# Patient Record
Sex: MALE | Race: WHITE | NOT HISPANIC OR LATINO | Employment: OTHER | URBAN - METROPOLITAN AREA
[De-identification: names, ages, dates, MRNs, and addresses within clinical notes are randomized per-mention and may not be internally consistent; named-entity substitution may affect disease eponyms.]

---

## 2017-01-13 ENCOUNTER — ALLSCRIPTS OFFICE VISIT (OUTPATIENT)
Dept: OTHER | Facility: OTHER | Age: 67
End: 2017-01-13

## 2017-01-27 DIAGNOSIS — E78.5 HYPERLIPIDEMIA: ICD-10-CM

## 2017-01-27 DIAGNOSIS — E11.9 TYPE 2 DIABETES MELLITUS WITHOUT COMPLICATIONS (HCC): ICD-10-CM

## 2017-02-05 ENCOUNTER — OFFICE VISIT (OUTPATIENT)
Dept: URGENT CARE | Facility: MEDICAL CENTER | Age: 67
End: 2017-02-05
Payer: COMMERCIAL

## 2017-02-05 PROCEDURE — 99202 OFFICE O/P NEW SF 15 MIN: CPT

## 2017-05-15 ENCOUNTER — ALLSCRIPTS OFFICE VISIT (OUTPATIENT)
Dept: OTHER | Facility: OTHER | Age: 67
End: 2017-05-15

## 2017-05-22 ENCOUNTER — APPOINTMENT (OUTPATIENT)
Dept: LAB | Facility: MEDICAL CENTER | Age: 67
End: 2017-05-22
Payer: COMMERCIAL

## 2017-05-22 ENCOUNTER — TRANSCRIBE ORDERS (OUTPATIENT)
Dept: ADMINISTRATIVE | Facility: HOSPITAL | Age: 67
End: 2017-05-22

## 2017-05-22 DIAGNOSIS — E78.5 HYPERLIPIDEMIA: ICD-10-CM

## 2017-05-22 DIAGNOSIS — E11.9 TYPE 2 DIABETES MELLITUS WITHOUT COMPLICATIONS (HCC): ICD-10-CM

## 2017-05-22 LAB
ALBUMIN SERPL BCP-MCNC: 3.6 G/DL (ref 3.5–5)
ALP SERPL-CCNC: 83 U/L (ref 46–116)
ALT SERPL W P-5'-P-CCNC: 30 U/L (ref 12–78)
ANION GAP SERPL CALCULATED.3IONS-SCNC: 7 MMOL/L (ref 4–13)
AST SERPL W P-5'-P-CCNC: 15 U/L (ref 5–45)
BILIRUB SERPL-MCNC: 0.57 MG/DL (ref 0.2–1)
BUN SERPL-MCNC: 30 MG/DL (ref 5–25)
CALCIUM SERPL-MCNC: 9.1 MG/DL (ref 8.3–10.1)
CHLORIDE SERPL-SCNC: 103 MMOL/L (ref 100–108)
CHOLEST SERPL-MCNC: 173 MG/DL (ref 50–200)
CO2 SERPL-SCNC: 30 MMOL/L (ref 21–32)
CREAT SERPL-MCNC: 0.95 MG/DL (ref 0.6–1.3)
EST. AVERAGE GLUCOSE BLD GHB EST-MCNC: 143 MG/DL
GFR SERPL CREATININE-BSD FRML MDRD: >60 ML/MIN/1.73SQ M
GLUCOSE P FAST SERPL-MCNC: 143 MG/DL (ref 65–99)
HBA1C MFR BLD: 6.6 % (ref 4.2–6.3)
HDLC SERPL-MCNC: 36 MG/DL (ref 40–60)
LDLC SERPL CALC-MCNC: 112 MG/DL (ref 0–100)
POTASSIUM SERPL-SCNC: 3.4 MMOL/L (ref 3.5–5.3)
PROT SERPL-MCNC: 7.8 G/DL (ref 6.4–8.2)
SODIUM SERPL-SCNC: 140 MMOL/L (ref 136–145)
TRIGL SERPL-MCNC: 123 MG/DL
TSH SERPL DL<=0.05 MIU/L-ACNC: 1.54 UIU/ML (ref 0.36–3.74)

## 2017-05-22 PROCEDURE — 80061 LIPID PANEL: CPT

## 2017-05-22 PROCEDURE — 36415 COLL VENOUS BLD VENIPUNCTURE: CPT

## 2017-05-22 PROCEDURE — 80053 COMPREHEN METABOLIC PANEL: CPT

## 2017-05-22 PROCEDURE — 84443 ASSAY THYROID STIM HORMONE: CPT

## 2017-05-22 PROCEDURE — 83036 HEMOGLOBIN GLYCOSYLATED A1C: CPT

## 2017-05-23 ENCOUNTER — GENERIC CONVERSION - ENCOUNTER (OUTPATIENT)
Dept: OTHER | Facility: OTHER | Age: 67
End: 2017-05-23

## 2017-08-31 ENCOUNTER — ALLSCRIPTS OFFICE VISIT (OUTPATIENT)
Dept: OTHER | Facility: OTHER | Age: 67
End: 2017-08-31

## 2017-08-31 DIAGNOSIS — Z12.5 ENCOUNTER FOR SCREENING FOR MALIGNANT NEOPLASM OF PROSTATE: ICD-10-CM

## 2017-08-31 DIAGNOSIS — N40.1 ENLARGED PROSTATE WITH LOWER URINARY TRACT SYMPTOMS (LUTS): ICD-10-CM

## 2017-08-31 LAB
CLARITY UR: NORMAL
COLOR UR: YELLOW
GLUCOSE (HISTORICAL): NORMAL
HGB UR QL STRIP.AUTO: NORMAL
KETONES UR STRIP-MCNC: NORMAL MG/DL
LEUKOCYTE ESTERASE UR QL STRIP: NORMAL
NITRITE UR QL STRIP: NORMAL
PH UR STRIP.AUTO: 5 [PH]
PROT UR STRIP-MCNC: NORMAL MG/DL
SP GR UR STRIP.AUTO: 1.01

## 2017-09-02 ENCOUNTER — APPOINTMENT (OUTPATIENT)
Dept: LAB | Facility: MEDICAL CENTER | Age: 67
End: 2017-09-02
Payer: MEDICARE

## 2017-09-02 DIAGNOSIS — Z12.5 ENCOUNTER FOR SCREENING FOR MALIGNANT NEOPLASM OF PROSTATE: ICD-10-CM

## 2017-09-02 DIAGNOSIS — N40.1 ENLARGED PROSTATE WITH LOWER URINARY TRACT SYMPTOMS (LUTS): ICD-10-CM

## 2017-09-02 LAB — PSA SERPL-MCNC: 2.1 NG/ML (ref 0–4)

## 2017-09-02 PROCEDURE — G0103 PSA SCREENING: HCPCS

## 2017-09-02 PROCEDURE — 36415 COLL VENOUS BLD VENIPUNCTURE: CPT

## 2017-09-07 ENCOUNTER — HOSPITAL ENCOUNTER (OUTPATIENT)
Dept: ULTRASOUND IMAGING | Facility: MEDICAL CENTER | Age: 67
Discharge: HOME | End: 2017-09-07
Payer: MEDICARE

## 2017-09-07 ENCOUNTER — APPOINTMENT (OUTPATIENT)
Dept: ULTRASOUND IMAGING | Facility: MEDICAL CENTER | Age: 67
End: 2017-09-07
Payer: MEDICARE

## 2017-09-07 DIAGNOSIS — N40.1 ENLARGED PROSTATE WITH LOWER URINARY TRACT SYMPTOMS (LUTS): ICD-10-CM

## 2017-09-07 PROCEDURE — 51798 US URINE CAPACITY MEASURE: CPT

## 2017-09-22 DIAGNOSIS — E11.9 TYPE 2 DIABETES MELLITUS WITHOUT COMPLICATIONS (HCC): ICD-10-CM

## 2017-09-22 DIAGNOSIS — E78.5 HYPERLIPIDEMIA: ICD-10-CM

## 2017-09-28 ENCOUNTER — ALLSCRIPTS OFFICE VISIT (OUTPATIENT)
Dept: OTHER | Facility: OTHER | Age: 67
End: 2017-09-28

## 2017-09-28 LAB
BILIRUB UR QL STRIP: NORMAL
CLARITY UR: NORMAL
COLOR UR: YELLOW
GLUCOSE (HISTORICAL): NORMAL
HGB UR QL STRIP.AUTO: NORMAL
KETONES UR STRIP-MCNC: NORMAL MG/DL
LEUKOCYTE ESTERASE UR QL STRIP: NORMAL
NITRITE UR QL STRIP: NORMAL
PH UR STRIP.AUTO: 5 [PH]
PROT UR STRIP-MCNC: NORMAL MG/DL
SP GR UR STRIP.AUTO: 1
UROBILINOGEN UR QL STRIP.AUTO: NORMAL

## 2017-10-25 NOTE — CONSULTS
Assessment  1  Enlarged prostate with lower urinary tract symptoms (LUTS) (600 01) (N40 1)    Plan  Encounter for screening for malignant neoplasm of prostate, Enlarged prostate with  lower urinary tract symptoms (LUTS)    · (1) PSA (SCREEN) (Dx V76 44 Screen for Prostate Cancer); Status:Active; Requested  for:73Yfk1786;    Perform:[facility]; Due:31Aug2018; Ordered;For:Encounter for screening for malignant neoplasm of prostate, Enlarged prostate with lower urinary tract symptoms (LUTS); Ordered By:Moisés Butler;  Enlarged prostate with lower urinary tract symptoms (LUTS)    · Tamsulosin HCl - 0 4 MG Oral Capsule; 1 PO every HS   Rx By: Rich Caballero; Dispense: 30 Days ; #:30 Capsule; Refill: 11; For: Enlarged prostate with lower urinary tract symptoms (LUTS); KRISTINE = N; Sent To:  [facility]   · Cystourethroscopy - POC; Status:Active - Perform Order; Requested for:08Ore1003;    Perform: In Office; Due:31Aug2018; Ordered;For:Enlarged prostate with lower urinary tract symptoms (LUTS); Ordered By:Brigitte Butler;   · US KIDNEY AND BLADDER; Status:Hold For - Scheduling; Requested for:42Tfw2173;    Perform:[facility]; Due:31Aug2018; Ordered;For:Enlarged prostate with lower urinary tract symptoms (LUTS); Ordered By:Brigitte Bulter;   · US MEASURE POST VOID RESIDUAL; Status:Hold For - Scheduling; Requested  for:31Aug2017;    Perform:[facility]; Due:31Aug2018; Ordered;For:Enlarged prostate with lower urinary tract symptoms (LUTS); Ordered By:Moisés Butler;  Nocturia    · Urine Dip Non-Automated- POC; Status:Complete - Retrospective By Protocol  Authorization;   Done: 89LML5609 09:49AM   Performed: In Office; Due:70Ani0146; Last Updated Lizz Sutherland; 8/31/2017 9:51:27 AM;Ordered;For:Nocturia; Ordered By:Brigitte Butler; Discussion/Summary  Discussion Summary:   Multiple urinary symptoms   Patient likely bladder outlet obstruction although requires further evaluation  He is also due for PSA  I will check an ultrasound of urinary tract with postvoid residual  I will also schedule him for cystoscopy  Regardless, I would like him to resume tamsulosin  He may have this at home  If not a new prescription was sent to his pharmacy  He understands and agrees with the plan  Chief Complaint  Chief Complaint Free Text Note Form: BPH; Nocturia      History of Present Illness  HPI: 30-year-old male presents complaining of significant lower urinary tract symptoms for several years  AUA symptom score 30  His primary complaint is nocturia up to 5 times per night, but he also has weak urinary stream occasional hesitancy and feeling of incomplete emptying  He has frequency and urgency as well on occasion  It seems as though he has been on tamsulosin for short-term as well as finasteride for short-term  He discontinued those medications on his own because of fear of side effects  2 2 on 9/27/16      Review of Systems  Complete-Male Urology:   Constitutional: No fever or chills, feels well, no tiredness, no recent weight gain or weight loss  Respiratory: No complaints of shortness of breath, no wheezing, no cough, no SOB on exertion, no orthopnea or PND  Cardiovascular: No complaints of slow heart rate, no fast heart rate, no chest pain, no palpitations, no leg claudication, no lower extremity  Gastrointestinal: No complaints of abdominal pain, no constipation, no nausea or vomiting, no diarrhea or bloody stools  Genitourinary: incontinence-- (occasional urge incontinence ),-- feelings of urinary urgency-- and-- stream quality poor, but-- as noted in HPI,-- no hematuria-- and-- no empty sensation--    The patient presents with complaints of dysuria (occasional burning )     The patient presents with complaints of urinary hesitancy (occasional )   The patient presents with complaints of nocturia (4-5x/night)   Musculoskeletal: No complaints of arthralgia, no myalgias, no joint swelling or stiffness, no limb pain or swelling  Integumentary: No complaints of skin rash or skin lesions, no itching, no skin wound, no dry skin  Hematologic/Lymphatic: No complaints of swollen glands, no swollen glands in the neck, does not bleed easily, no easy bruising  Neurological: No compliants of headache, no confusion, no convulsions, no numbness or tingling, no dizziness or fainting, no limb weakness, no difficulty walking  ROS Reviewed:   ROS reviewed  Active Problems  1  Dermatitis (692 9) (L30 9)   2  Encounter for prostate cancer screening (V76 44) (Z12 5)   3  Enlarged prostate with lower urinary tract symptoms (LUTS) (600 01) (N40 1)   4  Hearing loss, right (389 9) (H91 91)   5  Hyperlipidemia (272 4) (E78 5)   6  Hypertension (401 9) (I10)   7  Need for Tdap vaccination (V06 1) (Z23)   8  Nocturia (788 43) (R35 1)   9  Screening for colon cancer (V76 51) (Z12 11)   10  Syringomyelia (336 0) (G95 0)   11  Type 2 diabetes mellitus (250 00) (E11 9)    Past Medical History  1  History of Diverticulosis (562 10) (K57 90)   2  History of hemorrhoids (V13 89) (Z87 19)   3  History of Polyp, sigmoid colon (211 3) (D12 5)  Active Problems And Past Medical History Reviewed: The active problems and past medical history were reviewed and updated today  Surgical History  1  History of Colonoscopy   2  History of Ear Surgery   3  History of Nose Surgery  Surgical History Reviewed: The surgical history was reviewed and updated today  Family History  Mother    1  Family history of Hepatitis  Father    2  Family history of cardiac disorder (V17 49) (Z82 49)  Family History Reviewed: The family history was reviewed and updated today         Social History   · Advance directive information unavailable   · Full-time employment   · Denied: History of domestic violence   ·    · Never smoker   · Occasional alcohol use   · Denied: History of Problem with Latter day or spiritual beliefs regarding medical care   · Three children  Social History Reviewed: The social history was reviewed and updated today  Current Meds   1  AmLODIPine Besylate 10 MG Oral Tablet; TAKE 1 TABLET DAILY; Therapy: 85JXJ3656 to (Evaluate:08Jan2018)  Requested for: 21IDD9781; Last   Rx:13Jan2017 Ordered   2  Clobetasol Propionate 0 05 % External Cream; APPLY SPARINGLY TO AFFECTED   AREA(S) TWICE DAILY; Therapy: 21PWA9959 to (Oseas Gallagher)  Requested for: 13DNI5436; Last   Rx:22Jan2015 Ordered   3  Gabapentin 600 MG Oral Tablet; TAKE 1 CAPSULE BY MOUTH THREE TIMES DAILY AS   NEEDED; Therapy: 50OPV2034 to ([phone])  Requested for: 51TFY7305; Last   Rx:27Zzt5668 Ordered   4  HydroCHLOROthiazide 25 MG Oral Tablet; take 1 tablet by mouth once daily; Therapy: 59QMS7818 to (Evaluate:48Axx3987)  Requested for: 99OUX3493; Last   Rx:59Veq2558 Ordered   5  MetFORMIN HCl ER (OSM) 500 MG Oral Tablet Extended Release 24 Hour; take 1 tablet   by mouth once daily; Therapy: 81KYY4313 to (Evaluate:56Nmy6540)  Requested for: 54Bbl6865; Last   Rx:74Cyu9802 Ordered   6  OneTouch Ultra 2 w/Device Kit; USE AS DIRECTED; Therapy: 46JEY1920 to (Evaluate:05Oct2017)  Requested for: 35HBI2427; Last   Rx:14Nxo8272 Ordered   7  OneTouch Ultra Blue In Vitro Strip; TEST 1-2 TIMES PER DAY; Therapy: 22ZLR0890 to ([phone])  Requested for: 78IXR4746; Last   Rx:91Awg6533 Ordered   8  OneTouch UltraSoft Lancets Miscellaneous; check BS once daily; Therapy: 88ZIA6605 to ([phone])  Requested for: 76ADS6322; Last   Rx:31Oou6782 Ordered   9  TraMADol HCl - 50 MG Oral Tablet; TAKE 1 TABLET BY MOUTH TID  AS NEEDED FOR   MODERATE PAIN;   Therapy: 22Apr2017 to (Evaluate:34Ftn0054)  Requested for: 83RFC6231; Last   Rx:45Ezh4272 Ordered  Medication List Reviewed: The medication list was reviewed and updated today  Allergies  1  Lipitor TABS  2  No Known Environmental Allergies   3   No Known Food Allergies    Vitals  Vital Signs    Recorded: 31Aug2017 09:48AM   Heart Rate 78   Systolic 770   Diastolic 70   Height 5 ft 5 in   Weight 159 lb    BMI Calculated 26 46   BSA Calculated 1 79     Physical Exam    Constitutional   General appearance: No acute distress, well appearing and well nourished  Pulmonary   Respiratory effort: No increased work of breathing or signs of respiratory distress  Cardiovascular   Examination of extremities for edema and/or varicosities: Normal     Abdomen   Abdomen: Non-tender, no masses  Genitourinary   Digital rectal exam of prostate: Normal size, no masses  -- About 40 g, smooth, no nodules  Musculoskeletal   Gait and station: Normal     Skin   Skin and subcutaneous tissue: Normal without rashes or lesions  Lymphatic   Palpation of lymph nodes in groin: Normal     Neurologic   Sensation: No sensory loss         Results/Data  AUA Symptom Score 31Aug2017 09:51AM User, Ahs     Test Name Result Flag Reference   AUA Symptom Score (for prostate disease) 30     Incomplete emptying: Almost always (5)  Frequency: Almost always (5)  Intermittency: Almost always (5)  Urgency: Almost always (5)  Weak-stream: About half the time (3)  Straining: About half the time (3)  Nocturia: More than half the time (4)   AUA Symptom Score (for prostate disease) - Quality of Life Due to Urinary Symptoms Mixed     AUA Symptom Score (for prostate disease) - Score Category Severe       Urine Dip Non-Automated- POC 31Aug2017 09:49AM Guicho Newman     Test Name Result Flag Reference   Color Yellow     Clarity Transparent     Leukocytes -     Nitrite -     Blood -     Protein -     Ph 5 0     Specific Gravity 1 010     Ketone -     Glucose -         Future Appointments    Date/Time Provider Specialty Site   11/15/2017 10:00 AM Lavelle Lanza MD [place] [address]     Signatures   Electronically signed by : Luane Closs, M D ; Aug 31 2017 10:43AM EST (Author)

## 2017-11-13 ENCOUNTER — GENERIC CONVERSION - ENCOUNTER (OUTPATIENT)
Dept: OTHER | Facility: OTHER | Age: 67
End: 2017-11-13

## 2017-11-15 ENCOUNTER — TRANSCRIBE ORDERS (OUTPATIENT)
Dept: ADMINISTRATIVE | Facility: HOSPITAL | Age: 67
End: 2017-11-15

## 2017-11-15 ENCOUNTER — ALLSCRIPTS OFFICE VISIT (OUTPATIENT)
Dept: OTHER | Facility: OTHER | Age: 67
End: 2017-11-15

## 2017-11-15 DIAGNOSIS — G95.0 SYRINGOMYELIA (HCC): Primary | ICD-10-CM

## 2017-11-20 ENCOUNTER — TELEPHONE (OUTPATIENT)
Dept: PREADMISSION TESTING | Facility: HOSPITAL | Age: 67
End: 2017-11-20

## 2017-11-22 ENCOUNTER — APPOINTMENT (OUTPATIENT)
Dept: LAB | Facility: MEDICAL CENTER | Age: 67
End: 2017-11-22
Payer: MEDICARE

## 2017-11-22 ENCOUNTER — GENERIC CONVERSION - ENCOUNTER (OUTPATIENT)
Dept: OTHER | Facility: OTHER | Age: 67
End: 2017-11-22

## 2017-11-22 DIAGNOSIS — E78.5 HYPERLIPIDEMIA: ICD-10-CM

## 2017-11-22 DIAGNOSIS — E11.9 TYPE 2 DIABETES MELLITUS WITHOUT COMPLICATIONS (HCC): ICD-10-CM

## 2017-11-22 LAB
ALBUMIN SERPL BCP-MCNC: 3.6 G/DL (ref 3.5–5)
ALP SERPL-CCNC: 87 U/L (ref 46–116)
ALT SERPL W P-5'-P-CCNC: 30 U/L (ref 12–78)
ANION GAP SERPL CALCULATED.3IONS-SCNC: 8 MMOL/L (ref 4–13)
AST SERPL W P-5'-P-CCNC: 18 U/L (ref 5–45)
BILIRUB SERPL-MCNC: 0.69 MG/DL (ref 0.2–1)
BUN SERPL-MCNC: 23 MG/DL (ref 5–25)
CALCIUM SERPL-MCNC: 9 MG/DL (ref 8.3–10.1)
CHLORIDE SERPL-SCNC: 101 MMOL/L (ref 100–108)
CHOLEST SERPL-MCNC: 179 MG/DL (ref 50–200)
CO2 SERPL-SCNC: 28 MMOL/L (ref 21–32)
CREAT SERPL-MCNC: 0.88 MG/DL (ref 0.6–1.3)
CREAT UR-MCNC: 13.9 MG/DL
EST. AVERAGE GLUCOSE BLD GHB EST-MCNC: 143 MG/DL
GFR SERPL CREATININE-BSD FRML MDRD: 90 ML/MIN/1.73SQ M
GLUCOSE P FAST SERPL-MCNC: 118 MG/DL (ref 65–99)
HBA1C MFR BLD: 6.6 % (ref 4.2–6.3)
HDLC SERPL-MCNC: 38 MG/DL (ref 40–60)
LDLC SERPL CALC-MCNC: 123 MG/DL (ref 0–100)
MICROALBUMIN UR-MCNC: <5 MG/L (ref 0–20)
MICROALBUMIN/CREAT 24H UR: <36 MG/G CREATININE (ref 0–30)
POTASSIUM SERPL-SCNC: 3 MMOL/L (ref 3.5–5.3)
PROT SERPL-MCNC: 8.1 G/DL (ref 6.4–8.2)
SODIUM SERPL-SCNC: 137 MMOL/L (ref 136–145)
TRIGL SERPL-MCNC: 92 MG/DL

## 2017-11-22 PROCEDURE — 82043 UR ALBUMIN QUANTITATIVE: CPT

## 2017-11-22 PROCEDURE — 36415 COLL VENOUS BLD VENIPUNCTURE: CPT

## 2017-11-22 PROCEDURE — 82570 ASSAY OF URINE CREATININE: CPT

## 2017-11-22 PROCEDURE — 80053 COMPREHEN METABOLIC PANEL: CPT

## 2017-11-22 PROCEDURE — 83036 HEMOGLOBIN GLYCOSYLATED A1C: CPT

## 2017-11-22 PROCEDURE — 80061 LIPID PANEL: CPT

## 2017-12-06 ENCOUNTER — GENERIC CONVERSION - ENCOUNTER (OUTPATIENT)
Dept: OTHER | Facility: OTHER | Age: 67
End: 2017-12-06

## 2017-12-06 ENCOUNTER — ANESTHESIA (OUTPATIENT)
Dept: RADIOLOGY | Facility: HOSPITAL | Age: 67
End: 2017-12-06

## 2017-12-06 ENCOUNTER — ANESTHESIA EVENT (OUTPATIENT)
Dept: RADIOLOGY | Facility: HOSPITAL | Age: 67
End: 2017-12-06

## 2017-12-06 ENCOUNTER — HOSPITAL ENCOUNTER (OUTPATIENT)
Dept: RADIOLOGY | Facility: HOSPITAL | Age: 67
Discharge: HOME | End: 2017-12-06
Payer: MEDICARE

## 2017-12-06 VITALS
WEIGHT: 160 LBS | BODY MASS INDEX: 25.71 KG/M2 | TEMPERATURE: 98.3 F | DIASTOLIC BLOOD PRESSURE: 78 MMHG | HEIGHT: 66 IN | HEART RATE: 56 BPM | RESPIRATION RATE: 16 BRPM | SYSTOLIC BLOOD PRESSURE: 158 MMHG | OXYGEN SATURATION: 97 %

## 2017-12-06 DIAGNOSIS — E87.6 HYPOKALEMIA: ICD-10-CM

## 2017-12-06 DIAGNOSIS — G95.0 SYRINGOMYELIA (HCC): ICD-10-CM

## 2017-12-06 LAB — GLUCOSE SERPL-MCNC: 105 MG/DL (ref 65–140)

## 2017-12-06 PROCEDURE — 72141 MRI NECK SPINE W/O DYE: CPT

## 2017-12-06 PROCEDURE — 82948 REAGENT STRIP/BLOOD GLUCOSE: CPT

## 2017-12-06 RX ORDER — SODIUM CHLORIDE, SODIUM LACTATE, POTASSIUM CHLORIDE, CALCIUM CHLORIDE 600; 310; 30; 20 MG/100ML; MG/100ML; MG/100ML; MG/100ML
20 INJECTION, SOLUTION INTRAVENOUS CONTINUOUS
Status: DISCONTINUED | OUTPATIENT
Start: 2017-12-06 | End: 2017-12-07 | Stop reason: HOSPADM

## 2017-12-06 RX ORDER — PROPOFOL 10 MG/ML
INJECTION, EMULSION INTRAVENOUS AS NEEDED
Status: DISCONTINUED | OUTPATIENT
Start: 2017-12-06 | End: 2017-12-06 | Stop reason: SURG

## 2017-12-06 RX ADMIN — PROPOFOL 200 MG: 10 INJECTION, EMULSION INTRAVENOUS at 13:34

## 2017-12-06 RX ADMIN — SODIUM CHLORIDE, SODIUM LACTATE, POTASSIUM CHLORIDE, AND CALCIUM CHLORIDE: .6; .31; .03; .02 INJECTION, SOLUTION INTRAVENOUS at 13:27

## 2017-12-06 RX ADMIN — SODIUM CHLORIDE, SODIUM LACTATE, POTASSIUM CHLORIDE, AND CALCIUM CHLORIDE 20 ML/HR: .6; .31; .03; .02 INJECTION, SOLUTION INTRAVENOUS at 12:21

## 2017-12-06 NOTE — ANESTHESIA PREPROCEDURE EVALUATION
Review of Systems/Medical History  Patient summary reviewed  Chart reviewed      Cardiovascular  Exercise tolerance: good,  Hypertension ,    Pulmonary       GI/Hepatic            Endo/Other  Diabetes ,      GYN       Hematology   Musculoskeletal       Neurology   Psychology           Physical Exam    Airway    Mallampati score: I  TM Distance: <3 FB  Neck ROM: full     Dental   No notable dental hx     Cardiovascular  Rhythm: regular, Rate: normal,     Pulmonary  Pulmonary exam normal     Other Findings        Anesthesia Plan  ASA Score- 2       Anesthesia Type- general with ASA Monitors  Additional Monitors:   Airway Plan: LMA  Comment: I, Dr Tenisha Escobedo, the attending physician, has personally seen and evaluated the patient prior to anesthetic care  I have reviewed the pre-anesthetic record, and other medical records if appropriate to the anesthetic care  Risks and benefits discussed with patient; patient consented and agrees to proceed  If a CRNA is involved in the case, I have reviewed the CRNA assessment, if present, and agree  The patient is in a suitable condition to proceed with my formulated anesthetic plan          Induction- intravenous  Informed Consent- Anesthetic plan and risks discussed with patient

## 2017-12-06 NOTE — PROGRESS NOTES
Pt awake and alert  VS stable  Pt resting comfortably, answered all patient's questions  Notified pt of transfer to [facility], called [facility] spoke with Hola Villanueva

## 2017-12-06 NOTE — ANESTHESIA POSTPROCEDURE EVALUATION
Post-Op Assessment Note      CV Status:  Stable    Mental Status:  Alert and awake    Hydration Status:  Euvolemic    PONV Controlled:  Controlled    Airway Patency:  Patent    Post Op Vitals Reviewed: Yes          Staff: Anesthesiologist           /85 (12/06/17 1413)    Temp (!) 96 2 °F (35 7 °C) (12/06/17 1413)    Pulse 58 (12/06/17 1413)   Resp 16 (12/06/17 1413)    SpO2 98 % (12/06/17 1413)

## 2017-12-08 ENCOUNTER — GENERIC CONVERSION - ENCOUNTER (OUTPATIENT)
Dept: OTHER | Facility: OTHER | Age: 67
End: 2017-12-08

## 2017-12-11 ENCOUNTER — APPOINTMENT (OUTPATIENT)
Dept: LAB | Facility: MEDICAL CENTER | Age: 67
End: 2017-12-11
Payer: MEDICARE

## 2017-12-11 ENCOUNTER — GENERIC CONVERSION - ENCOUNTER (OUTPATIENT)
Dept: OTHER | Facility: OTHER | Age: 67
End: 2017-12-11

## 2017-12-11 DIAGNOSIS — E87.6 HYPOKALEMIA: ICD-10-CM

## 2017-12-11 LAB
ANION GAP SERPL CALCULATED.3IONS-SCNC: 6 MMOL/L (ref 4–13)
BUN SERPL-MCNC: 29 MG/DL (ref 5–25)
CALCIUM SERPL-MCNC: 9.5 MG/DL (ref 8.3–10.1)
CHLORIDE SERPL-SCNC: 102 MMOL/L (ref 100–108)
CO2 SERPL-SCNC: 28 MMOL/L (ref 21–32)
CREAT SERPL-MCNC: 0.92 MG/DL (ref 0.6–1.3)
GFR SERPL CREATININE-BSD FRML MDRD: 87 ML/MIN/1.73SQ M
GLUCOSE SERPL-MCNC: 152 MG/DL (ref 65–140)
POTASSIUM SERPL-SCNC: 3.4 MMOL/L (ref 3.5–5.3)
SODIUM SERPL-SCNC: 136 MMOL/L (ref 136–145)

## 2017-12-11 PROCEDURE — 80048 BASIC METABOLIC PNL TOTAL CA: CPT

## 2017-12-11 PROCEDURE — 36415 COLL VENOUS BLD VENIPUNCTURE: CPT

## 2018-01-10 NOTE — RESULT NOTES
Verified Results  (Q) BASIC METABOLIC PANEL W/O CA 03IWI7644 09:20AM Dionbrian Rodriguezn   REPORT COMMENT:  FASTING:YES     Test Name Result Flag Reference   GLUCOSE 168 mg/dL H 65-99   Fasting reference interval   UREA NITROGEN (BUN) 27 mg/dL H 7-25   CREATININE 0 81 mg/dL  0 70-1 25   For patients >52years of age, the reference limit  for Creatinine is approximately 13% higher for people  identified as -American  eGFR NON-AFR  AMERICAN 95 mL/min/1 73m2  > OR = 60   eGFR AFRICAN AMERICAN 110 mL/min/1 73m2  > OR = 60   BUN/CREATININE RATIO 33 (calc) H 6-22   SODIUM 141 mmol/L  135-146   POTASSIUM 3 9 mmol/L  3 5-5 3   CHLORIDE 104 mmol/L     CARBON DIOXIDE 26 mmol/L  19-30       Plan  Type 2 diabetes mellitus    · (Q) COMPREHENSIVE METABOLIC PNL W/ADJUSTED CALCIUM; Status:Active; Requested GGD:10ABU2537;    · (Q) HEMOGLOBIN A1c WITH eAG; Status:Active; Requested JUX:30ILG8754;    · (Q) LIPID PANEL WITH DIRECT LDL; Status:Active;  Requested OLB:05FBW7125;

## 2018-01-13 VITALS
BODY MASS INDEX: 28.06 KG/M2 | RESPIRATION RATE: 18 BRPM | WEIGHT: 168.38 LBS | SYSTOLIC BLOOD PRESSURE: 165 MMHG | DIASTOLIC BLOOD PRESSURE: 92 MMHG | HEIGHT: 65 IN | OXYGEN SATURATION: 97 % | HEART RATE: 80 BPM

## 2018-01-13 VITALS
WEIGHT: 159 LBS | BODY MASS INDEX: 26.49 KG/M2 | HEIGHT: 65 IN | DIASTOLIC BLOOD PRESSURE: 70 MMHG | SYSTOLIC BLOOD PRESSURE: 136 MMHG | HEART RATE: 78 BPM

## 2018-01-13 NOTE — RESULT NOTES
Verified Results  (1) COMPREHENSIVE METABOLIC PANEL 69VUU8180 03:64WJ Freddie Andres Order Number: [id number]     Test Name Result Flag Reference   SODIUM 140 mmol/L  136-145   POTASSIUM 3 4 mmol/L L 3 5-5 3   CHLORIDE 103 mmol/L  100-108   CARBON DIOXIDE 30 mmol/L  21-32   ANION GAP (CALC) 7 mmol/L  4-13   BLOOD UREA NITROGEN 30 mg/dL H 5-25   CREATININE 0 95 mg/dL  0 60-1 30   Standardized to IDMS reference method   CALCIUM 9 1 mg/dL  8 3-10 1   BILI, TOTAL 0 57 mg/dL  0 20-1 00   ALK PHOSPHATAS 83 U/L     ALT (SGPT) 30 U/L  12-78   AST(SGOT) 15 U/L  5-45   ALBUMIN 3 6 g/dL  3 5-5 0   TOTAL PROTEIN 7 8 g/dL  6 4-8 2   eGFR Non-African American      >60 0 ml/min/1 73sq [facility] Disease Education Program recommendations are as follows:  GFR calculation is accurate only with a steady state creatinine  Chronic Kidney disease less than 60 ml/min/1 73 sq  meters  Kidney failure less than 15 ml/min/1 73 sq  meters  GLUCOSE FASTING 143 mg/dL H 65-99     (1) HEMOGLOBIN A1C 95NLO2346 08:28AM Freddie [place] Order Number: [id number]     Test Name Result Flag Reference   HEMOGLOBIN A1C 6 6 % H 4 2-6 3   EST  AVG  GLUCOSE 143 mg/dl       (1) LIPID PANEL FASTING W DIRECT LDL REFLEX 40IYJ3058 08:28AM Freddie Dmitry Order Number: [id number]     Test Name Result Flag Reference   CHOLESTEROL 173 mg/dL     LDL CHOLESTEROL CALCULATED 112 mg/dL H 0-100   - Patient Instructions:  This is a fasting blood test  Water, black tea or black coffee only after 9:00pm the night before test   Drink 2 glasses of water the morning of test       Triglyceride:         Normal              <150 mg/dl       Borderline High    150-199 mg/dl       High               200-499 mg/dl       Very High          >499 mg/dl  Cholesterol:         Desirable        <200 mg/dl      Borderline High  200-239 mg/dl      High             >239 mg/dl  HDL Cholesterol:        High    >59 mg/dL      Low     <41 mg/dL  LDL Cholesterol:        Optimal          <100 mg/dl        Near Optimal     100-129 mg/dl        Above Optimal          Borderline High   130-159 mg/dl          High              160-189 mg/dl          Very High        >189 mg/dl  LDL CALCULATED:    This screening LDL is a calculated result  It does not have the accuracy of the Direct Measured LDL in the monitoring of patients with hyperlipidemia and/or statin therapy  Direct Measure LDL (GHZ612) must be ordered separately in these patients  TRIGLYCERIDES 123 mg/dL  <=150   Specimen collection should occur prior to N-Acetylcysteine or Metamizole administration due to the potential for falsely depressed results  HDL,DIRECT 36 mg/dL L 40-60   Specimen collection should occur prior to Metamizole administration due to the potential for falsely depressed results  (1) TSH 22YRY1557 08:28AM Nain Batista Order Number: [id number]     Test Name Result Flag Reference   TSH 1 540 uIU/mL  0 358-3 740   - Patient Instructions: This bloodwork is non-fasting  Please drink two glasses of water morning of bloodwork  Patients undergoing fluorescein dye angiography may retain small amounts of fluorescein in the body for 48-72 hours post procedure  Samples containing fluorescein can produce falsely depressed TSH values  If the patient had this procedure,a specimen should be resubmitted post fluorescein clearance  Plan  Hyperlipidemia    · (1) LIPID PANEL FASTING W DIRECT LDL REFLEX; Status:Active; Requested  for:22Sep2017;   Type 2 diabetes mellitus    · (1) COMPREHENSIVE METABOLIC PANEL; Status:Active; Requested for:22Sep2017;    · (1) HEMOGLOBIN A1C; Status:Active; Requested for:22Sep2017;    · (1) MICROALBUMIN CREATININE RATIO, RANDOM URINE; Status:Active; Requested  for:22Sep2017;    · (1) HEMOGLOBIN A1C ; every 6 months;  Last 27ICZ5201; Next 51WUC8644;  Status:Active

## 2018-01-13 NOTE — MISCELLANEOUS
Message   Recorded as Task   Date: 08/31/2016 08:31 AM, Created By: [facility]   Task Name: Follow Up   Assigned To: [facility],Team   Regarding Patient: Santa Fenton, Status: Active   CommentShikha Pepe - 31 Aug 2016 8:31 AM     TASK CREATED    Routine post colono call placed to patient  Procedure done on 8/30/16  Voicemail received and message left for patient to return call  Path pending  {Diverticulosis and Hemorrhoids, Single polyp seen in Sigmoid Colon}   [facility] - 31 Aug 2016 10:28 AM     TASK EDITED  Patient returned call and states he is doing fine  He is moving bowels and denies having any problems  Told him I will call him when his results are in  Patient verbalized understanding and thanked me for the call  [facility] - 07 Sep 2016 9:03 AM     TASK EDITED   [facility] - 08 Sep 2016 10:24 AM     TASK EDITED  Low-grade dysplasia, serrated adenoma, 1 year follow-up for the dysplasia  Results call in to patient  See telephone note on 9/8/16 for full discussion with patient of results  Active Problems    1  Benign prostatic hypertrophy (BPH) with nocturia (600 01,788 43) (N40 1,R35 1)   2  Chronic maxillary sinusitis (473 0) (J32 0)   3  Dermatitis (692 9) (L30 9)   4  Encounter for prostate cancer screening (V76 44) (Z12 5)   5  Fingernail problem (703 9) (L60 9)   6  Hearing loss, right (389 9) (H91 91)   7  Hyperlipidemia (272 4) (E78 5)   8  Hypertension (401 9) (I10)   9  Need for Tdap vaccination (V06 1) (Z23)   10  Otitis media (382 9) (H66 90)   11  Screening for colon cancer (V76 51) (Z12 11)   12  Syringomyelia (336 0) (G95 0)   13  Type 2 diabetes mellitus (250 00) (E11 9)    Current Meds   1  AmLODIPine Besylate 5 MG Oral Tablet; TAKE 1 TABLET DAILY AS DIRECTED; Therapy: 14NEK7911 to (Evaluate:29Oct2016)  Requested for: 37BWX2376; Last   Rx:02May2016 Ordered   2   Clobetasol Propionate 0 05 % External Cream; APPLY SPARINGLY TO AFFECTED   AREA(S) TWICE DAILY; Therapy: 76HXS3745 to (Raymona Burn)  Requested for: 80TPC2911; Last   Rx:22Jan2015 Ordered   3  Gabapentin 600 MG Oral Tablet; TAKE 1 CAPSULE BY MOUTH TWICE DAILY AS   NEEDED; Therapy: 06UWQ1321 to (Aguilar Rafita)  Requested for: 31Zmk4462; Last   Rx:66Izz1893 Ordered   4  Hydrochlorothiazide 25 MG Oral Tablet; take 1 tablet by mouth daily; Therapy: 73GXU0562 to (Evaluate:29Oct2016)  Requested for: 70YVL6830; Last   Rx:05Znn7059 Ordered   5  Tamsulosin HCl - 0 4 MG Oral Capsule; TAKE 1 CAPSULE AT BEDTIME; Therapy: 50HDM1817 to (Niraj Paul)  Requested for: 58CTW4711; Last   Rx:01Jun2016 Ordered   6  TraMADol HCl - 50 MG Oral Tablet; TAKE 1 TABLET Twice daily PRN; Last Rx:13Jan2016   Ordered    Allergies    1  Lipitor TABS    2  No Known Environmental Allergies   3   No Known Food Allergies    Signatures   Electronically signed by : Ngozi Story, ; Sep  8 2016 10:24AM EST                       (Author)

## 2018-01-14 VITALS
WEIGHT: 158 LBS | SYSTOLIC BLOOD PRESSURE: 130 MMHG | BODY MASS INDEX: 26.29 KG/M2 | DIASTOLIC BLOOD PRESSURE: 82 MMHG | HEART RATE: 68 BPM

## 2018-01-14 VITALS
BODY MASS INDEX: 27.42 KG/M2 | DIASTOLIC BLOOD PRESSURE: 98 MMHG | HEIGHT: 65 IN | SYSTOLIC BLOOD PRESSURE: 162 MMHG | WEIGHT: 164.6 LBS

## 2018-01-15 NOTE — RESULT NOTES
Verified Results  (Q) COMPREHENSIVE METABOLIC PNL W/ADJUSTED CALCIUM 37UGV3125 10:10AM Tamara Gibbonsumada     Test Name Result Flag Reference   GLUCOSE 139 mg/dL H 65-99   Fasting reference interval   UREA NITROGEN (BUN) 23 mg/dL  7-25   CREATININE 0 84 mg/dL  0 70-1 25   For patients >52years of age, the reference limit  for Creatinine is approximately 13% higher for people  identified as -American  eGFR NON-AFR  AMERICAN 93 mL/min/1 73m2  > OR = 60   eGFR AFRICAN AMERICAN 108 mL/min/1 73m2  > OR = 60   BUN/CREATININE RATIO   1-44   NOT APPLICABLE (calc)   SODIUM 140 mmol/L  135-146   POTASSIUM 4 3 mmol/L  3 5-5 3   CHLORIDE 105 mmol/L     CARBON DIOXIDE 27 mmol/L  19-30   CALCIUM 9 3 mg/dL  8 6-10 3   CALCIUM (ADJUSTED FOR$ALBUMIN) 9 4 mg/dL (calc)  8 6-10 2   PROTEIN, TOTAL 7 6 g/dL  6 1-8 1   ALBUMIN 4 2 g/dL  3 6-5 1   GLOBULIN 3 4 g/dL (calc)  1 9-3 7   ALBUMIN/GLOBULIN RATIO 1 2 (calc)  1 0-2 5   BILIRUBIN, TOTAL 0 7 mg/dL  0 2-1 2   ALKALINE PHOSPHATASE 88 U/L     AST 15 U/L  10-35   ALT 20 U/L  9-46     (Q) HEMOGLOBIN A1c WITH eAG 95GZA8180 10:10AM Tamara Gibbonsumada   REPORT COMMENT:  FASTING:YES     Test Name Result Flag Reference   HEMOGLOBIN A1c 6 5 % of total Hgb H <5 7   According to ADA guidelines, hemoglobin A1c <7 0%  represents optimal control in non-pregnant diabetic  patients  Different metrics may apply to specific  patient populations  Standards of Medical Care in    Diabetes Care  2013;36:s11-s66     For the purpose of screening for the presence of  diabetes  <5 7%       Consistent with the absence of diabetes  5 7-6 4%    Consistent with increased risk for diabetes              (prediabetes)  >or=6 5%    Consistent with diabetes     This assay result is consistent with diabetes  mellitus  Currently, no consensus exists for use of hemoglobin  A1c for diagnosis of diabetes for children     eAG (mg/dL) 140 (calc)     eAG (mmol/L) 7 7 (calc)       (Q) LIPID PANEL WITH DIRECT LDL 66TIH8744 10:10AM Leila Cm     Test Name Result Flag Reference   CHOLESTEROL, TOTAL 201 mg/dL H 125-200   HDL CHOLESTEROL 39 mg/dL L > OR = 40   TRIGLICERIDES 496 mg/dL  <150   DIRECT  mg/dL H <130   Desirable range <100 mg/dL for patients with CHD or  diabetes and <70 mg/dL for diabetic patients with  known heart disease  CHOL/HDLC RATIO 5 2 (calc) H < OR = 5 0   NON HDL CHOLESTEROL 162 mg/dL (calc) H    Target for non-HDL cholesterol is 30 mg/dL higher than   LDL cholesterol target  Plan  Type 2 diabetes mellitus    · (Q) BASIC METABOLIC PANEL W/O CA; Status:Active;  Requested for:20Qgu9988;

## 2018-01-15 NOTE — RESULT NOTES
Verified Results  (Q) COMPREHENSIVE METABOLIC PNL W/ADJUSTED CALCIUM [id number] 08:16AM Mari FrameBlast     Test Name Result Flag Reference   GLUCOSE 150 mg/dL H 65-99   Fasting reference interval   UREA NITROGEN (BUN) 22 mg/dL  7-25   CREATININE 0 86 mg/dL  0 70-1 25   For patients >52years of age, the reference limit  for Creatinine is approximately 13% higher for people  identified as -American  eGFR NON-AFR  AMERICAN 92 mL/min/1 73m2  > OR = 60   eGFR AFRICAN AMERICAN 106 mL/min/1 73m2  > OR = 60   BUN/CREATININE RATIO   2-70   NOT APPLICABLE (calc)   SODIUM 140 mmol/L  135-146   POTASSIUM 4 0 mmol/L  3 5-5 3   CHLORIDE 106 mmol/L     CARBON DIOXIDE 26 mmol/L  19-30   CALCIUM 9 1 mg/dL  8 6-10 3   CALCIUM (ADJUSTED FOR$ALBUMIN) 9 4 mg/dL (calc)  8 6-10 2   PROTEIN, TOTAL 6 9 g/dL  6 1-8 1   ALBUMIN 4 0 g/dL  3 6-5 1   GLOBULIN 2 9 g/dL (calc)  1 9-3 7   ALBUMIN/GLOBULIN RATIO 1 4 (calc)  1 0-2 5   BILIRUBIN, TOTAL 0 3 mg/dL  0 2-1 2   ALKALINE PHOSPHATASE 74 U/L     AST 15 U/L  10-35   ALT 21 U/L  9-46     (Q) HEMOGLOBIN A1c WITH eAG [id number] 08:16AM [facility]   REPORT COMMENT:  FASTING:YES     Test Name Result Flag Reference   HEMOGLOBIN A1c 6 8 % of total Hgb H <5 7   According to ADA guidelines, hemoglobin A1c <7 0%  represents optimal control in non-pregnant diabetic  patients  Different metrics may apply to specific  patient populations  Standards of Medical Care in    Diabetes Care  2013;36:s11-s66     For the purpose of screening for the presence of  diabetes  <5 7%       Consistent with the absence of diabetes  5 7-6 4%    Consistent with increased risk for diabetes              (prediabetes)  >or=6 5%    Consistent with diabetes     This assay result is consistent with diabetes  mellitus  Currently, no consensus exists for use of hemoglobin  A1c for diagnosis of diabetes for children     eAG (mg/dL) 148 (calc)     eAG (mmol/L) 8 2 (calc)       (Q) LIPID PANEL WITH DIRECT LDL [id number] 08:16AM Kamaljit Pena     Test Name Result Flag Reference   CHOLESTEROL, TOTAL 197 mg/dL  125-200   HDL CHOLESTEROL 35 mg/dL L > OR = 40   TRIGLICERIDES 628 mg/dL  <150   DIRECT  mg/dL H <130   Desirable range <100 mg/dL for patients with CHD or  diabetes and <70 mg/dL for diabetic patients with  known heart disease  CHOL/HDLC RATIO 5 6 (calc) H < OR = 5 0   NON HDL CHOLESTEROL 162 mg/dL (calc) H    Target for non-HDL cholesterol is 30 mg/dL higher than   LDL cholesterol target  (Q) MICROALBUMIN, RANDOM URINE (W/CREATININE) [id number] 08:16AM Kamaljit Pean     Test Name Result Flag Reference   CREATININE, RANDOM URINE 98 mg/dL     MICROALBUMIN 1 7 mg/dL     Reference Range  Not established   MICROALBUMIN/CREATININE$RATIO, RANDOM URINE 17 mcg/mg creat  <30   The ADA defines abnormalities in albumin  excretion as follows:     Category         Result (mcg/mg creatinine)     Normal                    <30  Microalbuminuria            Clinical albuminuria   > OR = 300     The ADA recommends that at least two of three  specimens collected within a 3-6 month period be  abnormal before considering a patient to be  within a diagnostic category  (Q) TSH, 3RD GENERATION [id number] 08:16AM Kamaljit Pena     Test Name Result Flag Reference   TSH 1 47 mIU/L  0 40-4 50       Plan  Type 2 diabetes mellitus    · (Q) MICROALBUMIN, RANDOM URINE (W/CREATININE) ; every 1 year;  Last  05MII7641; Status:Active

## 2018-01-16 NOTE — MISCELLANEOUS
Message  Mailed colono letter to patients home address  Tasked PCPs office  {Updated pre-visit planning for ONE YEAR follow up  }      Active Problems    1  Benign prostatic hypertrophy (BPH) with nocturia (600 01,788 43) (N40 1,R35 1)   2  Chronic maxillary sinusitis (473 0) (J32 0)   3  Dermatitis (692 9) (L30 9)   4  Encounter for prostate cancer screening (V76 44) (Z12 5)   5  Fingernail problem (703 9) (L60 9)   6  Hearing loss, right (389 9) (H91 91)   7  Hyperlipidemia (272 4) (E78 5)   8  Hypertension (401 9) (I10)   9  Need for Tdap vaccination (V06 1) (Z23)   10  Otitis media (382 9) (H66 90)   11  Screening for colon cancer (V76 51) (Z12 11)   12  Syringomyelia (336 0) (G95 0)   13  Type 2 diabetes mellitus (250 00) (E11 9)    Current Meds   1  AmLODIPine Besylate 5 MG Oral Tablet; TAKE 1 TABLET DAILY AS DIRECTED; Therapy: 29ADQ6946 to (Evaluate:29Oct2016)  Requested for: 35MQZ7658; Last   Rx:02May2016 Ordered   2  Clobetasol Propionate 0 05 % External Cream; APPLY SPARINGLY TO AFFECTED   AREA(S) TWICE DAILY; Therapy: 97SSN6928 to (Adrian Million)  Requested for: 10OTW5064; Last   Rx:22Jan2015 Ordered   3  Gabapentin 600 MG Oral Tablet; TAKE 1 CAPSULE BY MOUTH TWICE DAILY AS   NEEDED; Therapy: 74MDQ5287 to (Adrian Million)  Requested for: 30Wwl3807; Last   Rx:84Pys5532 Ordered   4  Hydrochlorothiazide 25 MG Oral Tablet; take 1 tablet by mouth daily; Therapy: 30FXN2394 to (Evaluate:29Oct2016)  Requested for: 13RFV0245; Last   Rx:02May2016 Ordered   5  MetFORMIN HCl ER (OSM) 500 MG Oral Tablet Extended Release 24 Hour; take 1 tablet   by mouth once daily; Therapy: 33SMX6866 to (Evaluate:82Sjz1221)  Requested for: 08Jzx0674; Last   Rx:62Wrf3270 Ordered   6  Tamsulosin HCl - 0 4 MG Oral Capsule; TAKE 1 CAPSULE AT BEDTIME; Therapy: 72BYU0789 to (Janne Sacks)  Requested for: 33RYA2697; Last   Rx:01Jun2016 Ordered   7   TraMADol HCl - 50 MG Oral Tablet; TAKE 1 TABLET Twice daily PRN; Last Rx:13Jan2016   Ordered    Allergies    1  Lipitor TABS    2  No Known Environmental Allergies   3  No Known Food Allergies    Plan  Screening for colon cancer    · COLONOSCOPY ; every 1 year;  Last 30Aug2016; Next 30Aug2017; Status:Active    Signatures   Electronically signed by : Paty Myers, ; Sep 22 2016 10:41AM EST                       (Author)

## 2018-01-16 NOTE — RESULT NOTES
Verified Results  (1) PSA (SCREEN) (Dx V76 44 Screen for Prostate Cancer) [id number] 08:15AM Leila Deshler     Test Name Result Flag Reference   PROSTATE SPECIFIC ANTIGEN 2 2 ng/mL  0 0-4 0     (1) HEMOGLOBIN A1C [id number] 08:15AM Leila Cm     Test Name Result Flag Reference   HEMOGLOBIN A1C 6 1 %  4 2-6 3   EST  AVG  GLUCOSE 128 mg/dl       (1) COMPREHENSIVE METABOLIC PANEL 50HDJ0367 87:14UW Leila Cm     Test Name Result Flag Reference   GLUCOSE,RANDM 110 mg/dL     If the patient is fasting, the ADA then defines impaired fasting glucose as > 100 mg/dL and diabetes as > or equal to 123 mg/dL  SODIUM 137 mmol/L  136-145   POTASSIUM 3 7 mmol/L  3 5-5 3   CHLORIDE 103 mmol/L  100-108   CARBON DIOXIDE 29 mmol/L  21-32   ANION GAP (CALC) 5 mmol/L  4-13   BLOOD UREA NITROGEN 24 mg/dL  5-25   CREATININE 0 88 mg/dL  0 60-1 30   Standardized to IDMS reference method   CALCIUM 8 8 mg/dL  8 3-10 1   BILI, TOTAL 0 55 mg/dL  0 20-1 00   ALK PHOSPHATAS 72 U/L     ALT (SGPT) 30 U/L  12-78   AST(SGOT) 16 U/L  5-45   ALBUMIN 3 9 g/dL  3 5-5 0   TOTAL PROTEIN 7 7 g/dL  6 4-8 2   eGFR Non-African American      >60 0 ml/min/1 73sq [facility] [employer] Disease Education Program recommendations are as follows:  GFR calculation is accurate only with a steady state creatinine  Chronic Kidney disease less than 60 ml/min/1 73 sq  meters  Kidney failure less than 15 ml/min/1 73 sq  meters       (1) LIPID PANEL FASTING W DIRECT LDL REFLEX [id number] 08:15AM Leila Cm     Test Name Result Flag Reference   CHOLESTEROL 199 mg/dL     LDL CHOLESTEROL CALCULATED 137 mg/dL H 0-100   Triglyceride:         Normal              <150 mg/dl       Borderline High    150-199 mg/dl       High               200-499 mg/dl       Very High          >499 mg/dl  Cholesterol:         Desirable        <200 mg/dl      Borderline High  200-239 mg/dl      High             >239 mg/dl  HDL Cholesterol:        High    >59 mg/dL      Low     <41 mg/dL  LDL Cholesterol:        Optimal          <100 mg/dl        Near Optimal     100-129 mg/dl        Above Optimal          Borderline High   130-159 mg/dl          High              160-189 mg/dl          Very High        >189 mg/dl  LDL CALCULATED:    This screening LDL is a calculated result  It does not have the accuracy of the Direct Measured LDL in the monitoring of patients with hyperlipidemia and/or statin therapy  Direct Measure LDL (YHS088) must be ordered separately in these patients  TRIGLYCERIDES 108 mg/dL  <=150   Specimen collection should occur prior to N-Acetylcysteine or Metamizole administration due to the potential for falsely depressed results  HDL,DIRECT 40 mg/dL  40-60   Specimen collection should occur prior to Metamizole administration due to the potential for falsely depressed results  Plan  Type 2 diabetes mellitus    · (1) HEMOGLOBIN A1C ; every 6 months;  Last [id number]; Status:Active

## 2018-01-18 NOTE — RESULT NOTES
Verified Results  (1) LIPID PANEL FASTING W DIRECT LDL REFLEX [id number] 09:34AM Danica Quick Order Number: [id number]     Test Name Result Flag Reference   CHOLESTEROL 179 mg/dL     LDL CHOLESTEROL CALCULATED 123 mg/dL H 0-100   Triglyceride:        Normal <150 mg/dl   Borderline High 150-199 mg/dl   High 200-499 mg/dl   Very High >499 mg/dl      Cholesterol:       Desirable <200 mg/dl    Borderline High 200-239 mg/dl    High >239 mg/dl      HDL Cholesterol:       High>59 mg/dL    Low <41 mg/dL      HDL Cholesterol:       High>59 mg/dL    Low <41 mg/dL      This screening LDL is a calculated result  It does not have the accuracy of the Direct Measured LDL in the monitoring of patients with hyperlipidemia and/or statin therapy  Direct Measure LDL (EIY491) must be ordered separately in these patients  TRIGLYCERIDES 92 mg/dL  <=150   Specimen collection should occur prior to N-Acetylcysteine or Metamizole administration due to the potential for falsely depressed results  HDL,DIRECT 38 mg/dL L 40-60   Specimen collection should occur prior to Metamizole administration due to the potential for falsley depressed results  (1) HEMOGLOBIN A1C 22Nov2017 09:34AM Danica Quick Order Number: [id number]     Test Name Result Flag Reference   HEMOGLOBIN A1C 6 6 % H 4 2-6 3   EST  AVG   GLUCOSE 143 mg/dl       (1) COMPREHENSIVE METABOLIC PANEL [id number] 80:14DJ Danica Quick Order Number: [id number]     Test Name Result Flag Reference   SODIUM 137 mmol/L  136-145   POTASSIUM 3 0 mmol/L L 3 5-5 3   CHLORIDE 101 mmol/L  100-108   CARBON DIOXIDE 28 mmol/L  21-32   ANION GAP (CALC) 8 mmol/L  4-13   BLOOD UREA NITROGEN 23 mg/dL  5-25   CREATININE 0 88 mg/dL  0 60-1 30   Standardized to IDMS reference method   CALCIUM 9 0 mg/dL  8 3-10 1   BILI, TOTAL 0 69 mg/dL  0 20-1 00   ALK PHOSPHATAS 87 U/L     ALT (SGPT) 30 U/L  12-78   Specimen collection should occur prior to Sulfasalazine and/or Sulfapyridine administration due to the potential for falsely depressed results  AST(SGOT) 18 U/L  5-45   Specimen collection should occur prior to Sulfasalazine administration due to the potential for falsely depressed results  ALBUMIN 3 6 g/dL  3 5-5 0   TOTAL PROTEIN 8 1 g/dL  6 4-8 2   eGFR 90 ml/min/1 73sq m     National Kidney Disease Education Program recommendations are as follows:  GFR calculation is accurate only with a steady state creatinine  Chronic Kidney disease less than 60 ml/min/1 73 sq  meters  Kidney failure less than 15 ml/min/1 73 sq  meters  GLUCOSE FASTING 118 mg/dL H 65-99   Specimen collection should occur prior to Sulfasalazine administration due to the potential for falsely depressed results  Specimen collection should occur prior to Sulfapyridine administration due to the potential for falsely elevated results  (1) MICROALBUMIN CREATININE RATIO, RANDOM URINE 22Nov2017 09:34AM Shahid Morales Order Number: [id number]     Test Name Result Flag Reference   MICROALBUMIN/ CREAT R <36 mg/g creatinine H 0-30   MICROALBUMIN,URINE <5 0 mg/L  0 0-20 0   CREATININE URINE 13 9 mg/dL         Plan  Hypokalemia    · (1) BASIC METABOLIC PROFILE; Status:Active; Requested for:25Exf6573;   Type 2 diabetes mellitus    · (1) HEMOGLOBIN A1C ; every 6 months;  Last 16ZUH2741; Next 29PTP9985;  Status:Active

## 2018-01-18 NOTE — MISCELLANEOUS
Message   Recorded as Task   Date: 09/07/2016 09:38 PM, Created By: Luis Lagunas   Task Name: Go to Result   Assigned To: [facility],Team   Regarding Patient: Narayan Andersen, Status: Active   CommentButler Joyce - 07 Sep 2016 9:38 PM     TASK CREATED  Low-grade dysplasia, serrated adenoma, 1 year follow-up for the dysplasia  MARIXA GUTIERREZ  [facility] - 08 Sep 2016 10:22 AM     TASK EDITED  Called patient with results and informed him that the polyp that was removed was a low-grade dysplasia, serrated adenoma  Dysplasia is a term that describes how much your polyp looks like cancer  Polyps that are only mildly abnormal, they donÃ¢??t look much like cancer are low-grade dysplasia which is the kind you had  It was removed completely and there is no cancer  However, you will need a follow-up colonoscopy in 1 year due to the dysplasia and to monitor for any new polyps with dysplasia  A reminder letter will be sent in the mail with the recommendation to have a 1 year follow-up but he will need to call the office to schedule an appointment in 1 year  Patient verbalized understanding and had no questions  Active Problems    1  Benign prostatic hypertrophy (BPH) with nocturia (600 01,788 43) (N40 1,R35 1)   2  Chronic maxillary sinusitis (473 0) (J32 0)   3  Dermatitis (692 9) (L30 9)   4  Encounter for prostate cancer screening (V76 44) (Z12 5)   5  Fingernail problem (703 9) (L60 9)   6  Hearing loss, right (389 9) (H91 91)   7  Hyperlipidemia (272 4) (E78 5)   8  Hypertension (401 9) (I10)   9  Need for Tdap vaccination (V06 1) (Z23)   10  Otitis media (382 9) (H66 90)   11  Screening for colon cancer (V76 51) (Z12 11)   12  Syringomyelia (336 0) (G95 0)   13  Type 2 diabetes mellitus (250 00) (E11 9)    Current Meds   1  AmLODIPine Besylate 5 MG Oral Tablet; TAKE 1 TABLET DAILY AS DIRECTED; Therapy: 95WFU8015 to (Evaluate:29Oct2016)  Requested for: 48YOA0156; Last   Rx:93Hwq2329 Ordered   2  Clobetasol Propionate 0 05 % External Cream; APPLY SPARINGLY TO AFFECTED   AREA(S) TWICE DAILY; Therapy: 31WXA9712 to ([facility])  Requested for: 95GRA0800; Last   Rx:22Jan2015 Ordered   3  Gabapentin 600 MG Oral Tablet; TAKE 1 CAPSULE BY MOUTH TWICE DAILY AS   NEEDED; Therapy: 15LGH4546 to ([id number])  Requested for: 60Bfy0500; Last   Rx:32Azy1284 Ordered   4  Hydrochlorothiazide 25 MG Oral Tablet; take 1 tablet by mouth daily; Therapy: 88JTT2729 to (Evaluate:29Oct2016)  Requested for: 36ONN4115; Last   Rx:72Yth3181 Ordered   5  Tamsulosin HCl - 0 4 MG Oral Capsule; TAKE 1 CAPSULE AT BEDTIME; Therapy: 96DBT8084 to ([facility])  Requested for: 35FFJ3314; Last   Rx:01Jun2016 Ordered   6  TraMADol HCl - 50 MG Oral Tablet; TAKE 1 TABLET Twice daily PRN; Last Rx:13Jan2016   Ordered    Allergies    1  Lipitor TABS    2  No Known Environmental Allergies   3   No Known Food Allergies    Signatures   Electronically signed by : Ping July, ; Sep  8 2016 10:22AM EST                       (Author)

## 2018-01-18 NOTE — PROGRESS NOTES
Assessment    1  Type 2 diabetes mellitus (250 00) (E11 9)   2  Acute maxillary sinusitis, recurrence not specified (461 0) (J01 00)   3  Syringomyelia (336 0) (G95 0)   4  Encounter for preventive health examination (V70 0) (Z00 00)    Plan  Acute maxillary sinusitis, recurrence not specified    · Amoxicillin-Pot Clavulanate 875-125 MG Oral Tablet; TAKE 1 TABLET EVERY 12  HOURS DAILY  Hyperlipidemia    · (1) LIPID PANEL FASTING W DIRECT LDL REFLEX; Status:Active; Requested  for:53Dwz6753;   Syringomyelia    · TraMADol HCl - 50 MG Oral Tablet; take 1 tablet by mouth three times a day if  needed for MODERATE PAIN   · * MRI CERVICAL SPINE WO CONTRAST; Status:Need Information - Financial  Authorization; Requested for:[id number];   Type 2 diabetes mellitus    · MetFORMIN HCl ER (OSM) 500 MG Oral Tablet Extended Release 24 Hour   · MetFORMIN HCl  MG Oral Tablet Extended Release 24 Hour; take 1 tablet  every day   · (1) COMPREHENSIVE METABOLIC PANEL; Status:Active; Requested for:73Pja1674;    · (1) HEMOGLOBIN A1C; Status:Active; Requested for:25Nyx3773;    · (1) MICROALBUMIN CREATININE RATIO, RANDOM URINE; Status:Active; Requested  for:24Ylh9574;     Discussion/Summary    Await lab,would like to do MRI but needs sedation  Impression: Welcome to Medicare Visit, with preventive exam as well as age and risk appropriate counseling completed  Cardiovascular screening and counseling: screening is current  Diabetes screening and counseling: screening is current  Colorectal cancer screening and counseling: screening is current  Prostate cancer screening and counseling: screening is current  Osteoporosis screening and counseling: screening not indicated  Abdominal aortic aneurysm screening and counseling: screening not indicated  Glaucoma screening and counseling: screening is current  HIV screening and counseling: screening not indicated     Hepatitis C Screening: the patient was counseled on Hepatitis C screening  Immunizations: influenza vaccine is up to date this year, the lifetime pneumococcal vaccine has been completed, hepatitis B vaccination series is not indicated at this time due to the patient's low risk of sotero the disease, the risks and benefits of the Zostavax vaccine were discussed with the patient and Tdap vaccination up to date  Diabetes Counseling: pt declines  Advance Directive Planning: not complete, he was encouraged to follow-up with me to discuss his questions and/or decisions  Patient Discussion: follow-up visit needed in one year  The treatment plan was reviewed with the patient/guardian  The patient/guardian understands and agrees with the treatment plan      Chief Complaint  pt is here for AWV      Advance Directives  Advance Directive Radha Santos:   The patient is in agreement to receive the Advance Care Planning service   Capacity/Competence: This patient has full decision making capacity for discussion of advance care planning and This patient has full decision making competency for discussion of advance care planning  History of Present Illness  Welcome to Medicare and Wellness Visits: The patient is being seen for the welcome to medicare visit  Medicare Screening and Risk Factors   Hospitalizations: he has been previously hospitalizied and he has been hospitalized 3-4  ear surgery, trouble breathing, neck times  Once per lifetime medicare screening tests: ECG (nl)  Medicare Screening Tests Risk Questions   Abdominal aortic aneurysm risk assessment: over 72years of age  Osteoporosis risk assessment: over 48years of age  HIV risk assessment: none indicated  Drug and Alcohol Use: The patient is a former cigarette smoker, quit smoking 50 and experimented when younger  The patient reports occasional alcohol use  He has never used illicit drugs  Diet and Physical Activity: Current diet includes well balanced meals  He exercises 5 times per week   Exercise: walking, yardwork 30 minutes per day  Mood Disorder and Cognitive Impairment Screening:   Depression screening  negative for symptoms  Cognitive impairment screening: denies difficulty learning/retaining new information, denies difficulty handling complex tasks, denies difficulty with reasoning, denies difficulty with spatial ability and orientation, denies difficulty with language and denies difficulty with behavior  Functional Ability/Level of Safety: Hearing is normal bilaterally  He denies hearing difficulties  He does not use a hearing aid  The patient is currently able to do activities of daily living without limitations, able to do instrumental activities of daily living without limitations, able to participate in social activities without limitations and able to drive without limitations  Activities of daily living details: does not need help using the phone, no transportation help needed, does not need help shopping, no meal preparation help needed, does not need help doing housework, does not need help doing laundry, does not need help managing medications and does not need help managing money  Fall risk factors: The patient fell 0 times in the past 12 months  Injury History: urinary incontinence  Home safety risk factors:  no unfamiliar surroundings, no loose rugs, no poor household lighting, no uneven floors, no household clutter, grab bars in the bathroom and handrails on the stairs  Advance Directives: Advance directives: no living will, no durable power of  for health care directives and no advance directives     Co-Managers and Medical Equipment/Suppliers: See Patient Care Team   Preventive Quality Program 65 and Older: Urinary Incontinence Symptoms includes: urinary incontinence        Patient Care Team    Care Team Member Role Specialty Office Number   Edith Worthington MD Specialist Urology [phone]   Alejandra Augustine MD Referring Family Medicine [phone]     Review of Systems    Constitutional: no malaise and no fatigue  ENT: sore throat and nasal congestion  Cardiovascular: no chest pain  Respiratory: no shortness of breath  Genitourinary: no dysuria  Musculoskeletal: pain in other joints and neck pain  Integumentary and Breasts: no rashes  Active Problems    1  Dermatitis (692 9) (L30 9)   2  Encounter for prostate cancer screening (V76 44) (Z12 5)   3  Encounter for screening for malignant neoplasm of prostate (V76 44) (Z12 5)   4  Enlarged prostate with lower urinary tract symptoms (LUTS) (600 01) (N40 1)   5  Hearing loss, right (389 9) (H91 91)   6  Hyperlipidemia (272 4) (E78 5)   7  Hypertension (401 9) (I10)   8  Need for Tdap vaccination (V06 1) (Z23)   9  Nocturia (788 43) (R35 1)   10  Screening for colon cancer (V76 51) (Z12 11)   11  Syringomyelia (336 0) (G95 0)   12  Type 2 diabetes mellitus (250 00) (E11 9)    Past Medical History    · History of Diverticulosis (562 10) (K57 90)   · History of hemorrhoids (V13 89) (Z87 19)   · History of Polyp, sigmoid colon (211 3) (D12 5)    The active problems and past medical history were reviewed and updated today  Surgical History    · History of Colonoscopy   · History of Ear Surgery   · History of Nose Surgery    The surgical history was reviewed and updated today  Family History  Mother    · Family history of Hepatitis  Father    · Family history of cardiac disorder (V17 49) (Z82 49)    The family history was reviewed and updated today  Social History    · Advance directive information unavailable   · Full-time employment   · Denied: History of domestic violence   ·    · Never smoker   · Occasional alcohol use   · Denied: History of Problem with Syn[employer] or spiritual beliefs regarding medical care   · Three children  The social history was reviewed and updated today  Current Meds   1  AmLODIPine Besylate 10 MG Oral Tablet; TAKE 1 TABLET DAILY;    Therapy: 30CVM4877 to (Evaluate:08Jan2018)  Requested for: 58COD3561; Last   Rx:13Jan2017 Ordered   2  Clobetasol Propionate 0 05 % External Cream; APPLY SPARINGLY TO AFFECTED   AREA(S) TWICE DAILY; Therapy: 91SBY8353 to ([phone])  Requested for: 00BFG7900; Last   Rx:22Jan2015 Ordered   3  Gabapentin 600 MG Oral Tablet; TAKE 1 CAPSULE BY MOUTH THREE TIMES DAILY   AS NEEDED; Therapy: 34MEH0883 to ([phone])  Requested for: 41VHI0929; Last   Rx:39Otq6699 Ordered   4  HydroCHLOROthiazide 25 MG Oral Tablet; take 1 tablet by mouth once daily; Therapy: 59LSG1370 to (Evaluate:28Cgm4289)  Requested for: 34BMA9802; Last   Rx:81Mnj1863 Ordered   5  MetFORMIN HCl ER (OSM) 500 MG Oral Tablet Extended Release 24 Hour; take 1 tablet   by mouth daily; Therapy: 52QDT5559 to (General Dad)  Requested for: 18Dgk5272; Last   Rx:03Cxg4099 Ordered   6  OneTouch Ultra 2 w/Device Kit; USE AS DIRECTED; Therapy: 96GEG5408 to (Evaluate:05Oct2017)  Requested for: 16RAL9241; Last   Rx:10Ttq4033 Ordered   7  OneTouch Ultra Blue In Vitro Strip; TEST 1-2 TIMES PER DAY; Therapy: 27RTE0586 to ([phone])  Requested for: 17HSZ5464; Last   Rx:22Oyn1708 Ordered   8  OneTouch UltraSoft Lancets Miscellaneous; check BS once daily; Therapy: 95VXN9908 to ([phone])  Requested for: 57JRI7782; Last   Rx:89Crx1895 Ordered   9  Tamsulosin HCl - 0 4 MG Oral Capsule; 1 PO every HS; Therapy: 96Ufy1027 to (Evaluate:72Zva8220)  Requested for: 25Kdo6493; Last   Rx:34Pfu7920 Ordered   10  TraMADol HCl - 50 MG Oral Tablet; take 1 tablet by mouth three times a day if needed for    MODERATE PAIN;    Therapy: 22Apr2017 to (Alen Rich)  Requested for: 15BFT0532; Last    Rx:09Oct2017 Ordered    The medication list was reviewed and updated today  Allergies    1  Lipitor TABS    2  No Known Environmental Allergies   3   No Known Food Allergies    Immunizations   1 2 3    Influenza  15-Sep-2015  (65y) 01-Oct-2015  (65y) 01-Nov-2017  (67y)    PCV  13-Jan-2016  (65y)      Tdap  01-Jun-2016  (66y)       Vitals  Signs    Temperature: 98 1 F   Heart Rate: 68  Systolic: 471  Diastolic: 74  Height: 5 ft 5 in  Weight: 157 lb   BMI Calculated: 26 13  BSA Calculated: 1 78    Physical Exam    Constitutional   General appearance: Abnormal   acutely ill and within normal limits of ideal weight  Ears, Nose, Mouth, and Throat   Otoscopic examination: Tympanic membranes translucent with normal light reflex  Canals patent without erythema  Hearing: Abnormal   -positive maxillary sinus discomfort  Oropharynx: Abnormal   There was erythema of both tonsils  Pulmonary   Auscultation of lungs: Clear to auscultation  Cardiovascular   Auscultation of heart: Normal rate and rhythm, normal S1 and S2, no murmurs  Lymphatic   Palpation of lymph nodes in neck: No lymphadenopathy  Results/Data  Falls Risk Assessment (Dx Z13 89 Screen for Neurologic Disorder) 62YJC0723 10:41AM User, [employer]     Test Name Result Flag Reference   Falls Risk      No falls in the past year     PHQ-2 Adult Depression Screening 19JSZ7446 10:40AM User, [employer]     Test Name Result Flag Reference   PHQ-2 Adult Depression Score 0     Over the last two weeks, how often have you been bothered by any of the following problems? Little interest or pleasure in doing things: Not at all - 0  Feeling down, depressed, or hopeless: Not at all - 0   PHQ-2 Adult Depression Screening Negative         Health Management  Screening for colon cancer   COLONOSCOPY; every 1 year; Last 76Liy2701; Next Due: 91Zcu6915; Overdue  Type 2 diabetes mellitus   (1) HEMOGLOBIN A1C; every 6 months; Last 32QJP6541; Next Due: 82Cer1151; Near  Due  (every) MICROALBUMIN, RANDOM URINE (W/CREATININE); every 1 year; Last  65OHI6999; Next Due: 71OGE0547; Overdue  *VB - Eye Exam; every 1 year; Last 69DIJ7633; Next Due: 63AUL9432; Overdue  *VB - Foot Exam; every 1 year;  Last 62TRS7213; Next Due: 92SZS3185; Overdue    Future Appointments    Date/Time Provider Specialty Site   03/29/2018 08:30 AM SARAH Mujica  Urology  [facility]     Signatures   Electronically signed by :  Marilee Mcdaniels MD; Nov 15 2017 11:13AM EST                       (Author)

## 2018-01-22 VITALS
BODY MASS INDEX: 26.16 KG/M2 | HEIGHT: 65 IN | HEART RATE: 68 BPM | SYSTOLIC BLOOD PRESSURE: 112 MMHG | WEIGHT: 157 LBS | DIASTOLIC BLOOD PRESSURE: 74 MMHG | TEMPERATURE: 98.1 F

## 2018-01-23 NOTE — RESULT NOTES
Verified Results  (1) BASIC METABOLIC PROFILE 11WGU0287 09:44AM Clay Medina    Order Number: [id number]     Test Name Result Flag Reference   GLUCOSE,RANDM 152 mg/dL H    If the patient is fasting, the ADA then defines impaired fasting glucose as > 100 mg/dL and diabetes as > or equal to 123 mg/dL  Specimen collection should occur prior to Sulfasalazine administration due to the potential for falsely depressed results  Specimen collection should occur prior to Sulfapyridine administration due to the potential for falsely elevated results  SODIUM 136 mmol/L  136-145   POTASSIUM 3 4 mmol/L L 3 5-5 3   CHLORIDE 102 mmol/L  100-108   CARBON DIOXIDE 28 mmol/L  21-32   ANION GAP (CALC) 6 mmol/L  4-13   BLOOD UREA NITROGEN 29 mg/dL H 5-25   CREATININE 0 92 mg/dL  0 60-1 30   Standardized to IDMS reference method   CALCIUM 9 5 mg/dL  8 3-10 1   eGFR 87 ml/min/1 73sq m     [facility] Disease Education Program recommendations are as follows:  GFR calculation is accurate only with a steady state creatinine  Chronic Kidney disease less than 60 ml/min/1 73 sq  meters  Kidney failure less than 15 ml/min/1 73 sq  meters  Plan  Type 2 diabetes mellitus    · (1) COMPREHENSIVE METABOLIC PANEL; Status:Active; Requested for:19Ixl4794;    · (1) HEMOGLOBIN A1C; Status:Active;  Requested for:51Nib3664;

## 2018-01-23 NOTE — RESULT NOTES
Verified Results  * MRI CERVICAL SPINE WO CONTRAST 64WIE7589 10:55AM Amanda Lewis     Test Name Result Flag Reference   MRI CERVICAL SPINE [address] (Report)     MRI CERVICAL SPINE WITHOUT CONTRAST     INDICATION: Previously documented mid thoracic syrinx     COMPARISON: None  TECHNIQUE: Sagittal T1, sagittal T2, sagittal inversion recovery, axial T2, axial 2D merge patient prepared for and monitored during this exam by anesthesia personnel     IMAGE QUALITY: Diagnostic     FINDINGS:     ALIGNMENT: Normal alignment of the cervical spine  No compression fracture  No subluxation  No scoliosis  MARROW SIGNAL: Normal marrow signal is identified within the visualized bony structures  No discrete marrow lesion  CERVICAL AND VISUALIZED THORACIC CORD: Normal signal within the visualized cord  No intrinsic cord pathology  No indication of cerebellar tonsillar ectopia or other dysraphic condition  PREVERTEBRAL AND PARASPINAL SOFT TISSUES:  Normal          VISUALIZED POSTERIOR FOSSA: The visualized posterior fossa demonstrates no abnormal signal  Visualized brain stem normal  Craniocervical junction normal      CERVICAL DISC SPACES:        C2-C3: Minor facet arthrosis     C3-C4: Mild facet arthrosis minor right uncinate arthrosis     C4-C5: Normal      C5-C6: Reduction disc height, asymmetric right facet and uncinate arthrosis  Right lateral disc osteophyte  Potentially significant right foraminal stenosis  Correlate for right C6 radiculitis  C6-C7: Small marginal osteophytes, right facet arthrosis  C7-T1: Normal      UPPER THORACIC DISC SPACES: Normal        IMPRESSION:     Cervical cord, caudal brainstem, craniocervical junction are intrinsically normal  No compressive disease  Potentially significant right C5-C6 foraminal stenosis, correlate for right C6 radiculitis  Workstation performed: [id number]     Signed by:    Roseann Lo MD   12/7/17

## 2018-02-05 DIAGNOSIS — I10 ESSENTIAL HYPERTENSION: Primary | ICD-10-CM

## 2018-02-05 RX ORDER — AMLODIPINE BESYLATE 10 MG/1
TABLET ORAL
Qty: 90 TABLET | Refills: 3 | Status: SHIPPED | OUTPATIENT
Start: 2018-02-05 | End: 2018-03-01 | Stop reason: SDUPTHER

## 2018-02-19 LAB
LEFT EYE DIABETIC RETINOPATHY: NORMAL
RIGHT EYE DIABETIC RETINOPATHY: NORMAL

## 2018-02-22 DIAGNOSIS — E11.9 TYPE 2 DIABETES MELLITUS WITHOUT COMPLICATIONS (HCC): ICD-10-CM

## 2018-03-01 ENCOUNTER — OFFICE VISIT (OUTPATIENT)
Dept: FAMILY MEDICINE CLINIC | Facility: CLINIC | Age: 68
End: 2018-03-01
Payer: MEDICARE

## 2018-03-01 VITALS
HEART RATE: 78 BPM | HEIGHT: 65 IN | DIASTOLIC BLOOD PRESSURE: 84 MMHG | BODY MASS INDEX: 27.03 KG/M2 | SYSTOLIC BLOOD PRESSURE: 134 MMHG | WEIGHT: 162.25 LBS | TEMPERATURE: 97.6 F | RESPIRATION RATE: 16 BRPM

## 2018-03-01 DIAGNOSIS — E11.9 TYPE 2 DIABETES MELLITUS WITHOUT COMPLICATION, WITHOUT LONG-TERM CURRENT USE OF INSULIN (HCC): Primary | ICD-10-CM

## 2018-03-01 DIAGNOSIS — M79.601 PAIN OF RIGHT UPPER EXTREMITY: ICD-10-CM

## 2018-03-01 PROCEDURE — 99213 OFFICE O/P EST LOW 20 MIN: CPT | Performed by: FAMILY MEDICINE

## 2018-03-01 RX ORDER — AMLODIPINE BESYLATE 10 MG/1
10 TABLET ORAL DAILY
Status: ON HOLD | COMMUNITY
Start: 2014-07-09 | End: 2019-02-05 | Stop reason: ALTCHOICE

## 2018-03-01 RX ORDER — CLOBETASOL PROPIONATE 0.5 MG/G
1 CREAM TOPICAL 2 TIMES DAILY PRN
COMMUNITY
Start: 2015-01-22 | End: 2019-01-10 | Stop reason: SDUPTHER

## 2018-03-01 RX ORDER — LANCETS
EACH MISCELLANEOUS DAILY
Qty: 100 EACH | Refills: 2 | Status: SHIPPED | OUTPATIENT
Start: 2018-03-01 | End: 2018-03-08 | Stop reason: SDUPTHER

## 2018-03-01 RX ORDER — METFORMIN HYDROCHLORIDE 500 MG/1
1 TABLET, EXTENDED RELEASE ORAL DAILY
COMMUNITY
Start: 2017-11-15 | End: 2018-03-01 | Stop reason: SDUPTHER

## 2018-03-01 RX ORDER — HYDROCHLOROTHIAZIDE 25 MG/1
1 TABLET ORAL DAILY
COMMUNITY
Start: 2014-12-01 | End: 2018-06-17 | Stop reason: SDUPTHER

## 2018-03-01 RX ORDER — LANCETS
EACH MISCELLANEOUS
COMMUNITY
Start: 2017-07-07 | End: 2018-03-01 | Stop reason: SDUPTHER

## 2018-03-01 RX ORDER — METHYLPREDNISOLONE 4 MG/1
TABLET ORAL
Qty: 21 TABLET | Refills: 0 | Status: SHIPPED | OUTPATIENT
Start: 2018-03-01 | End: 2018-05-16 | Stop reason: HOSPADM

## 2018-03-01 RX ORDER — TRAMADOL HYDROCHLORIDE 50 MG/1
TABLET ORAL
COMMUNITY
Start: 2017-04-22 | End: 2018-03-04 | Stop reason: SDUPTHER

## 2018-03-01 RX ORDER — GABAPENTIN 600 MG/1
TABLET ORAL
COMMUNITY
Start: 2016-01-13 | End: 2018-03-01 | Stop reason: SDUPTHER

## 2018-03-01 RX ORDER — METAXALONE 800 MG/1
800 TABLET ORAL 3 TIMES DAILY
Qty: 30 TABLET | Refills: 0 | Status: SHIPPED | OUTPATIENT
Start: 2018-03-01 | End: 2018-05-16 | Stop reason: HOSPADM

## 2018-03-01 RX ORDER — TAMSULOSIN HYDROCHLORIDE 0.4 MG/1
CAPSULE ORAL
COMMUNITY
Start: 2017-08-31 | End: 2018-03-01 | Stop reason: SDUPTHER

## 2018-03-01 RX ORDER — BLOOD-GLUCOSE METER
EACH MISCELLANEOUS
COMMUNITY
Start: 2017-07-07 | End: 2021-09-01 | Stop reason: SDUPTHER

## 2018-03-01 RX ORDER — AMLODIPINE BESYLATE 5 MG/1
5 TABLET ORAL
COMMUNITY
Start: 2012-09-07 | End: 2018-03-01 | Stop reason: SDUPTHER

## 2018-03-04 DIAGNOSIS — G95.0 SYRINGOMYELIA (HCC): Primary | ICD-10-CM

## 2018-03-04 RX ORDER — TRAMADOL HYDROCHLORIDE 50 MG/1
TABLET ORAL
Qty: 90 TABLET | Refills: 0 | Status: SHIPPED | OUTPATIENT
Start: 2018-03-04 | End: 2018-04-16 | Stop reason: SDUPTHER

## 2018-03-05 DIAGNOSIS — G95.0 SYRINGOMYELIA (HCC): ICD-10-CM

## 2018-03-05 RX ORDER — TRAMADOL HYDROCHLORIDE 50 MG/1
TABLET ORAL
Qty: 90 TABLET | Refills: 0 | OUTPATIENT
Start: 2018-03-05

## 2018-03-06 ENCOUNTER — APPOINTMENT (OUTPATIENT)
Dept: RADIOLOGY | Facility: MEDICAL CENTER | Age: 68
End: 2018-03-06
Payer: MEDICARE

## 2018-03-06 ENCOUNTER — TRANSCRIBE ORDERS (OUTPATIENT)
Dept: ADMINISTRATIVE | Facility: HOSPITAL | Age: 68
End: 2018-03-06

## 2018-03-06 PROCEDURE — 73030 X-RAY EXAM OF SHOULDER: CPT

## 2018-03-08 ENCOUNTER — TELEPHONE (OUTPATIENT)
Dept: FAMILY MEDICINE CLINIC | Facility: CLINIC | Age: 68
End: 2018-03-08

## 2018-03-08 ENCOUNTER — APPOINTMENT (OUTPATIENT)
Dept: LAB | Facility: MEDICAL CENTER | Age: 68
End: 2018-03-08
Payer: MEDICARE

## 2018-03-08 DIAGNOSIS — E11.9 TYPE 2 DIABETES MELLITUS WITHOUT COMPLICATION, WITHOUT LONG-TERM CURRENT USE OF INSULIN (HCC): ICD-10-CM

## 2018-03-08 DIAGNOSIS — E11.9 TYPE 2 DIABETES MELLITUS WITHOUT COMPLICATIONS (HCC): ICD-10-CM

## 2018-03-08 DIAGNOSIS — M79.601 PAIN OF RIGHT UPPER EXTREMITY: Primary | ICD-10-CM

## 2018-03-08 LAB
ALBUMIN SERPL BCP-MCNC: 3.7 G/DL (ref 3.5–5)
ALP SERPL-CCNC: 90 U/L (ref 46–116)
ALT SERPL W P-5'-P-CCNC: 32 U/L (ref 12–78)
ANION GAP SERPL CALCULATED.3IONS-SCNC: 6 MMOL/L (ref 4–13)
AST SERPL W P-5'-P-CCNC: 8 U/L (ref 5–45)
BILIRUB SERPL-MCNC: 0.57 MG/DL (ref 0.2–1)
BUN SERPL-MCNC: 27 MG/DL (ref 5–25)
CALCIUM SERPL-MCNC: 9 MG/DL (ref 8.3–10.1)
CHLORIDE SERPL-SCNC: 105 MMOL/L (ref 100–108)
CO2 SERPL-SCNC: 28 MMOL/L (ref 21–32)
CREAT SERPL-MCNC: 0.89 MG/DL (ref 0.6–1.3)
EST. AVERAGE GLUCOSE BLD GHB EST-MCNC: 148 MG/DL
GFR SERPL CREATININE-BSD FRML MDRD: 90 ML/MIN/1.73SQ M
GLUCOSE P FAST SERPL-MCNC: 112 MG/DL (ref 65–99)
HBA1C MFR BLD: 6.8 % (ref 4.2–6.3)
POTASSIUM SERPL-SCNC: 3.4 MMOL/L (ref 3.5–5.3)
PROT SERPL-MCNC: 7.9 G/DL (ref 6.4–8.2)
SODIUM SERPL-SCNC: 139 MMOL/L (ref 136–145)

## 2018-03-08 PROCEDURE — 80053 COMPREHEN METABOLIC PANEL: CPT

## 2018-03-08 PROCEDURE — 36415 COLL VENOUS BLD VENIPUNCTURE: CPT

## 2018-03-08 PROCEDURE — 83036 HEMOGLOBIN GLYCOSYLATED A1C: CPT

## 2018-03-08 RX ORDER — LANCETS
EACH MISCELLANEOUS DAILY
Qty: 100 EACH | Refills: 3 | Status: SHIPPED | OUTPATIENT
Start: 2018-03-08 | End: 2021-03-01 | Stop reason: SDUPTHER

## 2018-03-08 NOTE — TELEPHONE ENCOUNTER
Pt notified of results and recommendatios  Pt is interested in the physical therapy, no preference on location  Pls advise

## 2018-03-08 NOTE — TELEPHONE ENCOUNTER
----- Message from Xochitl Eller MD sent at 3/7/2018  5:24 PM EST -----  Xray shoulder nl, if pain persisting next step physical therapy

## 2018-03-19 ENCOUNTER — TELEPHONE (OUTPATIENT)
Dept: FAMILY MEDICINE CLINIC | Facility: CLINIC | Age: 68
End: 2018-03-19

## 2018-03-20 ENCOUNTER — EVALUATION (OUTPATIENT)
Dept: PHYSICAL THERAPY | Facility: MEDICAL CENTER | Age: 68
End: 2018-03-20
Payer: MEDICARE

## 2018-03-20 DIAGNOSIS — M54.12 CERVICAL RADICULOPATHY: ICD-10-CM

## 2018-03-20 DIAGNOSIS — M25.511 ACUTE PAIN OF RIGHT SHOULDER: Primary | ICD-10-CM

## 2018-03-20 DIAGNOSIS — M79.601 PAIN OF RIGHT UPPER EXTREMITY: ICD-10-CM

## 2018-03-20 PROCEDURE — G8991 OTHER PT/OT GOAL STATUS: HCPCS | Performed by: PHYSICAL THERAPIST

## 2018-03-20 PROCEDURE — G8990 OTHER PT/OT CURRENT STATUS: HCPCS | Performed by: PHYSICAL THERAPIST

## 2018-03-20 PROCEDURE — 97112 NEUROMUSCULAR REEDUCATION: CPT | Performed by: PHYSICAL THERAPIST

## 2018-03-20 PROCEDURE — 97162 PT EVAL MOD COMPLEX 30 MIN: CPT | Performed by: PHYSICAL THERAPIST

## 2018-03-20 NOTE — PROGRESS NOTES
PT Evaluation     Today's date: 3/20/2018  Patient name: Mathew Carter  : 1950  MRN: [id number]  Referring provider: Bibi Wolf MD  Dx:   Encounter Diagnosis     ICD-10-CM    1  Acute pain of right shoulder M25 511    2  Pain of right upper extremity M79 601 Ambulatory referral to Physical Therapy   3  Cervical radiculopathy M54 12                   Assessment  Impairments: abnormal muscle firing, abnormal or restricted ROM, activity intolerance, lacks appropriate home exercise program, pain with function and scapular dyskinesis    Assessment details: Patient is a pleasant 74 y o  Male who presents to physical therapy with chief complaint of right shoulder and arm pain of 4 weeks duration following splitting and carrying wood  No further referral appears necessary at this time based upon exam findings  Patient presents with primary movement diagnosis of poor scapulohumeral movement coordination with underlying cervical dysfunction contributing to right shoulder and UE pain secondary to possible impingement and cervical radiculopathy limiting his ability to reach, wash his hair, don/doff a jacket and shirt, and sleep  Etiologic factors include poor posture  Patient will benefit from outpatient physical therapy services to address the observed impairments to enable him to return to premorbid level of functioning  Understanding of Dx/Px/POC: good   Prognosis: good  Prognosis details: Prognosis is good given compliance with HEP and PT 2x/week decreasing to 1x/week over the next 8 weeks  Positive prognostic factors: positive attitude towards recovery  Negative prognostic factors: DM, HTN, language barrier    Goals  1  Patient will be independent in HEP  2  Patient will demonstrate improved functional ROM to enable him to reach overhead to wash his hair and into a cabinet in 4 weeks    3  Patient will demonstrate decreased pain to enable him to don/doff a jacket or shirt and sleep on his right side in 4 weeks   4  Patient will demonstrate improved scapular movement coordination to enable him to return to all activities (i e , gardening) at pre-morbid level of functioning in 8 weeks  Plan  Planned modality interventions: cryotherapy  Planned therapy interventions: joint mobilization, manual therapy, motor coordination training, neuromuscular re-education, patient education, postural training, therapeutic exercise, home exercise program and functional ROM exercises  Frequency: 2x week  Duration in weeks: 8  Treatment plan discussed with: patient  Plan details: Please contact me with any questions  Thank you for the referral          Subjective Evaluation    History of Present Illness  Mechanism of injury: Patient is a 74 y o  Male who presents to physical therapy with chief report of right shoulder pain with onset 4 weeks ago following splitting and carrying wood for his fireplace  Patient states he followed up with his PCP in which he was prescribed pain medication with no change  Patient reports a stabbing pain in the right anterior and superior shoulder with reaching and overhead activities, including washing his hair and donning/doffing a jacket or shirt   He reports intermittent paraesthesias in the right UE and into the fingers  Denies any loss of sensation or significant weakness  Patient states the pain is gradually improving however is concerned about his ongoing pain with daily activities and pain and disruption in his sleep secondary to inability to lay on his right side  Occupation: retired    Quality of life: good    Pain  Current pain ratin  At best pain ratin  At worst pain rating: 10  Quality: sharp  Alleviating factors: stopping the aggravating motion, resting with arm at side  Exacerbated by: reaching, overhead activities, donning/doffing shirt and jacket, sleeping on right side    Progression: improved    Patient Goals  Patient goals for therapy: independence with ADLs/IADLs, decreased pain and increased motion          Objective     Postural Observations  Seated posture: poor (slouched)    Additional Postural Observation Details  Right lateral head tilt    Observations     Additional Observation Details  Poor scapulohumeral movement coordination with right medial and inferior boarding and (+) painful arc    Palpation     Additional Palpation Details  (+) TTP supraspinatus tendon insertion  Myogenic hypertonicity (B) UT R > L    Cervical/Thoracic Screen   Cervical range of motion within normal limits with the following exceptions: Cervical AROM flexion limited 25% pulling in UT , extension limited 25% (+) pain in shoulder, rotation and SB WNL  (+) spurlings A on right  (-) distraction  (-) ULTT median n  Bias    Repeated retractions- no change in pain, improved OH reaching  Poor DNF movement coordination    Active Range of Motion   Left Shoulder   Normal active range of motion  External rotation BTH: T3   Internal rotation BTB: T6     Right Shoulder   Flexion: Right shoulder active forward flexion: limited 15% compared to contralateral side  with pain  Abduction: Right shoulder active abduction: limited 40% compared to contralateral side  with pain  External rotation 0°: WFL and with pain  External rotation BTH: Active external rotation behind the head: occiput   with pain  Internal rotation BTB: sacrum with pain    Passive Range of Motion     Right Shoulder   Normal passive range of motion  Flexion: with pain  Abduction: with pain  External rotation 90°: with pain  Internal rotation 90°: 45 degrees with pain    Strength/Myotome Testing     Left Shoulder   Normal muscle strength    Isolated Muscles   Lower trapezius: 4   Middle trapezius: 4   Teres minor: 4+     Right Shoulder     Planes of Motion   Flexion: 3+   Extension: 3+   External rotation at 0°: 4-   Internal rotation at 0°: 5     Isolated Muscles   Lower trapezius: 3+   Middle trapezius: 3+   Supraspinatus: 3   Teres minor: 3- Tests     Additional Tests Details  (+) neers, muro, painful arc, resisted ER on right  (+) full can/empty for pain  (+) test for posterior capsule tightness  (-) IR lag  (+) ER lag, pain  (-) Scapular repositioning and scapular assistance tests    General Comments     Shoulder Comments   Constitutional: No fever, no chills, feels well, no tiredness, no recent weight gain or loss  Eyes: No complaints of eyesight problems, no red eyes  ENT: no loss of hearing, no nosebleeds, no sore throat  Cardiovascular: No complaints of chest pain, no palpitations, no leg claudication or lower extremity edema  Respiratory: no complaints of shortness of breath, no wheezing, no cough  Gastrointestinal: no complaints of abdominal pain, no constipation, no nausea or diarrhea, no vomiting, no bloody stools  Genitourinary: no complaints of dysuria, no incontinence  Musculoskeletal: as noted in impairment observations  Integumentary: no complaints of skin rash or lesion, no itching or dry skin, no skin wounds  Neurological: no complaints of headache, no confusion, no numbness or tingling, no dizziness  DTR, myotome, dermatome screen unremarkable  Endocrine: No complaints of muscle weakness, no feelings of weakness, no frequent urination, no excessive thirst    Psychiatric: No suicidal thoughts, no anxiety, no feelings of depression                Precautions: DM, HTN, back pain    Daily Treatment Diary     Manual              Posterior GJ joint mobs             Posterior capsule stretch             May benefit from cervical mobilizations                                           Exercise Diary              Wall slides X 10            Lower trap ER X 10            Cervical retractions X 10            pulleys             DNF             Prone horizontal ABD             Prone flexion             scap 4 progression             Postural training Modalities              CP prn                                         Impairment list:  1  Poor scapulohumeral movement coordination- will address with movement coordination training  2  poor DNF movement coordination- will address with movement coordination training  3  Posterior capsule tightness- will address with joint mobilizations and mobility exericses  4   Postural dysfunction will address with postural education     Plan: further assess UE strength (elbow, wrist, hand) as well as cervical and thoracic segmental mobility

## 2018-03-27 ENCOUNTER — OFFICE VISIT (OUTPATIENT)
Dept: PHYSICAL THERAPY | Facility: MEDICAL CENTER | Age: 68
End: 2018-03-27
Payer: MEDICARE

## 2018-03-27 DIAGNOSIS — M25.511 ACUTE PAIN OF RIGHT SHOULDER: ICD-10-CM

## 2018-03-27 DIAGNOSIS — M54.12 CERVICAL RADICULOPATHY: ICD-10-CM

## 2018-03-27 DIAGNOSIS — M79.601 PAIN OF RIGHT UPPER EXTREMITY: Primary | ICD-10-CM

## 2018-03-27 PROCEDURE — 97110 THERAPEUTIC EXERCISES: CPT | Performed by: PHYSICAL THERAPIST

## 2018-03-27 PROCEDURE — 97112 NEUROMUSCULAR REEDUCATION: CPT | Performed by: PHYSICAL THERAPIST

## 2018-03-27 PROCEDURE — 97140 MANUAL THERAPY 1/> REGIONS: CPT | Performed by: PHYSICAL THERAPIST

## 2018-03-27 NOTE — PROGRESS NOTES
Daily Note     Today's date: 3/27/2018  Patient name: Spenser Cobos  : 1950  MRN: [id number]  Referring provider: Aurelia Edwards MD  Dx:   Encounter Diagnosis     ICD-10-CM    1  Pain of right upper extremity M79 601    2  Cervical radiculopathy M54 12    3  Acute pain of right shoulder M25 511                   Subjective: Patient denies any increase in symptoms at this time  States the wall slides seem to increase his pain during but resolves after  Objective: See treatment diary below    Precautions: DM, HTN, back pain    Daily Treatment Diary     Manual   3/27           Posterior GJ joint mobs inferiorly, posteriorly followed by PROM with gentle distraction  CK           Posterior capsule stretch  CK           Grade V supine thoracic mobilization  CK                                         Exercise Diary              Wall slides X 10 hold           Lower trap ER X 10 2 x 10           Cervical retractions X 10 5" x 10           pulleys             DNF             Prone horizontal ABD  X 10           Prone flexion  pain           scap 4 progression             Postural training  performed           Prone row  X 10           Cross arm stretch  20" x 4 (in pain free range)                                                                                                                                    Modalities              CP prn                                         Impairment list:  1  Poor scapulohumeral movement coordination- addressing with movement coordination training  2  poor DNF movement coordination- addressing with movement coordination training  3  Posterior capsule tightness- addressing with joint mobilizations and mobility exericses  4  Postural dysfunction- addressing with postural education       Assessment: Cervical spine further screened this session  PROM WNL compared to AROM, AROM limited in all planes by 25%, (+) in to flexion    (-) spurlings, distraction (+) ZUNILDA CEE  Myotomes/dermatomes WNL  Right UPA hypomobility noted throughout with (+) tenderness, myogenic hypertonicity of right cervical paraspinals  Hypomobility of mid thoracic spine  Addressed mobility issues as noted above with improved GH mobility into flexion and abduction  Plan: Monitor response nv and progress as apporpriate

## 2018-03-29 ENCOUNTER — OFFICE VISIT (OUTPATIENT)
Dept: UROLOGY | Facility: CLINIC | Age: 68
End: 2018-03-29
Payer: MEDICARE

## 2018-03-29 VITALS
WEIGHT: 161.2 LBS | DIASTOLIC BLOOD PRESSURE: 80 MMHG | HEART RATE: 58 BPM | HEIGHT: 66 IN | SYSTOLIC BLOOD PRESSURE: 132 MMHG | BODY MASS INDEX: 25.91 KG/M2

## 2018-03-29 DIAGNOSIS — N40.1 BENIGN PROSTATIC HYPERPLASIA WITH URINARY FREQUENCY: ICD-10-CM

## 2018-03-29 DIAGNOSIS — R35.0 BENIGN PROSTATIC HYPERPLASIA WITH URINARY FREQUENCY: ICD-10-CM

## 2018-03-29 PROCEDURE — 99213 OFFICE O/P EST LOW 20 MIN: CPT | Performed by: UROLOGY

## 2018-03-29 PROCEDURE — 51798 US URINE CAPACITY MEASURE: CPT | Performed by: UROLOGY

## 2018-03-29 NOTE — LETTER
March 29, 2018     MD [address]  [facility] [address]    Patient: Kendell Gentile   YOB: 1950   Date of Visit: 3/29/2018       Dear Dr Mona Coats:    Thank you for referring Kendell Gentile to me for evaluation  Below are my notes for this consultation  If you have questions, please do not hesitate to call me  I look forward to following your patient along with you  Sincerely,        Prescott Leyden, MD        CC: No Recipients  Prescott Leyden, MD  3/29/2018  9:09 AM  Sign at close encounter  3/29/2018    Kendell Gentile  1950  [id number]        Assessment  BPH with lower urinary tract symptoms    Plan  Patient is doing well and will continue with tamsulosin daily  He will follow up in 1 year with postvoid residual assessment  Will continue to have PSA checked with his routine lab work through his primary care physician  History of Present Illness  Juan Mendoza is a 79 y o  male with history of BPH and lower urinary tract symptoms  He was started on tamsulosin  His AUA symptom score improved from 30 down to 1  His nocturia improved from 5 to 0  He is very happy with his progress overall  PSA 2 1 on 9/2/2017  Bladder scan postvoid residual today 7 mL  AUA SYMPTOM SCORE    Flowsheet Row Most Recent Value   AUA SYMPTOM SCORE   How often have you had a sensation of not emptying your bladder completely after you finished urinating? 0   How often have you had to urinate again less than two hours after you finished urinating? 1   How often have you found you stopped and started again several times when you urinate?  0   How often have you found it difficult to postpone urination? 0   How often have you had a weak urinary stream?  0   How often have you had to push or strain to begin urination?   0   How many times did you most typically get up to urinate from the time you went to bed at night until the time you got up in the morning?  0   Quality of Life: If you were to spend the rest of your life with your urinary condition just the way it is now, how would you feel about that?  1   AUA SYMPTOM SCORE  1          Review of Systems  Review of Systems      Past Medical History  Past Medical History:   Diagnosis Date    Diabetes mellitus ([address] )     Hypertension        Past Social History  Past Surgical History:   Procedure Laterality Date    INNER EAR SURGERY      NASAL SINUS SURGERY      AR COLONOSCOPY FLX DX W/COLLJ SPEC WHEN PFRMD N/A 8/30/2016    Procedure: COLONOSCOPY;  Surgeon: Dominic Rojo MD;  Location: [facility]; Service: General       Past Family History  History reviewed  No pertinent family history  Past Social history  Social History     Social History    Marital status: /Civil Union     Spouse name: N/A    Number of children: N/A    Years of education: N/A     Occupational History    Not on file       Social History Main Topics    Smoking status: Never Smoker    Smokeless tobacco: Never Used    Alcohol use Yes      Comment: occassional    Drug use: No    Sexual activity: Not on file     Other Topics Concern    Not on file     Social History Narrative    No narrative on file       Current Medications  Current Outpatient Prescriptions   Medication Sig Dispense Refill    amLODIPine (NORVASC) 10 mg tablet Take 1 tablet by mouth daily      Blood Glucose Monitoring Suppl (ONE TOUCH ULTRA 2) w/Device KIT by Does not apply route      clobetasol (TEMOVATE) 0 05 % cream Apply topically 2 (two) times a day      GABAPENTIN PO Take 600 mg by mouth 3 (three) times a day as needed        glucose blood (ONE TOUCH ULTRA TEST) test strip by In Vitro route      hydrochlorothiazide (HYDRODIURIL) 25 mg tablet Take 1 tablet by mouth daily      Lancets (ONETOUCH ULTRASOFT) lancets by Other route daily 100 each 3    metFORMIN (GLUCOPHAGE) 500 mg tablet Take 500 mg by mouth daily at bedtime        tamsulosin (FLOMAX) 0 4 mg Take 0 4 mg by mouth daily at bedtime      traMADol (ULTRAM) 50 mg tablet take 1 tablet by mouth three times a day if needed for MODERATE PAIN 90 tablet 0    metaxalone (SKELAXIN) 800 mg tablet Take 1 tablet (800 mg total) by mouth 3 (three) times a day 30 tablet 0    Methylprednisolone 4 MG TBPK Use as directed on package 21 tablet 0     No current facility-administered medications for this visit  Allergies  Allergies   Allergen Reactions    Lipitor [Atorvastatin] Other (See Comments) and Myalgia     Other reaction(s): increased pain  arthralgia    Lyrica [Pregabalin] Other (See Comments)     Pt can't remmember reaction       Past Medical History, Social History, Family History, medications and allergies were reviewed  Vitals  Vitals:    03/29/18 0834   BP: 132/80   Pulse: 58   Weight: 73 1 kg (161 lb 3 2 oz)   Height: 5' 6" (1 676 m)       Physical Exam  Physical Exam   Constitutional: He is oriented to person, place, and time  He appears well-developed and well-nourished  Cardiovascular: Normal rate  Pulmonary/Chest: Effort normal    Abdominal: Soft  Genitourinary:   Genitourinary Comments: About 35 g, smooth, no nodules   Musculoskeletal: Normal range of motion  Neurological: He is alert and oriented to person, place, and time  Skin: Skin is warm, dry and intact  Psychiatric: He has a normal mood and affect  Vitals reviewed          Results  Lab Results   Component Value Date    PSA 2 1 09/02/2017    PSA 2 2 09/27/2016     Lab Results   Component Value Date    GLUCOSE 152 (H) 12/11/2017    CALCIUM 9 0 03/08/2018     03/08/2018    K 3 4 (L) 03/08/2018    CO2 28 03/08/2018     03/08/2018    BUN 27 (H) 03/08/2018    CREATININE 0 89 03/08/2018     No results found for: WBC, HGB, HCT, MCV, PLT

## 2018-03-29 NOTE — PROGRESS NOTES
3/29/2018    Eliz Burr  1950  [id number]        Assessment  BPH with lower urinary tract symptoms    Plan  Patient is doing well and will continue with tamsulosin daily  He will follow up in 1 year with postvoid residual assessment  Will continue to have PSA checked with his routine lab work through his primary care physician  History of Present Illness  Kassidy Meyers is a 79 y o  male with history of BPH and lower urinary tract symptoms  He was started on tamsulosin  His AUA symptom score improved from 30 down to 1  His nocturia improved from 5 to 0  He is very happy with his progress overall  PSA 2 1 on 9/2/2017  Bladder scan postvoid residual today 7 mL  AUA SYMPTOM SCORE    Flowsheet Row Most Recent Value   AUA SYMPTOM SCORE   How often have you had a sensation of not emptying your bladder completely after you finished urinating? 0   How often have you had to urinate again less than two hours after you finished urinating? 1   How often have you found you stopped and started again several times when you urinate?  0   How often have you found it difficult to postpone urination? 0   How often have you had a weak urinary stream?  0   How often have you had to push or strain to begin urination?   0   How many times did you most typically get up to urinate from the time you went to bed at night until the time you got up in the morning?  0   Quality of Life: If you were to spend the rest of your life with your urinary condition just the way it is now, how would you feel about that?  1   AUA SYMPTOM SCORE  1          Review of Systems  Review of Systems      Past Medical History  Past Medical History:   Diagnosis Date    Diabetes mellitus ([address] )     Hypertension        Past Social History  Past Surgical History:   Procedure Laterality Date    INNER EAR SURGERY      NASAL SINUS SURGERY      PA COLONOSCOPY FLX DX W/COLLJ SPEC WHEN PFRMD N/A 8/30/2016    Procedure: COLONOSCOPY;  Surgeon: Haresh Francis MD;  Location: [facility]; Service: General       Past Family History  History reviewed  No pertinent family history  Past Social history  Social History     Social History    Marital status: /Civil Union     Spouse name: N/A    Number of children: N/A    Years of education: N/A     Occupational History    Not on file  Social History Main Topics    Smoking status: Never Smoker    Smokeless tobacco: Never Used    Alcohol use Yes      Comment: occassional    Drug use: No    Sexual activity: Not on file     Other Topics Concern    Not on file     Social History Narrative    No narrative on file       Current Medications  Current Outpatient Prescriptions   Medication Sig Dispense Refill    amLODIPine (NORVASC) 10 mg tablet Take 1 tablet by mouth daily      Blood Glucose Monitoring Suppl (ONE TOUCH ULTRA 2) w/Device KIT by Does not apply route      clobetasol (TEMOVATE) 0 05 % cream Apply topically 2 (two) times a day      GABAPENTIN PO Take 600 mg by mouth 3 (three) times a day as needed        glucose blood (ONE TOUCH ULTRA TEST) test strip by In Vitro route      hydrochlorothiazide (HYDRODIURIL) 25 mg tablet Take 1 tablet by mouth daily      Lancets (ONETOUCH ULTRASOFT) lancets by Other route daily 100 each 3    metFORMIN (GLUCOPHAGE) 500 mg tablet Take 500 mg by mouth daily at bedtime        tamsulosin (FLOMAX) 0 4 mg Take 0 4 mg by mouth daily at bedtime      traMADol (ULTRAM) 50 mg tablet take 1 tablet by mouth three times a day if needed for MODERATE PAIN 90 tablet 0    metaxalone (SKELAXIN) 800 mg tablet Take 1 tablet (800 mg total) by mouth 3 (three) times a day 30 tablet 0    Methylprednisolone 4 MG TBPK Use as directed on package 21 tablet 0     No current facility-administered medications for this visit          Allergies  Allergies   Allergen Reactions    Lipitor [Atorvastatin] Other (See Comments) and Myalgia     Other reaction(s): increased pain  arthralgia    Lyrica [Pregabalin] Other (See Comments)     Pt can't remmember reaction       Past Medical History, Social History, Family History, medications and allergies were reviewed  Vitals  Vitals:    03/29/18 0834   BP: 132/80   Pulse: 58   Weight: 73 1 kg (161 lb 3 2 oz)   Height: 5' 6" (1 676 m)       Physical Exam  Physical Exam   Constitutional: He is oriented to person, place, and time  He appears well-developed and well-nourished  Cardiovascular: Normal rate  Pulmonary/Chest: Effort normal    Abdominal: Soft  Genitourinary:   Genitourinary Comments: About 35 g, smooth, no nodules   Musculoskeletal: Normal range of motion  Neurological: He is alert and oriented to person, place, and time  Skin: Skin is warm, dry and intact  Psychiatric: He has a normal mood and affect  Vitals reviewed          Results  Lab Results   Component Value Date    PSA 2 1 09/02/2017    PSA 2 2 09/27/2016     Lab Results   Component Value Date    GLUCOSE 152 (H) 12/11/2017    CALCIUM 9 0 03/08/2018     03/08/2018    K 3 4 (L) 03/08/2018    CO2 28 03/08/2018     03/08/2018    BUN 27 (H) 03/08/2018    CREATININE 0 89 03/08/2018     No results found for: WBC, HGB, HCT, MCV, PLT

## 2018-03-30 ENCOUNTER — OFFICE VISIT (OUTPATIENT)
Dept: PHYSICAL THERAPY | Facility: MEDICAL CENTER | Age: 68
End: 2018-03-30
Payer: MEDICARE

## 2018-03-30 DIAGNOSIS — M54.12 CERVICAL RADICULOPATHY: ICD-10-CM

## 2018-03-30 DIAGNOSIS — M25.511 ACUTE PAIN OF RIGHT SHOULDER: ICD-10-CM

## 2018-03-30 DIAGNOSIS — M79.601 PAIN OF RIGHT UPPER EXTREMITY: Primary | ICD-10-CM

## 2018-03-30 PROCEDURE — 97110 THERAPEUTIC EXERCISES: CPT | Performed by: PHYSICAL THERAPIST

## 2018-03-30 PROCEDURE — 97112 NEUROMUSCULAR REEDUCATION: CPT | Performed by: PHYSICAL THERAPIST

## 2018-03-30 PROCEDURE — 97140 MANUAL THERAPY 1/> REGIONS: CPT | Performed by: PHYSICAL THERAPIST

## 2018-03-30 NOTE — PROGRESS NOTES
Daily Note     Today's date: 3/30/2018  Patient name: Maria D Gamino  : 1950  MRN: [id number]  Referring provider: Brea Clemente MD  Dx:   Encounter Diagnosis     ICD-10-CM    1  Pain of right upper extremity M79 601    2  Cervical radiculopathy M54 12    3  Acute pain of right shoulder M25 511                   Subjective: Patient states he notes small improvements in his right shoulder pain at this time  Objective: See treatment diary below    Precautions: DM, HTN, back pain    Daily Treatment Diary     Manual   3/27 3/30          Posterior GJ joint mobs inferiorly, posteriorly followed by PROM with gentle distraction  CK           Posterior capsule stretch  CK           Grade V supine thoracic mobilization  CK                                         Exercise Diary              Wall slides X 10 hold 2 x 10          Lower trap ER X 10 2 x 10 3 x 10          Cervical retractions X 10 5" x 10           pulleys   X 5'          DNF   5" 2 x 10          Prone horizontal ABD  X 10 2 x 10          Prone flexion  pain 2 x 10          scap 4 progression   2 x 10 ea          Postural training  performed           Prone row  X 10           Cross arm stretch  20" x 4 (in pain free range) 20" x 4          SL ER   2 x 10                                                                                                                      Modalities              CP prn                                         Impairment list:  1  Poor scapulohumeral movement coordination- addressing with movement coordination training  2  poor DNF movement coordination- addressing with movement coordination training  3  Posterior capsule tightness- addressing with joint mobilizations and mobility exericses  4  Postural dysfunction- addressing with postural education       Assessment: Patient demonstrates full forward flexion and abduction ROM this session, slight discomfort noted with abduction at end range   Tolerated progressions in TE this session with addition of scapular stabilization  Cueing required for for scapular control and positioning  Plan: Monitor response nv and progress as apporpriate

## 2018-04-03 ENCOUNTER — OFFICE VISIT (OUTPATIENT)
Dept: PHYSICAL THERAPY | Facility: MEDICAL CENTER | Age: 68
End: 2018-04-03
Payer: MEDICARE

## 2018-04-03 DIAGNOSIS — M54.12 CERVICAL RADICULOPATHY: ICD-10-CM

## 2018-04-03 DIAGNOSIS — M79.601 PAIN OF RIGHT UPPER EXTREMITY: Primary | ICD-10-CM

## 2018-04-03 DIAGNOSIS — M25.511 ACUTE PAIN OF RIGHT SHOULDER: ICD-10-CM

## 2018-04-03 PROCEDURE — 97110 THERAPEUTIC EXERCISES: CPT | Performed by: PHYSICAL THERAPIST

## 2018-04-03 PROCEDURE — 97112 NEUROMUSCULAR REEDUCATION: CPT | Performed by: PHYSICAL THERAPIST

## 2018-04-03 PROCEDURE — 97140 MANUAL THERAPY 1/> REGIONS: CPT | Performed by: PHYSICAL THERAPIST

## 2018-04-03 NOTE — PROGRESS NOTES
Daily Note     Today's date: 4/3/2018  Patient name: Lilliana Quiñones  : 1950  MRN: [id number]  Referring provider: Ernie Traore MD  Dx:   Encounter Diagnosis     ICD-10-CM    1  Pain of right upper extremity M79 601    2  Cervical radiculopathy M54 12    3  Acute pain of right shoulder M25 511                   Subjective: Patient reports improved ability to wash hair  Was also able to carry wood into the house yesterday with minimal discomfort  However today he states he woke up with stiffness and pain, but it is improving as he has moved around  Objective: See treatment diary below    Precautions: DM, HTN, back pain    Daily Treatment Diary     Manual   3/27 3/30 4/3         Posterior GJ joint mobs inferiorly, posteriorly followed by PROM with gentle distraction  CK  CK         Posterior capsule stretch  CK           Grade V supine thoracic mobilization  CK           MWM into abduction    CK                          Exercise Diary              Wall slides X 10 hold 2 x 10 2 x 10         Lower trap ER X 10 2 x 10 3 x 10 3 x 10         Cervical retractions X 10 5" x 10           pulleys   X 5' X 5'         DNF   5" 2 x 10 5" 2 x 10         Prone horizontal ABD  X 10 2 x 10 3 x 10         Prone flexion  pain 2 x 10 3 x 10         scap 4 progression   2 x 10 ea 3 x 10 ea         Postural training  performed  perfomred         Prone row  X 10           Cross arm stretch  20" x 4 (in pain free range) 20" x 4 20" x 4         SL ER   2 x 10 3 x 10                                                                                                                     Modalities              CP prn                                         Impairment list:  1  Poor scapulohumeral movement coordination- addressing with movement coordination training  2  poor DNF movement coordination- addressing with movement coordination training  3   Posterior capsule tightness- addressing with joint mobilizations and mobility exericses  4  Postural dysfunction- addressing with postural education       Assessment: Patient continues to be challenged by scapular stabilization in which he requires extensive cueing and proprioceptive feedback to prevent UT hiking and demonstrate appropriate scapular retraction  Patient counseled in appropriate posture to promote overhead reaching; may benefit from further thoracic extension facilitation  May benefit from taping for proprioceptive feedback to limit right lateral head tilt, will trial next visit  Plan: Monitor response nv and progress as apporpriate

## 2018-04-06 ENCOUNTER — OFFICE VISIT (OUTPATIENT)
Dept: PHYSICAL THERAPY | Facility: MEDICAL CENTER | Age: 68
End: 2018-04-06
Payer: MEDICARE

## 2018-04-06 DIAGNOSIS — M79.601 PAIN OF RIGHT UPPER EXTREMITY: Primary | ICD-10-CM

## 2018-04-06 DIAGNOSIS — M54.12 CERVICAL RADICULOPATHY: ICD-10-CM

## 2018-04-06 DIAGNOSIS — M25.511 ACUTE PAIN OF RIGHT SHOULDER: ICD-10-CM

## 2018-04-06 PROCEDURE — G8991 OTHER PT/OT GOAL STATUS: HCPCS | Performed by: PHYSICAL THERAPIST

## 2018-04-06 PROCEDURE — 97110 THERAPEUTIC EXERCISES: CPT | Performed by: PHYSICAL THERAPIST

## 2018-04-06 PROCEDURE — G8990 OTHER PT/OT CURRENT STATUS: HCPCS | Performed by: PHYSICAL THERAPIST

## 2018-04-06 PROCEDURE — 97112 NEUROMUSCULAR REEDUCATION: CPT | Performed by: PHYSICAL THERAPIST

## 2018-04-06 PROCEDURE — 97140 MANUAL THERAPY 1/> REGIONS: CPT | Performed by: PHYSICAL THERAPIST

## 2018-04-06 NOTE — PROGRESS NOTES
Daily Note     Today's date: 2018  Patient name: Olvin Ramirez  : 1950  MRN: [id number]  Referring provider: Gabo Butler MD  Dx:   Encounter Diagnosis     ICD-10-CM    1  Pain of right upper extremity M79 601    2  Cervical radiculopathy M54 12    3  Acute pain of right shoulder M25 511                   Subjective: Patient continues to note good mobility of the right shoulder but continues to experience "pain" with all overhead and reaching activities  Objective: See treatment diary below    Precautions: DM, HTN, back pain    Daily Treatment Diary     Manual   3/27 3/30 4/3 4/6        Posterior GJ joint mobs inferiorly, posteriorly followed by PROM with gentle distraction  CK  CK CK        Posterior capsule stretch  CK   CK        Grade V supine thoracic mobilization  CK   CK        MWM into abduction    CK CK        May benefit from STM to right scalenes with passive stretching                 Exercise Diary              Wall slides X 10 hold 2 x 10 2 x 10         Lower trap ER X 10 2 x 10 3 x 10 3 x 10         Cervical retractions X 10 5" x 10           pulleys   X 5' X 5' X 5'        DNF   5" 2 x 10 5" 2 x 10 5" x 30        Prone horizontal ABD  X 10 2 x 10 3 x 10 np        Prone flexion  pain 2 x 10 3 x 10 np        scap 4 progression   2 x 10 ea 3 x 10 ea 3 x 10 (scap squeezes only)        Postural training  performed  perfomred performed        Cross arm stretch  20" x 4 (in pain free range) 20" x 4 20" x 4         SL ER   2 x 10 3 x 10 3 x 10        Prone lower trap     3 x 10        Serratus punches     2 x 10                                                                                          Modalities              CP prn     X 10        McConnel tape to correct right lateral head tilt     performed                       Impairment list:  1   Poor scapulohumeral movement coordination- addressing with movement coordination training  2  poor DNF movement coordination- addressing with movement coordination training  3  Posterior capsule tightness- addressing with joint mobilizations and mobility exericses  4  Postural dysfunction- addressing with postural education       Assessment: Patient presents with (+) painful arc, however demonstrates full AROM of the right shoulder in all planes Hypomobility noted throughout upper and mid-thoracic spine with notable flexed thoracic posture  (-) ULTT, however demonstrates significant scalene and upper trap hypertonicity, which may be contributing to right lateral head tilt  Extensive cuing this session to promote lower trap activation and inhibit UT activation utilizing visual, proprioceptive, and tactile cues  Follow up with nv and only progress as appropriate technique is noted consistently  Plan: Monitor response nv and progress as apporpriate

## 2018-04-10 ENCOUNTER — OFFICE VISIT (OUTPATIENT)
Dept: PHYSICAL THERAPY | Facility: MEDICAL CENTER | Age: 68
End: 2018-04-10
Payer: MEDICARE

## 2018-04-10 DIAGNOSIS — M25.511 ACUTE PAIN OF RIGHT SHOULDER: ICD-10-CM

## 2018-04-10 DIAGNOSIS — M54.12 CERVICAL RADICULOPATHY: ICD-10-CM

## 2018-04-10 DIAGNOSIS — M79.601 PAIN OF RIGHT UPPER EXTREMITY: Primary | ICD-10-CM

## 2018-04-10 PROCEDURE — 97112 NEUROMUSCULAR REEDUCATION: CPT | Performed by: PHYSICAL THERAPIST

## 2018-04-10 PROCEDURE — 97140 MANUAL THERAPY 1/> REGIONS: CPT | Performed by: PHYSICAL THERAPIST

## 2018-04-10 NOTE — PROGRESS NOTES
Daily Note     Today's date: 4/10/2018  Patient name: Giovani Orantes  : 1950  MRN: [id number]  Referring provider: Christopher Wray MD  Dx:   Encounter Diagnosis     ICD-10-CM    1  Pain of right upper extremity M79 601    2  Cervical radiculopathy M54 12    3  Acute pain of right shoulder M25 511                   Subjective: Patient reports his pain from Friday has decreased, continues to maintain full mobility of his right shoulder  He states he felt the tape "helped"       Objective: See treatment diary below    Precautions: DM, HTN, back pain    Daily Treatment Diary     Manual   3/27 3/30 4/3 4/6 4/10       Posterior GJ joint mobs inferiorly, posteriorly followed by PROM with gentle distraction  CK  CK CK        Posterior capsule stretch  CK   CK        Grade V supine thoracic mobilization  CK   CK        MWM into abduction    CK CK        May benefit from STM to right scalenes with passive stretching             Grade 2-4 inferior GH joint mobs in scapular plane      CK                                                  Exercise Diary              Wall slides X 10 hold 2 x 10 2 x 10  2 x 10       Lower trap ER X 10 2 x 10 3 x 10 3 x 10  3 x 10       Cervical retractions X 10 5" x 10           pulleys   X 5' X 5' X 5' X 5'       DNF   5" 2 x 10 5" 2 x 10 5" x 30        Prone horizontal ABD  X 10 2 x 10 3 x 10 np        Prone flexion  pain 2 x 10 3 x 10 np        scap 4 progression   2 x 10 ea 3 x 10 ea 3 x 10 (scap squeezes only)        Postural training  performed  perfomred performed performed       Cross arm stretch  20" x 4 (in pain free range) 20" x 4 20" x 4         SL ER   2 x 10 3 x 10 3 x 10 3 x 10       Prone lower trap     3 x 10 3 x 10       Serratus punches     2 x 10 3 x 10       MRE's lower trap      5" x 20                                                                            Modalities              CP prn     X 10        McConnel tape to correct right lateral head tilt     performed CK McConnel tape for upper trap inhibition      CK         Impairment list:  1  Poor scapulohumeral movement coordination- addressing with movement coordination training  2  poor DNF movement coordination- addressing with movement coordination training  3  Posterior capsule tightness- addressing with joint mobilizations and mobility exericses  4  Postural dysfunction- addressing with postural education       Assessment: Patient presents today with painful arc into shoulder abduction  Demonstrates decreased scapular upward rotation and UT compensation with scapular dumping upon lowering phase  Unable to correct with manual scapular upward rotation ,however Pain improved following inferior joint mobilizations  Focused on scapular retraction this session in which patient required extensive tactile and verbal cues for apporpriate technique  McConnel tape performed for postural correction and to inhibit UT hiking  Patient left at end of session with decreased pain and improved mobility  Will continue with HEP for lower trap isolation, review nv  Plan: Monitor response nv and progress as apporpriate

## 2018-04-13 ENCOUNTER — OFFICE VISIT (OUTPATIENT)
Dept: PHYSICAL THERAPY | Facility: MEDICAL CENTER | Age: 68
End: 2018-04-13
Payer: MEDICARE

## 2018-04-13 DIAGNOSIS — M54.12 CERVICAL RADICULOPATHY: ICD-10-CM

## 2018-04-13 DIAGNOSIS — M25.511 ACUTE PAIN OF RIGHT SHOULDER: ICD-10-CM

## 2018-04-13 DIAGNOSIS — M79.601 PAIN OF RIGHT UPPER EXTREMITY: Primary | ICD-10-CM

## 2018-04-13 PROCEDURE — 97140 MANUAL THERAPY 1/> REGIONS: CPT | Performed by: PHYSICAL THERAPIST

## 2018-04-13 PROCEDURE — 97112 NEUROMUSCULAR REEDUCATION: CPT | Performed by: PHYSICAL THERAPIST

## 2018-04-13 NOTE — PROGRESS NOTES
Daily Note     Today's date: 2018  Patient name: Braulio Kayser  : 1950  MRN: [id number]  Referring provider: Rosario Virk MD  Dx:   Encounter Diagnosis     ICD-10-CM    1  Pain of right upper extremity M79 601    2  Cervical radiculopathy M54 12    3  Acute pain of right shoulder M25 511                   Subjective: Patient states reaching overhead is feeling much better, slight discomfort in the middle of the range noted  Notes anterior shoulder pain with lifting and carrying moderately heavy objects such as wood      Objective: See treatment diary below    Precautions: DM, HTN, back pain    Daily Treatment Diary     Manual   3/27 3/30 4/3 4/6 4/10 4/13      Posterior GJ joint mobs inferiorly, posteriorly followed by PROM with gentle distraction  CK  CK CK        Posterior capsule stretch  CK   CK  CK      Grade V supine thoracic mobilization  CK   CK        MWM into abduction    CK CK        May benefit from STM to right scalenes with passive stretching             Grade 2-4 inferior GH joint mobs in scapular plane      CK CK      Radial nerve glides       CK      Subscapularis release       CK                       Exercise Diary              Wall slides X 10 hold 2 x 10 2 x 10  2 x 10 3 x 10      Lower trap ER X 10 2 x 10 3 x 10 3 x 10  3 x 10 3  X 15      Cervical retractions X 10 5" x 10           pulleys   X 5' X 5' X 5' X 5' X 5'      DNF   5" 2 x 10 5" 2 x 10 5" x 30        Prone horizontal ABD  X 10 2 x 10 3 x 10 np        Prone flexion  pain 2 x 10 3 x 10 np        scap 4 progression   2 x 10 ea 3 x 10 ea 3 x 10 (scap squeezes only)        Postural training  performed  perfomred performed performed       Cross arm stretch  20" x 4 (in pain free range) 20" x 4 20" x 4         SL ER   2 x 10 3 x 10 3 x 10 3 x 10 1# 3 x 10      Prone lower trap     3 x 10 3 x 10 3 x 10 with muscle tapping      Serratus punches     2 x 10 3 x 10 3 x 10      MRE's lower trap      5" x 20 5" x 20      Radial nerve glides       X 10      Right UT stretch       20" x 3                                                 Modalities              CP prn     X 10        McConnel tape to correct right lateral head tilt     performed CK CK      McConnel tape for upper trap inhibition      CK CK        Impairment list:  1  Poor scapulohumeral movement coordination- addressing with movement coordination training  2  poor DNF movement coordination- addressing with movement coordination training  3  Posterior capsule tightness- addressing with joint mobilizations and mobility exericses  4  Postural dysfunction- addressing with postural education       Assessment: Patient demonstrates improved lower trap activation this session, however continues to require tactile cues to prevent UT activation  Mild discomfort noted at end range flexion and abduction, resolved following joint mobilization  Patient reports posterior arm and forearm pain with (+) ULTT for radian nerve bias noted with elbow flexion at 75 degrees of flexion before onset of pain  Performed nerve glides with slight improvement noted, instructed in self nerve glides for home to carry over between sessions  Patient fatigued at end of session  Plan: Monitor response nv  Continue with POC  RAYNA McConnel taping as appropriate

## 2018-04-16 DIAGNOSIS — G95.0 SYRINGOMYELIA (HCC): ICD-10-CM

## 2018-04-16 RX ORDER — TRAMADOL HYDROCHLORIDE 50 MG/1
TABLET ORAL
Qty: 90 TABLET | Refills: 0 | Status: SHIPPED | OUTPATIENT
Start: 2018-04-16 | End: 2018-05-16 | Stop reason: SDUPTHER

## 2018-04-17 ENCOUNTER — OFFICE VISIT (OUTPATIENT)
Dept: PHYSICAL THERAPY | Facility: MEDICAL CENTER | Age: 68
End: 2018-04-17
Payer: MEDICARE

## 2018-04-17 DIAGNOSIS — M79.601 PAIN OF RIGHT UPPER EXTREMITY: Primary | ICD-10-CM

## 2018-04-17 DIAGNOSIS — M54.12 CERVICAL RADICULOPATHY: ICD-10-CM

## 2018-04-17 DIAGNOSIS — M25.511 ACUTE PAIN OF RIGHT SHOULDER: ICD-10-CM

## 2018-04-17 PROCEDURE — 97140 MANUAL THERAPY 1/> REGIONS: CPT | Performed by: PHYSICAL THERAPIST

## 2018-04-17 PROCEDURE — 97112 NEUROMUSCULAR REEDUCATION: CPT | Performed by: PHYSICAL THERAPIST

## 2018-04-17 NOTE — PROGRESS NOTES
Daily Note     Today's date: 2018  Patient name: Zayda Braxton  : 1950  MRN: [id number]  Referring provider: Lavelle Lanza MD  Dx:   Encounter Diagnosis     ICD-10-CM    1  Pain of right upper extremity M79 601    2  Cervical radiculopathy M54 12    3  Acute pain of right shoulder M25 511                   Subjective: Patient reports continued anterior lateral right shoulder pain with shoulder extension and pushing       Objective: See treatment diary below    Precautions: DM, HTN, back pain    Daily Treatment Diary     Manual   3/27 3/30 4/3 4/6 4/10 4/13 4/17     Posterior GJ joint mobs inferiorly, posteriorly followed by PROM with gentle distraction  CK  CK CK        Posterior capsule stretch  CK   CK  CK      Grade V supine thoracic mobilization  CK   CK        MWM into abduction    CK CK        STM right anterior lateral deltoid        CK     Grade 2-4 inferior GH joint mobs in scapular plane      CK CK CK     Radial nerve glides       CK CK     Subscapularis release       CK      PA right uper rib joint mobs grade 3        CK         Exercise Diary              Wall slides X 10 hold 2 x 10 2 x 10  2 x 10 3 x 10 3 x 10     Lower trap ER X 10 2 x 10 3 x 10 3 x 10  3 x 10 3  X 15 3 x 15     Cervical retractions X 10 5" x 10           pulleys   X 5' X 5' X 5' X 5' X 5' X 5'     DNF   5" 2 x 10 5" 2 x 10 5" x 30        Prone horizontal ABD  X 10 2 x 10 3 x 10 np   2 x 10     Prone flexion  pain 2 x 10 3 x 10 np   pain     scap 4 progression   2 x 10 ea 3 x 10 ea 3 x 10 (scap squeezes only)        Postural training  performed  perfomred performed performed  performed     Cross arm stretch  20" x 4 (in pain free range) 20" x 4 20" x 4    20" x 4     SL ER   2 x 10 3 x 10 3 x 10 3 x 10 1# 3 x 10      Prone lower trap     3 x 10 3 x 10 3 x 10 with muscle tapping 5", 3 x 10 with muscle tapping     Serratus punches     2 x 10 3 x 10 3 x 10      MRE's lower trap      5" x 20 5" x 20      Radial nerve glides X 10      Right UT stretch       20" x 3                                                 Modalities              CP prn     X 10        McConnel tape to correct right lateral head tilt     performed CK CK      McConnel tape for upper trap inhibition      CK CK CK       Impairment list:  1  Poor scapulohumeral movement coordination- addressing with movement coordination training  2  poor DNF movement coordination- addressing with movement coordination training  3  Posterior capsule tightness- addressing with joint mobilizations and mobility exericses  4  Postural dysfunction- addressing with postural education       Assessment: Patient continues to require extensive cueing for upper trap inhibition and activation of lower trap for appropriate scapular retraction and upward rotation  Patient demonstrates poor posterior tilt and retraction of bilateral scapula, R > L, which is most likely secondary to poor motor coordination of the lower trap  Patient also presents with hypomobility of upper ribs which may be contributing to his scapular positioning  Muscle tapping utilized in combination with manual techniques to promote appropriate scapular movement coordination  Will continue with McConnel taping at this time for postural cueing  I believe the communication barrier at this time is limiting patient's progress as he continues to require extensive cueing each visit for appropriate technique and motor control  Plan: Update HEP next session to improve carry over between sessions to minimize incorrect performance

## 2018-04-20 ENCOUNTER — OFFICE VISIT (OUTPATIENT)
Dept: PHYSICAL THERAPY | Facility: MEDICAL CENTER | Age: 68
End: 2018-04-20
Payer: MEDICARE

## 2018-04-20 DIAGNOSIS — M79.601 PAIN OF RIGHT UPPER EXTREMITY: Primary | ICD-10-CM

## 2018-04-20 DIAGNOSIS — M54.12 CERVICAL RADICULOPATHY: ICD-10-CM

## 2018-04-20 DIAGNOSIS — M25.511 ACUTE PAIN OF RIGHT SHOULDER: ICD-10-CM

## 2018-04-20 PROCEDURE — 97112 NEUROMUSCULAR REEDUCATION: CPT | Performed by: PHYSICAL THERAPIST

## 2018-04-20 PROCEDURE — 97140 MANUAL THERAPY 1/> REGIONS: CPT | Performed by: PHYSICAL THERAPIST

## 2018-04-20 NOTE — PROGRESS NOTES
Daily Note     Today's date: 2018  Patient name: Olvin Ramirez  : 1950  MRN: [id number]  Referring provider: Gabo Butler MD  Dx:   Encounter Diagnosis     ICD-10-CM    1  Pain of right upper extremity M79 601    2  Cervical radiculopathy M54 12    3  Acute pain of right shoulder M25 511                   Subjective: Patient reports relief of symptoms with taping for UT inhibition  He states he took the tape off last night and is experiencing slight discomfort again  Overall, patient reports 90% improvement in his right shoulder since starting PT      Objective: See treatment diary below    Precautions: DM, HTN, back pain    Daily Treatment Diary     Manual   3/27 3/30 4/3 4/6 4/10 4/13 4/17 4/20    Posterior GJ joint mobs inferiorly, posteriorly followed by PROM with gentle distraction  CK  CK CK        Posterior capsule stretch  CK   CK  CK  CK    Grade V supine thoracic mobilization  CK   CK        MWM into abduction    CK CK        STM right anterior lateral deltoid        CK CK    Grade 2-4 inferior GH joint mobs in scapular plane      CK CK CK     Radial nerve glides       CK CK     Subscapularis release       CK      PA right uper rib joint mobs grade 3        CK CK        Exercise Diary              Wall slides X 10 hold 2 x 10 2 x 10  2 x 10 3 x 10 3 x 10 3 x 10    Lower trap ER X 10 2 x 10 3 x 10 3 x 10  3 x 10 3  X 15 3 x 15     Cervical retractions X 10 5" x 10           pulleys   X 5' X 5' X 5' X 5' X 5' X 5'     DNF   5" 2 x 10 5" 2 x 10 5" x 30        Prone horizontal ABD  X 10 2 x 10 3 x 10 np   2 x 10 3 x 10 (tapping)    Prone flexion  pain 2 x 10 3 x 10 np   pain 3 x 10 (tapping)    scap 4 progression   2 x 10 ea 3 x 10 ea 3 x 10 (scap squeezes only)    YTB x 10 ea    Postural training  performed  perfomred performed performed  performed     Cross arm stretch  20" x 4 (in pain free range) 20" x 4 20" x 4    20" x 4 20" x 3    SL ER   2 x 10 3 x 10 3 x 10 3 x 10 1# 3 x 10      Prone lower trap     3 x 10 3 x 10 3 x 10 with muscle tapping 5", 3 x 10 with muscle tapping 5", 3 x 10 with muscle tapping    Serratus punches     2 x 10 3 x 10 3 x 10      MRE's lower trap      5" x 20 5" x 20  5" x 20    Radial nerve glides       X 10      Right UT stretch       20" x 3      UBE         3'/3'                                  Modalities              CP prn     X 10        McConnel tape to correct right lateral head tilt     performed CK CK      McConnel tape for upper trap inhibition      CK CK CK CK      Impairment list:  1  Poor scapulohumeral movement coordination- addressing with movement coordination training  2  poor DNF movement coordination- addressing with movement coordination training  3  Posterior capsule tightness- addressing with joint mobilizations and mobility exericses  4  Postural dysfunction- addressing with postural education and McConnel taping      Assessment: Patient's symptoms appear to be primarily of motor coordination and muscle power deficits at this time  Joint mobility assessment normal with full ROM in all planes  Improved lower trap isolation noted this session, however continued to perform all scapular setting exercises with muscular tapping at the lower trap to prevent upper trap activation  Patient with improved technique however continued to fatigue  Reintroduced scap 4 progression with limited resistance and muscle tapping for cueing  Updated HEP with written home exercise sheet: prone lower trap setting, cross arm stretch, wall slides  Plan: Continue PT 2x/week for 2 more weeks

## 2018-04-24 ENCOUNTER — EVALUATION (OUTPATIENT)
Dept: PHYSICAL THERAPY | Facility: MEDICAL CENTER | Age: 68
End: 2018-04-24
Payer: MEDICARE

## 2018-04-24 DIAGNOSIS — M54.12 CERVICAL RADICULOPATHY: ICD-10-CM

## 2018-04-24 DIAGNOSIS — M79.601 PAIN OF RIGHT UPPER EXTREMITY: Primary | ICD-10-CM

## 2018-04-24 DIAGNOSIS — M25.511 ACUTE PAIN OF RIGHT SHOULDER: ICD-10-CM

## 2018-04-24 PROCEDURE — G8991 OTHER PT/OT GOAL STATUS: HCPCS | Performed by: PHYSICAL THERAPIST

## 2018-04-24 PROCEDURE — 97112 NEUROMUSCULAR REEDUCATION: CPT | Performed by: PHYSICAL THERAPIST

## 2018-04-24 PROCEDURE — G8990 OTHER PT/OT CURRENT STATUS: HCPCS | Performed by: PHYSICAL THERAPIST

## 2018-04-24 NOTE — PROGRESS NOTES
Progress Note     Today's date: 2018  Patient name: Rusty Cleveland  : 1950  MRN: [id number]  Referring provider: Michael Harper MD  Dx:   Encounter Diagnosis     ICD-10-CM    1  Pain of right upper extremity M79 601    2  Cervical radiculopathy M54 12    3  Acute pain of right shoulder M25 511                   Subjective: Patient continues to note most relief with UT inhibition taping with increased pain following removal of tape  He states he has made 90% improvement in his right shoulder pain since starting PT however continues to be frustrated with ongoing discomfort in his anterior shoulder and arm with lifting and pushing/pulling activities  He states he was removing paint on his porch over the weekend which has resulted in some increased arm and shoulder pain today        Objective: See treatment diary below    Precautions: DM, HTN, back pain    Daily Treatment Diary     Manual   3/27 3/30 4/3 4/6 4/10 4/13 4/17 4/20 4/24   Posterior GJ joint mobs inferiorly, posteriorly followed by PROM with gentle distraction  CK  CK CK        Posterior capsule stretch  CK   CK  CK  CK    Grade V supine thoracic mobilization  CK   CK        MWM into abduction    CK CK        STM right anterior lateral deltoid        CK CK    Grade 2-4 inferior GH joint mobs in scapular plane      CK CK CK     Radial nerve glides       CK CK     Subscapularis release       CK      PA right uper rib joint mobs grade 3        CK CK        Exercise Diary              Wall slides X 10 hold 2 x 10 2 x 10  2 x 10 3 x 10 3 x 10 3 x 10    Lower trap ER X 10 2 x 10 3 x 10 3 x 10  3 x 10 3  X 15 3 x 15     Cervical retractions X 10 5" x 10           pulleys   X 5' X 5' X 5' X 5' X 5' X 5'     DNF   5" 2 x 10 5" 2 x 10 5" x 30        Prone horizontal ABD  X 10 2 x 10 3 x 10 np   2 x 10 3 x 10 (tapping)    Prone flexion  pain 2 x 10 3 x 10 np   pain 3 x 10 (tapping)    scap 4 progression   2 x 10 ea 3 x 10 ea 3 x 10 (scap squeezes only)    YTB x 10 ea    Postural training  performed  perfomred performed performed  performed     Cross arm stretch  20" x 4 (in pain free range) 20" x 4 20" x 4    20" x 4 20" x 3    SL ER   2 x 10 3 x 10 3 x 10 3 x 10 1# 3 x 10      Prone lower trap     3 x 10 3 x 10 3 x 10 with muscle tapping 5", 3 x 10 with muscle tapping 5", 3 x 10 with muscle tapping    Serratus punches     2 x 10 3 x 10 3 x 10      MRE's lower trap      5" x 20 5" x 20  5" x 20    Radial nerve glides       X 10      Right UT stretch       20" x 3      UBE         3'/3' 3'/3'                                 Modalities              CP prn     X 10        McConnel tape to correct right lateral head tilt     performed CK CK      McConnel tape for upper trap inhibition      CK CK CK CK CK     Impairment list:  1  Poor scapulohumeral movement coordination- addressing with movement coordination training  2  poor DNF movement coordination- addressing with movement coordination training  3  Posterior capsule tightness- addressing with joint mobilizations and mobility exericses  4  Postural dysfunction- addressing with postural education and McConnel taping      Assessment: Patient presents today for reassessment of right shoulder pain  ROM WNL, painful arc with abduction and flexion  Cervical assessment does not change his symptoms  Gross 4/5 right shoulder strength, 3-/5 lower trap strength  Tenderness along right biceps muscle  Unable to isolate lower trap with out cueing and tactile cues  He also continues to have poor postural awareness despite cueing and education provided  We have been working extensively on lower trap activation and posture during therapy however there is minimal carry over between sessions  It appears that progress is limited at this time secondary to language barrier which communication is limited and is therefore limiting carry over between sessions   McConnel taping has continued to work positively for patient however once removed there is no carry over which I believe is from over activation of the upper trap  Patient would benefit from continued PT at this time in order to address the observed impairments to decrease shoulder pain during daily activities including reaching, lifting, pushing, and pulling  Patient would like to work on things at home for two weeks and mentioned following up with his physician at this time  Patient will follow up in the intermin as needed  HEP: wall slides, scap retractions with focus on lower trap using mirror for visual feedback, posterior capsule stretch  Plan: Follow up in two weeks  Continue with PT as appropriate

## 2018-04-26 ENCOUNTER — APPOINTMENT (OUTPATIENT)
Dept: PHYSICAL THERAPY | Facility: MEDICAL CENTER | Age: 68
End: 2018-04-26
Payer: MEDICARE

## 2018-05-01 ENCOUNTER — APPOINTMENT (OUTPATIENT)
Dept: PHYSICAL THERAPY | Facility: MEDICAL CENTER | Age: 68
End: 2018-05-01
Payer: MEDICARE

## 2018-05-04 ENCOUNTER — APPOINTMENT (OUTPATIENT)
Dept: PHYSICAL THERAPY | Facility: MEDICAL CENTER | Age: 68
End: 2018-05-04
Payer: MEDICARE

## 2018-05-08 ENCOUNTER — EVALUATION (OUTPATIENT)
Dept: PHYSICAL THERAPY | Facility: MEDICAL CENTER | Age: 68
End: 2018-05-08
Payer: MEDICARE

## 2018-05-08 DIAGNOSIS — M79.601 PAIN OF RIGHT UPPER EXTREMITY: Primary | ICD-10-CM

## 2018-05-08 DIAGNOSIS — M54.12 CERVICAL RADICULOPATHY: ICD-10-CM

## 2018-05-08 DIAGNOSIS — M25.511 ACUTE PAIN OF RIGHT SHOULDER: ICD-10-CM

## 2018-05-08 PROCEDURE — G8991 OTHER PT/OT GOAL STATUS: HCPCS | Performed by: PHYSICAL THERAPIST

## 2018-05-08 PROCEDURE — 97110 THERAPEUTIC EXERCISES: CPT | Performed by: PHYSICAL THERAPIST

## 2018-05-08 PROCEDURE — G8992 OTHER PT/OT  D/C STATUS: HCPCS | Performed by: PHYSICAL THERAPIST

## 2018-05-08 NOTE — PROGRESS NOTES
Daily Note     Today's date: 2018  Patient name: Nhung Belle  : 1950  MRN: [id number]  Referring provider: Grabiel Church MD  Dx:   Encounter Diagnosis     ICD-10-CM    1  Pain of right upper extremity M79 601    2  Cervical radiculopathy M54 12    3  Acute pain of right shoulder M25 511                   Subjective: Patient arrived to therapy today and states he has good motion and mobility of his right shoulder but would like to hold on physical therapy treatment at this time until he follows up with his physician has he continues to have superior right shoulder pain  Overall patient reports 90% improvement in his right shoulder since starting physical therapy         Objective: See treatment diary below    Precautions: DM, HTN, back pain    Daily Treatment Diary     Manual              Posterior GJ joint mobs inferiorly, posteriorly followed by PROM with gentle distraction             Posterior capsule stretch             Grade V supine thoracic mobilization             MWM into abduction             STM right anterior lateral deltoid             Grade 2-4 inferior GH joint mobs in scapular plane             Radial nerve glides             Subscapularis release             PA right uper rib joint mobs grade 3                 Exercise Diary              Wall slides             Lower trap ER             Cervical retractions             pulleys             DNF             Prone horizontal ABD             Prone flexion             scap 4 progression             Postural training             Cross arm stretch             SL ER             Prone lower trap             Serratus punches             MRE's lower trap             Radial nerve glides             Right UT stretch             UBE                                           Modalities              CP prn             McConnel tape to correct right lateral head tilt             McConnel tape for upper trap inhibition                   Assessment: Patient presents today 2 weeks following reassessment of right shoulder pain  Patient continues to have primary complaint of superior right shoulder pain despite improvements in ROM, strength, and motor control  He continues to have poor scapulothoracic motor control  I believe his limited progress has been secondary to the language barrier that presents  Patient will follow up with his physician regarding his continued shoulder pain at this time  Progress note performed 4/24    Plan: Follow up with physician   Discharge from PT

## 2018-05-16 ENCOUNTER — OFFICE VISIT (OUTPATIENT)
Dept: FAMILY MEDICINE CLINIC | Facility: CLINIC | Age: 68
End: 2018-05-16
Payer: MEDICARE

## 2018-05-16 VITALS
BODY MASS INDEX: 25.71 KG/M2 | SYSTOLIC BLOOD PRESSURE: 112 MMHG | WEIGHT: 160 LBS | HEART RATE: 74 BPM | HEIGHT: 66 IN | DIASTOLIC BLOOD PRESSURE: 80 MMHG

## 2018-05-16 DIAGNOSIS — E11.9 TYPE 2 DIABETES MELLITUS WITHOUT COMPLICATION, WITHOUT LONG-TERM CURRENT USE OF INSULIN (HCC): ICD-10-CM

## 2018-05-16 DIAGNOSIS — G95.0 SYRINGOMYELIA (HCC): ICD-10-CM

## 2018-05-16 DIAGNOSIS — Z23 ENCOUNTER FOR IMMUNIZATION: ICD-10-CM

## 2018-05-16 DIAGNOSIS — M79.601 PAIN OF RIGHT UPPER EXTREMITY: Primary | ICD-10-CM

## 2018-05-16 DIAGNOSIS — N52.9 ERECTILE DYSFUNCTION, UNSPECIFIED ERECTILE DYSFUNCTION TYPE: ICD-10-CM

## 2018-05-16 PROCEDURE — 93000 ELECTROCARDIOGRAM COMPLETE: CPT | Performed by: FAMILY MEDICINE

## 2018-05-16 PROCEDURE — G0009 ADMIN PNEUMOCOCCAL VACCINE: HCPCS | Performed by: FAMILY MEDICINE

## 2018-05-16 PROCEDURE — 90732 PPSV23 VACC 2 YRS+ SUBQ/IM: CPT | Performed by: FAMILY MEDICINE

## 2018-05-16 PROCEDURE — 99214 OFFICE O/P EST MOD 30 MIN: CPT | Performed by: FAMILY MEDICINE

## 2018-05-16 RX ORDER — GABAPENTIN 600 MG/1
TABLET ORAL
Refills: 0 | COMMUNITY
Start: 2018-04-19 | End: 2018-05-18 | Stop reason: SDUPTHER

## 2018-05-16 RX ORDER — TRAMADOL HYDROCHLORIDE 50 MG/1
TABLET ORAL
Qty: 90 TABLET | Refills: 0 | Status: SHIPPED | OUTPATIENT
Start: 2018-05-16 | End: 2018-07-09 | Stop reason: SDUPTHER

## 2018-05-16 NOTE — PROGRESS NOTES
Assessment/Plan:    Type 2 diabetes mellitus ([facility] [address] )  Await lab    Erectile dysfunction  Check EKG before rxing meds       Diagnoses and all orders for this visit:    Pain of right upper extremity  -     Ambulatory referral to Sports Medicine; Future    Type 2 diabetes mellitus without complication, without long-term current use of insulin ([facility])  -     Comprehensive metabolic panel; Future  -     HEMOGLOBIN A1C W/ EAG ESTIMATION; Future  -     POCT ECG  -     PNEUMOCOCCAL POLYSACCHARIDE VACCINE 23-VALENT =>1YO SQ IM  -     Microalbumin / creatinine urine ratio; Future    Erectile dysfunction, unspecified erectile dysfunction type  -     POCT ECG    Syringomyelia (HCC)  -     traMADol (ULTRAM) 50 mg tablet; Take 1 tablet by mouth three times a day if needed for moderate pain    Encounter for immunization  -     PNEUMOCOCCAL POLYSACCHARIDE VACCINE 23-VALENT =>1YO SQ IM    Other orders  -     gabapentin (NEURONTIN) 600 MG tablet;           Subjective:      Patient ID: Kurt Yang is a 79 y o  male  Diabetes   He presents for his follow-up diabetic visit  He has type 2 diabetes mellitus  No MedicAlert identification noted  His disease course has been stable  There are no hypoglycemic associated symptoms  Pertinent negatives for hypoglycemia include no dizziness or headaches  There are no diabetic associated symptoms  Pertinent negatives for diabetes include no chest pain  Symptoms are stable  There are no diabetic complications  Risk factors for coronary artery disease include male sex  Current diabetic treatment includes oral agent (monotherapy)  He is compliant with treatment all of the time  He has not had a previous visit with a dietitian  He participates in exercise three times a week         The following portions of the patient's history were reviewed and updated as appropriate: allergies, current medications, past family history, past medical history, past social history, past surgical history and problem list     Review of Systems   Constitutional: Negative for unexpected weight change  Respiratory: Negative for shortness of breath  Cardiovascular: Negative for chest pain  Musculoskeletal: Positive for arthralgias  R shoulder pain   Neurological: Negative for dizziness and headaches  Objective:      /80   Pulse 74   Ht 5' 6" (1 676 m)   Wt 72 6 kg (160 lb)   BMI 25 82 kg/m²          Physical Exam   Constitutional: He appears well-developed and well-nourished  Neck: No thyromegaly present  Cardiovascular: Normal rate, regular rhythm and normal heart sounds  Pulmonary/Chest: Breath sounds normal    Musculoskeletal: He exhibits no edema  Lymphadenopathy:     He has no cervical adenopathy

## 2018-05-18 DIAGNOSIS — G95.0 SYRINGOMYELIA (HCC): Primary | ICD-10-CM

## 2018-05-18 RX ORDER — GABAPENTIN 600 MG/1
TABLET ORAL
Qty: 90 TABLET | Refills: 5 | Status: SHIPPED | OUTPATIENT
Start: 2018-05-18 | End: 2018-11-15 | Stop reason: SDUPTHER

## 2018-05-24 ENCOUNTER — OFFICE VISIT (OUTPATIENT)
Dept: OBGYN CLINIC | Facility: MEDICAL CENTER | Age: 68
End: 2018-05-24
Payer: MEDICARE

## 2018-05-24 VITALS
HEART RATE: 87 BPM | SYSTOLIC BLOOD PRESSURE: 114 MMHG | WEIGHT: 161.8 LBS | DIASTOLIC BLOOD PRESSURE: 77 MMHG | BODY MASS INDEX: 26.12 KG/M2

## 2018-05-24 DIAGNOSIS — M75.41 IMPINGEMENT SYNDROME OF RIGHT SHOULDER: Primary | ICD-10-CM

## 2018-05-24 DIAGNOSIS — M79.601 PAIN OF RIGHT UPPER EXTREMITY: ICD-10-CM

## 2018-05-24 DIAGNOSIS — M54.12 CERVICAL RADICULOPATHY: ICD-10-CM

## 2018-05-24 PROCEDURE — 20610 DRAIN/INJ JOINT/BURSA W/O US: CPT | Performed by: FAMILY MEDICINE

## 2018-05-24 PROCEDURE — 99214 OFFICE O/P EST MOD 30 MIN: CPT | Performed by: FAMILY MEDICINE

## 2018-05-24 RX ADMIN — LIDOCAINE HYDROCHLORIDE 4 ML: 10 INJECTION, SOLUTION INFILTRATION; PERINEURAL at 15:48

## 2018-05-24 RX ADMIN — TRIAMCINOLONE ACETONIDE 40 MG: 40 INJECTION, SUSPENSION INTRA-ARTICULAR; INTRAMUSCULAR at 15:48

## 2018-05-24 NOTE — PATIENT INSTRUCTIONS
I have diagnosed you with right rotator cuff impingment syndrome but also appears that you have he pinched nerve possibly in your neck  The corticosteroid injection performed today showed help with your right shoulder pain but may not cause relief of the symptoms radiating down her arm  I have referred you to Pain Management for evaluation of possible pinched nerve in your neck causing pain radiating down her arm  If you're pain resolved completely after injection then please cancel the pain management appointment and you can follow-up with orthopedic office as needed  reviewed red flags with patient regarding injection including infection, skin dimpling, hypo-pigmentation, and nerve damage  patient expressed understanding and agreed to proceed with procedure  educated if any symptoms including fevers, chills, swelling, or worsening symptoms occur then to call office or go to hospital for immediate care if physician unavailable  patient expressed understanding and agreed to plan

## 2018-05-24 NOTE — PROGRESS NOTES
1  Impingement syndrome of right shoulder     2  Pain of right upper extremity  Ambulatory referral to Sports Medicine    Large joint arthrocentesis   3  Cervical radiculopathy       Orders Placed This Encounter   Procedures    Large joint arthrocentesis        Imaging Studies (I personally reviewed results in PACS):  X-ray right shoulder 03/06/2018:  No acute osseous abnormality    Report reviewed:  MRI cervical vertebra 12/06/2017:  Possible right C5-6 foraminal stenosis    IMPRESSION:   Right shoulder impingement syndrome  Cervical radiculopathy      Return for Follow-up with Pain Management  Patient Instructions   I have diagnosed you with right rotator cuff impingment syndrome but also appears that you have he pinched nerve possibly in your neck  The corticosteroid injection performed today showed help with your right shoulder pain but may not cause relief of the symptoms radiating down her arm  I have referred you to Pain Management for evaluation of possible pinched nerve in your neck causing pain radiating down her arm  If you're pain resolved completely after injection then please cancel the pain management appointment and you can follow-up with orthopedic office as needed  reviewed red flags with patient regarding injection including infection, skin dimpling, hypo-pigmentation, and nerve damage  patient expressed understanding and agreed to proceed with procedure  educated if any symptoms including fevers, chills, swelling, or worsening symptoms occur then to call office or go to hospital for immediate care if physician unavailable  patient expressed understanding and agreed to plan  CHIEF COMPLAINT:  Right arm pain    HPI:  Mckenzie Nelson is a 79 y o  male  who presents for  Evaluation of right arm pain that is an ongoing for 5 months  Patient does have a past medical history significant for syringomyelia    He did have a MRI performed in December of 2017 for his cervical vertebra  There does appear to be signs of right foraminal stenosis C5-6  Patient has seen his primary care doctor for evaluation his right arm pain  He did have a shoulder x-ray performed on 03/07/2018 which showed no acute osseous abnormality  He did start physical therapy for his neck in shoulder for approximately 4 weeks but still has persistent pain  Visit 05/24/2018:   Patient points to his lateral shoulder screw source of pain  He does have associated symptoms which include radiation of pain down his arm into his hand  He describes it as sharp pain moderate intensity  Review of Systems   Constitutional: Negative for chills, fever and unexpected weight change  HENT: Negative for hearing loss, nosebleeds and sore throat  Eyes: Negative for pain, redness and visual disturbance  Respiratory: Negative for cough, shortness of breath and wheezing  Cardiovascular: Negative for chest pain, palpitations and leg swelling  Gastrointestinal: Negative for abdominal distention, nausea and vomiting  Endocrine: Negative for polydipsia and polyuria  Genitourinary: Negative for dysuria and hematuria  Skin: Negative for rash and wound  Neurological: Positive for numbness  Negative for dizziness and headaches  Psychiatric/Behavioral: Negative for decreased concentration and suicidal ideas  Following history reviewed and update:    Past Medical History:   Diagnosis Date    Diabetes mellitus ([facility] [address] )     Hypertension      Past Surgical History:   Procedure Laterality Date    INNER EAR SURGERY      NASAL SINUS SURGERY      [place] COLONOSCOPY FLX DX W/COLLJ SPEC WHEN PFRMD N/A 8/30/2016    Procedure: COLONOSCOPY;  Surgeon: Pradip Hu MD;  Location: [facility];   Service: General     Social History   History   Alcohol Use    Yes     Comment: occassional     History   Drug Use No     History   Smoking Status    Never Smoker   Smokeless Tobacco    Never Used     Family History Problem Relation Age of Onset    Hypertension Brother      Allergies   Allergen Reactions    Lipitor [Atorvastatin] Other (See Comments) and Myalgia     Other reaction(s): increased pain  arthralgia    Lyrica [Pregabalin] Other (See Comments)     Pt can't remmember reaction          Physical Exam  Constitutional:  see vital signs  Gen: well-developed, normocephalic/atraumatic, well-groomed  Eyes: No inflammation or discharge of conjunctiva or lids; sclera clear   Pharynx: no inflammation, lesion, or mass of lips  Neck: supple, no masses, non-distended  MSK: no inflammation, lesion, mass, or clubbing of nails and digits except for other than mentioned below  SKIN: no visible rashes or skin lesions  Pulmonary/Chest: Effort normal  No respiratory distress     NEURO: cranial nerves grossly intact  PSYCH:  Alert and oriented to person, place, and time; recent and remote memory intact; mood normal, no depression, anxiety, or agitation, judgment and insight good and intact     Ortho Exam  Cervical  ROM: intact  Tenderness: no spinous process tenderness;   Sensation UE Bilateral:  C5: normal  C6: normal  C7: normal  C8: normal  T1: normal  Strength UE: 5/5 elbow, wrist, fingers bilatearl  Reflexes: symmetric bilateral triceps, biceps, corachobrachiais  Spurlings:     RIGHT SHOULDER:  Erythema: no  Swelling: no  Increased Warmth: no    Tenderness: + lateral shoulder    ROM  Touchdown sign: intact  [place] Scratch Test: symmetric  Passive: symmetric    Strength  Abduction: 5/5  ER: 5/5  IR: 5/5    Drop-Arm: negative  Emptycan: +  Belly Press: negative  Lift-off Test:    Ewing:+  Neer: +  Cross-Arm: negative  Speeds: negative    Apprehension:  Clara's Relocation Maneuver:    LEFT SHOULDER:  Strength  Abduction: 5/5  ER: 5/5  IR: 5/5    ROM  Touchdown sign: intact  App[place] Scratch Test: symmetric  Passive: symmetric    Empty can: negative         Large joint arthrocentesis  Date/Time: 5/24/2018 3:48 PM  Consent given by: patient  Site marked: site marked  Timeout: Immediately prior to procedure a time out was called to verify the correct patient, procedure, equipment, support staff and site/side marked as required   Supporting Documentation  Indications: pain and diagnostic evaluation   Procedure Details  Location: shoulder - R subacromial bursa  Preparation: Patient was prepped and draped in the usual sterile fashion  Needle size: 22 G  Ultrasound guidance: no  Approach: posterolateral  Medications administered: 4 mL lidocaine 1 %; 40 mg triamcinolone acetonide 40 mg/mL    Patient tolerance: patient tolerated the procedure well with no immediate complications  Dressing:  Sterile dressing applied

## 2018-05-25 RX ORDER — LIDOCAINE HYDROCHLORIDE 10 MG/ML
4 INJECTION, SOLUTION INFILTRATION; PERINEURAL
Status: COMPLETED | OUTPATIENT
Start: 2018-05-24 | End: 2018-05-24

## 2018-05-25 RX ORDER — TRIAMCINOLONE ACETONIDE 40 MG/ML
40 INJECTION, SUSPENSION INTRA-ARTICULAR; INTRAMUSCULAR
Status: COMPLETED | OUTPATIENT
Start: 2018-05-24 | End: 2018-05-24

## 2018-06-17 DIAGNOSIS — I10 ESSENTIAL HYPERTENSION: Primary | ICD-10-CM

## 2018-06-17 RX ORDER — HYDROCHLOROTHIAZIDE 25 MG/1
TABLET ORAL
Qty: 90 TABLET | Refills: 1 | Status: SHIPPED | OUTPATIENT
Start: 2018-06-17 | End: 2018-12-12 | Stop reason: SDUPTHER

## 2018-06-18 ENCOUNTER — TELEPHONE (OUTPATIENT)
Dept: FAMILY MEDICINE CLINIC | Facility: CLINIC | Age: 68
End: 2018-06-18

## 2018-06-18 ENCOUNTER — APPOINTMENT (OUTPATIENT)
Dept: LAB | Facility: MEDICAL CENTER | Age: 68
End: 2018-06-18
Payer: MEDICARE

## 2018-06-18 DIAGNOSIS — E11.9 TYPE 2 DIABETES MELLITUS WITHOUT COMPLICATION, WITHOUT LONG-TERM CURRENT USE OF INSULIN (HCC): ICD-10-CM

## 2018-06-18 DIAGNOSIS — R80.9 PROTEINURIA, UNSPECIFIED TYPE: Primary | ICD-10-CM

## 2018-06-18 DIAGNOSIS — IMO0001 UNCONTROLLED TYPE 2 DIABETES MELLITUS WITHOUT COMPLICATION, WITHOUT LONG-TERM CURRENT USE OF INSULIN: ICD-10-CM

## 2018-06-18 LAB
ALBUMIN SERPL BCP-MCNC: 3.8 G/DL (ref 3.5–5)
ALP SERPL-CCNC: 78 U/L (ref 46–116)
ALT SERPL W P-5'-P-CCNC: 27 U/L (ref 12–78)
ANION GAP SERPL CALCULATED.3IONS-SCNC: 8 MMOL/L (ref 4–13)
AST SERPL W P-5'-P-CCNC: 14 U/L (ref 5–45)
BILIRUB SERPL-MCNC: 0.57 MG/DL (ref 0.2–1)
BUN SERPL-MCNC: 22 MG/DL (ref 5–25)
CALCIUM SERPL-MCNC: 9 MG/DL (ref 8.3–10.1)
CHLORIDE SERPL-SCNC: 102 MMOL/L (ref 100–108)
CO2 SERPL-SCNC: 27 MMOL/L (ref 21–32)
CREAT SERPL-MCNC: 0.98 MG/DL (ref 0.6–1.3)
CREAT UR-MCNC: 271 MG/DL
EST. AVERAGE GLUCOSE BLD GHB EST-MCNC: 146 MG/DL
GFR SERPL CREATININE-BSD FRML MDRD: 80 ML/MIN/1.73SQ M
GLUCOSE P FAST SERPL-MCNC: 131 MG/DL (ref 65–99)
HBA1C MFR BLD: 6.7 % (ref 4.2–6.3)
MICROALBUMIN UR-MCNC: 27.2 MG/L (ref 0–20)
MICROALBUMIN/CREAT 24H UR: 10 MG/G CREATININE (ref 0–30)
POTASSIUM SERPL-SCNC: 3.4 MMOL/L (ref 3.5–5.3)
PROT SERPL-MCNC: 7.6 G/DL (ref 6.4–8.2)
SODIUM SERPL-SCNC: 137 MMOL/L (ref 136–145)

## 2018-06-18 PROCEDURE — 83036 HEMOGLOBIN GLYCOSYLATED A1C: CPT

## 2018-06-18 PROCEDURE — 36415 COLL VENOUS BLD VENIPUNCTURE: CPT

## 2018-06-18 PROCEDURE — 80053 COMPREHEN METABOLIC PANEL: CPT

## 2018-06-18 PROCEDURE — 82570 ASSAY OF URINE CREATININE: CPT

## 2018-06-18 PROCEDURE — 82043 UR ALBUMIN QUANTITATIVE: CPT

## 2018-06-18 RX ORDER — LISINOPRIL 2.5 MG/1
2.5 TABLET ORAL DAILY
Qty: 30 TABLET | Refills: 5 | Status: SHIPPED | OUTPATIENT
Start: 2018-06-18 | End: 2018-12-27

## 2018-06-18 NOTE — TELEPHONE ENCOUNTER
----- Message from Davon Kelly MD sent at 6/18/2018  3:55 PM EDT -----  Sugar up a little-increase metformin to bid-new rx sent, too much protein in urine-rec lisinopril 2 5 mg/day-rx sent

## 2018-07-09 DIAGNOSIS — G95.0 SYRINGOMYELIA (HCC): ICD-10-CM

## 2018-07-09 RX ORDER — TRAMADOL HYDROCHLORIDE 50 MG/1
TABLET ORAL
Qty: 90 TABLET | Refills: 0 | Status: SHIPPED | OUTPATIENT
Start: 2018-07-09 | End: 2018-07-09 | Stop reason: SDUPTHER

## 2018-07-09 RX ORDER — TRAMADOL HYDROCHLORIDE 50 MG/1
TABLET ORAL
Qty: 90 TABLET | Refills: 0 | Status: SHIPPED | OUTPATIENT
Start: 2018-07-09 | End: 2018-08-29 | Stop reason: SDUPTHER

## 2018-07-09 NOTE — TELEPHONE ENCOUNTER
PT NEEDS REFILL ON TRAMADOL 50MG TAB 1 TAB 3 TIMES DAILY  LAST IN ON 05/16/18  PLS SEND TO [facility]

## 2018-07-16 ENCOUNTER — TELEPHONE (OUTPATIENT)
Dept: FAMILY MEDICINE CLINIC | Facility: CLINIC | Age: 68
End: 2018-07-16

## 2018-07-17 ENCOUNTER — OFFICE VISIT (OUTPATIENT)
Dept: FAMILY MEDICINE CLINIC | Facility: CLINIC | Age: 68
End: 2018-07-17
Payer: MEDICARE

## 2018-07-17 VITALS
DIASTOLIC BLOOD PRESSURE: 62 MMHG | WEIGHT: 159.2 LBS | BODY MASS INDEX: 25.58 KG/M2 | HEIGHT: 66 IN | TEMPERATURE: 98 F | SYSTOLIC BLOOD PRESSURE: 118 MMHG | HEART RATE: 76 BPM | RESPIRATION RATE: 18 BRPM

## 2018-07-17 DIAGNOSIS — F40.243 FEAR OF FLYING: Primary | ICD-10-CM

## 2018-07-17 PROCEDURE — 99213 OFFICE O/P EST LOW 20 MIN: CPT | Performed by: NURSE PRACTITIONER

## 2018-07-17 RX ORDER — ALPRAZOLAM 0.5 MG/1
TABLET ORAL
Qty: 4 TABLET | Refills: 0 | Status: SHIPPED | OUTPATIENT
Start: 2018-07-17 | End: 2020-05-11 | Stop reason: CLARIF

## 2018-07-17 NOTE — PROGRESS NOTES
Chief Complaint   Patient presents with    Anxiety       Assessment/Plan:         Diagnoses and all orders for this visit:    Fear of flying  -     ALPRAZolam (XANAX) 0 5 mg tablet; Take one tablet by mouth 30 minutes prior to flight  Subjective:      Patient ID: John Mccarthy is a 79 y o  male  Patient is going to fly tomorrow to [place]  He has a detrimental fear of flying after an elevator incident in which he was stuck in because the elevator dropped  Anxiety   Symptoms include nervous/anxious behavior  Patient reports no chest pain, palpitations or shortness of breath  The following portions of the patient's history were reviewed and updated as appropriate: allergies, current medications, past family history, past medical history, past social history, past surgical history and problem list     Review of Systems   Respiratory: Negative for chest tightness and shortness of breath  Cardiovascular: Negative for chest pain and palpitations  Psychiatric/Behavioral: The patient is nervous/anxious  Objective:      /62 (BP Location: Left arm, Patient Position: Sitting, Cuff Size: Adult)   Pulse 76   Temp 98 °F (36 7 °C) (Temporal)   Resp 18   Ht 5' 6" (1 676 m)   Wt 72 2 kg (159 lb 3 2 oz)   BMI 25 70 kg/m²          Physical Exam   Constitutional: He is oriented to person, place, and time  He appears well-developed and well-nourished  Cardiovascular: Normal rate, regular rhythm and normal heart sounds  No murmur heard  Pulmonary/Chest: Effort normal and breath sounds normal  No respiratory distress  Neurological: He is alert and oriented to person, place, and time  Psychiatric: His mood appears anxious

## 2018-08-29 DIAGNOSIS — G95.0 SYRINGOMYELIA (HCC): ICD-10-CM

## 2018-08-29 RX ORDER — TRAMADOL HYDROCHLORIDE 50 MG/1
TABLET ORAL
Qty: 90 TABLET | Refills: 0 | Status: SHIPPED | OUTPATIENT
Start: 2018-08-29 | End: 2018-10-17 | Stop reason: SDUPTHER

## 2018-08-29 NOTE — TELEPHONE ENCOUNTER
Pt called fro med refill for tramadol 50mg tab to be sent to [facility]  Pt last ov 7/17/18 last bw 6/18/18

## 2018-09-18 ENCOUNTER — OFFICE VISIT (OUTPATIENT)
Dept: OBGYN CLINIC | Facility: OTHER | Age: 68
End: 2018-09-18
Payer: MEDICARE

## 2018-09-18 VITALS
DIASTOLIC BLOOD PRESSURE: 87 MMHG | BODY MASS INDEX: 24.96 KG/M2 | HEIGHT: 67 IN | WEIGHT: 159 LBS | SYSTOLIC BLOOD PRESSURE: 149 MMHG | HEART RATE: 65 BPM

## 2018-09-18 DIAGNOSIS — M75.41 IMPINGEMENT SYNDROME OF RIGHT SHOULDER: Primary | ICD-10-CM

## 2018-09-18 PROCEDURE — 99213 OFFICE O/P EST LOW 20 MIN: CPT | Performed by: FAMILY MEDICINE

## 2018-09-18 PROCEDURE — 20610 DRAIN/INJ JOINT/BURSA W/O US: CPT | Performed by: FAMILY MEDICINE

## 2018-09-18 RX ORDER — TRIAMCINOLONE ACETONIDE 40 MG/ML
40 INJECTION, SUSPENSION INTRA-ARTICULAR; INTRAMUSCULAR
Status: COMPLETED | OUTPATIENT
Start: 2018-09-18 | End: 2018-09-18

## 2018-09-18 RX ORDER — BUPIVACAINE HYDROCHLORIDE 2.5 MG/ML
4 INJECTION, SOLUTION INFILTRATION; PERINEURAL
Status: COMPLETED | OUTPATIENT
Start: 2018-09-18 | End: 2018-09-18

## 2018-09-18 RX ADMIN — TRIAMCINOLONE ACETONIDE 40 MG: 40 INJECTION, SUSPENSION INTRA-ARTICULAR; INTRAMUSCULAR at 12:33

## 2018-09-18 RX ADMIN — BUPIVACAINE HYDROCHLORIDE 4 ML: 2.5 INJECTION, SOLUTION INFILTRATION; PERINEURAL at 12:33

## 2018-09-18 NOTE — PROGRESS NOTES
1  Impingement syndrome of right shoulder  Large joint arthrocentesis     Orders Placed This Encounter   Procedures    Large joint arthrocentesis        Imaging Studies (I personally reviewed results in PACS):  X-ray right shoulder 03/06/2018 reviewed again 09/18/2018:  No acute osseous abnormality    Report reviewed:  MRI cervical vertebra 12/06/2017:  Possible right C5-6 foraminal stenosis    IMPRESSION:   Right shoulder impingement syndrome  Cervical radiculopathy    Diabetes - hemoglobin A1c 06/18/2018 6 7 improved    Return if symptoms worsen or fail to improve  Patient Instructions   You have been diagnosed with Rotator cuff impingement which is a pinching of the tendon, or cable, that attaches to the arm bone known as the humerus on the outside of your shoulder and can lead to pain, feelings of weakness due to pain, and worsening pain with reaching overhead, repetitive movements with your arm, or lying on your shoulder at night  There is no indication today of a significant or large rotator cuff tear requiring surgery although it is possible you may still have a small tear that does not require surgery at the moment  Impingement and even small rotator cuff tear symptoms usually improve with rest, range of motion exercises, and physical therapy over 1-3 months but sometimes can take 4-6 months  Additional treatments include steroid injection for inflammation; however, the pain relief from steroid injections is not long lasting and the curative treatment is physical therapy  If you continue to fail to improve with conservative measures after 3 months or if there is a red flag, then we will discuss obtaining an MRI at future visits to evaluate for severe rotator cuff tear  Surgery for impingement syndrome is usually not considered until after 4-6 months of initial therapy unless a severe rotator cuff tear is found on MRI       reviewed red flags with patient regarding injection including infection, skin dimpling, hypo-pigmentation, and nerve damage  patient expressed understanding and agreed to proceed with procedure  educated if any symptoms including fevers, chills, swelling, or worsening symptoms occur then to call office or go to hospital for immediate care if physician unavailable  patient expressed understanding and agreed to plan  CHIEF COMPLAINT:  Right arm pain    HPI:  Omi Vidal is a 79 y o  male  who presents for  Evaluation of right arm pain that is an ongoing for 5 months  Patient does have a past medical history significant for syringomyelia  He did have a MRI performed in December of 2017 for his cervical vertebra  There does appear to be signs of right foraminal stenosis C5-6  Patient has seen his primary care doctor for evaluation his right arm pain  He did have a shoulder x-ray performed on 03/07/2018 which showed no acute osseous abnormality  He did start physical therapy for his neck in shoulder for approximately 4 weeks but still has persistent pain  Visit 05/24/2018:   Patient points to his lateral shoulder screw source of pain  He does have associated symptoms which include radiation of pain down his arm into his hand  He describes it as sharp pain moderate intensity  09/18/2018: Follow-up evaluation of right shoulder pain  Last visit patient was diagnosed with right shoulder impingement versus cervical radiculopathy  He did have subacromial corticosteroid injection which did result in significant relief  Patient was also referred to pain management but he states that since he had almost 100% relief of his right shoulder pain he did not present for appointment  Patient points to lateral aspect of her shoulder as greatest source of his pain  He denies any numbness tingling in his hands  Denies any pain radiating from his neck down his arm  He presents today seeking repeat corticosteroid injection subacromial region    Patient did perform approximately 5-6 weeks of physical therapy prior to her last appointment  Review of Systems   Constitutional: Negative for chills and fever  Neurological: Negative for weakness  Following history reviewed and update:    Past Medical History:   Diagnosis Date    Diabetes mellitus ([address] )     Diverticulosis     Hemorrhoids     Hypertension     Polyp, sigmoid colon      Past Surgical History:   Procedure Laterality Date    COLONOSCOPY      EAR SURGERY      INNER EAR SURGERY      NASAL SINUS SURGERY      [place] COLONOSCOPY FLX DX W/COLLJ SPEC WHEN PFRMD N/A 8/30/2016    Procedure: COLONOSCOPY;  Surgeon: Hannah Martin MD;  Location: [facility]; Service: General     Social History   History   Alcohol Use    Yes     Comment: occassional     History   Drug Use No     History   Smoking Status    Never Smoker   Smokeless Tobacco    Never Used     Family History   Problem Relation Age of Onset    Hypertension Brother     Hepatitis Mother     Heart disease Father         CARDIAC DISORDER      Allergies   Allergen Reactions    Lipitor [Atorvastatin] Other (See Comments) and Myalgia     Other reaction(s): increased pain  arthralgia    Lyrica [Pregabalin] Other (See Comments)     Pt can't remmember reaction          Physical Exam    Constitutional:  see vital signs  Gen: well-developed, normocephalic/atraumatic, well-groomed  Eyes: No inflammation or discharge of conjunctiva or lids; sclera clear   Pharynx: no inflammation, lesion, or mass of lips  Neck: supple, no masses, non-distended  MSK: no inflammation, lesion, mass, or clubbing of nails and digits except for other than mentioned below  SKIN: no visible rashes or skin lesions  Pulmonary/Chest: Effort normal  No respiratory distress     NEURO: cranial nerves grossly intact  PSYCH:  Alert and oriented to person, place, and time; recent and remote memory intact; mood normal, no depression, anxiety, or agitation, judgment and insight good and intact      Ortho Exam  Cervical  ROM: intact  Tenderness: no spinous process tenderness;   Sensation UE Bilateral:  C5: normal  C6: normal  C7: normal  C8: normal  T1: normal  Strength UE: 5/5 elbow, wrist, fingers bilatearl  Reflexes: symmetric bilateral triceps, biceps, corachobrachiais  Spurlings:   Negative        RIGHT SHOULDER:  Erythema: no  Swelling: no  Increased Warmth: no    Tenderness: + right subacromial    ROM  Touchdown sign: intact  Appley Scratch Test: symmetric  Passive: symmetric    Strength  Abduction: 5/5  ER: 5/5  IR: 5/5    Drop-Arm: negative  Emptycan: negative  Belly Press: negative  Lift-off Test:    Ewing: +  Neer: +  Cross-Arm: negative  Speeds: negative    LEFT SHOULDER:  Strength  Abduction: 5/5  ER: 5/5  IR: 5/5    ROM  Touchdown sign: intact  Appley Scratch Test: symmetric  Passive: symmetric    Empty can: negative            Large joint arthrocentesis  Date/Time: 9/18/2018 12:33 PM  Consent given by: patient  Site marked: site marked  Timeout: Immediately prior to procedure a time out was called to verify the correct patient, procedure, equipment, support staff and site/side marked as required   Supporting Documentation  Indications: pain and diagnostic evaluation   Procedure Details  Location: shoulder - R subacromial bursa  Preparation: Patient was prepped and draped in the usual sterile fashion  Needle size: 22 G  Ultrasound guidance: no  Approach: posterolateral  Medications administered: 4 mL bupivacaine 0 25 %; 40 mg triamcinolone acetonide 40 mg/mL    Patient tolerance: patient tolerated the procedure well with no immediate complications  Dressing:  Sterile dressing applied      Near exam repeated with significant reduction of symptoms after injection

## 2018-09-18 NOTE — PROGRESS NOTES
No diagnosis found  No orders of the defined types were placed in this encounter  Imaging Studies (I personally reviewed images in PACS and report):    IMPRESSION:  ***      No Follow-up on file  There are no Patient Instructions on file for this visit  CHIEF COMPLAINT:  ***    HPI:  Jennifer Valadez is a 79 y o  male  who presents for       Visit ***        Review of Systems      Following history reviewed and update:    Past Medical History:   Diagnosis Date    Diabetes mellitus ([address] )     Diverticulosis     Hemorrhoids     Hypertension     Polyp, sigmoid colon      Past Surgical History:   Procedure Laterality Date    COLONOSCOPY      EAR SURGERY      INNER EAR SURGERY      NASAL SINUS SURGERY      [place] COLONOSCOPY FLX DX W/COLLJ SPEC WHEN PFRMD N/A 8/30/2016    Procedure: COLONOSCOPY;  Surgeon: Margarita Cho MD;  Location: [facility]; Service: General     Social History   History   Alcohol Use    Yes     Comment: occassional     History   Drug Use No     History   Smoking Status    Never Smoker   Smokeless Tobacco    Never Used     Family History   Problem Relation Age of Onset    Hypertension Brother     Hepatitis Mother     Heart disease Father         CARDIAC DISORDER      Allergies   Allergen Reactions    Lipitor [Atorvastatin] Other (See Comments) and Myalgia     Other reaction(s): increased pain  arthralgia    Lyrica [Pregabalin] Other (See Comments)     Pt can't remmember reaction          Physical Exam  Constitutional:  see vital signs  Gen: well-developed, normocephalic/atraumatic, well-groomed  Eyes: No inflammation or discharge of conjunctiva or lids; sclera clear   Pharynx: no inflammation, lesion, or mass of lips  Neck: supple, no masses, non-distended  MSK: no inflammation, lesion, mass, or clubbing of nails and digits except for other than mentioned below  SKIN: no visible rashes or skin lesions  Pulmonary/Chest: Effort normal  No respiratory distress     NEURO: cranial nerves grossly intact  PSYCH:  Alert and oriented to person, place, and time; recent and remote memory intact; mood normal, no depression, anxiety, or agitation, judgment and insight good and intact     Ortho Exam  RIGHT SHOULDER:  Erythema: no  Swelling: no  Increased Warmth: no    Tenderness:     ROM  Touchdown sign: intact  Appley Scratch Test: symmetric  Passive: symmetric    Strength  Abduction: 5/5  ER: 5/5  IR: 5/5    Drop-Arm: negative  Emptycan: negative  Belly Press: negative  Lift-off Test:    Ewing: negative  Neer: negative  Cross-Arm: negative  Speeds: negative    Internal Impingement:  (crank position pain)    Labral Crank Test :  (abducted 90, axial load, guided IR & Er)    Modified Labral Shift:  (seated, ER, abduction, axial load, guided abd/add)    [place]'s Test:   (FF 90, abd 15, resist thumbs up-, resist thumbs down+)    Apprehension:  Clara's Relocation Maneuver:    LEFT SHOULDER:  Strength  Abduction: 5/5  ER: 5/5  IR: 5/5    ROM  Touchdown sign: intact  Appley Scratch Test: symmetric  Passive: symmetric    Empty can: negative      Procedures

## 2018-09-18 NOTE — PATIENT INSTRUCTIONS
You have been diagnosed with Rotator cuff impingement which is a pinching of the tendon, or cable, that attaches to the arm bone known as the humerus on the outside of your shoulder and can lead to pain, feelings of weakness due to pain, and worsening pain with reaching overhead, repetitive movements with your arm, or lying on your shoulder at night  There is no indication today of a significant or large rotator cuff tear requiring surgery although it is possible you may still have a small tear that does not require surgery at the moment  Impingement and even small rotator cuff tear symptoms usually improve with rest, range of motion exercises, and physical therapy over 1-3 months but sometimes can take 4-6 months  Additional treatments include steroid injection for inflammation; however, the pain relief from steroid injections is not long lasting and the curative treatment is physical therapy  If you continue to fail to improve with conservative measures after 3 months or if there is a red flag, then we will discuss obtaining an MRI at future visits to evaluate for severe rotator cuff tear  Surgery for impingement syndrome is usually not considered until after 4-6 months of initial therapy unless a severe rotator cuff tear is found on MRI  reviewed red flags with patient regarding injection including infection, skin dimpling, hypo-pigmentation, and nerve damage  patient expressed understanding and agreed to proceed with procedure  educated if any symptoms including fevers, chills, swelling, or worsening symptoms occur then to call office or go to hospital for immediate care if physician unavailable  patient expressed understanding and agreed to plan

## 2018-09-22 DIAGNOSIS — N40.1 BENIGN LOCALIZED PROSTATIC HYPERPLASIA WITH LOWER URINARY TRACT SYMPTOMS (LUTS): Primary | ICD-10-CM

## 2018-09-24 RX ORDER — TAMSULOSIN HYDROCHLORIDE 0.4 MG/1
CAPSULE ORAL
Qty: 30 CAPSULE | Refills: 11 | Status: SHIPPED | OUTPATIENT
Start: 2018-09-24 | End: 2019-09-08 | Stop reason: SDUPTHER

## 2018-09-26 ENCOUNTER — OFFICE VISIT (OUTPATIENT)
Dept: FAMILY MEDICINE CLINIC | Facility: CLINIC | Age: 68
End: 2018-09-26
Payer: MEDICARE

## 2018-09-26 VITALS
RESPIRATION RATE: 18 BRPM | HEIGHT: 66 IN | SYSTOLIC BLOOD PRESSURE: 134 MMHG | DIASTOLIC BLOOD PRESSURE: 78 MMHG | BODY MASS INDEX: 25.46 KG/M2 | WEIGHT: 158.4 LBS | HEART RATE: 72 BPM

## 2018-09-26 DIAGNOSIS — Z23 NEED FOR INFLUENZA VACCINATION: Primary | ICD-10-CM

## 2018-09-26 DIAGNOSIS — Z12.5 SCREENING FOR PROSTATE CANCER: ICD-10-CM

## 2018-09-26 DIAGNOSIS — E11.9 TYPE 2 DIABETES MELLITUS WITHOUT COMPLICATION, WITHOUT LONG-TERM CURRENT USE OF INSULIN (HCC): ICD-10-CM

## 2018-09-26 DIAGNOSIS — R39.14 FEELING OF INCOMPLETE BLADDER EMPTYING: ICD-10-CM

## 2018-09-26 DIAGNOSIS — I10 ESSENTIAL HYPERTENSION: ICD-10-CM

## 2018-09-26 DIAGNOSIS — L25.5 RHUS DERMATITIS: ICD-10-CM

## 2018-09-26 PROCEDURE — 99214 OFFICE O/P EST MOD 30 MIN: CPT | Performed by: FAMILY MEDICINE

## 2018-09-26 PROCEDURE — 90662 IIV NO PRSV INCREASED AG IM: CPT | Performed by: FAMILY MEDICINE

## 2018-09-26 PROCEDURE — G0008 ADMIN INFLUENZA VIRUS VAC: HCPCS | Performed by: FAMILY MEDICINE

## 2018-09-26 RX ORDER — BETAMETHASONE DIPROPIONATE 0.5 MG/G
CREAM TOPICAL 2 TIMES DAILY
Qty: 60 G | Refills: 1 | Status: SHIPPED | OUTPATIENT
Start: 2018-09-26 | End: 2019-01-10 | Stop reason: ALTCHOICE

## 2018-09-26 NOTE — ASSESSMENT & PLAN NOTE
Lab Results   Component Value Date    HGBA1C 6 7 (H) 06/18/2018   last lab 6/18-due 10/18    No results for input(s): POCGLU in the last 72 hours      Blood Sugar Average: Last 72 hrs:

## 2018-09-26 NOTE — PROGRESS NOTES
Assessment/Plan:    No problem-specific Assessment & Plan notes found for this encounter  Diagnoses and all orders for this visit:    Need for influenza vaccination  -     influenza vaccine, [id number], high-dose, PF 0 5 mL, for patients 65 yr+ (FLUZONE HIGH-DOSE)          Subjective:      Patient ID: Zayda Braxton is a 79 y o  male  Hypertension   This is a chronic problem  The current episode started more than 1 year ago  The problem is unchanged  The problem is controlled  Associated symptoms include neck pain  Pertinent negatives include no anxiety, blurred vision, chest pain, headaches, palpitations or shortness of breath  There are no associated agents to hypertension  Risk factors for coronary artery disease include male gender and diabetes mellitus  Past treatments include calcium channel blockers and angiotensin blockers  The current treatment provides significant improvement  There are no compliance problems  Hyperlipidemia   Pertinent negatives include no chest pain or shortness of breath  Diabetes   Pertinent negatives for hypoglycemia include no headaches or nervousness/anxiousness  Pertinent negatives for diabetes include no blurred vision and no chest pain  The following portions of the patient's history were reviewed and updated as appropriate: allergies, current medications, past family history, past medical history, past social history, past surgical history and problem list       Review of Systems   Constitutional: Negative for activity change, appetite change and unexpected weight change  Eyes: Negative for blurred vision  Respiratory: Negative for shortness of breath  Cardiovascular: Negative for chest pain and palpitations  Musculoskeletal: Positive for neck pain  Skin: Positive for rash  Neurological: Negative for headaches  Hematological: Negative for adenopathy  Psychiatric/Behavioral: The patient is not nervous/anxious            Objective:      /78 (BP Location: Right arm, Patient Position: Sitting, Cuff Size: Standard)   Pulse 72   Resp 18   Ht 5' 6 09" (1 679 m)   Wt 71 8 kg (158 lb 6 4 oz)   BMI 25 50 kg/m²          Physical Exam   Constitutional: He appears well-developed and well-nourished  Skin: Rash noted  Vitals reviewed

## 2018-10-17 DIAGNOSIS — G95.0 SYRINGOMYELIA (HCC): ICD-10-CM

## 2018-10-17 RX ORDER — TRAMADOL HYDROCHLORIDE 50 MG/1
TABLET ORAL
Qty: 90 TABLET | Refills: 0 | Status: SHIPPED | OUTPATIENT
Start: 2018-10-17 | End: 2018-11-27 | Stop reason: SDUPTHER

## 2018-10-17 NOTE — TELEPHONE ENCOUNTER
Pt called in for refill on  traMADol (ULTRAM) 50 mg tablet  30 days  90 qty  Sent to [facility] on file   Last ov was 09/26/18

## 2018-10-29 ENCOUNTER — TELEPHONE (OUTPATIENT)
Dept: FAMILY MEDICINE CLINIC | Facility: CLINIC | Age: 68
End: 2018-10-29

## 2018-10-29 ENCOUNTER — APPOINTMENT (OUTPATIENT)
Dept: LAB | Facility: MEDICAL CENTER | Age: 68
End: 2018-10-29
Payer: MEDICARE

## 2018-10-29 DIAGNOSIS — E11.9 TYPE 2 DIABETES MELLITUS WITHOUT COMPLICATION, WITHOUT LONG-TERM CURRENT USE OF INSULIN (HCC): ICD-10-CM

## 2018-10-29 LAB
ALBUMIN SERPL BCP-MCNC: 3.9 G/DL (ref 3.5–5)
ALP SERPL-CCNC: 76 U/L (ref 46–116)
ALT SERPL W P-5'-P-CCNC: 29 U/L (ref 12–78)
ANION GAP SERPL CALCULATED.3IONS-SCNC: 6 MMOL/L (ref 4–13)
AST SERPL W P-5'-P-CCNC: 12 U/L (ref 5–45)
BILIRUB SERPL-MCNC: 0.49 MG/DL (ref 0.2–1)
BUN SERPL-MCNC: 18 MG/DL (ref 5–25)
CALCIUM SERPL-MCNC: 8.8 MG/DL (ref 8.3–10.1)
CHLORIDE SERPL-SCNC: 103 MMOL/L (ref 100–108)
CHOLEST SERPL-MCNC: 196 MG/DL (ref 50–200)
CO2 SERPL-SCNC: 28 MMOL/L (ref 21–32)
CREAT SERPL-MCNC: 0.92 MG/DL (ref 0.6–1.3)
CREAT UR-MCNC: 58.5 MG/DL
EST. AVERAGE GLUCOSE BLD GHB EST-MCNC: 134 MG/DL
GFR SERPL CREATININE-BSD FRML MDRD: 86 ML/MIN/1.73SQ M
GLUCOSE P FAST SERPL-MCNC: 129 MG/DL (ref 65–99)
HBA1C MFR BLD: 6.3 % (ref 4.2–6.3)
HDLC SERPL-MCNC: 42 MG/DL (ref 40–60)
LDLC SERPL CALC-MCNC: 123 MG/DL (ref 0–100)
MICROALBUMIN UR-MCNC: 16.2 MG/L (ref 0–20)
MICROALBUMIN/CREAT 24H UR: 28 MG/G CREATININE (ref 0–30)
POTASSIUM SERPL-SCNC: 3.6 MMOL/L (ref 3.5–5.3)
PROT SERPL-MCNC: 7.8 G/DL (ref 6.4–8.2)
SODIUM SERPL-SCNC: 137 MMOL/L (ref 136–145)
TRIGL SERPL-MCNC: 157 MG/DL

## 2018-10-29 PROCEDURE — 80053 COMPREHEN METABOLIC PANEL: CPT

## 2018-10-29 PROCEDURE — 36415 COLL VENOUS BLD VENIPUNCTURE: CPT

## 2018-10-29 PROCEDURE — 80061 LIPID PANEL: CPT

## 2018-10-29 PROCEDURE — 83036 HEMOGLOBIN GLYCOSYLATED A1C: CPT

## 2018-10-29 PROCEDURE — 82570 ASSAY OF URINE CREATININE: CPT

## 2018-10-29 PROCEDURE — 82043 UR ALBUMIN QUANTITATIVE: CPT

## 2018-10-29 NOTE — TELEPHONE ENCOUNTER
----- Message from Eli Bee MD sent at 10/29/2018  3:35 PM EDT -----  All lab stable, recheck CMP, HBA1C,lipids  6  months

## 2018-11-04 ENCOUNTER — HOSPITAL ENCOUNTER (EMERGENCY)
Facility: HOSPITAL | Age: 68
Discharge: HOME | End: 2018-11-04
Attending: EMERGENCY MEDICINE | Admitting: EMERGENCY MEDICINE
Payer: MEDICARE

## 2018-11-04 VITALS
TEMPERATURE: 97.4 F | DIASTOLIC BLOOD PRESSURE: 93 MMHG | HEART RATE: 73 BPM | WEIGHT: 159 LBS | RESPIRATION RATE: 16 BRPM | BODY MASS INDEX: 25.59 KG/M2 | OXYGEN SATURATION: 98 % | SYSTOLIC BLOOD PRESSURE: 180 MMHG

## 2018-11-04 DIAGNOSIS — M25.511 RIGHT SHOULDER PAIN: Primary | ICD-10-CM

## 2018-11-04 PROCEDURE — 99283 EMERGENCY DEPT VISIT LOW MDM: CPT

## 2018-11-04 PROCEDURE — 96372 THER/PROPH/DIAG INJ SC/IM: CPT

## 2018-11-04 RX ORDER — METHOCARBAMOL 500 MG/1
500 TABLET, FILM COATED ORAL 2 TIMES DAILY
Qty: 10 TABLET | Refills: 0 | Status: SHIPPED | OUTPATIENT
Start: 2018-11-04 | End: 2018-12-27

## 2018-11-04 RX ORDER — LIDOCAINE 50 MG/G
1 PATCH TOPICAL ONCE
Status: DISCONTINUED | OUTPATIENT
Start: 2018-11-04 | End: 2018-11-04 | Stop reason: HOSPADM

## 2018-11-04 RX ORDER — KETOROLAC TROMETHAMINE 30 MG/ML
30 INJECTION, SOLUTION INTRAMUSCULAR; INTRAVENOUS ONCE
Status: COMPLETED | OUTPATIENT
Start: 2018-11-04 | End: 2018-11-04

## 2018-11-04 RX ORDER — NAPROXEN 500 MG/1
500 TABLET ORAL 2 TIMES DAILY WITH MEALS
Qty: 10 TABLET | Refills: 0 | Status: SHIPPED | OUTPATIENT
Start: 2018-11-04 | End: 2018-12-27

## 2018-11-04 RX ADMIN — LIDOCAINE 1 PATCH: 50 PATCH TOPICAL at 09:31

## 2018-11-04 RX ADMIN — KETOROLAC TROMETHAMINE 30 MG: 30 INJECTION, SOLUTION INTRAMUSCULAR at 09:32

## 2018-11-04 NOTE — ED PROVIDER NOTES
History  Chief Complaint   Patient presents with    Shoulder Pain     Patient has hx of torn rotator cuff to right surgery, saw othrjuan antonio on Sept 26th  Patient reports he was lifting a bike to place back on rack and reports severe pain to right shoulder, reports f/u with ortho again April 2nd but pain to too severe  Reports taking tramadol and gabepentin today, daily medications for back pain and denies relief to shoulder  History provided by:  Patient   used: No    Shoulder Pain   Location:  Shoulder  Shoulder location:  R shoulder  Injury: no (strain)    Pain details:     Quality:  Aching    Radiates to:  Does not radiate    Severity:  Moderate    Onset quality:  Gradual    Duration:  2 days    Timing:  Intermittent    Progression:  Unchanged  Dislocation: no    Foreign body present:  Unable to specify  Tetanus status:  Unknown  Prior injury to area:  Unable to specify (has chronic right shoulder pain)  Relieved by:  Nothing  Worsened by:  Nothing  Ineffective treatments:  None tried  Associated symptoms: no back pain, no fever, no muscle weakness, no neck pain and no swelling    Risk factors comment:  Chronic right shoulder pain      Prior to Admission Medications   Prescriptions Last Dose Informant Patient Reported? Taking? ALPRAZolam (XANAX) 0 5 mg tablet   No No   Sig: Take one tablet by mouth 30 minutes prior to flight     Blood Glucose Monitoring Suppl (ONE TOUCH ULTRA 2) w/Device KIT  Self Yes No   Sig: by Does not apply route   Lancets (ONETOUCH ULTRASOFT) lancets  Self No No   Sig: by Other route daily   amLODIPine (NORVASC) 10 mg tablet  Self Yes No   Sig: Take 1 tablet by mouth daily   betamethasone, augmented, (DIPROLENE-AF) 0 05 % cream   No No   Sig: Apply topically 2 (two) times a day   clobetasol (TEMOVATE) 0 05 % cream  Self Yes No   Sig: Apply topically 2 (two) times a day   gabapentin (NEURONTIN) 600 MG tablet   No No   Sig: take 1 tablet by mouth three times a day if needed   glucose blood (ONE TOUCH ULTRA TEST) test strip  Self Yes No   Sig: by In Vitro route   hydrochlorothiazide (HYDRODIURIL) 25 mg tablet   No No   Sig: take 1 tablet by mouth once daily   lisinopril (ZESTRIL) 2 5 mg tablet   No No   Sig: Take 1 tablet (2 5 mg total) by mouth daily   metFORMIN (GLUCOPHAGE) 500 mg tablet   No No   Sig: Take 1 tablet (500 mg total) by mouth 2 (two) times a day with meals   tamsulosin (FLOMAX) 0 4 mg   No No   Sig: take 1 capsule by mouth at bedtime   traMADol (ULTRAM) 50 mg tablet   No No   Sig: Take 1 tablet by mouth three times a day if needed for moderate pain      Facility-Administered Medications: None       Past Medical History:   Diagnosis Date    Diabetes mellitus ([facility] [address] )     Diverticulosis     Hemorrhoids     Hypertension     Polyp, sigmoid colon        Past Surgical History:   Procedure Laterality Date    COLONOSCOPY      EAR SURGERY      INNER EAR SURGERY      NASAL SINUS SURGERY      CA COLONOSCOPY FLX DX W/COLLJ SPEC WHEN PFRMD N/A 8/30/2016    Procedure: COLONOSCOPY;  Surgeon: Gadiel Mendes MD;  Location: [facility]; Service: General       Family History   Problem Relation Age of Onset    Hypertension Brother     Hepatitis Mother     Heart disease Father         CARDIAC DISORDER      I have reviewed and agree with the history as documented  Social History   Substance Use Topics    Smoking status: Never Smoker    Smokeless tobacco: Never Used    Alcohol use Yes      Comment: occassional        Review of Systems   Constitutional: Negative for chills and fever  HENT: Negative for facial swelling, sore throat and trouble swallowing  Eyes: Negative for pain and visual disturbance  Respiratory: Negative for cough and shortness of breath  Cardiovascular: Negative for chest pain and leg swelling  Gastrointestinal: Negative for abdominal pain, nausea and vomiting  Genitourinary: Negative for dysuria and flank pain     Musculoskeletal: Positive for arthralgias  Negative for back pain, neck pain and neck stiffness  Skin: Negative for pallor and rash  Allergic/Immunologic: Negative for environmental allergies and immunocompromised state  Neurological: Negative for dizziness and headaches  Hematological: Negative for adenopathy  Does not bruise/bleed easily  Psychiatric/Behavioral: Negative for agitation and behavioral problems  All other systems reviewed and are negative  Physical Exam  Physical Exam   Constitutional: He is oriented to person, place, and time  He appears well-developed and well-nourished  No distress  HENT:   Head: Normocephalic and atraumatic  Eyes: EOM are normal    Neck: Normal range of motion  Neck supple  Cardiovascular: Normal rate, regular rhythm, normal heart sounds and intact distal pulses  Pulmonary/Chest: Effort normal and breath sounds normal    Abdominal: Soft  Bowel sounds are normal  There is no tenderness  There is no rebound and no guarding  Musculoskeletal: Normal range of motion  Right shoulder:  Limited active range of movement due to pain, able to fully range passively, neurovascular intact distally,  Neurological: He is alert and oriented to person, place, and time  Skin: Skin is warm and dry  Psychiatric: He has a normal mood and affect  Nursing note and vitals reviewed        Vital Signs  ED Triage Vitals [11/04/18 0858]   Temperature Pulse Respirations Blood Pressure SpO2   (!) 97 4 °F (36 3 °C) 73 16 (!) 180/93 98 %      Temp Source Heart Rate Source Patient Position - Orthostatic VS BP Location FiO2 (%)   Oral Monitor Sitting Left arm --      Pain Score       Worst Possible Pain           Vitals:    11/04/18 0858   BP: (!) 180/93   Pulse: 73   Patient Position - Orthostatic VS: Sitting       Visual Acuity      ED Medications  Medications   ketorolac (TORADOL) injection 30 mg (30 mg Intramuscular Given 11/4/18 0932)       Diagnostic Studies  Results Reviewed     None No orders to display              Procedures  Procedures       Phone Contacts  ED Phone Contact    ED Course                               MDM  Number of Diagnoses or Management Options  Right shoulder pain: new and does not require workup  Diagnosis management comments: Acute on chronic right shoulder pain, has seen Orthopedics, got injection in the joint, was lifting a bike, developed severe pain in the right shoulder, no falls; denies chest pain or dyspnea  Vital signs stable, lungs clear, cardiovascular normal heart sounds, right shoulder joint has decreased range of movement due to pain, no deformity, no swelling, able to range passively, neurovascular intact distally  Impression:  Acute on chronic right shoulder pain, will give Toradol, lidocaine, discharged on naproxen and Robaxin, follow up with his orthopedics  CritCare Time    Disposition  Final diagnoses:   Right shoulder pain     Time reflects when diagnosis was documented in both MDM as applicable and the Disposition within this note     Time User Action Codes Description Comment    11/4/2018  9:22 AM Luis Carlos, [address] 917 Right shoulder pain       ED Disposition     ED Disposition Condition Comment    Discharge  [place] Antes Depari discharge to home/self care  Condition at discharge: Stable        Follow-up Information     Follow up With Specialties Details Why Contact Info    Mayra Zheng MD Family Medicine   [address] [facility] [address]  [phone]          [facility] [address]            Discharge Medication List as of 11/4/2018  9:23 AM      START taking these medications    Details   methocarbamol (ROBAXIN) 500 mg tablet Take 1 tablet (500 mg total) by mouth 2 (two) times a day, Starting Sun 11/4/2018, Print      naproxen (NAPROSYN) 500 mg tablet Take 1 tablet (500 mg total) by mouth 2 (two) times a day with meals for 5 days, Starting Sun 11/4/2018, Until Fri 11/9/2018, Print         CONTINUE these medications which have NOT CHANGED    Details   ALPRAZolam (XANAX) 0 5 mg tablet Take one tablet by mouth 30 minutes prior to flight , Print      amLODIPine (NORVASC) 10 mg tablet Take 1 tablet by mouth daily, Starting Wed 7/9/2014, Historical Med      betamethasone, augmented, (DIPROLENE-AF) 0 05 % cream Apply topically 2 (two) times a day, Starting Wed 9/26/2018, Normal      Blood Glucose Monitoring Suppl (ONE TOUCH ULTRA 2) w/Device KIT by Does not apply route, Starting Fri 7/7/2017, Historical Med      clobetasol (TEMOVATE) 0 05 % cream Apply topically 2 (two) times a day, Starting Thu 1/22/2015, Historical Med      gabapentin (NEURONTIN) 600 MG tablet take 1 tablet by mouth three times a day if needed, Normal      glucose blood (ONE TOUCH ULTRA TEST) test strip by In Vitro route, Starting Fri 7/7/2017, Historical Med      hydrochlorothiazide (HYDRODIURIL) 25 mg tablet take 1 tablet by mouth once daily, Normal      Lancets (ONETOUCH ULTRASOFT) lancets by Other route daily, Starting Thu 3/8/2018, Normal      lisinopril (ZESTRIL) 2 5 mg tablet Take 1 tablet (2 5 mg total) by mouth daily, Starting Mon 6/18/2018, Normal      metFORMIN (GLUCOPHAGE) 500 mg tablet Take 1 tablet (500 mg total) by mouth 2 (two) times a day with meals, Starting Mon 6/18/2018, Normal      tamsulosin (FLOMAX) 0 4 mg take 1 capsule by mouth at bedtime, Normal      traMADol (ULTRAM) 50 mg tablet Take 1 tablet by mouth three times a day if needed for moderate pain, Normal           No discharge procedures on file      ED Provider  Electronically Signed by           Bigg Ortiz MD  11/04/18 2030

## 2018-11-04 NOTE — DISCHARGE INSTRUCTIONS
Shoulder Pain, Ambulatory Care   GENERAL INFORMATION:   Shoulder pain  is a common problem and can affect your daily activities  Pain can be caused by a problem within your shoulder  Shoulder pain may also be caused by pain that spreads to your shoulder from another part of your body  Seek immediate care for the following symptoms:   · Severe pain    · Inability to move your arm or shoulder    · Numbness or tingling in your shoulder or arm  Treatment for shoulder pain  may include any of the following:  · Acetaminophen  decreases pain and fever  It is available without a doctor's order  Ask how much to take and how often to take it  Follow directions  Acetaminophen can cause liver damage if not taken correctly  · NSAIDs  help decrease swelling and pain or fever  This medicine is available with or without a doctor's order  NSAIDs can cause stomach bleeding or kidney problems in certain people  If you take blood thinner medicine, always ask your healthcare provider if NSAIDs are safe for you  Always read the medicine label and follow directions  · A steroid injection  may help decrease pain and swelling  · Surgery  may be needed for long-term pain and loss of function  Manage your symptoms:   · Apply ice  on your shoulder for 20 to 30 minutes every 2 hours or as directed  Use an ice pack, or put crushed ice in a plastic bag  Cover it with a towel  Ice helps prevent tissue damage and decreases swelling and pain  · Apply heat if ice does not help your symptoms  Apply heat on your shoulder for 20 to 30 minutes every 2 hours for as many days as directed  Heat helps decrease pain and muscle spasms  · Go to physical or occupational therapy as directed  A physical therapist teaches you exercises to help improve movement and strength, and to decrease pain  An occupational therapist teaches you skills to help with your daily activities  Prevent shoulder pain:   · Stretch and strengthen your shoulder  Use proper technique during exercises and sports  · Limit activities as directed  Try to avoid repeated overhead movements  Follow up with your healthcare provider or orthopedist as directed:  Write down your questions so you remember to ask them during your visits  CARE AGREEMENT:   You have the right to help plan your care  Learn about your health condition and how it may be treated  Discuss treatment options with your caregivers to decide what care you want to receive  You always have the right to refuse treatment  The above information is an  only  It is not intended as medical advice for individual conditions or treatments  Talk to your doctor, nurse or pharmacist before following any medical regimen to see if it is safe and effective for you  © 2014 [address] is for End User's use only and may not be sold, redistributed or otherwise used for commercial purposes  All illustrations and images included in CareNotes® are the copyrighted property of A D A M , Inc  or Cristian Colvin

## 2018-11-15 DIAGNOSIS — G95.0 SYRINGOMYELIA (HCC): ICD-10-CM

## 2018-11-15 RX ORDER — GABAPENTIN 600 MG/1
600 TABLET ORAL 3 TIMES DAILY PRN
Qty: 90 TABLET | Refills: 1 | Status: SHIPPED | OUTPATIENT
Start: 2018-11-15 | End: 2018-12-05 | Stop reason: SDUPTHER

## 2018-11-15 NOTE — TELEPHONE ENCOUNTER
Patient called in for refill on  gabapentin (NEURONTIN) 600 MG tablet  90 days  90 qty  Sent to [facility] on file  Last ov was 07/17/18

## 2018-11-27 DIAGNOSIS — G95.0 SYRINGOMYELIA (HCC): ICD-10-CM

## 2018-11-27 RX ORDER — TRAMADOL HYDROCHLORIDE 50 MG/1
TABLET ORAL
Qty: 90 TABLET | Refills: 0 | Status: SHIPPED | OUTPATIENT
Start: 2018-11-27 | End: 2019-01-07 | Stop reason: SDUPTHER

## 2018-11-27 NOTE — TELEPHONE ENCOUNTER
Patient called in requesting medication refill on his Tramadol to be sent to his [facility] on [phone]  MICHAEL GRAMAJO  [facility] was:9/26/2018

## 2018-12-05 DIAGNOSIS — IMO0001 UNCONTROLLED TYPE 2 DIABETES MELLITUS WITHOUT COMPLICATION, WITHOUT LONG-TERM CURRENT USE OF INSULIN: ICD-10-CM

## 2018-12-05 DIAGNOSIS — G95.0 SYRINGOMYELIA (HCC): ICD-10-CM

## 2018-12-05 RX ORDER — GABAPENTIN 600 MG/1
600 TABLET ORAL 3 TIMES DAILY PRN
Qty: 90 TABLET | Refills: 0 | Status: SHIPPED | OUTPATIENT
Start: 2018-12-05 | End: 2019-02-22 | Stop reason: SDUPTHER

## 2018-12-05 NOTE — TELEPHONE ENCOUNTER
Pt pharmacy faxed in med refill request for gabapentin 600mg and metformin 500mg to be sent to [facility]

## 2018-12-08 ENCOUNTER — OFFICE VISIT (OUTPATIENT)
Dept: OBGYN CLINIC | Facility: MEDICAL CENTER | Age: 68
End: 2018-12-08
Payer: MEDICARE

## 2018-12-08 VITALS
DIASTOLIC BLOOD PRESSURE: 88 MMHG | WEIGHT: 159 LBS | HEART RATE: 80 BPM | SYSTOLIC BLOOD PRESSURE: 149 MMHG | BODY MASS INDEX: 25.55 KG/M2 | HEIGHT: 66 IN

## 2018-12-08 DIAGNOSIS — M25.511 CHRONIC RIGHT SHOULDER PAIN: Primary | ICD-10-CM

## 2018-12-08 DIAGNOSIS — G89.29 CHRONIC RIGHT SHOULDER PAIN: Primary | ICD-10-CM

## 2018-12-08 PROCEDURE — 99213 OFFICE O/P EST LOW 20 MIN: CPT | Performed by: FAMILY MEDICINE

## 2018-12-08 NOTE — PROGRESS NOTES
1  Chronic right shoulder pain  MRI shoulder right wo contrast    Basic metabolic panel     Orders Placed This Encounter   Procedures    MRI shoulder right wo contrast    Basic metabolic panel        Imaging Studies (I personally reviewed results in PACS):  X-ray right shoulder 03/06/2018:  No acute osseous abnormality    Report reviewed:  MRI cervical vertebra 12/06/2017:  Possible right C5-6 foraminal stenosis    IMPRESSION:   Right shoulder impingement syndrome versus partial rotator cuff tear  Cervical radiculopathy  Diabetes - hemoglobin A1c 06/18/2018 6 7 improved    Return for Follow-up after MRI  Patient Instructions   Explained the patient that he has persistent symptoms even after 2 corticosteroid injections of his right shoulder with pain that could be consistent with possible partial rotator cuff tear versus other pathology  As such I will order MRI  Patient has severe claustrophobia requiring general anesthesia for his MRI  Provided him with BMP lab slip to be performed within 30 days of his scheduled MRI  Patient currently on tramadol also known as Ultram which is a opiate for his back pain  He tells me that he has prescribed to take this 3 times a day however he usually only requires 2 doses per day  He takes 1 pill of 50 mg in the morning and 1 at night  I explained to him that on severe days of shoulder pain he may take 2 pills of tramadol for total 100 mg per dose every 6 hours no more than 400 mg per day maximum dose      Educated risks of drowsiness and sedating effects when taking prescription opiate pain pills including ultram (tramadol), vicodin (hydrocodone-acetaminophen) and percocet (oxycodone-acetaminophen) and recommended against driving or performing complex tasks while taking these medications due to risk of injury to self or others    educated risks of mixing sedating medications such as sleeping pills, benadryl (diphenhydramine), prescription opiates, muscle relaxants, alcohol, benzodiazepines such as xanax (alprazolam), ativan (lorazepam), clonazeppam, valium (diazepam) and other sedating medications including respiratory depression (stopping breathing) and death  instructed to never take these medications together  I also explained that vicodin (hydrocodone-acetaminophen) and percocet (oxycodone-acetaminophen) is a combination pill and contains tylenol (acetaminophen)  I recommend against takign tylenol in the setting of liver problems (hepatitis or other) and if does take tylenol then takeno more than a  maximum of 1,000  Mg per dose every 6 hours as needed for pain but no more 3 doses or 3,000 mg per day  If you are taking other medications which include tylenol (acetaminophen) such as hydrocodone-acetaminophen then you must factor in the amount of tylenol (acetamionphen) into your 3,000mg maximum dose  Patient expressed understanding and agreed to plan  I recommend against taking anti-inflammatory medications also known NSAIDs  These medications include but not limited to Motrin, ibuprofen, Advil, Aleve, naproxen, meloxicam   These medications should not be used in the setting of known high blood pressure as well as kidney issues, stomach ulcers, gastrointestinal bleeding from the rectum or the stomach, or simultaneously with blood thinners  Risks of taking NSAIDs include severely elevated blood pressure that can lead to stroke and heart attack, kidney failure, and severe internal bleeding  If you have no liver problems, you may take Tylenol (also known as acetaminophen) at a  maximum of 1,000  Mg per dose every 6 hours as needed for pain but no more 3 doses or 3,000 mg per day  If you are taking other medications which include tylenol (acetaminophen) such as hydrocodone-acetaminophen then you must factor in the amount of tylenol (acetamionphen) into your 3,000mg maximum dose  Patient expressed understanding and agreed to plan                   CHIEF COMPLAINT:  Follow-up right arm and shoulder pain    HPI:  Pepe Pearson is a 79 y o  male  who presents for  Evaluation of right arm pain that is an ongoing for 5 months  Patient does have a past medical history significant for syringomyelia  He did have a MRI performed in December of 2017 for his cervical vertebra  There does appear to be signs of right foraminal stenosis C5-6  Patient has seen his primary care doctor for evaluation his right arm pain  He did have a shoulder x-ray performed on 03/07/2018 which showed no acute osseous abnormality  He did start physical therapy for his neck in shoulder for approximately 4 weeks but still has persistent pain  Visit 05/24/2018:   Patient points to his lateral shoulder screw source of pain  He does have associated symptoms which include radiation of pain down his arm into his hand  He describes it as sharp pain moderate intensity  09/18/2018: Follow-up evaluation of right shoulder pain  Last visit patient was diagnosed with right shoulder impingement versus cervical radiculopathy  He did have subacromial corticosteroid injection which did result in significant relief  Patient was also referred to pain management but he states that since he had almost 100% relief of his right shoulder pain he did not present for appointment  Patient points to lateral aspect of her shoulder as greatest source of his pain  He denies any numbness tingling in his hands  Denies any pain radiating from his neck down his arm  He presents today seeking repeat corticosteroid injection subacromial region  Patient did perform approximately 5-6 weeks of physical therapy prior to her last appointment  12/08/2018: Follow-up evaluation of right shoulder pain:  Uncontrolled  Patient did have 2nd corticosteroid injection 09/18/2018 which did not offer any significant relief  He has had worsening pain since then  Points to lateral aspect of his shoulder as source of pain  Worse with overhead motion and also feels restricted in his right shoulder motion  He tells me that since his last visit during Thanksgiving week he reached out to lift the bike which causes severe pain requiring ER visit where he was given analgesics  Review of Systems   Constitutional: Negative for chills and fever  Neurological: Negative for weakness  Following history reviewed and update:    Past Medical History:   Diagnosis Date    Diabetes mellitus ([address] )     Diverticulosis     Hemorrhoids     Hypertension     Polyp, sigmoid colon      Past Surgical History:   Procedure Laterality Date    COLONOSCOPY      EAR SURGERY      INNER EAR SURGERY      NASAL SINUS SURGERY      [place] COLONOSCOPY FLX DX W/COLLJ SPEC WHEN PFRMD N/A 8/30/2016    Procedure: COLONOSCOPY;  Surgeon: Bryan Armendariz MD;  Location: [facility]; Service: General     Social History   History   Alcohol Use    Yes     Comment: occassional     History   Drug Use No     History   Smoking Status    Never Smoker   Smokeless Tobacco    Never Used     Family History   Problem Relation Age of Onset    Hypertension Brother     Hepatitis Mother     Heart disease Father         CARDIAC DISORDER      Allergies   Allergen Reactions    Lipitor [Atorvastatin] Other (See Comments) and Myalgia     Other reaction(s): increased pain  arthralgia    Lyrica [Pregabalin] Other (See Comments)     Pt can't remmember reaction          Physical Exam    Constitutional:  see vital signs  Gen: well-developed, normocephalic/atraumatic, well-groomed  Eyes: No inflammation or discharge of conjunctiva or lids; sclera clear   Pharynx: no inflammation, lesion, or mass of lips  Neck: supple, no masses, non-distended  MSK: no inflammation, lesion, mass, or clubbing of nails and digits except for other than mentioned below  SKIN: no visible rashes or skin lesions  Pulmonary/Chest: Effort normal  No respiratory distress     NEURO: cranial nerves grossly intact  PSYCH:  Alert and oriented to person, place, and time; recent and remote memory intact; mood normal, no depression, anxiety, or agitation, judgment and insight good and intact      Ortho Exam    RIGHT SHOULDER:  Erythema: no  Swelling: no  Increased Warmth: no    Tenderness: + lateral subacromial    ROM  Touchdown sign: intact  Appley Scratch Test: symmetric  Passive: symmetric    Strength  Abduction: 4/5  ER: 4/5  IR: 5/5    Drop-Arm: negative  Emptycan: ++  Belly Press: negative  Lift-off Test:    Ewing: +  Neer: ++  Cross-Arm: negative  Speeds: negative    LEFT SHOULDER:  Strength  Abduction: 5/5  ER: 5/5  IR: 5/5    ROM  Touchdown sign: intact  Appley Scratch Test: symmetric  Passive: symmetric    Empty can: negative               Procedures

## 2018-12-08 NOTE — PATIENT INSTRUCTIONS
Explained the patient that he has persistent symptoms even after 2 corticosteroid injections of his right shoulder with pain that could be consistent with possible partial rotator cuff tear versus other pathology  As such I will order MRI  Patient has severe claustrophobia requiring general anesthesia for his MRI  Provided him with BMP lab slip to be performed within 30 days of his scheduled MRI  Patient currently on tramadol also known as Ultram which is a opiate for his back pain  He tells me that he has prescribed to take this 3 times a day however he usually only requires 2 doses per day  He takes 1 pill of 50 mg in the morning and 1 at night  I explained to him that on severe days of shoulder pain he may take 2 pills of tramadol for total 100 mg per dose every 6 hours no more than 400 mg per day maximum dose  Educated risks of drowsiness and sedating effects when taking prescription opiate pain pills including ultram (tramadol), vicodin (hydrocodone-acetaminophen) and percocet (oxycodone-acetaminophen) and recommended against driving or performing complex tasks while taking these medications due to risk of injury to self or others    educated risks of mixing sedating medications such as sleeping pills, benadryl (diphenhydramine), prescription opiates, muscle relaxants, alcohol, benzodiazepines such as xanax (alprazolam), ativan (lorazepam), clonazeppam, valium (diazepam) and other sedating medications including respiratory depression (stopping breathing) and death  instructed to never take these medications together  I also explained that vicodin (hydrocodone-acetaminophen) and percocet (oxycodone-acetaminophen) is a combination pill and contains tylenol (acetaminophen)   I recommend against takign tylenol in the setting of liver problems (hepatitis or other) and if does take tylenol then takeno more than a  maximum of 1,000  Mg per dose every 6 hours as needed for pain but no more 3 doses or 3,000 mg per day  If you are taking other medications which include tylenol (acetaminophen) such as hydrocodone-acetaminophen then you must factor in the amount of tylenol (acetamionphen) into your 3,000mg maximum dose  Patient expressed understanding and agreed to plan  I recommend against taking anti-inflammatory medications also known NSAIDs  These medications include but not limited to Motrin, ibuprofen, Advil, Aleve, naproxen, meloxicam   These medications should not be used in the setting of known high blood pressure as well as kidney issues, stomach ulcers, gastrointestinal bleeding from the rectum or the stomach, or simultaneously with blood thinners  Risks of taking NSAIDs include severely elevated blood pressure that can lead to stroke and heart attack, kidney failure, and severe internal bleeding  If you have no liver problems, you may take Tylenol (also known as acetaminophen) at a  maximum of 1,000  Mg per dose every 6 hours as needed for pain but no more 3 doses or 3,000 mg per day  If you are taking other medications which include tylenol (acetaminophen) such as hydrocodone-acetaminophen then you must factor in the amount of tylenol (acetamionphen) into your 3,000mg maximum dose  Patient expressed understanding and agreed to plan

## 2018-12-12 DIAGNOSIS — I10 ESSENTIAL HYPERTENSION: ICD-10-CM

## 2018-12-12 RX ORDER — HYDROCHLOROTHIAZIDE 25 MG/1
TABLET ORAL
Qty: 90 TABLET | Refills: 1 | Status: SHIPPED | OUTPATIENT
Start: 2018-12-12 | End: 2019-06-12 | Stop reason: SDUPTHER

## 2018-12-27 NOTE — PRE-PROCEDURE INSTRUCTIONS
Pre-Surgery Instructions:   Medication Instructions    ALPRAZolam (XANAX) 0 5 mg tablet Instructed patient per Anesthesia Guidelines   amLODIPine (NORVASC) 10 mg tablet Instructed patient per Anesthesia Guidelines   betamethasone, augmented, (DIPROLENE-AF) 0 05 % cream Instructed patient per Anesthesia Guidelines   Blood Glucose Monitoring Suppl (ONE TOUCH ULTRA 2) w/Device KIT Instructed patient per Anesthesia Guidelines   clobetasol (TEMOVATE) 0 05 % cream Instructed patient per Anesthesia Guidelines   gabapentin (NEURONTIN) 600 MG tablet Instructed patient per Anesthesia Guidelines   glucose blood (ONE TOUCH ULTRA TEST) test strip Instructed patient per Anesthesia Guidelines   hydrochlorothiazide (HYDRODIURIL) 25 mg tablet Instructed patient per Anesthesia Guidelines   Lancets (ONETOUCH ULTRASOFT) lancets Instructed patient per Anesthesia Guidelines   metFORMIN (GLUCOPHAGE) 500 mg tablet Instructed patient per Anesthesia Guidelines   tamsulosin (FLOMAX) 0 4 mg Instructed patient per Anesthesia Guidelines   traMADol (ULTRAM) 50 mg tablet Instructed patient per Anesthesia Guidelines      Pre MRI instructions reviewed; verbalized understanding

## 2018-12-28 ENCOUNTER — APPOINTMENT (OUTPATIENT)
Dept: LAB | Facility: MEDICAL CENTER | Age: 68
End: 2018-12-28
Payer: MEDICARE

## 2018-12-28 DIAGNOSIS — I10 ESSENTIAL HYPERTENSION: ICD-10-CM

## 2018-12-28 DIAGNOSIS — M25.511 CHRONIC RIGHT SHOULDER PAIN: ICD-10-CM

## 2018-12-28 DIAGNOSIS — R39.14 FEELING OF INCOMPLETE BLADDER EMPTYING: ICD-10-CM

## 2018-12-28 DIAGNOSIS — G89.29 CHRONIC RIGHT SHOULDER PAIN: ICD-10-CM

## 2018-12-28 DIAGNOSIS — Z12.5 SCREENING FOR PROSTATE CANCER: ICD-10-CM

## 2018-12-28 LAB
ANION GAP SERPL CALCULATED.3IONS-SCNC: 8 MMOL/L (ref 4–13)
BASOPHILS # BLD AUTO: 0.06 THOUSANDS/ΜL (ref 0–0.1)
BASOPHILS NFR BLD AUTO: 1 % (ref 0–1)
BUN SERPL-MCNC: 25 MG/DL (ref 5–25)
CALCIUM SERPL-MCNC: 9.4 MG/DL (ref 8.3–10.1)
CHLORIDE SERPL-SCNC: 100 MMOL/L (ref 100–108)
CO2 SERPL-SCNC: 30 MMOL/L (ref 21–32)
CREAT SERPL-MCNC: 0.98 MG/DL (ref 0.6–1.3)
EOSINOPHIL # BLD AUTO: 0.19 THOUSAND/ΜL (ref 0–0.61)
EOSINOPHIL NFR BLD AUTO: 3 % (ref 0–6)
ERYTHROCYTE [DISTWIDTH] IN BLOOD BY AUTOMATED COUNT: 13.1 % (ref 11.6–15.1)
GFR SERPL CREATININE-BSD FRML MDRD: 80 ML/MIN/1.73SQ M
GLUCOSE P FAST SERPL-MCNC: 151 MG/DL (ref 65–99)
HCT VFR BLD AUTO: 47.7 % (ref 36.5–49.3)
HGB BLD-MCNC: 15.6 G/DL (ref 12–17)
IMM GRANULOCYTES # BLD AUTO: 0.01 THOUSAND/UL (ref 0–0.2)
IMM GRANULOCYTES NFR BLD AUTO: 0 % (ref 0–2)
LYMPHOCYTES # BLD AUTO: 3.01 THOUSANDS/ΜL (ref 0.6–4.47)
LYMPHOCYTES NFR BLD AUTO: 40 % (ref 14–44)
MCH RBC QN AUTO: 28.4 PG (ref 26.8–34.3)
MCHC RBC AUTO-ENTMCNC: 32.7 G/DL (ref 31.4–37.4)
MCV RBC AUTO: 87 FL (ref 82–98)
MONOCYTES # BLD AUTO: 0.49 THOUSAND/ΜL (ref 0.17–1.22)
MONOCYTES NFR BLD AUTO: 7 % (ref 4–12)
NEUTROPHILS # BLD AUTO: 3.8 THOUSANDS/ΜL (ref 1.85–7.62)
NEUTS SEG NFR BLD AUTO: 49 % (ref 43–75)
NRBC BLD AUTO-RTO: 0 /100 WBCS
PLATELET # BLD AUTO: 233 THOUSANDS/UL (ref 149–390)
PMV BLD AUTO: 10.8 FL (ref 8.9–12.7)
POTASSIUM SERPL-SCNC: 3.7 MMOL/L (ref 3.5–5.3)
RBC # BLD AUTO: 5.5 MILLION/UL (ref 3.88–5.62)
SODIUM SERPL-SCNC: 138 MMOL/L (ref 136–145)
TSH SERPL DL<=0.05 MIU/L-ACNC: 1.68 UIU/ML (ref 0.36–3.74)
WBC # BLD AUTO: 7.56 THOUSAND/UL (ref 4.31–10.16)

## 2018-12-28 PROCEDURE — 80048 BASIC METABOLIC PNL TOTAL CA: CPT

## 2018-12-28 PROCEDURE — 85025 COMPLETE CBC W/AUTO DIFF WBC: CPT

## 2018-12-28 PROCEDURE — 84154 ASSAY OF PSA FREE: CPT

## 2018-12-28 PROCEDURE — 84443 ASSAY THYROID STIM HORMONE: CPT

## 2018-12-28 PROCEDURE — 84153 ASSAY OF PSA TOTAL: CPT

## 2018-12-28 PROCEDURE — 36415 COLL VENOUS BLD VENIPUNCTURE: CPT

## 2018-12-29 LAB
PSA FREE MFR SERPL: 18 %
PSA FREE SERPL-MCNC: 0.63 NG/ML
PSA SERPL-MCNC: 3.5 NG/ML (ref 0–4)

## 2018-12-31 ENCOUNTER — TELEPHONE (OUTPATIENT)
Dept: FAMILY MEDICINE CLINIC | Facility: CLINIC | Age: 68
End: 2018-12-31

## 2018-12-31 NOTE — TELEPHONE ENCOUNTER
Spoke to Pt spouse regarding the results as per Dr Ame Mckeon up-does pt have upcoming ov, pSA also bumped up a little=can review that also at ov  She will schedule an office visit later on this month   Albertina BRICENO

## 2018-12-31 NOTE — TELEPHONE ENCOUNTER
----- Message from James Leal MD sent at 12/30/2018  9:03 PM EST -----  Sugar up-does pt have upcoming ov, pSA also bumped up a little=can review that also at [facility]

## 2019-01-02 ENCOUNTER — TELEPHONE (OUTPATIENT)
Dept: FAMILY MEDICINE CLINIC | Facility: CLINIC | Age: 69
End: 2019-01-02

## 2019-01-07 DIAGNOSIS — G95.0 SYRINGOMYELIA (HCC): ICD-10-CM

## 2019-01-07 RX ORDER — TRAMADOL HYDROCHLORIDE 50 MG/1
TABLET ORAL
Qty: 90 TABLET | Refills: 0 | Status: SHIPPED | OUTPATIENT
Start: 2019-01-07 | End: 2019-02-22 | Stop reason: SDUPTHER

## 2019-01-07 NOTE — TELEPHONE ENCOUNTER
Pt called requesting med refill for his Tramadol 50mg to be sent to [facility] [address]

## 2019-01-09 ENCOUNTER — ANESTHESIA (OUTPATIENT)
Dept: RADIOLOGY | Facility: HOSPITAL | Age: 69
End: 2019-01-09

## 2019-01-09 ENCOUNTER — HOSPITAL ENCOUNTER (OUTPATIENT)
Dept: RADIOLOGY | Facility: HOSPITAL | Age: 69
Discharge: HOME | End: 2019-01-09
Attending: FAMILY MEDICINE
Payer: MEDICARE

## 2019-01-09 ENCOUNTER — ANESTHESIA EVENT (OUTPATIENT)
Dept: RADIOLOGY | Facility: HOSPITAL | Age: 69
End: 2019-01-09

## 2019-01-09 VITALS
HEIGHT: 66 IN | OXYGEN SATURATION: 100 % | BODY MASS INDEX: 25.23 KG/M2 | RESPIRATION RATE: 16 BRPM | WEIGHT: 157 LBS | HEART RATE: 80 BPM | SYSTOLIC BLOOD PRESSURE: 150 MMHG | DIASTOLIC BLOOD PRESSURE: 84 MMHG | TEMPERATURE: 99.2 F

## 2019-01-09 DIAGNOSIS — G89.29 CHRONIC RIGHT SHOULDER PAIN: ICD-10-CM

## 2019-01-09 DIAGNOSIS — M25.511 CHRONIC RIGHT SHOULDER PAIN: ICD-10-CM

## 2019-01-09 LAB — GLUCOSE SERPL-MCNC: 113 MG/DL (ref 65–140)

## 2019-01-09 PROCEDURE — 73221 MRI JOINT UPR EXTREM W/O DYE: CPT

## 2019-01-09 PROCEDURE — 82948 REAGENT STRIP/BLOOD GLUCOSE: CPT

## 2019-01-09 RX ORDER — PROPOFOL 10 MG/ML
INJECTION, EMULSION INTRAVENOUS AS NEEDED
Status: DISCONTINUED | OUTPATIENT
Start: 2019-01-09 | End: 2019-01-09 | Stop reason: SURG

## 2019-01-09 RX ORDER — LIDOCAINE HYDROCHLORIDE 10 MG/ML
INJECTION, SOLUTION INFILTRATION; PERINEURAL AS NEEDED
Status: DISCONTINUED | OUTPATIENT
Start: 2019-01-09 | End: 2019-01-09 | Stop reason: SURG

## 2019-01-09 RX ORDER — SODIUM CHLORIDE, SODIUM LACTATE, POTASSIUM CHLORIDE, CALCIUM CHLORIDE 600; 310; 30; 20 MG/100ML; MG/100ML; MG/100ML; MG/100ML
125 INJECTION, SOLUTION INTRAVENOUS CONTINUOUS
Status: DISCONTINUED | OUTPATIENT
Start: 2019-01-09 | End: 2019-01-10 | Stop reason: HOSPADM

## 2019-01-09 RX ORDER — TRAMADOL HYDROCHLORIDE 50 MG/1
50 TABLET ORAL EVERY 6 HOURS PRN
Status: DISCONTINUED | OUTPATIENT
Start: 2019-01-09 | End: 2019-01-10 | Stop reason: HOSPADM

## 2019-01-09 RX ADMIN — SODIUM CHLORIDE, SODIUM LACTATE, POTASSIUM CHLORIDE, AND CALCIUM CHLORIDE 125 ML/HR: .6; .31; .03; .02 INJECTION, SOLUTION INTRAVENOUS at 13:57

## 2019-01-09 RX ADMIN — TRAMADOL HYDROCHLORIDE 50 MG: 50 TABLET, COATED ORAL at 16:04

## 2019-01-09 RX ADMIN — LIDOCAINE HYDROCHLORIDE 5 ML: 10 INJECTION, SOLUTION INFILTRATION; PERINEURAL at 14:50

## 2019-01-09 RX ADMIN — PROPOFOL 200 MG: 10 INJECTION, EMULSION INTRAVENOUS at 14:50

## 2019-01-09 NOTE — PROGRESS NOTES
PT stable in holding area  Pt denies the need for anything when asked  Pt notified of plan for transport to [facility] and discharge  Alma Silva RN [facility] notified

## 2019-01-09 NOTE — ANESTHESIA PREPROCEDURE EVALUATION
Review of Systems/Medical History  Patient summary reviewed  Chart reviewed  No history of anesthetic complications     Cardiovascular  EKG reviewed, Exercise tolerance (METS): >4,  Hyperlipidemia, Hypertension on > 1 medication,    Pulmonary  Recent URI ,        GI/Hepatic       Prostatic disorder, benign prostatic hyperplasia       Endo/Other  Diabetes well controlled type 2 Oral agent,      GYN       Hematology   Musculoskeletal    Arthritis     Neurology   Psychology           Physical Exam    Airway    Mallampati score: II  TM Distance: >3 FB  Neck ROM: full     Dental   No notable dental hx     Cardiovascular      Pulmonary  Wheezes,     Other Findings        Anesthesia Plan  ASA Score- 2     Anesthesia Type- general with ASA Monitors  Additional Monitors:   Airway Plan: LMA  Plan Factors-    Induction- intravenous  Postoperative Plan-     Informed Consent- Anesthetic plan and risks discussed with patient

## 2019-01-09 NOTE — PROGRESS NOTES
No Heart Exam on H&P, Lungs-Wheezes throughout lung fields, nurse notified and note left for anesthesia, patient has cold symptoms and cough  Heart-RRR, no murmur

## 2019-01-09 NOTE — DISCHARGE INSTRUCTIONS
Magnetic Resonance Imaging   WHAT YOU NEED TO KNOW:   Magnetic resonance imaging (MRI) is a test that uses magnetic fields and radio waves to take pictures inside of your body  An MRI is used to see blood vessels, tissue, muscles, and bones  It can also show organs, such as your heart, lungs, or liver  An MRI can help your healthcare provider diagnose or treat a medical condition  It does not use radiation  DISCHARGE INSTRUCTIONS:   Drink liquids as directed:  Liquids will help flush the contrast dye out of your body  Ask how much liquid to drink after your MRI, and which liquids to drink  Follow up with your healthcare provider as directed:  Write down your questions so you remember to ask them during your visits  Contact your healthcare provider if:   · You have questions or concerns about your condition or care  Seek care immediately or call 911 if:  You have any signs of an allergic reaction to the contrast dye, such as:  · Trouble breathing    · Dizziness or fainting    · Swelling of your mouth or face    · Nausea or vomiting    · Sudden decrease in urination    · A rash, itching, or swollen skin  © 2017 [address] [facility] Information is for End User's use only and may not be sold, redistributed or otherwise used for commercial purposes  All illustrations and images included in CareNotes® are the copyrighted property of A D A M , Inc  or Cristian Colvin  The above information is an  only  It is not intended as medical advice for individual conditions or treatments  Talk to your doctor, nurse or pharmacist before following any medical regimen to see if it is safe and effective for you

## 2019-01-10 ENCOUNTER — OFFICE VISIT (OUTPATIENT)
Dept: FAMILY MEDICINE CLINIC | Facility: CLINIC | Age: 69
End: 2019-01-10
Payer: MEDICARE

## 2019-01-10 VITALS
BODY MASS INDEX: 25.39 KG/M2 | HEART RATE: 72 BPM | OXYGEN SATURATION: 97 % | RESPIRATION RATE: 18 BRPM | WEIGHT: 158 LBS | DIASTOLIC BLOOD PRESSURE: 72 MMHG | HEIGHT: 66 IN | TEMPERATURE: 97.9 F | SYSTOLIC BLOOD PRESSURE: 122 MMHG

## 2019-01-10 DIAGNOSIS — L24.9 IRRITANT CONTACT DERMATITIS, UNSPECIFIED TRIGGER: ICD-10-CM

## 2019-01-10 DIAGNOSIS — H65.92 MUCOID OTITIS MEDIA OF LEFT EAR WITH EFFUSION: Primary | ICD-10-CM

## 2019-01-10 DIAGNOSIS — R06.2 WHEEZE: ICD-10-CM

## 2019-01-10 PROCEDURE — 99213 OFFICE O/P EST LOW 20 MIN: CPT | Performed by: NURSE PRACTITIONER

## 2019-01-10 RX ORDER — CLOBETASOL PROPIONATE 0.5 MG/G
CREAM TOPICAL
Qty: 60 G | Refills: 2 | Status: SHIPPED | OUTPATIENT
Start: 2019-01-10 | End: 2019-03-27 | Stop reason: CLARIF

## 2019-01-10 RX ORDER — METHYLPREDNISOLONE 4 MG/1
TABLET ORAL
Qty: 21 TABLET | Refills: 0 | Status: SHIPPED | OUTPATIENT
Start: 2019-01-10 | End: 2019-01-25

## 2019-01-10 NOTE — PROGRESS NOTES
Chief Complaint   Patient presents with    Cold Like Symptoms     Pt was referred by MRI due pt was wheezing during tx  Pt has been coughing x 3 days w non produrctive cough with mild white phlegm  Assessment/Plan:     Diagnoses and all orders for this visit:    Mucoid otitis media of left ear with effusion  -     Methylprednisolone 4 MG TBPK; Use as directed on package    Wheeze  -     Methylprednisolone 4 MG TBPK; Use as directed on package    Irritant contact dermatitis, unspecified trigger  -     clobetasol (TEMOVATE) 0 05 % cream; Use three times per week      continue with mucinex      Subjective:      Patient ID: Angélica Payton is a 79 y o  male  Patient states eh was seen by doctor yesterday who told him he was wheezing and was told to make appointment with us  The following portions of the patient's history were reviewed and updated as appropriate: allergies, current medications, past family history, past medical history, past social history, past surgical history and problem list     Review of Systems   Constitutional: Negative for chills, diaphoresis, fatigue and fever  HENT: Positive for postnasal drip  Negative for congestion, rhinorrhea, sinus pain, sore throat and trouble swallowing  Respiratory: Positive for wheezing  Negative for chest tightness and shortness of breath  Cardiovascular: Negative for chest pain and palpitations  Gastrointestinal: Negative for diarrhea, nausea and vomiting  Hematological: Negative for adenopathy  Objective:      /72 (BP Location: Left arm, Patient Position: Sitting, Cuff Size: Standard)   Pulse 72   Temp 97 9 °F (36 6 °C) (Temporal)   Resp 18   Ht 5' 6" (1 676 m)   Wt 71 7 kg (158 lb)   SpO2 97%   BMI 25 50 kg/m²          Physical Exam   Constitutional: He is oriented to person, place, and time  Vital signs are normal  He appears well-developed and well-nourished  He does not appear ill     HENT:   Head: Normocephalic and atraumatic  Right Ear: Tympanic membrane normal    Left Ear: A middle ear effusion (clear) is present  Nose: Nose normal    Mouth/Throat: Uvula is midline, oropharynx is clear and moist and mucous membranes are normal    Eyes: Pupils are equal    Cardiovascular: Normal rate, regular rhythm, S1 normal and S2 normal     No murmur heard  Pulmonary/Chest: Effort normal  No respiratory distress  He has no decreased breath sounds  He has wheezes in the right upper field  He has no rhonchi  Lymphadenopathy:     He has no cervical adenopathy  Neurological: He is alert and oriented to person, place, and time  Psychiatric: He has a normal mood and affect   Thought content normal

## 2019-01-14 ENCOUNTER — OFFICE VISIT (OUTPATIENT)
Dept: OBGYN CLINIC | Facility: MEDICAL CENTER | Age: 69
End: 2019-01-14
Payer: MEDICARE

## 2019-01-14 VITALS
HEIGHT: 66 IN | SYSTOLIC BLOOD PRESSURE: 163 MMHG | DIASTOLIC BLOOD PRESSURE: 94 MMHG | WEIGHT: 162.8 LBS | BODY MASS INDEX: 26.16 KG/M2

## 2019-01-14 DIAGNOSIS — G89.29 CHRONIC RIGHT SHOULDER PAIN: ICD-10-CM

## 2019-01-14 DIAGNOSIS — M75.101 TEAR OF RIGHT ROTATOR CUFF, UNSPECIFIED TEAR EXTENT: Primary | ICD-10-CM

## 2019-01-14 DIAGNOSIS — M25.511 CHRONIC RIGHT SHOULDER PAIN: ICD-10-CM

## 2019-01-14 PROCEDURE — 99213 OFFICE O/P EST LOW 20 MIN: CPT | Performed by: FAMILY MEDICINE

## 2019-01-14 NOTE — PATIENT INSTRUCTIONS
Patient was instructed to consult PCP about sore throat, wheezing and frequent urination  He understood and all questions were answered      Due to failure of prior conservative treatments he will be referred to Dr Griselda Capone for possible surgical evaluation/consideration    MRI reveals full thickness tears of his supraspinatus and infraspinatus tendons of his rotator cuff causing his persistent weakness and pain

## 2019-01-14 NOTE — PROGRESS NOTES
1  Tear of right rotator cuff, unspecified tear extent  Ambulatory referral to Orthopedic Surgery   2  Chronic right shoulder pain  Ambulatory referral to Orthopedic Surgery     Orders Placed This Encounter   Procedures    Ambulatory referral to Orthopedic Surgery        Imaging Studies (I personally reviewed results in PACS):  MRI RIGHT shoulder:  Full-thickness supraspinatus and infraspinatus tendon tears with retraction  As per report: 4 2 cm in AP dimension and 3 4 cm of proximal retraction  Moderate glenohumeral osteoarthritis  Long head biceps tendinitis    Prior Studies:  X-ray right shoulder 03/06/2018:  No acute osseous abnormality    Report reviewed:  MRI cervical vertebra 12/06/2017:  Possible right C5-6 foraminal stenosis    IMPRESSION:   Right shoulder full thickness rotator cuff tear    Return for Follow-up with orthopedic surgeon  Patient Instructions   Patient was instructed to consult PCP about sore throat, wheezing and frequent urination  He understood and all questions were answered  Due to failure of prior conservative treatments he will be referred to Dr Anoop Samano for possible surgical evaluation/consideration    MRI reveals full thickness tears of his supraspinatus and infraspinatus tendons of his rotator cuff causing his persistent weakness and pain        CHIEF COMPLAINT:  Follow-up right arm and shoulder pain    HPI:  Daniel Wolf is a 79 y o  male  who presents for  Evaluation of right arm pain that is an ongoing for 5 months  Patient does have a past medical history significant for syringomyelia  He did have a MRI performed in December of 2017 for his cervical vertebra  There does appear to be signs of right foraminal stenosis C5-6  Patient has seen his primary care doctor for evaluation his right arm pain  He did have a shoulder x-ray performed on 03/07/2018 which showed no acute osseous abnormality    He did start physical therapy for his neck in shoulder for approximately 4 weeks but still has persistent pain  Visit 05/24/2018:   Patient points to his lateral shoulder screw source of pain  He does have associated symptoms which include radiation of pain down his arm into his hand  He describes it as sharp pain moderate intensity  09/18/2018: Follow-up evaluation of right shoulder pain  Last visit patient was diagnosed with right shoulder impingement versus cervical radiculopathy  He did have subacromial corticosteroid injection which did result in significant relief  Patient was also referred to pain management but he states that since he had almost 100% relief of his right shoulder pain he did not present for appointment  Patient points to lateral aspect of her shoulder as greatest source of his pain  He denies any numbness tingling in his hands  Denies any pain radiating from his neck down his arm  He presents today seeking repeat corticosteroid injection subacromial region  Patient did perform approximately 5-6 weeks of physical therapy prior to her last appointment  12/08/2018: Follow-up evaluation of right shoulder pain:  Uncontrolled  Patient did have 2nd corticosteroid injection 09/18/2018 which did not offer any significant relief  He has had worsening pain since then  Points to lateral aspect of his shoulder as source of pain  Worse with overhead motion and also feels restricted in his right shoulder motion  He tells me that since his last visit during Thanksgiving week he reached out to lift the bike which causes severe pain requiring ER visit where he was given analgesics  1/14/19:  Evaluation of right shoulder and MRI review  Shoulder pain uncontrolled  Continues to have shoulder pain lateral right shoulder  Patient has tried and failed conservative treatments for his right shoulder including multiple cortisone injections and formal physical therapy    Patient continues to point to the lateral aspect of his shoulder as the main source of pain  His symptoms are worse with overhead motions and reaching out in front of him  Review of Systems   Constitutional: Negative for chills and fever  HENT: Positive for sore throat  Respiratory: Positive for wheezing  Genitourinary: Positive for urgency  Neurological: Negative for weakness  Denies any shortness of breath or trouble breathing at this time  States that he has chronic wheezing intermittent has seen his primary care physician for this  Following history reviewed and update:    Past Medical History:   Diagnosis Date    Chronic pain disorder     Diabetes mellitus ([address] )     Diverticulosis     Enlarged prostate     Hearing loss, right     Hemorrhoids     Hypertension     Polyp, sigmoid colon      Past Surgical History:   Procedure Laterality Date    COLONOSCOPY      EAR SURGERY      INNER EAR SURGERY      NASAL SINUS SURGERY      LA COLONOSCOPY FLX DX W/COLLJ SPEC WHEN PFRMD N/A 8/30/2016    Procedure: COLONOSCOPY;  Surgeon: Jeff Soto MD;  Location: [facility];   Service: General     Social History   History   Alcohol Use    Yes     Comment: occassional     History   Drug Use No     History   Smoking Status    Never Smoker   Smokeless Tobacco    Never Used     Family History   Problem Relation Age of Onset    Hypertension Brother     Hepatitis Mother     Heart disease Father         CARDIAC DISORDER      Allergies   Allergen Reactions    Lipitor [Atorvastatin] Other (See Comments) and Myalgia     Other reaction(s): increased pain  arthralgia    Lyrica [Pregabalin] Other (See Comments)     Pt can't remmember reaction          Physical Exam    Constitutional:  see vital signs  Gen: well-developed, normocephalic/atraumatic, well-groomed  Eyes: No inflammation or discharge of conjunctiva or lids; sclera clear   Pharynx: no inflammation, lesion, or mass of lips  Neck: supple, no masses, non-distended  MSK: no inflammation, lesion, mass, or clubbing of nails and digits except for other than mentioned below  SKIN: no visible rashes or skin lesions  Pulmonary/Chest: Effort normal  No respiratory distress     NEURO: cranial nerves grossly intact  PSYCH:  Alert and oriented to person, place, and time; recent and remote memory intact; mood normal, no depression, anxiety, or agitation, judgment and insight good and intact      Ortho Exam    RIGHT SHOULDER:  Erythema: no  Swelling: no  Increased Warmth: no    Tenderness: + lateral subacromial    ROM  Touchdown sign: Active: 90 Passive 160  Appley Scratch Test: L5  Passive: symmetric    Strength  Abduction: 4-/5  ER: 4-/5  IR: 5/5    Drop-Arm: negative  Emptycan: ++  Belly Press: negative  Lift-off Test:    Ewing: +  Neer: ++  Cross-Arm: negative  Speeds: negative    LEFT SHOULDER:  Strength  Abduction: 5/5  ER: 5/5  IR: 5/5    ROM  Touchdown sign: intact  Appley Scratch Test: symmetric  Passive: symmetric    Empty can: negative      Cervical  ROM: intact  Tenderness: no spinous process tenderness;   Sensation UE Bilateral:  C5: normal  C6: normal  C7: normal  C8: normal  T1: normal  Strength UE: 5/5 elbow, wrist, fingers bilatearl  Reflexes: symmetric bilateral triceps, biceps, corachobrachiais  Spurlings: negative         Procedures    None performed today     Scribe Attestation    I,:   Meme Kaur am acting as a scribe while in the presence of the attending physician :        I,:   Linnie Saint III, DO personally performed the services described in this documentation    as scribed in my presence :

## 2019-01-21 ENCOUNTER — OFFICE VISIT (OUTPATIENT)
Dept: OBGYN CLINIC | Facility: MEDICAL CENTER | Age: 69
End: 2019-01-21
Payer: MEDICARE

## 2019-01-21 VITALS
WEIGHT: 162 LBS | HEIGHT: 66 IN | HEART RATE: 87 BPM | DIASTOLIC BLOOD PRESSURE: 89 MMHG | BODY MASS INDEX: 26.03 KG/M2 | SYSTOLIC BLOOD PRESSURE: 135 MMHG

## 2019-01-21 DIAGNOSIS — M75.101 TEAR OF RIGHT ROTATOR CUFF, UNSPECIFIED TEAR EXTENT: ICD-10-CM

## 2019-01-21 DIAGNOSIS — G89.29 CHRONIC RIGHT SHOULDER PAIN: ICD-10-CM

## 2019-01-21 DIAGNOSIS — M25.511 CHRONIC RIGHT SHOULDER PAIN: ICD-10-CM

## 2019-01-21 DIAGNOSIS — M75.21 BICEPS TENDONITIS ON RIGHT: Primary | ICD-10-CM

## 2019-01-21 PROCEDURE — 99214 OFFICE O/P EST MOD 30 MIN: CPT | Performed by: ORTHOPAEDIC SURGERY

## 2019-01-21 RX ORDER — CEFAZOLIN SODIUM 2 G/50ML
2000 SOLUTION INTRAVENOUS ONCE
Status: CANCELLED | OUTPATIENT
Start: 2019-01-21 | End: 2019-01-21

## 2019-01-21 RX ORDER — ACETAMINOPHEN 325 MG/1
650 TABLET ORAL EVERY 6 HOURS PRN
Status: CANCELLED | OUTPATIENT
Start: 2019-01-21

## 2019-01-21 NOTE — LETTER
January 21, 2019     MD Marquis [address]  [address]  [id number]    Patient: Jaqueline Rodriguez   YOB: 1950   Date of Visit: 1/21/2019       Dear Dr Sarah Berry:    Thank you for referring Jaqueline Rodriguez to me for evaluation  Below are my notes for this consultation  If you have questions, please do not hesitate to call me  I look forward to following your patient along with you  Sincerely,        Roosevelt Jauregui DO        CC: No Recipients  Roosevelt Jauregui DO  1/21/2019  3:47 PM  Sign at close encounter  Ortho Sports Medicine Shoulder New Patient Visit     Assesment:   79year old RHD male with right shoulder rotator cuff tear, AC joint arthritis and biceps tendinitis    Plan:    Conservative treatment:    Evan Gonsalves has failed the below conservative management:     Ice to shoulder 1-2 times daily, for 20 minutes at a time  Advil/Ibuprofen PRN pain  Physical Therapy  Corticosteroid injection      Imaging: All imaging from today was reviewed by myself and explained to the patient  Injection:    No Injection planned at this time  Surgery: All of the risks and benefits of operative treatment were explained to the patient, as well as the risks and benefits of any alternative treatment options, including nonoperative care  This risks of surgery include, but are not limited to, infection, bleeding, blood clot, damage to nerves/arteries, need for further surgery, cardiovascular risk, anesthesia risk, and continued pain  The patient understood this and elects to proceed forward with surgical intervention  We will proceed forward with surgical arthroscopy of the shoulder with surgical arthroscopy of the right shoulder, repair of the supraspinatus and infraspinatus tendons, subacromial decompression, and arthroscopic versus open biceps tenodesis versus tenotomy  Follow up:    Return in about 2 weeks (around 2/4/2019) for Surgery          Chief Complaint   Patient presents with    Right Shoulder - Pain       History of Present Illness: The patient is a 79 y o , right hand dominant male whose occupation is Retired, referred to me by Dr Colin Cohen, seen in clinic for consultation of right shoulder rotator cuff tear  Eileen Alejo reports today with a a year history of right shoulder pain  The pain was insidious in nature  He was initially treated by Dr Colin Cohen who performed two shoulder injections and physical therapy over a 5 month period  Eileen Alejo states he had moderate relief with the first injection and physical therapy but the pain returned in September  He underwent a second corticosteroid injection which provided no relief  They obtain an MRI on 01/09/18 which was reviewed with the patient by Dr Colin Cohen on 01/15/2019  They advised he follow up with me due to the findings of full thickness supraspinatus and infraspinatus tears  The patient has a history of diabetes  The patient denies a history of thyroid disorder  At today's visit, the pain is located anterior, posterior, lateral, medial, deep  The patient rates the pain as a 8/10  The pain has been present for 7 months  The pain is characterized as sharp, stabbing, dull, achy  The pain is present at all times  Pain is improved by rest, ice and NSAIDS  Pain is aggravated by overhead activity, reaching back, rotation, lifting  and exercising  Symptoms include clicking and popping  The patient has weakness  The patient denies numbness and tingling  The patient has tried rest, ice, NSAIDS, physical therapy and injection            Shoulder Surgical History:  None    Past Medical, Social and Family History:  Past Medical History:   Diagnosis Date    Chronic pain disorder     Diabetes mellitus ([address] )     Diverticulosis     Enlarged prostate     Hearing loss, right     Hemorrhoids     Hypertension     Polyp, sigmoid colon      Past Surgical History:   Procedure Laterality Date    COLONOSCOPY      EAR SURGERY      INNER EAR SURGERY      NASAL SINUS SURGERY      [place] COLONOSCOPY FLX DX W/COLLJ SPEC WHEN PFRMD N/A 8/30/2016    Procedure: COLONOSCOPY;  Surgeon: Michael Deshpande MD;  Location: [facility]; Service: General     Allergies   Allergen Reactions    Lipitor [Atorvastatin] Other (See Comments) and Myalgia     Other reaction(s): increased pain  arthralgia    Lyrica [Pregabalin] Other (See Comments)     Pt can't remmember reaction     Current Outpatient Prescriptions on File Prior to Visit   Medication Sig Dispense Refill    ALPRAZolam (XANAX) 0 5 mg tablet Take one tablet by mouth 30 minutes prior to flight   (Patient taking differently: Take 0 5 mg by mouth see administration instructions Take one tablet by mouth 30 minutes prior to flight  ) 4 tablet 0    amLODIPine (NORVASC) 10 mg tablet Take 1 tablet by mouth daily      Blood Glucose Monitoring Suppl (ONE TOUCH ULTRA 2) w/Device KIT by Does not apply route      clobetasol (TEMOVATE) 0 05 % cream Use three times per week 60 g 2    gabapentin (NEURONTIN) 600 MG tablet Take 1 tablet (600 mg total) by mouth 3 (three) times a day as needed (pain) (Patient taking differently: Take 600 mg by mouth 3 (three) times a day  ) 90 tablet 0    glucose blood (ONE TOUCH ULTRA TEST) test strip by In Vitro route      hydrochlorothiazide (HYDRODIURIL) 25 mg tablet take 1 tablet by mouth once daily 90 tablet 1    Lancets (ONETOUCH ULTRASOFT) lancets by Other route daily 100 each 3    metFORMIN (GLUCOPHAGE) 500 mg tablet Take 1 tablet (500 mg total) by mouth 2 (two) times a day with meals (Patient taking differently: Take 500 mg by mouth daily at bedtime  ) 180 tablet 0    tamsulosin (FLOMAX) 0 4 mg take 1 capsule by mouth at bedtime 30 capsule 11    traMADol (ULTRAM) 50 mg tablet Take 1 tablet by mouth three times a day if needed for moderate pain 90 tablet 0    Methylprednisolone 4 MG TBPK Use as directed on package (Patient not taking: Reported on 1/21/2019 ) 21 tablet 0     No current facility-administered medications on file prior to visit  Social History     Social History    Marital status: /Civil Union     Spouse name: N/A    Number of children: 3    Years of education: N/A     Occupational History    FULL TIME EMPLOYMENT      Social History Main Topics    Smoking status: Never Smoker    Smokeless tobacco: Never Used    Alcohol use Yes      Comment: occassional    Drug use: No    Sexual activity: Yes     Partners: Female     Other Topics Concern    Not on file     Social History Narrative    Lives with wife,daughter, and 2 grandchildren    Retired        Advance directive on file     Denied domestic violence     Denied problem with Evangelical beliefs regarding medical care         I have reviewed the past medical, surgical, social and family history, medications and allergies as documented in the EMR  Review of systems: ROS is negative other than that noted in the HPI  Constitutional: Negative for fatigue and fever  HENT: Negative for sore throat  Respiratory: Negative for shortness of breath  Cardiovascular: Negative for chest pain  Gastrointestinal: Negative for abdominal pain  Endocrine: Negative for cold intolerance and heat intolerance  Genitourinary: Negative for flank pain  Musculoskeletal: Negative for back pain  Skin: Negative for rash  Allergic/Immunologic: Negative for immunocompromised state  Neurological: Negative for dizziness  Psychiatric/Behavioral: Negative for agitation  Physical Exam:    Blood pressure 135/89, pulse 87, height 5' 6" (1 676 m), weight 73 5 kg (162 lb)      General/Constitutional: NAD, well developed, well nourished  HENT: Normocephalic, atraumatic  CV: Intact distal pulses, regular rate  Resp: No respiratory distress or labored breathing  Lymphatic: No lymphadenopathy palpated  Neuro: Alert and Oriented x 3, no focal deficits  Psych: Normal mood, normal affect, normal judgement, normal behavior  Skin: Warm, dry, no rashes, no erythema      Shoulder focused exam:       RIGHT LEFT    Scapula Atrophy Negative Negative     Winging Negative Negative     Protraction Negative Negative    Rotator cuff SS 4/5 5/5     IS 4/5 5/5     SubS 5/5 5/5    ROM  170     ER0 60 60     ER90 90 90     IR90 40 40     IRb T6 T6    TTP: AC Negative Negative     Biceps Positive Negative     Coracoid Negative Negative    Special Tests: O'Briens Negative Negative     Snyder-shear Positive Negative     Cross body Adduction Negative        Speeds  Positive Negative     Clara's Negative Negative     Whipple Positive Negative       Neer Positive Negative            Ewing Negative Negative    Instability: Apprehension & relocation not tested not tested     Load & shift not tested not tested    Other: Crank Negative Negative               UE NV Exam: +2 Radial pulses bilaterally  Sensation intact to light touch C5-T1 bilaterally, Radial/median/ulnar nerve motor intact      Bilateral elbow, wrist, and and forearm ROM full, painless with passive ROM, no ttp or crepitance throughout extremities below shoulder joint    Cervical ROM is full without pain, numbness or tingling      Shoulder Imaging    X-rays of the right shoulder were reviewed, which demonstrate moderate AC joint osteoarthritis  Otherwise normal examination without fracture, dislocation, or osseous abnormalities  I have reviewed the radiology report and agree with their impression  MRI of the right shoulder were reviewed, which demonstrates a full thickness RC tear  See report below  ROTATOR CUFF: full-thickness tear involves most of the supraspinatus, sparing only scant leading edge fibers, and most of the infraspinatus, which measures about 4 2 cm in AP dimension, with about 3 4 cm of proximal retraction  There is granulation tissue/remnant fibers within the tendon gap    There is a portion of the infraspinatus tendon has retracted more proximally, to about the level of the glenohumeral joint, seen on series 5 image 17  Mild fatty atrophy of both the supraspinatus and   infraspinatus  LONG HEAD OF BICEPS TENDON: Tendinopathy of the intra-articular portion     GLENOID LABRUM: Degenerative fraying without focal tear      GLENOHUMERAL JOINT: Shallow fraying of the central portion of the glenoid  Near full-thickness loss of cartilage at the inferior one 3rd of the humeral head surface      ACROMIOCLAVICULAR JOINT:  There is moderate osteoarthritis  I have reviewed the radiology report and agree with their impression        Scribe Attestation    I,:   Dawson Newman am acting as a scribe while in the presence of the attending physician :        I,:   Lacy Phelps, DO personally performed the services described in this documentation    as scribed in my presence :

## 2019-01-21 NOTE — PROGRESS NOTES
Ortho Sports Medicine Shoulder New Patient Visit     Assesment:   79year old RHD male with right shoulder rotator cuff tear, AC joint arthritis and biceps tendinitis    Plan:    Conservative treatment:    Zoraida He has failed the below conservative management:     Ice to shoulder 1-2 times daily, for 20 minutes at a time  Advil/Ibuprofen PRN pain  Physical Therapy  Corticosteroid injection      Imaging: All imaging from today was reviewed by myself and explained to the patient  Injection:    No Injection planned at this time  Surgery: All of the risks and benefits of operative treatment were explained to the patient, as well as the risks and benefits of any alternative treatment options, including nonoperative care  This risks of surgery include, but are not limited to, infection, bleeding, blood clot, damage to nerves/arteries, need for further surgery, cardiovascular risk, anesthesia risk, and continued pain  The patient understood this and elects to proceed forward with surgical intervention  We will proceed forward with surgical arthroscopy of the shoulder with surgical arthroscopy of the right shoulder, repair of the supraspinatus and infraspinatus tendons, subacromial decompression, and arthroscopic versus open biceps tenodesis versus tenotomy  Follow up:    Return in about 2 weeks (around 2/4/2019) for Surgery  Chief Complaint   Patient presents with    Right Shoulder - Pain       History of Present Illness: The patient is a 79 y o , right hand dominant male whose occupation is Retired, referred to me by Dr Anette Roth, seen in clinic for consultation of right shoulder rotator cuff tear  Zoraida He reports today with a a year history of right shoulder pain  The pain was insidious in nature  He was initially treated by Dr Anette Roth who performed two shoulder injections and physical therapy over a 5 month period   Zoraida Delgado states he had moderate relief with the first injection and physical therapy but the pain returned in September  He underwent a second corticosteroid injection which provided no relief  They obtain an MRI on 01/09/18 which was reviewed with the patient by Dr Amy Bailey on 01/15/2019  They advised he follow up with me due to the findings of full thickness supraspinatus and infraspinatus tears  The patient has a history of diabetes  The patient denies a history of thyroid disorder  At today's visit, the pain is located anterior, posterior, lateral, medial, deep  The patient rates the pain as a 8/10  The pain has been present for 7 months  The pain is characterized as sharp, stabbing, dull, achy  The pain is present at all times  Pain is improved by rest, ice and NSAIDS  Pain is aggravated by overhead activity, reaching back, rotation, lifting  and exercising  Symptoms include clicking and popping  The patient has weakness  The patient denies numbness and tingling  The patient has tried rest, ice, NSAIDS, physical therapy and injection  Shoulder Surgical History:  None    Past Medical, Social and Family History:  Past Medical History:   Diagnosis Date    Chronic pain disorder     Diabetes mellitus ([address] )     Diverticulosis     Enlarged prostate     Hearing loss, right     Hemorrhoids     Hypertension     Polyp, sigmoid colon      Past Surgical History:   Procedure Laterality Date    COLONOSCOPY      EAR SURGERY      INNER EAR SURGERY      NASAL SINUS SURGERY      [place] COLON[place]COPY FLX DX W/COLLJ SPEC WHEN PFRMD N/A 8/30/2016    Procedure: COLONOSCOPY;  Surgeon: Juan White MD;  Location: [facility];   Service: General     Allergies   Allergen Reactions    Lipitor [Atorvastatin] Other (See Comments) and Myalgia     Other reaction(s): increased pain  arthralgia    Lyrica [Pregabalin] Other (See Comments)     Pt can't remmember reaction     Current Outpatient Prescriptions on File Prior to Visit   Medication Sig Dispense Refill    ALPRAZolam (XANAX) 0 5 mg tablet Take one tablet by mouth 30 minutes prior to flight  (Patient taking differently: Take 0 5 mg by mouth see administration instructions Take one tablet by mouth 30 minutes prior to flight  ) 4 tablet 0    amLODIPine (NORVASC) 10 mg tablet Take 1 tablet by mouth daily      Blood Glucose Monitoring Suppl (ONE TOUCH ULTRA 2) w/Device KIT by Does not apply route      clobetasol (TEMOVATE) 0 05 % cream Use three times per week 60 g 2    gabapentin (NEURONTIN) 600 MG tablet Take 1 tablet (600 mg total) by mouth 3 (three) times a day as needed (pain) (Patient taking differently: Take 600 mg by mouth 3 (three) times a day  ) 90 tablet 0    glucose blood (ONE TOUCH ULTRA TEST) test strip by In Vitro route      hydrochlorothiazide (HYDRODIURIL) 25 mg tablet take 1 tablet by mouth once daily 90 tablet 1    Lancets (ONETOUCH ULTRASOFT) lancets by Other route daily 100 each 3    metFORMIN (GLUCOPHAGE) 500 mg tablet Take 1 tablet (500 mg total) by mouth 2 (two) times a day with meals (Patient taking differently: Take 500 mg by mouth daily at bedtime  ) 180 tablet 0    tamsulosin (FLOMAX) 0 4 mg take 1 capsule by mouth at bedtime 30 capsule 11    traMADol (ULTRAM) 50 mg tablet Take 1 tablet by mouth three times a day if needed for moderate pain 90 tablet 0    Methylprednisolone 4 MG TBPK Use as directed on package (Patient not taking: Reported on 1/21/2019 ) 21 tablet 0     No current facility-administered medications on file prior to visit        Social History     Social History    Marital status: /Civil Union     Spouse name: N/A    Number of children: 3    Years of education: N/A     Occupational History    FULL TIME EMPLOYMENT      Social History Main Topics    Smoking status: Never Smoker    Smokeless tobacco: Never Used    Alcohol use Yes      Comment: occassional    Drug use: No    Sexual activity: Yes     Partners: Female     Other Topics Concern    Not on file     Social History Narrative    Lives with wife,daughter, and 2 grandchildren    Retired        Advance directive on file     Denied domestic violence     Denied problem with Taoism beliefs regarding medical care         I have reviewed the past medical, surgical, social and family history, medications and allergies as documented in the EMR  Review of systems: ROS is negative other than that noted in the HPI  Constitutional: Negative for fatigue and fever  HENT: Negative for sore throat  Respiratory: Negative for shortness of breath  Cardiovascular: Negative for chest pain  Gastrointestinal: Negative for abdominal pain  Endocrine: Negative for cold intolerance and heat intolerance  Genitourinary: Negative for flank pain  Musculoskeletal: Negative for back pain  Skin: Negative for rash  Allergic/Immunologic: Negative for immunocompromised state  Neurological: Negative for dizziness  Psychiatric/Behavioral: Negative for agitation  Physical Exam:    Blood pressure 135/89, pulse 87, height 5' 6" (1 676 m), weight 73 5 kg (162 lb)      General/Constitutional: NAD, well developed, well nourished  HENT: Normocephalic, atraumatic  CV: Intact distal pulses, regular rate  Resp: No respiratory distress or labored breathing  Lymphatic: No lymphadenopathy palpated  Neuro: Alert and Oriented x 3, no focal deficits  Psych: Normal mood, normal affect, normal judgement, normal behavior  Skin: Warm, dry, no rashes, no erythema      Shoulder focused exam:       RIGHT LEFT    Scapula Atrophy Negative Negative     Winging Negative Negative     Protraction Negative Negative    Rotator cuff SS 4/5 5/5     IS 4/5 5/5     SubS 5/5 5/5    ROM  170     ER0 60 60     ER90 90 90     IR90 40 40     IRb T6 T6    TTP: AC Negative Negative     Biceps Positive Negative     Coracoid Negative Negative    Special Tests: O'Briens Negative Negative     Snyder-shear Positive Negative     Cross body Adduction Negative        Speeds  Positive Negative     Clara's Negative Negative     Whipple Positive Negative       Neer Positive Negative            Ewing Negative Negative    Instability: Apprehension & relocation not tested not tested     Load & shift not tested not tested    Other: Crank Negative Negative               UE NV Exam: +2 Radial pulses bilaterally  Sensation intact to light touch C5-T1 bilaterally, Radial/median/ulnar nerve motor intact      Bilateral elbow, wrist, and and forearm ROM full, painless with passive ROM, no ttp or crepitance throughout extremities below shoulder joint    Cervical ROM is full without pain, numbness or tingling      Shoulder Imaging    X-rays of the right shoulder were reviewed, which demonstrate moderate AC joint osteoarthritis  Otherwise normal examination without fracture, dislocation, or osseous abnormalities  I have reviewed the radiology report and agree with their impression  MRI of the right shoulder were reviewed, which demonstrates a full thickness RC tear  See report below  ROTATOR CUFF: full-thickness tear involves most of the supraspinatus, sparing only scant leading edge fibers, and most of the infraspinatus, which measures about 4 2 cm in AP dimension, with about 3 4 cm of proximal retraction  There is granulation tissue/remnant fibers within the tendon gap  There is a portion of the infraspinatus tendon has retracted more proximally, to about the level of the glenohumeral joint, seen on series 5 image 17  Mild fatty atrophy of both the supraspinatus and   infraspinatus  LONG HEAD OF BICEPS TENDON: Tendinopathy of the intra-articular portion     GLENOID LABRUM: Degenerative fraying without focal tear      GLENOHUMERAL JOINT: Shallow fraying of the central portion of the glenoid  Near full-thickness loss of cartilage at the inferior one 3rd of the humeral head surface      ACROMIOCLAVICULAR JOINT:  There is moderate osteoarthritis      I have reviewed the radiology report and agree with their impression        Scribe Attestation    I,:   Cira Patterson am acting as a scribe while in the presence of the attending physician :        I,:   Andree Allison, DO personally performed the services described in this documentation    as scribed in my presence :

## 2019-01-21 NOTE — PATIENT INSTRUCTIONS
Instructions Following Arthroscopic Rotator Cuff Repair     Sling:   Wear your sling at all times after your surgery (this includes sleeping), except for when you are doing pendulum exercises, showering, or physical therapy  Additionally, you should not carry anything heavier than a pencil in your hand  Please perform gentle elbow and wrist range of motion throughout the day to prevent stiffness  Dressing:   Remove all tape, cotton and gauze 4 days after your surgery  You do not need to put a new dressing on your wound  There are steri strips over your incisions, which can stay in place  If the steri strips fall off, you may place band-aids over the incisions  If the steri strips do not fall off by 2 weeks after surgery, you should peel them off yourself  Showering: You may shower 4 days after surgery, and should sponge bathe until that time  Do not soak in a bathtub, hot tub, or pool until the doctor tells you it is O K , to do so  Once you are done showering, pat the wound dry  While in the shower you must keep the arm across the front of the body as if it were still in the sling  Sleeping:   You will most likely have difficulty sleeping in the first few weeks after surgery  Most people find it more comfortable to sleep in a reclining position  You can either sleep in a recliner chair or create this position with pillows  Ice:   You can ice the shoulder to reduce swelling and discomfort  Do not ice the shoulder more than 20 minutes at a time  Let the shoulder warm up before reapplication  Avoid getting you wound wet  Pain Control:  Please take Advil 400 mg orally every 6 hours, as well as Tylenol 650 mg orally every 6 hours  You can alternate these medications, taking either Advil or Tylenol every 3 hours for 4 days  This will give you a baseline level of pain control and lessen the need for narcotics    You have been given a narcotic pain medicine, which can be used in addition to the Advil and Tylenol on an as needed basis  Follow-up visit:   You need to see the doctor about one week following surgery for your first post-op visit  You will be given a prescription to begin physical therapy if you were not already given one  Common concerns:   Bruising and/or swelling of the shoulder, arm, or hand are common after surgery  To relieve this discomfort it is best to ice the shoulder  Please call if:   1  Any oozing or redness of the wound, fevers (>101 3°F), or chills  2  Any difficulty breathing or heaviness in the chest      REMEMBER - these are only guidelines for what to expect   If you have any questions or concerns, please do not hesitate to call the office  [phone]

## 2019-01-21 NOTE — LETTER
January 21, 2019     MD Marquis [address]  [address]  [id number]    Patient: Jaqueline Rodriguez   YOB: 1950   Date of Visit: 1/21/2019       Dear Dr Sarah Berry:    Thank you for referring Jaqueline Rodriguez to me for evaluation  Below are my notes for this consultation  If you have questions, please do not hesitate to call me  I look forward to following your patient along with you  Sincerely,        Roosevelt Jauregui DO        CC: No Recipients  Roosevelt Jauregui DO  1/21/2019  3:46 PM  Sign at close encounter  Ortho Sports Medicine Shoulder New Patient Visit     Assesment:   79year old RHD male with right shoulder rotator cuff tear, AC joint arthritis and biceps tendinitis    Plan:    Conservative treatment:    Evan Gonsalves has failed the below conservative management:     Ice to shoulder 1-2 times daily, for 20 minutes at a time  Advil/Ibuprofen PRN pain  Physical Therapy  Corticosteroid injection      Imaging: All imaging from today was reviewed by myself and explained to the patient  Injection:    No Injection planned at this time  Surgery: All of the risks and benefits of operative treatment were explained to the patient, as well as the risks and benefits of any alternative treatment options, including nonoperative care  This risks of surgery include, but are not limited to, infection, bleeding, blood clot, damage to nerves/arteries, need for further surgery, cardiovascular risk, anesthesia risk, and continued pain  The patient understood this and elects to proceed forward with surgical intervention  We will proceed forward with surgical arthroscopy of the shoulder with surgical arthroscopy of the right shoulder, repair of the supraspinatus and infraspinatus tendons, subacromial decompression, and arthroscopic versus open biceps tenodesis versus tenotomy  Follow up:    Return in about 2 weeks (around 2/4/2019) for Surgery          Chief Complaint   Patient presents with    Right Shoulder - Pain       History of Present Illness: The patient is a 79 y o , right hand dominant male whose occupation is Retired, referred to me by Dr Steff Cano, seen in clinic for consultation of right shoulder rotator cuff tear  Dontae Osborn reports today with a a year history of right shoulder pain  The pain was insidious in nature  He was initially treated by Dr Steff Cano who performed two shoulder injections and physical therapy over a 5 month period  Diggs Irena states he had moderate relief with the first injection and physical therapy but the pain returned in September  He underwent a second corticosteroid injection which provided no relief  They obtain an MRI on 01/09/18 which was reviewed with the patient by Dr Steff Cano on 01/15/2019  They advised he follow up with me due to the findings of full thickness supraspinatus and infraspinatus tears  The patient has a history of diabetes  The patient denies a history of thyroid disorder  At today's visit, the pain is located anterior, posterior, lateral, medial, deep  The patient rates the pain as a 8/10  The pain has been present for 7 months  The pain is characterized as sharp, stabbing, dull, achy  The pain is present at all times  Pain is improved by rest, ice and NSAIDS  Pain is aggravated by overhead activity, reaching back, rotation, lifting  and exercising  Symptoms include clicking and popping  The patient has weakness  The patient denies numbness and tingling  The patient has tried rest, ice, NSAIDS, physical therapy and injection            Shoulder Surgical History:  None    Past Medical, Social and Family History:  Past Medical History:   Diagnosis Date    Chronic pain disorder     Diabetes mellitus ([address] )     Diverticulosis     Enlarged prostate     Hearing loss, right     Hemorrhoids     Hypertension     Polyp, sigmoid colon      Past Surgical History:   Procedure Laterality Date    COLONOSCOPY      EAR SURGERY      INNER EAR SURGERY      NASAL SINUS SURGERY      NV COLONOSCOPY FLX DX W/COLLJ SPEC WHEN PFRMD N/A 8/30/2016    Procedure: COLONOSCOPY;  Surgeon: Benson Agarwal MD;  Location: [facility]; Service: General     Allergies   Allergen Reactions    Lipitor [Atorvastatin] Other (See Comments) and Myalgia     Other reaction(s): increased pain  arthralgia    Lyrica [Pregabalin] Other (See Comments)     Pt can't remmember reaction     Current Outpatient Prescriptions on File Prior to Visit   Medication Sig Dispense Refill    ALPRAZolam (XANAX) 0 5 mg tablet Take one tablet by mouth 30 minutes prior to flight   (Patient taking differently: Take 0 5 mg by mouth see administration instructions Take one tablet by mouth 30 minutes prior to flight  ) 4 tablet 0    amLODIPine (NORVASC) 10 mg tablet Take 1 tablet by mouth daily      Blood Glucose Monitoring Suppl (ONE TOUCH ULTRA 2) w/Device KIT by Does not apply route      clobetasol (TEMOVATE) 0 05 % cream Use three times per week 60 g 2    gabapentin (NEURONTIN) 600 MG tablet Take 1 tablet (600 mg total) by mouth 3 (three) times a day as needed (pain) (Patient taking differently: Take 600 mg by mouth 3 (three) times a day  ) 90 tablet 0    glucose blood (ONE TOUCH ULTRA TEST) test strip by In Vitro route      hydrochlorothiazide (HYDRODIURIL) 25 mg tablet take 1 tablet by mouth once daily 90 tablet 1    Lancets (ONETOUCH ULTRASOFT) lancets by Other route daily 100 each 3    metFORMIN (GLUCOPHAGE) 500 mg tablet Take 1 tablet (500 mg total) by mouth 2 (two) times a day with meals (Patient taking differently: Take 500 mg by mouth daily at bedtime  ) 180 tablet 0    tamsulosin (FLOMAX) 0 4 mg take 1 capsule by mouth at bedtime 30 capsule 11    traMADol (ULTRAM) 50 mg tablet Take 1 tablet by mouth three times a day if needed for moderate pain 90 tablet 0    Methylprednisolone 4 MG TBPK Use as directed on package (Patient not taking: Reported on 1/21/2019 ) 21 tablet 0     No current facility-administered medications on file prior to visit  Social History     Social History    Marital status: /Civil Union     Spouse name: N/A    Number of children: 3    Years of education: N/A     Occupational History    FULL TIME EMPLOYMENT      Social History Main Topics    Smoking status: Never Smoker    Smokeless tobacco: Never Used    Alcohol use Yes      Comment: occassional    Drug use: No    Sexual activity: Yes     Partners: Female     Other Topics Concern    Not on file     Social History Narrative    Lives with wife,daughter, and 2 grandchildren    Retired        Advance directive on file     Denied domestic violence     Denied problem with [employer] beliefs regarding medical care         I have reviewed the past medical, surgical, social and family history, medications and allergies as documented in the EMR  Review of systems: ROS is negative other than that noted in the HPI  Constitutional: Negative for fatigue and fever  HENT: Negative for sore throat  Respiratory: Negative for shortness of breath  Cardiovascular: Negative for chest pain  Gastrointestinal: Negative for abdominal pain  Endocrine: Negative for cold intolerance and heat intolerance  Genitourinary: Negative for flank pain  Musculoskeletal: Negative for back pain  Skin: Negative for rash  Allergic/Immunologic: Negative for immunocompromised state  Neurological: Negative for dizziness  Psychiatric/Behavioral: Negative for agitation  Physical Exam:    Blood pressure 135/89, pulse 87, height 5' 6" (1 676 m), weight 73 5 kg (162 lb)      General/Constitutional: NAD, well developed, well nourished  HENT: Normocephalic, atraumatic  CV: Intact distal pulses, regular rate  Resp: No respiratory distress or labored breathing  Lymphatic: No lymphadenopathy palpated  Neuro: Alert and Oriented x 3, no focal deficits  Psych: Normal mood, normal affect, normal judgement, normal behavior  Skin: Warm, dry, no rashes, no erythema      Shoulder focused exam:       RIGHT LEFT    Scapula Atrophy Negative Negative     Winging Negative Negative     Protraction Negative Negative    Rotator cuff SS 4/5 5/5     IS 4/5 5/5     SubS 5/5 5/5    ROM  170     ER0 60 60     ER90 90 90     IR90 40 40     IRb T6 T6    TTP: AC Negative Negative     Biceps Positive Negative     Coracoid Negative Negative    Special Tests: O'Briens Negative Negative     Snyder-shear Positive Negative     Cross body Adduction Negative        Speeds  Positive Negative     Clara's Negative Negative     Whipple Positive {positive negative:10718::"Negative"}       Neer Positive {positive negative:28545::"Negative"}     Ewing Negative Negative    Instability: Apprehension & relocation not tested not tested     Load & shift not tested not tested    Other: Crank Negative Negative               UE NV Exam: +2 Radial pulses bilaterally  Sensation intact to light touch C5-T1 bilaterally, Radial/median/ulnar nerve motor intact      Bilateral elbow, wrist, and and forearm ROM full, painless with passive ROM, no ttp or crepitance throughout extremities below shoulder joint    Cervical ROM is full without pain, numbness or tingling      Shoulder Imaging    X-rays of the right shoulder were reviewed, which demonstrate moderate AC joint osteoarthritis  Otherwise normal examination without fracture, dislocation, or osseous abnormalities  I have reviewed the radiology report and agree with their impression  MRI of the right shoulder were reviewed, which demonstrates a full thickness RC tear  See report below  ROTATOR CUFF: full-thickness tear involves most of the supraspinatus, sparing only scant leading edge fibers, and most of the infraspinatus, which measures about 4 2 cm in AP dimension, with about 3 4 cm of proximal retraction  There is granulation tissue/remnant fibers within the tendon gap  There is a portion of the infraspinatus tendon has retracted more proximally, to about the level of the glenohumeral joint, seen on series 5 image 17  Mild fatty atrophy of both the supraspinatus and   infraspinatus  LONG HEAD OF BICEPS TENDON: Tendinopathy of the intra-articular portion     GLENOID LABRUM: Degenerative fraying without focal tear      GLENOHUMERAL JOINT: Shallow fraying of the central portion of the glenoid  Near full-thickness loss of cartilage at the inferior one 3rd of the humeral head surface      ACROMIOCLAVICULAR JOINT:  There is moderate osteoarthritis  I have reviewed the radiology report and agree with their impression        Scribe Attestation    I,:   Kiya Garcia am acting as a scribe while in the presence of the attending physician :        I,:   Roosevelt Jauregui DO personally performed the services described in this documentation    as scribed in my presence :

## 2019-01-25 ENCOUNTER — APPOINTMENT (OUTPATIENT)
Dept: PREADMISSION TESTING | Facility: HOSPITAL | Age: 69
End: 2019-01-25
Payer: MEDICARE

## 2019-01-25 DIAGNOSIS — Z01.818 PREOP TESTING: Primary | ICD-10-CM

## 2019-01-25 LAB
ANION GAP SERPL CALCULATED.3IONS-SCNC: 9 MMOL/L (ref 5–14)
BUN SERPL-MCNC: 20 MG/DL (ref 5–25)
CALCIUM SERPL-MCNC: 9.8 MG/DL (ref 8.4–10.2)
CHLORIDE SERPL-SCNC: 102 MMOL/L (ref 97–108)
CO2 SERPL-SCNC: 26 MMOL/L (ref 22–30)
CREAT SERPL-MCNC: 0.74 MG/DL (ref 0.7–1.5)
EST. AVERAGE GLUCOSE BLD GHB EST-MCNC: 163 MG/DL
GFR SERPL CREATININE-BSD FRML MDRD: 96 ML/MIN/1.73SQ M
GLUCOSE P FAST SERPL-MCNC: 130 MG/DL (ref 70–99)
GLUCOSE SERPL-MCNC: 130 MG/DL (ref 70–99)
HBA1C MFR BLD: 7.3 % (ref 4.2–6.3)
POTASSIUM SERPL-SCNC: 3.8 MMOL/L (ref 3.6–5)
SODIUM SERPL-SCNC: 137 MMOL/L (ref 137–147)

## 2019-01-25 PROCEDURE — 93005 ELECTROCARDIOGRAM TRACING: CPT

## 2019-01-25 PROCEDURE — 83036 HEMOGLOBIN GLYCOSYLATED A1C: CPT

## 2019-01-25 PROCEDURE — 80048 BASIC METABOLIC PNL TOTAL CA: CPT

## 2019-01-25 NOTE — PRE-PROCEDURE INSTRUCTIONS
Pre-Surgery Instructions:   Medication Instructions    amLODIPine (NORVASC) 10 mg tablet Instructed patient per Anesthesia Guidelines   gabapentin (NEURONTIN) 600 MG tablet Instructed patient per Anesthesia Guidelines   hydrochlorothiazide (HYDRODIURIL) 25 mg tablet Instructed patient per Anesthesia Guidelines  Pre-op Showering Instructions for Surgery Patients    Before your operation, you play an important role in decreasing your risk for infection by washing with special antiseptic soap  This is an effective way to reduce bacteria on the skin which may help to prevent infections at the surgical site  Please read the following directions in advance  1  In the week before your operation, purchase a 4 ounce bottle of antiseptic soap containing chlorhexidine gluconate (CHG)  4%  Some brand names include: Aplicare®, Endure, and Hibiclens®  The cost is usually less than $5 00   For your convenience, the [employer] carries the soap   It may also be available at your doctors office or pre-admission testing center, and at most retail pharmacies   If you are allergic or sensitive to soaps containing CHG, please let your doctor know so another antiseptic can be suggested   CHG antiseptic soap is for external use only  2   The day before your operation, follow these instructions carefully to get ready   Please clean linens (sheets) on your bed; you should sleep on clean sheets after your evening shower   Get clean towels and washcloth ready - you need enough for 2 showers   Set aside clean underwear, pajamas, and clothing to wear after the showers     Reminders:   DO NOT use any other soap or body rinse on your skin during or after the antiseptic showers   DO NOT use lotion, powder, deodorant, or perfume/aftershave of any kind on your skin after your antiseptic shower   DO NOT shave any body parts in the 24 hours/day before your operation   DO NOT get the antiseptic soap in your eyes, ears, nose, mouth, or vaginal area    3  You will need to shower the night before AND the morning of your surgery  Shower 1:   The first evening before the operation, take the first shower   First, shampoo your hair with regular shampoo and rinse it completely before you use the antiseptic soap  After washing and rinsing your hair, rinse your body   Next, use a clean washcloth to apply the antiseptic soap and wash your body from the neck down to your toes using ½ bottle of the antiseptic soap   You will use the other ½ bottle for the second shower   Clean the area where your incision will be; lather this area well for about 2 minutes   If you are having head or neck surgery, wash areas with the antiseptic soap   Rinse yourself completely with running water   Use a clean towel to dry off   Wear clean underwear and clothing/pajamas  Shower 2   The morning of your operation, take the second shower following the same steps as Shower 1 using the second ½ of the bottle of antiseptic soap   Use clean cloths and towels to wash and dry yourself   Wear clean underwear and clothing

## 2019-01-26 DIAGNOSIS — I10 ESSENTIAL HYPERTENSION: ICD-10-CM

## 2019-01-26 LAB
ATRIAL RATE: 69 BPM
P AXIS: 51 DEGREES
PR INTERVAL: 166 MS
QRS AXIS: -6 DEGREES
QRSD INTERVAL: 68 MS
QT INTERVAL: 386 MS
QTC INTERVAL: 413 MS
T WAVE AXIS: 22 DEGREES
VENTRICULAR RATE: 69 BPM

## 2019-01-26 PROCEDURE — 93010 ELECTROCARDIOGRAM REPORT: CPT | Performed by: INTERNAL MEDICINE

## 2019-01-27 RX ORDER — AMLODIPINE BESYLATE 10 MG/1
TABLET ORAL
Qty: 90 TABLET | Refills: 3 | Status: SHIPPED | OUTPATIENT
Start: 2019-01-27 | End: 2019-07-29 | Stop reason: CLARIF

## 2019-02-04 ENCOUNTER — ANESTHESIA EVENT (OUTPATIENT)
Dept: PERIOP | Facility: HOSPITAL | Age: 69
End: 2019-02-04
Payer: MEDICARE

## 2019-02-04 RX ORDER — OXYCODONE HYDROCHLORIDE 5 MG/1
5 TABLET ORAL EVERY 4 HOURS PRN
Qty: 30 TABLET | Refills: 0 | Status: SHIPPED | OUTPATIENT
Start: 2019-02-04 | End: 2019-02-14

## 2019-02-04 NOTE — DISCHARGE INSTRUCTIONS
Instructions Following Arthroscopic Rotator Cuff Repair     Sling:   Wear your sling at all times after your surgery (this includes sleeping), except for when you are doing pendulum exercises, showering, or physical therapy  Additionally, you should not carry anything heavier than a pencil in your hand  Please perform gentle elbow and wrist range of motion throughout the day to prevent stiffness  Dressing:   Remove all tape, cotton and gauze 4 days after your surgery  You do not need to put a new dressing on your wound  There are steri strips over your incisions, which can stay in place  If the steri strips fall off, you may place band-aids over the incisions  If the steri strips do not fall off by 2 weeks after surgery, you should peel them off yourself  Showering: You may shower 4 days after surgery, and should sponge bathe until that time  Do not soak in a bathtub, hot tub, or pool until the doctor tells you it is O K , to do so  Once you are done showering, pat the wound dry  While in the shower you must keep the arm across the front of the body as if it were still in the sling  Sleeping:   You will most likely have difficulty sleeping in the first few weeks after surgery  Most people find it more comfortable to sleep in a reclining position  You can either sleep in a recliner chair or create this position with pillows  Ice:   You can ice the shoulder to reduce swelling and discomfort  Do not ice the shoulder more than 20 minutes at a time  Let the shoulder warm up before reapplication  Avoid getting you wound wet  Pain Control:  Please take Advil 400 mg orally every 6 hours, as well as Tylenol 650 mg orally every 6 hours  You can alternate these medications, taking either Advil or Tylenol every 3 hours for 4 days  This will give you a baseline level of pain control and lessen the need for narcotics    You have been given a narcotic pain medicine, which can be used in addition to the Advil and Tylenol on an as needed basis  Follow-up visit:   You need to see the doctor about one week following surgery for your first post-op visit  You will be given a prescription to begin physical therapy if you were not already given one  Common concerns:   Bruising and/or swelling of the shoulder, arm, or hand are common after surgery  To relieve this discomfort it is best to ice the shoulder  Please call if:   1  Any oozing or redness of the wound, fevers (>101 3°F), or chills  2  Any difficulty breathing or heaviness in the chest      REMEMBER - these are only guidelines for what to expect   If you have any questions or concerns, please do not hesitate to call the office  [phone]

## 2019-02-04 NOTE — DISCHARGE SUMMARY
[facility] Discharge Note  Janeth Roy, 79 y o  male  MRN: [id number]      Preoperative diagnosis: right shoulder rotator cuff tear, impingement, biceps tendinitis    Postoperative diagnosis: same    Procedure: surgical arthroscopy of the right shoulder with arthroscopic biceps tenodesis, subacromial decompression, and rotator cuff repair    After procedure, patient was brought to PACU  Pain was controlled with IV and oral pain medication  Patient was discharged to home after cleared by anesthesia and when criteria was met  Condition: good    Discharge medications:   See after visit summary for reconciled discharge medications provided to patient and family  Please refer to discharge instructions for further information

## 2019-02-05 ENCOUNTER — ANESTHESIA (OUTPATIENT)
Dept: PERIOP | Facility: HOSPITAL | Age: 69
End: 2019-02-05
Payer: MEDICARE

## 2019-02-05 ENCOUNTER — HOSPITAL ENCOUNTER (OUTPATIENT)
Facility: HOSPITAL | Age: 69
Setting detail: OUTPATIENT SURGERY
Discharge: HOME | End: 2019-02-05
Attending: ORTHOPAEDIC SURGERY | Admitting: ORTHOPAEDIC SURGERY
Payer: MEDICARE

## 2019-02-05 VITALS
SYSTOLIC BLOOD PRESSURE: 135 MMHG | RESPIRATION RATE: 18 BRPM | DIASTOLIC BLOOD PRESSURE: 80 MMHG | TEMPERATURE: 97.6 F | HEART RATE: 94 BPM | OXYGEN SATURATION: 94 %

## 2019-02-05 DIAGNOSIS — M75.121 COMPLETE TEAR OF RIGHT ROTATOR CUFF: Primary | ICD-10-CM

## 2019-02-05 DIAGNOSIS — Z01.818 PREOP TESTING: ICD-10-CM

## 2019-02-05 LAB — GLUCOSE SERPL-MCNC: 121 MG/DL (ref 65–140)

## 2019-02-05 PROCEDURE — 82948 REAGENT STRIP/BLOOD GLUCOSE: CPT

## 2019-02-05 PROCEDURE — 29826 SHO ARTHRS SRG DECOMPRESSION: CPT | Performed by: PHYSICIAN ASSISTANT

## 2019-02-05 PROCEDURE — C1713 ANCHOR/SCREW BN/BN,TIS/BN: HCPCS | Performed by: ORTHOPAEDIC SURGERY

## 2019-02-05 PROCEDURE — C9290 INJ, BUPIVACAINE LIPOSOME: HCPCS | Performed by: ANESTHESIOLOGY

## 2019-02-05 PROCEDURE — 29827 SHO ARTHRS SRG RT8TR CUF RPR: CPT | Performed by: PHYSICIAN ASSISTANT

## 2019-02-05 PROCEDURE — 29828 SHO ARTHRS SRG BICP TENODSIS: CPT | Performed by: PHYSICIAN ASSISTANT

## 2019-02-05 PROCEDURE — 29828 SHO ARTHRS SRG BICP TENODSIS: CPT | Performed by: ORTHOPAEDIC SURGERY

## 2019-02-05 PROCEDURE — 29827 SHO ARTHRS SRG RT8TR CUF RPR: CPT | Performed by: ORTHOPAEDIC SURGERY

## 2019-02-05 PROCEDURE — 29826 SHO ARTHRS SRG DECOMPRESSION: CPT | Performed by: ORTHOPAEDIC SURGERY

## 2019-02-05 DEVICE — ANCHOR SUT BIOCOMPOSITE 2.9 X 12.5MM KNOTLESS SHRT PUSHLOCK: Type: IMPLANTABLE DEVICE | Site: SHOULDER | Status: FUNCTIONAL

## 2019-02-05 DEVICE — ANCHOR SUT 4.75 X 19.1MM W/CLD EYELET SWIVELOCK STRL: Type: IMPLANTABLE DEVICE | Site: SHOULDER | Status: FUNCTIONAL

## 2019-02-05 RX ORDER — GLYCOPYRROLATE 0.2 MG/ML
INJECTION INTRAMUSCULAR; INTRAVENOUS AS NEEDED
Status: DISCONTINUED | OUTPATIENT
Start: 2019-02-05 | End: 2019-02-05 | Stop reason: SURG

## 2019-02-05 RX ORDER — ONDANSETRON 2 MG/ML
INJECTION INTRAMUSCULAR; INTRAVENOUS AS NEEDED
Status: DISCONTINUED | OUTPATIENT
Start: 2019-02-05 | End: 2019-02-05 | Stop reason: SURG

## 2019-02-05 RX ORDER — SODIUM CHLORIDE, SODIUM LACTATE, POTASSIUM CHLORIDE, CALCIUM CHLORIDE 600; 310; 30; 20 MG/100ML; MG/100ML; MG/100ML; MG/100ML
125 INJECTION, SOLUTION INTRAVENOUS CONTINUOUS
Status: DISCONTINUED | OUTPATIENT
Start: 2019-02-05 | End: 2019-02-05 | Stop reason: HOSPADM

## 2019-02-05 RX ORDER — ACETAMINOPHEN 325 MG/1
650 TABLET ORAL EVERY 6 HOURS PRN
Status: DISCONTINUED | OUTPATIENT
Start: 2019-02-05 | End: 2019-02-05 | Stop reason: HOSPADM

## 2019-02-05 RX ORDER — NEOSTIGMINE METHYLSULFATE 1 MG/ML
INJECTION INTRAVENOUS AS NEEDED
Status: DISCONTINUED | OUTPATIENT
Start: 2019-02-05 | End: 2019-02-05 | Stop reason: SURG

## 2019-02-05 RX ORDER — MAGNESIUM HYDROXIDE 1200 MG/15ML
LIQUID ORAL AS NEEDED
Status: DISCONTINUED | OUTPATIENT
Start: 2019-02-05 | End: 2019-02-05 | Stop reason: HOSPADM

## 2019-02-05 RX ORDER — EPHEDRINE SULFATE 50 MG/ML
INJECTION, SOLUTION INTRAVENOUS AS NEEDED
Status: DISCONTINUED | OUTPATIENT
Start: 2019-02-05 | End: 2019-02-05 | Stop reason: SURG

## 2019-02-05 RX ORDER — DIPHENHYDRAMINE HYDROCHLORIDE 50 MG/ML
12.5 INJECTION INTRAMUSCULAR; INTRAVENOUS ONCE AS NEEDED
Status: DISCONTINUED | OUTPATIENT
Start: 2019-02-05 | End: 2019-02-05 | Stop reason: HOSPADM

## 2019-02-05 RX ORDER — VECURONIUM BROMIDE 1 MG/ML
INJECTION, POWDER, LYOPHILIZED, FOR SOLUTION INTRAVENOUS AS NEEDED
Status: DISCONTINUED | OUTPATIENT
Start: 2019-02-05 | End: 2019-02-05 | Stop reason: SURG

## 2019-02-05 RX ORDER — FENTANYL CITRATE 50 UG/ML
INJECTION, SOLUTION INTRAMUSCULAR; INTRAVENOUS AS NEEDED
Status: DISCONTINUED | OUTPATIENT
Start: 2019-02-05 | End: 2019-02-05 | Stop reason: SURG

## 2019-02-05 RX ORDER — HYDROMORPHONE HCL/PF 1 MG/ML
0.5 SYRINGE (ML) INJECTION
Status: DISCONTINUED | OUTPATIENT
Start: 2019-02-05 | End: 2019-02-05 | Stop reason: HOSPADM

## 2019-02-05 RX ORDER — PROPOFOL 10 MG/ML
INJECTION, EMULSION INTRAVENOUS AS NEEDED
Status: DISCONTINUED | OUTPATIENT
Start: 2019-02-05 | End: 2019-02-05 | Stop reason: SURG

## 2019-02-05 RX ORDER — DEXAMETHASONE SODIUM PHOSPHATE 4 MG/ML
INJECTION, SOLUTION INTRA-ARTICULAR; INTRALESIONAL; INTRAMUSCULAR; INTRAVENOUS; SOFT TISSUE AS NEEDED
Status: DISCONTINUED | OUTPATIENT
Start: 2019-02-05 | End: 2019-02-05 | Stop reason: SURG

## 2019-02-05 RX ORDER — CEFAZOLIN SODIUM 2 G/50ML
2000 SOLUTION INTRAVENOUS ONCE
Status: COMPLETED | OUTPATIENT
Start: 2019-02-05 | End: 2019-02-05

## 2019-02-05 RX ORDER — LIDOCAINE HYDROCHLORIDE 10 MG/ML
INJECTION, SOLUTION INFILTRATION; PERINEURAL AS NEEDED
Status: DISCONTINUED | OUTPATIENT
Start: 2019-02-05 | End: 2019-02-05 | Stop reason: SURG

## 2019-02-05 RX ORDER — MIDAZOLAM HYDROCHLORIDE 1 MG/ML
INJECTION INTRAMUSCULAR; INTRAVENOUS AS NEEDED
Status: DISCONTINUED | OUTPATIENT
Start: 2019-02-05 | End: 2019-02-05 | Stop reason: SURG

## 2019-02-05 RX ORDER — FENTANYL CITRATE/PF 50 MCG/ML
25 SYRINGE (ML) INJECTION
Status: DISCONTINUED | OUTPATIENT
Start: 2019-02-05 | End: 2019-02-05 | Stop reason: HOSPADM

## 2019-02-05 RX ADMIN — ACETAMINOPHEN 650 MG: 325 TABLET ORAL at 12:53

## 2019-02-05 RX ADMIN — CEFAZOLIN SODIUM 2000 MG: 2 SOLUTION INTRAVENOUS at 14:29

## 2019-02-05 RX ADMIN — SODIUM CHLORIDE, POTASSIUM CHLORIDE, SODIUM LACTATE AND CALCIUM CHLORIDE: 600; 310; 30; 20 INJECTION, SOLUTION INTRAVENOUS at 15:08

## 2019-02-05 RX ADMIN — NEOSTIGMINE METHYLSULFATE 3 MG: 1 INJECTION INTRAVENOUS at 16:19

## 2019-02-05 RX ADMIN — PROPOFOL 200 MG: 10 INJECTION, EMULSION INTRAVENOUS at 14:30

## 2019-02-05 RX ADMIN — SODIUM CHLORIDE, POTASSIUM CHLORIDE, SODIUM LACTATE AND CALCIUM CHLORIDE 125 ML/HR: 600; 310; 30; 20 INJECTION, SOLUTION INTRAVENOUS at 13:36

## 2019-02-05 RX ADMIN — VECURONIUM BROMIDE 6 MG: 1 INJECTION, POWDER, LYOPHILIZED, FOR SOLUTION INTRAVENOUS at 14:31

## 2019-02-05 RX ADMIN — EPHEDRINE SULFATE 10 MG: 50 INJECTION INTRAMUSCULAR; INTRAVENOUS; SUBCUTANEOUS at 14:57

## 2019-02-05 RX ADMIN — FENTANYL CITRATE 50 MCG: 50 INJECTION INTRAMUSCULAR; INTRAVENOUS at 15:59

## 2019-02-05 RX ADMIN — BUPIVACAINE 20 ML: 13.3 INJECTION, SUSPENSION, LIPOSOMAL INFILTRATION at 15:23

## 2019-02-05 RX ADMIN — DEXAMETHASONE SODIUM PHOSPHATE 4 MG: 4 INJECTION, SOLUTION INTRA-ARTICULAR; INTRALESIONAL; INTRAMUSCULAR; INTRAVENOUS; SOFT TISSUE at 14:50

## 2019-02-05 RX ADMIN — FENTANYL CITRATE 50 MCG: 50 INJECTION INTRAMUSCULAR; INTRAVENOUS at 14:07

## 2019-02-05 RX ADMIN — ONDANSETRON HYDROCHLORIDE 4 MG: 2 INJECTION, SOLUTION INTRAMUSCULAR; INTRAVENOUS at 15:59

## 2019-02-05 RX ADMIN — VECURONIUM BROMIDE 1 MG: 1 INJECTION, POWDER, LYOPHILIZED, FOR SOLUTION INTRAVENOUS at 15:59

## 2019-02-05 RX ADMIN — MIDAZOLAM HYDROCHLORIDE 2 MG: 1 INJECTION, SOLUTION INTRAMUSCULAR; INTRAVENOUS at 14:07

## 2019-02-05 RX ADMIN — GLYCOPYRROLATE 0.4 MG: 0.2 INJECTION, SOLUTION INTRAMUSCULAR; INTRAVENOUS at 16:19

## 2019-02-05 RX ADMIN — EPHEDRINE SULFATE 10 MG: 50 INJECTION INTRAMUSCULAR; INTRAVENOUS; SUBCUTANEOUS at 14:46

## 2019-02-05 RX ADMIN — LIDOCAINE HYDROCHLORIDE 50 MG: 10 INJECTION, SOLUTION INFILTRATION; PERINEURAL at 14:30

## 2019-02-05 NOTE — ANESTHESIA POSTPROCEDURE EVALUATION
Post-Op Assessment Note      CV Status:  Stable    Hydration Status:  Euvolemic    PONV Controlled:  None    Airway Patency:  Patent    Post Op Vitals Reviewed: Yes          Staff: Anesthesiologist           BP   144/76   Temp   97 4   Pulse  94   Resp  20   SpO2   98%15

## 2019-02-05 NOTE — NURSING NOTE
Patient received into SPU via litter  Awake/alert  Skin warm/dry  Respirations easy/unlabored  Dressing right shoulder clean/dry/intact  Sling with pillow to right arm   (+) right radial pulse  Right fingers:  (+) movement, (+) sensation, warm to touch with brisk capillary refill  Patient denies pain at present  Patient out of bed to bathroom with assistance, voided without difficulty

## 2019-02-05 NOTE — ANESTHESIA PREPROCEDURE EVALUATION
Review of Systems/Medical History  Patient summary reviewed  Chart reviewed      Cardiovascular  Hyperlipidemia, Hypertension ,    Pulmonary  Negative pulmonary ROS        GI/Hepatic  Negative GI/hepatic ROS          Prostatic disorder, benign prostatic hyperplasia       Endo/Other  Diabetes (HbA1c 7 3) type 2 Oral agent,      GYN  Negative gynecology ROS          Hematology  Negative hematology ROS      Musculoskeletal    Arthritis     Neurology  Negative neurology ROS      Psychology   Anxiety,          ECG 12 lead   Order: [id number]   Collected:  1/25/2019 09:19    Ref Range & Units 11d ago   Ventricular Rate BPM 69    Atrial Rate BPM 69    NH Interval ms 166    QRSD Interval ms 68    QT Interval ms 386    QTC Interval ms 413    P Axis degrees 51    QRS Axis degrees -6    T Wave Axis degrees 22    View Full Report  Narrative     Normal sinus rhythm  Normal ECG  No previous ECGs available  Confirmed by Neville Larsen ([id number]) on 1/26/2019 1:55:22 PM         Linked Documents     View Image                  Physical Exam    Airway    Mallampati score: II  TM Distance: >3 FB  Neck ROM: full     Dental       Cardiovascular  Cardiovascular exam normal    Pulmonary  Pulmonary exam normal     Other Findings        Anesthesia Plan  ASA Score- 2     Anesthesia Type- general and regional with ASA Monitors  Additional Monitors:   Airway Plan: LMA  Comment: Right ISB block  Plan Factors-    Induction- intravenous  Postoperative Plan-     Informed Consent- Anesthetic plan and risks discussed with patient  I personally reviewed this patient with the CRNA  Discussed and agreed on the Anesthesia Plan with the CRNA  Prasanna Esparza

## 2019-02-05 NOTE — H&P (VIEW-ONLY)
Ortho Sports Medicine Shoulder New Patient Visit     Assesment:   79year old RHD male with right shoulder rotator cuff tear, AC joint arthritis and biceps tendinitis    Plan:    Conservative treatment:    Dilma Muse has failed the below conservative management:     Ice to shoulder 1-2 times daily, for 20 minutes at a time  Advil/Ibuprofen PRN pain  Physical Therapy  Corticosteroid injection      Imaging: All imaging from today was reviewed by myself and explained to the patient  Injection:    No Injection planned at this time  Surgery: All of the risks and benefits of operative treatment were explained to the patient, as well as the risks and benefits of any alternative treatment options, including nonoperative care  This risks of surgery include, but are not limited to, infection, bleeding, blood clot, damage to nerves/arteries, need for further surgery, cardiovascular risk, anesthesia risk, and continued pain  The patient understood this and elects to proceed forward with surgical intervention  We will proceed forward with surgical arthroscopy of the shoulder with surgical arthroscopy of the right shoulder, repair of the supraspinatus and infraspinatus tendons, subacromial decompression, and arthroscopic versus open biceps tenodesis versus tenotomy  Follow up:    Return in about 2 weeks (around 2/4/2019) for Surgery  Chief Complaint   Patient presents with    Right Shoulder - Pain       History of Present Illness: The patient is a 79 y o , right hand dominant male whose occupation is Retired, referred to me by Dr Desire Jain, seen in clinic for consultation of right shoulder rotator cuff tear  Dilma Muse reports today with a a year history of right shoulder pain  The pain was insidious in nature  He was initially treated by Dr Desier Jain who performed two shoulder injections and physical therapy over a 5 month period   Dimla Muse states he had moderate relief with the first injection and physical therapy but the pain returned in September  He underwent a second corticosteroid injection which provided no relief  They obtain an MRI on 01/09/18 which was reviewed with the patient by Dr Mayte Hinojosa on 01/15/2019  They advised he follow up with me due to the findings of full thickness supraspinatus and infraspinatus tears  The patient has a history of diabetes  The patient denies a history of thyroid disorder  At today's visit, the pain is located anterior, posterior, lateral, medial, deep  The patient rates the pain as a 8/10  The pain has been present for 7 months  The pain is characterized as sharp, stabbing, dull, achy  The pain is present at all times  Pain is improved by rest, ice and NSAIDS  Pain is aggravated by overhead activity, reaching back, rotation, lifting  and exercising  Symptoms include clicking and popping  The patient has weakness  The patient denies numbness and tingling  The patient has tried rest, ice, NSAIDS, physical therapy and injection  Shoulder Surgical History:  None    Past Medical, Social and Family History:  Past Medical History:   Diagnosis Date    Chronic pain disorder     Diabetes mellitus ([address] )     Diverticulosis     Enlarged prostate     Hearing loss, right     Hemorrhoids     Hypertension     Polyp, sigmoid colon      Past Surgical History:   Procedure Laterality Date    COLONOSCOPY      EAR SURGERY      INNER EAR SURGERY      NASAL SINUS SURGERY      CO COLONOSCOPY FLX DX W/COLLJ SPEC WHEN PFRMD N/A 8/30/2016    Procedure: COLONOSCOPY;  Surgeon: Iliana Marquez MD;  Location: [facility];   Service: General     Allergies   Allergen Reactions    Lipitor [Atorvastatin] Other (See Comments) and Myalgia     Other reaction(s): increased pain  arthralgia    Lyrica [Pregabalin] Other (See Comments)     Pt can't remmember reaction     Current Outpatient Prescriptions on File Prior to Visit   Medication Sig Dispense Refill    ALPRAZolam (XANAX) 0 5 mg tablet Take one tablet by mouth 30 minutes prior to flight  (Patient taking differently: Take 0 5 mg by mouth see administration instructions Take one tablet by mouth 30 minutes prior to flight  ) 4 tablet 0    amLODIPine (NORVASC) 10 mg tablet Take 1 tablet by mouth daily      Blood Glucose Monitoring Suppl (ONE TOUCH ULTRA 2) w/Device KIT by Does not apply route      clobetasol (TEMOVATE) 0 05 % cream Use three times per week 60 g 2    gabapentin (NEURONTIN) 600 MG tablet Take 1 tablet (600 mg total) by mouth 3 (three) times a day as needed (pain) (Patient taking differently: Take 600 mg by mouth 3 (three) times a day  ) 90 tablet 0    glucose blood (ONE TOUCH ULTRA TEST) test strip by In Vitro route      hydrochlorothiazide (HYDRODIURIL) 25 mg tablet take 1 tablet by mouth once daily 90 tablet 1    Lancets (ONETOUCH ULTRASOFT) lancets by Other route daily 100 each 3    metFORMIN (GLUCOPHAGE) 500 mg tablet Take 1 tablet (500 mg total) by mouth 2 (two) times a day with meals (Patient taking differently: Take 500 mg by mouth daily at bedtime  ) 180 tablet 0    tamsulosin (FLOMAX) 0 4 mg take 1 capsule by mouth at bedtime 30 capsule 11    traMADol (ULTRAM) 50 mg tablet Take 1 tablet by mouth three times a day if needed for moderate pain 90 tablet 0    Methylprednisolone 4 MG TBPK Use as directed on package (Patient not taking: Reported on 1/21/2019 ) 21 tablet 0     No current facility-administered medications on file prior to visit        Social History     Social History    Marital status: /Civil Union     Spouse name: N/A    Number of children: 3    Years of education: N/A     Occupational History    FULL TIME EMPLOYMENT      Social History Main Topics    Smoking status: Never Smoker    Smokeless tobacco: Never Used    Alcohol use Yes      Comment: occassional    Drug use: No    Sexual activity: Yes     Partners: Female     Other Topics Concern    Not on file     Social History Narrative    Lives with wife,daughter, and 2 grandchildren    Retired        Advance directive on file     Denied domestic violence     Denied problem with [employer]ist beliefs regarding medical care         I have reviewed the past medical, surgical, social and family history, medications and allergies as documented in the EMR  Review of systems: ROS is negative other than that noted in the HPI  Constitutional: Negative for fatigue and fever  HENT: Negative for sore throat  Respiratory: Negative for shortness of breath  Cardiovascular: Negative for chest pain  Gastrointestinal: Negative for abdominal pain  Endocrine: Negative for cold intolerance and heat intolerance  Genitourinary: Negative for flank pain  Musculoskeletal: Negative for back pain  Skin: Negative for rash  Allergic/Immunologic: Negative for immunocompromised state  Neurological: Negative for dizziness  Psychiatric/Behavioral: Negative for agitation  Physical Exam:    Blood pressure 135/89, pulse 87, height 5' 6" (1 676 m), weight 73 5 kg (162 lb)      General/Constitutional: NAD, well developed, well nourished  HENT: Normocephalic, atraumatic  CV: Intact distal pulses, regular rate  Resp: No respiratory distress or labored breathing  Lymphatic: No lymphadenopathy palpated  Neuro: Alert and Oriented x 3, no focal deficits  Psych: Normal mood, normal affect, normal judgement, normal behavior  Skin: Warm, dry, no rashes, no erythema      Shoulder focused exam:       RIGHT LEFT    Scapula Atrophy Negative Negative     Winging Negative Negative     Protraction Negative Negative    Rotator cuff SS 4/5 5/5     IS 4/5 5/5     SubS 5/5 5/5    ROM  170     ER0 60 60     ER90 90 90     IR90 40 40     IRb T6 T6    TTP: AC Negative Negative     Biceps Positive Negative     Coracoid Negative Negative    Special Tests: O'Briens Negative Negative     Snyder-shear Positive Negative     Cross body Adduction Negative        Speeds  Positive Negative     Clara's Negative Negative     Whipple Positive Negative       Neer Positive Negative            Ewing Negative Negative    Instability: Apprehension & relocation not tested not tested     Load & shift not tested not tested    Other: Crank Negative Negative               UE NV Exam: +2 Radial pulses bilaterally  Sensation intact to light touch C5-T1 bilaterally, Radial/median/ulnar nerve motor intact      Bilateral elbow, wrist, and and forearm ROM full, painless with passive ROM, no ttp or crepitance throughout extremities below shoulder joint    Cervical ROM is full without pain, numbness or tingling      Shoulder Imaging    X-rays of the right shoulder were reviewed, which demonstrate moderate AC joint osteoarthritis  Otherwise normal examination without fracture, dislocation, or osseous abnormalities  I have reviewed the radiology report and agree with their impression  MRI of the right shoulder were reviewed, which demonstrates a full thickness RC tear  See report below  ROTATOR CUFF: full-thickness tear involves most of the supraspinatus, sparing only scant leading edge fibers, and most of the infraspinatus, which measures about 4 2 cm in AP dimension, with about 3 4 cm of proximal retraction  There is granulation tissue/remnant fibers within the tendon gap  There is a portion of the infraspinatus tendon has retracted more proximally, to about the level of the glenohumeral joint, seen on series 5 image 17  Mild fatty atrophy of both the supraspinatus and   infraspinatus  LONG HEAD OF BICEPS TENDON: Tendinopathy of the intra-articular portion     GLENOID LABRUM: Degenerative fraying without focal tear      GLENOHUMERAL JOINT: Shallow fraying of the central portion of the glenoid  Near full-thickness loss of cartilage at the inferior one 3rd of the humeral head surface      ACROMIOCLAVICULAR JOINT:  There is moderate osteoarthritis      I have reviewed the radiology report and agree with their impression        Scribe Attestation    I,:   Kiya Garcia am acting as a scribe while in the presence of the attending physician :        I,:   Roosevelt Jauregui, DO personally performed the services described in this documentation    as scribed in my presence :

## 2019-02-05 NOTE — ANESTHESIA PROCEDURE NOTES
Peripheral Block    Patient location during procedure: holding area  Start time: 2/5/2019 2:07 PM  Reason for block: at surgeon's request and post-op pain management  Staffing  Anesthesiologist: Santosh Woods  Performed: anesthesiologist   Preanesthetic Checklist  Completed: patient identified, site marked, surgical consent, pre-op evaluation, timeout performed, IV checked, risks and benefits discussed and monitors and equipment checked  Peripheral Block  Patient position: supine  Prep: ChloraPrep  Patient monitoring: heart rate, cardiac monitor, continuous pulse ox and frequent blood pressure checks  Block type: interscalene  Laterality: right  Injection technique: single-shot  Procedures: ultrasound guided  Ultrasound permanent image saved  Local infiltration: bupivacaine (experal 20 ml)  Infiltration strength: 0 5 %  Dose: 10 mL  Needle  Needle type: Stimuplex   Needle gauge: 21 G  Needle length: 10 cm  Needle localization: ultrasound guidance  Test dose: negative  Assessment  Injection assessment: incremental injection and negative aspiration for heme  Paresthesia pain: none  Heart rate change: no  Slow fractionated injection: no  patient tolerated the procedure well with no immediate complications

## 2019-02-06 NOTE — OP NOTE
OPERATIVE REPORT  PATIENT NAME: Jaqueline Rodriguez    :  1950  MRN: [id number]  Pt Location:  OR ROOM 10    SURGERY DATE: 2019    Surgeon(s) and Role:     * Howard Downey DO - Primary     * Lashonda Tay PA-C - Assisting    Preop Diagnosis:  Tear of right rotator cuff, unspecified tear extent [M75 101]    Post-Op Diagnosis Codes:     * Tear of right rotator cuff, unspecified tear extent [M75 101]    Procedure(s) (LRB):  ARTHROSCOPY SHOULDER (Right) with rotator cuff repair, subacromial decompression, and arthroscopic biceps tenodesis    Specimen(s):  * No specimens in log *    Estimated Blood Loss:   Minimal    Drains:       Anesthesia Type:   General    Operative Indications:  Tear of right rotator cuff, unspecified tear extent [G53 800]    Complications:   None    Patient: Jaqueline Rodriguez  MRN:  [id number]  Date of birth:  1950     Operative Modifiers:  None      Surgeon:  RUBY Kign  Second Assistant: Palma Gonzalez with interscalene block     Blood Loss:  Minimal      Indications:   Mr Soumya Cornell is a 79 y o  male who failed conservative management for a full thickness rotator cuff tear    An MRI revealed a full thickness supraspinatus and infraspinatus tear  Due to the MRI findings and the fact that the patient had not improved with a conservative approach we discussed rotator cuff repair surgery  We reviewed risks and benefits of surgery  It was agreed  to proceed with surgery to address the pathology that was causing the patient's symptoms  Findings:    Arthroscopic evaluation of the right shoulder revealed the following:   Glenoid articular surface: No notable chondral defects  Humeral head articular surface: No notable chondral defects except for some abrasion adjacent to the long head of the biceps tendon     Labrum: There was tearing of the superior labrum that was involved the bicipital attachment to the superior labrum   The labrum in this region was fragmented and getting entrapped between the humeral head and glenoid  Biceps tendon:fraying and erythema of biceps at superior labral insertion   Rotator cuff: The infraspinatus, teres minor, and subscapularis tendons were entirely intact without evidence of disruption on the articular or bursal surfaces  However, there was a rotator cuff tear involving the supraspinatus tendon, which measured approximately 25 mm from anterior to posterior  Inferior recess: No loose bodies or ligament disruption  Subacromial bursa: Markedly hypertrophic and hyperemic  Acromion: The anterior aspect of the acromion displayed an inferiorly directed spur  Distal clavicle: No appreciable inferior spurring  Procedure:    In the pre-operative holding area, the patient identified the correct operative extremity and I marked that extremity with my initials, using a permanent marker  The patient was then brought to the operating room and positioned supine  Following satisfactory induction of anesthesia, the patient was positioned in the beach chair position  The entire right operative extremity was draped free and the right shoulder was prepped in the usual sterile fashion for surgery of the shoulder  Before any surgical instrumentation was passed to me by the surgical technician, a formalized time-out occurred, which involves the surgeon, circulating nurse, and anesthesia staff all verifying the correct operative extremity  My initials were visible on the prepped and draped operative field  The anatomic landmarks of the scapular spine, acromion, clavicle, and coracoid process were marked  Subsequently, a posterior portal was marked in the soft recess between the glenoid and humeral head  The posterior portal was established with a scalpel  The arthroscope was introduced through this portal  Under direct visualization, the anterior portal was established with a localizing needle followed by a scalpel   A probe was then introduced into the anterior portal  A systematic diagnostic arthroscopy evaluated the following: glenoid articular surface, humeral head articular surface, labrum, biceps tendon, rotator cuff, and inferior recess  The superior labral complex had detached from the glenoid rim  Specifically, this separation extended from the anterior insertion of the long head of the biceps tendon to approximately the 10 o'clock position  The labrum in this region was grossly unstable to probing  Further, some radial tearing and central fraying was noted in this region  Consequently, the superior labrum was gently debrided with the motorized shaver  A Mindflash beak instrument was used to pass a cinch stitch around the proximal biceps tendon, in a loop n ledy fashion  The labral attachment of the long head of the biceps tendon was detached using a mets  Electrocautery was then used to debride any fraying of the labrum  A 2 9 mm push lock anchor was then inserted to secure the biceps tendon into the biceps groove just above the subscapularis tendon, completing the arthroscopic biceps tenotomy  The arthroscope was withdrawn from the glenohumeral joint and placed into the subacromial bursa via a separate approach and entry  Under direct visualization, the lateral portal was established with a localizing needle followed by a scalpel  A shaver was introduced into the subacromial space and the space was further established  The acromion, distal clavicle, and rotator cuff were arthroscopically evaluated  The subacromial bursa was markedly hypertrophic and hyperemic  The anterior aspect of the acromion displayed an inferiorly directed spur  The subacromial bursa was carefully resected with the motorized shaver and electrocautery tool  The subacromial space was further decompressed as the acromial spur was resected and smoothed using the arthroscopic yang until no spurs were seen       The size and mobility of the rotator cuff tear was assessed with the tissue grasper  A bleeding bed on the rotator cuff footprint was established with the arthroscopic shaver and yang  A fibertape suture was passed through the rotator cuff anteriorly and posteriorly, as well as one fiberlink stitch was placed as a cinch stitch in ripstop fashion between and medial to the prior fibertape stitches  All 3 suture limbs were then placed into a swivel lock anchor, and this was used to repair the rotator cuff down to bone  Appropriate anatomic repair of the rotator cuff was performed   There was no additional pathology  All particulate debris was removed  The shoulder was copiously rinsed and then drained  The portals were closed with buried 3-0 monocryl suture  The skin was cleansed with sterile saline and dried before Steri-Strips were applied  Finally, a sterile dressing was secured by tape, followed by a shoulder brace           I was present for the entire procedure, A qualified resident physician was not available and A physician assistant was required during the procedure for retraction tissue handling,dissection and suturing    Patient Disposition:  PACU     SIGNATURE: Angie Marley DO  DATE: February 6, 2019  TIME: 9:59 AM

## 2019-02-06 NOTE — NURSING NOTE
Pt is awake, alert, tolerated diet  Dressing and sling with pillow remain clean dry and intact to right arm, fingers to right hand are pink and warm,(+) movement in the fingers of the right hand and (+) radial pulse  Written and verbal instructions given to Pt and wife who verbalize an understanding, all questions were answered, pt offers no complaints, denies pain

## 2019-02-06 NOTE — INTERIM OP NOTE
ARTHROSCOPY SHOULDER  Postoperative Note  PATIENT NAME: Corinne Junior  : 1950  MRN: [id number]  [address] OR ROOM 10    Surgery Date: 2019    Preop Diagnosis:  Tear of right rotator cuff, unspecified tear extent [M75 101]    Post-Op Diagnosis Codes:     * Tear of right rotator cuff, unspecified tear extent [M75 101]    Procedure(s) (LRB):  ARTHROSCOPY SHOULDER (Right), rotator cuff repair, subacromial decompression, arthroscopic biceps tenodesis    Surgeon(s) and Role:     * Howard Downey DO - Primary     * Lashonda Tay PA-C - Assisting    Specimens:  * No specimens in log *    Estimated Blood Loss:   Minimal    Anesthesia Type:   General     Findings:    see op note  Complications:   None    SIGNATURE: Jeanne Poole DO   DATE: 2019   TIME: 9:59 AM

## 2019-02-18 ENCOUNTER — OFFICE VISIT (OUTPATIENT)
Dept: OBGYN CLINIC | Facility: MEDICAL CENTER | Age: 69
End: 2019-02-18

## 2019-02-18 VITALS
HEIGHT: 66 IN | DIASTOLIC BLOOD PRESSURE: 86 MMHG | BODY MASS INDEX: 26.03 KG/M2 | HEART RATE: 77 BPM | SYSTOLIC BLOOD PRESSURE: 135 MMHG | WEIGHT: 162 LBS

## 2019-02-18 DIAGNOSIS — M75.121 COMPLETE TEAR OF RIGHT ROTATOR CUFF: Primary | ICD-10-CM

## 2019-02-18 PROCEDURE — 99024 POSTOP FOLLOW-UP VISIT: CPT | Performed by: ORTHOPAEDIC SURGERY

## 2019-02-18 NOTE — LETTER
February 18, 2019     [address]  [place] [address]   [phone]  [phone]    Patient: Hosea Murrieta   YOB: 1950   Date of Visit: 2/18/2019       Dear Dr Sunny Pinedo:    Thank you for referring Hosea Murrieta to me for evaluation  Below are my notes for this consultation  If you have questions, please do not hesitate to call me  I look forward to following your patient along with you  Sincerely,        Dena Saldana DO        CC: No Recipients  Dena Saldana DO  2/18/2019  8:17 PM  Sign at close encounter  Shoulder Post Operative Visit     Assesment:     Surgical Arthroscopy of the right shoulder with rotator cuff repair and arthroscopic biceps tenodesis    Plan:    Post-Operative treatment:    Ice to shoulder 1-2 times daily, for 20 minutes at a time  PT for ROM and strengthening to shoulder, rotator cuff, scapular stabilizers  Imaging:    Surgical pictures were reviewed by myself and explained to the patient  Sling:  at all times other than showering    DVT Prophylaxis:  Ambulation    Follow up:   6 weeks    Patient was advised that if they have any fevers, chills, chest pain, shortness of breath, redness or drainage from the incision, please let our office know immediately  Chief Complaint   Patient presents with    Right Shoulder - Post-op       History of Present Illness: The patient is a 79 y o  male who is being evaluated post operatively 2 weeks status post right rotator cuff repair with arthroscopic biceps tenodesis  Since the prior visit, He reports no improvement  Patient reports significant problems sleeping  He's tried sleeping in a recliner and in his bed, propped up on pillows  Pain is well controlled  The patient is using ice to control swelling  They have not started physical therapy  Patient reports he has an appointment scheduled for Monday 2/25/19     The patient is ambulating for DVT ppx      The patient is using their sling at all times other than showering    The patient denies any fevers, chills, calf pain, chest pain/shortness of breath, redness or drainage from the incision  I have reviewed the past medical, surgical, social and family history, medications and allergies as documented in the EMR  Review of systems: ROS is negative other than that noted in the HPI  Constitutional: Negative for fatigue and fever  Physical Exam:    Blood pressure 135/86, pulse 77, height 5' 6" (1 676 m), weight 73 5 kg (162 lb)      General/Constitutional: NAD, well developed, well nourished  HENT: Normocephalic, atraumatic  CV: Intact distal pulses, regular rate  Resp: No respiratory distress or labored breathing  Lymphatic: No lymphadenopathy palpated  Neuro: Alert and Oriented x 3, no focal deficits  Psych: Normal mood, normal affect, normal judgement, normal behavior  Skin: Warm, dry, no rashes, no erythema    Incisions show no erythema, no drainage    Shoulder focused exam:       RIGHT LEFT    Scapula Atrophy Negative Negative     Winging Negative Negative     Protraction Negative Negative    Rotator cuff SS Deferred due to post op period Deferred due to post op period     IS Deferred due to post op period Deferred due to post op period     SubS Deferred due to post op period Deferred due to post op period    ROM FF Deferred due to post op period 170     ER0 Deferred due to post op period 61     ER90 Deferred due to post op period 80     IR90 Deferred due to post op period T6     IRb Deferred due to post op period T6    TTP:  None None    Stability:  stable stable      UE NV Exam: +2 Radial pulses bilaterally  Sensation intact to light touch C5-T1 bilaterally, Radial/median/ulnar nerve motor intact

## 2019-02-18 NOTE — PROGRESS NOTES
Shoulder Post Operative Visit     Assesment:     Surgical Arthroscopy of the right shoulder with rotator cuff repair and arthroscopic biceps tenodesis    Plan:    Post-Operative treatment:    Ice to shoulder 1-2 times daily, for 20 minutes at a time  PT for ROM and strengthening to shoulder, rotator cuff, scapular stabilizers  Imaging:    Surgical pictures were reviewed by myself and explained to the patient  Sling:  at all times other than showering    DVT Prophylaxis:  Ambulation    Follow up:   6 weeks    Patient was advised that if they have any fevers, chills, chest pain, shortness of breath, redness or drainage from the incision, please let our office know immediately  Chief Complaint   Patient presents with    Right Shoulder - Post-op       History of Present Illness: The patient is a 79 y o  male who is being evaluated post operatively 2 weeks status post right rotator cuff repair with arthroscopic biceps tenodesis  Since the prior visit, He reports no improvement  Patient reports significant problems sleeping  He's tried sleeping in a recliner and in his bed, propped up on pillows  Pain is well controlled  The patient is using ice to control swelling  They have not started physical therapy  Patient reports he has an appointment scheduled for Monday 2/25/19     The patient is ambulating for DVT ppx  The patient is using their sling at all times other than showering    The patient denies any fevers, chills, calf pain, chest pain/shortness of breath, redness or drainage from the incision  I have reviewed the past medical, surgical, social and family history, medications and allergies as documented in the EMR  Review of systems: ROS is negative other than that noted in the HPI  Constitutional: Negative for fatigue and fever  Physical Exam:    Blood pressure 135/86, pulse 77, height 5' 6" (1 676 m), weight 73 5 kg (162 lb)      General/Constitutional: NAD, well developed, well nourished  HENT: Normocephalic, atraumatic  CV: Intact distal pulses, regular rate  Resp: No respiratory distress or labored breathing  Lymphatic: No lymphadenopathy palpated  Neuro: Alert and Oriented x 3, no focal deficits  Psych: Normal mood, normal affect, normal judgement, normal behavior  Skin: Warm, dry, no rashes, no erythema    Incisions show no erythema, no drainage    Shoulder focused exam:       RIGHT LEFT    Scapula Atrophy Negative Negative     Winging Negative Negative     Protraction Negative Negative    Rotator cuff SS Deferred due to post op period Deferred due to post op period     IS Deferred due to post op period Deferred due to post op period     SubS Deferred due to post op period Deferred due to post op period    ROM FF Deferred due to post op period 170     ER0 Deferred due to post op period 61     ER90 Deferred due to post op period 80     IR90 Deferred due to post op period T6     IRb Deferred due to post op period T6    TTP:  None None    Stability:  stable stable      UE NV Exam: +2 Radial pulses bilaterally  Sensation intact to light touch C5-T1 bilaterally, Radial/median/ulnar nerve motor intact

## 2019-02-22 DIAGNOSIS — G95.0 SYRINGOMYELIA (HCC): ICD-10-CM

## 2019-02-22 RX ORDER — GABAPENTIN 600 MG/1
600 TABLET ORAL 3 TIMES DAILY PRN
Qty: 90 TABLET | Refills: 0 | Status: SHIPPED | OUTPATIENT
Start: 2019-02-22 | End: 2019-03-27 | Stop reason: SDUPTHER

## 2019-02-22 RX ORDER — TRAMADOL HYDROCHLORIDE 50 MG/1
TABLET ORAL
Qty: 90 TABLET | Refills: 0 | Status: SHIPPED | OUTPATIENT
Start: 2019-02-22 | End: 2019-03-27 | Stop reason: SDUPTHER

## 2019-02-22 NOTE — TELEPHONE ENCOUNTER
Patient called in requesting a refill on his Tramadol 50 mg and Gabapentin 600 mg to be sent to his [facility] on [facility]   Patient has an upcoming appointment on 3/27/2019 with Dr Ric Dueñas

## 2019-02-25 ENCOUNTER — EVALUATION (OUTPATIENT)
Dept: PHYSICAL THERAPY | Facility: MEDICAL CENTER | Age: 69
End: 2019-02-25
Payer: MEDICARE

## 2019-02-25 DIAGNOSIS — M25.511 CHRONIC RIGHT SHOULDER PAIN: ICD-10-CM

## 2019-02-25 DIAGNOSIS — G89.29 CHRONIC RIGHT SHOULDER PAIN: ICD-10-CM

## 2019-02-25 DIAGNOSIS — M75.101 TEAR OF RIGHT ROTATOR CUFF, UNSPECIFIED TEAR EXTENT: ICD-10-CM

## 2019-02-25 DIAGNOSIS — M75.21 BICEPS TENDONITIS ON RIGHT: ICD-10-CM

## 2019-02-25 PROCEDURE — 97161 PT EVAL LOW COMPLEX 20 MIN: CPT | Performed by: PHYSICAL THERAPIST

## 2019-02-25 NOTE — PROGRESS NOTES
PT Evaluation     Today's date: 2019  Patient name: Yanira Puentes  : 1950  MRN: [id number]  Referring provider: Reford Apgar, DO  Dx:   Encounter Diagnosis     ICD-10-CM    1  Tear of right rotator cuff, unspecified tear extent M75 101 Ambulatory referral to Physical Therapy   2  Chronic right shoulder pain M25 511 Ambulatory referral to Physical Therapy    G89 29    3  Biceps tendonitis on right M75 21 Ambulatory referral to Physical Therapy       Start Time:   Stop Time: 173  Total time in clinic (min): 45 minutes    Assessment  Assessment details: Yanira Puentes is a 79 y o  male who presents to physical therapy status post R RTC repair on 2019  Dontae Osborn presents with pain, limitations in range of motion, strength, and functional ability  The current limitations are affecting Stanislav's ability to function at prior level  He will benefit from skilled physical therapy to address the current impairments and functional limitations to enable him to return to daily activities at premorbid status  Impairments: abnormal movement, activity intolerance, impaired physical strength and lacks appropriate home exercise program    Symptom irritability: lowUnderstanding of Dx/Px/POC: good   Prognosis: good    Goals  STG (3-6 weeks)  1) patient will decrease pain at worst to 5/10   2) patient will demonstrate full pain free shoulder range of motion  3) patient will be able to sleep in bed, through the night  4) patient will d/c sling  5) patient will be independent with HEP  6) patient will demonstrate full elbow extension     LTG (8-12 weeks)  1) patient will improve pain at worst to 1-2/10   2) patient will demonstrate at 5/5 shoulder strength  3) patient will be able to shave and complete ADLs with no difficulties  4) patent will improve FOTO score to 60  5) patient will return to completing household tasks      Plan  Patient would benefit from: skilled physical therapy  Planned modality interventions: cryotherapy  Planned therapy interventions: home exercise program, therapeutic exercise, therapeutic activities, neuromuscular re-education, manual therapy, joint mobilization, strengthening and stretching  Frequency: 3x week  Duration in weeks: 12  Treatment plan discussed with: patient        Subjective Evaluation    History of Present Illness  Date of surgery: 2019  Mechanism of injury: surgery  Mechanism of injury: Keira Hayes is a 79 y o  male presenting to physical therapy on 19 status post R RTC surgery on 2019  He is RHD  Patient reports doing work around the house and pulling a nail out and felt pain in the shoulder, he then picked up a bike and felt it again  He reports he having two shots in the shoulder which didn't help the pain  He reports it was just a gradual onset of problems which lead to the surgery  He reports having therapy for this prior to therapy  Patient reports sleeping about an hour a night  He reports going from recliner to the bed back to the recliner etc  He mentions he is unable to complete housework like he was previously  He reports having difficulty shaving and completing ADLs normally due to being right handed  Patient denies any numbness/tingling in the hands  Pain  Current pain ratin  At best pain ratin  At worst pain rating: 10  Relieving factors: medications and ice    Social Support  Lives with: spouse    Treatments  Previous treatment: physical therapy and injection treatment  Patient Goals  Patient goals for therapy: increased strength, decreased pain, increased motion and independence with ADLs/IADLs  Patient goal: get back to doing housework  Objective     General Comments:      Shoulder Comments   Cervical ROM: WFL  Wrist ROM: WFL  Sensation: Intact bilaterally    Right Elbow ROM  - flexion: WFL  - Extension: 20 degrees      Left Shoulder AROM: WFL    Right Shoulder PROM  - flexion: 70 degrees  - abduction: 55 degrees  - ER: 10 degrees  - IR: n/a    Strength: not assessed at this time          FOTO Score: 22    Precautions: RTC Protocol DOS 2/5/19; DM, HTN, back pain    Daily Treatment Diary     Manual              PROM                                                                     Exercise Diary              Pendulums             Elb Flex/Ext             Wrist flex/ext             C/s AROM             UT Stretch             Scap retractions             Ball Squeeze                                                                                                                                                                                                                                 Modalities

## 2019-02-27 ENCOUNTER — OFFICE VISIT (OUTPATIENT)
Dept: PHYSICAL THERAPY | Facility: MEDICAL CENTER | Age: 69
End: 2019-02-27
Payer: MEDICARE

## 2019-02-27 DIAGNOSIS — M75.101 TEAR OF RIGHT ROTATOR CUFF, UNSPECIFIED TEAR EXTENT: Primary | ICD-10-CM

## 2019-02-27 DIAGNOSIS — G89.29 CHRONIC RIGHT SHOULDER PAIN: ICD-10-CM

## 2019-02-27 DIAGNOSIS — M25.511 CHRONIC RIGHT SHOULDER PAIN: ICD-10-CM

## 2019-02-27 DIAGNOSIS — M75.21 BICEPS TENDONITIS ON RIGHT: ICD-10-CM

## 2019-02-27 PROCEDURE — 97112 NEUROMUSCULAR REEDUCATION: CPT

## 2019-02-27 PROCEDURE — 97140 MANUAL THERAPY 1/> REGIONS: CPT

## 2019-02-27 PROCEDURE — 97110 THERAPEUTIC EXERCISES: CPT

## 2019-02-27 NOTE — PROGRESS NOTES
Daily Note     Today's date: 2019  Patient name: Corinne Junior  : 1950  MRN: [id number]  Referring provider: Mariola Vogt DO  Dx:   Encounter Diagnosis     ICD-10-CM    1  Tear of right rotator cuff, unspecified tear extent M75 101    2  Chronic right shoulder pain M25 511     G89 29    3  Biceps tendonitis on right M75 21                   Subjective: Pt reports that he was in pain 5/10 in his R shoulder  Notes that he has been compliant with the sling usage  No further questions regarding his HEP  Objective: See treatment diary below  Precautions: RTC Protocol DOS 19; DM, HTN, back pain    Daily Treatment Diary     Manual              PROM MM                                                                    Exercise Diary              Pendulums 30x ea            Elb Flex/Ext 2x10 (AA)            Wrist flex/ext 30x            C/s AROM 10x ea  UT Stretch 20"x4            Scap retractions 20x 5"            Ball Squeeze 3"x20                                                                                                                                                                                                                                Modalities              ICE 10'                                          Assessment: Tolerated treatment fair  Pt demonstrated compliance with sling usage as well as don/doffing  Continued to follow protocol this session with good tolerance but semi- compliant since he has a tendency to flex his elbow un assisted which was corrected with cuing  Pt was unable to reach protocol limits this session with PROM noting a tight end feel in both flx and ER  Patient demonstrated fatigue post treatment, exhibited good technique with therapeutic exercises and would benefit from continued PT      Plan: Continue per plan of care

## 2019-03-04 ENCOUNTER — OFFICE VISIT (OUTPATIENT)
Dept: PHYSICAL THERAPY | Facility: MEDICAL CENTER | Age: 69
End: 2019-03-04
Payer: MEDICARE

## 2019-03-04 DIAGNOSIS — M25.511 CHRONIC RIGHT SHOULDER PAIN: ICD-10-CM

## 2019-03-04 DIAGNOSIS — M75.21 BICEPS TENDONITIS ON RIGHT: ICD-10-CM

## 2019-03-04 DIAGNOSIS — G89.29 CHRONIC RIGHT SHOULDER PAIN: ICD-10-CM

## 2019-03-04 DIAGNOSIS — M75.101 TEAR OF RIGHT ROTATOR CUFF, UNSPECIFIED TEAR EXTENT: Primary | ICD-10-CM

## 2019-03-04 PROCEDURE — 97140 MANUAL THERAPY 1/> REGIONS: CPT

## 2019-03-04 PROCEDURE — 97110 THERAPEUTIC EXERCISES: CPT

## 2019-03-04 NOTE — PROGRESS NOTES
Daily Note     Today's date: 3/4/2019  Patient name: Leigh Epley  : 1950  MRN: [id number]  Referring provider: Anh Hendrix DO  Dx:   Encounter Diagnosis     ICD-10-CM    1  Tear of right rotator cuff, unspecified tear extent M75 101    2  Chronic right shoulder pain M25 511     G89 29    3  Biceps tendonitis on right M75 21        Start Time: 1730  Stop Time: 1820  Total time in clinic (min): 50 minutes    Subjective: Pt reports 5/10 prior to start of session today  Objective: See treatment diary below  Precautions: RTC Protocol DOS 19; DM, HTN, back pain    Daily Treatment Diary     Manual  2/27 3/4           PROM MM AFB                                                                   Exercise Diary  2/27 3/4           Pendulums 30x ea 30x ea           Elb Flex/Ext 2x10 (AA) 2x10 (AA)           Wrist flex/ext 30x 30x           C/s AROM 10x ea  10x ea  UT Stretch 20"x4 20"x4           Scap retractions 20x 5" 20x 5"           Ball Squeeze 3"x20 3"x20                                                                                                                                                                                                                               Modalities  2/27 3/4           ICE 10' 10'                                         Assessment: Tolerated treatment well  Slight increase of pain reported toward end of session that was resolved after receiving ice  Pain present at end range during PROM in all planes  Program performed within physicians protocol this session  Patient would benefit from continued PT to further improve ROM and strength per protocol  Plan: Continue per plan of care

## 2019-03-06 ENCOUNTER — OFFICE VISIT (OUTPATIENT)
Dept: PHYSICAL THERAPY | Facility: MEDICAL CENTER | Age: 69
End: 2019-03-06
Payer: MEDICARE

## 2019-03-06 DIAGNOSIS — M75.101 TEAR OF RIGHT ROTATOR CUFF, UNSPECIFIED TEAR EXTENT: Primary | ICD-10-CM

## 2019-03-06 DIAGNOSIS — M75.21 BICEPS TENDONITIS ON RIGHT: ICD-10-CM

## 2019-03-06 DIAGNOSIS — G89.29 CHRONIC RIGHT SHOULDER PAIN: ICD-10-CM

## 2019-03-06 DIAGNOSIS — M25.511 CHRONIC RIGHT SHOULDER PAIN: ICD-10-CM

## 2019-03-06 PROCEDURE — 97140 MANUAL THERAPY 1/> REGIONS: CPT

## 2019-03-06 PROCEDURE — 97112 NEUROMUSCULAR REEDUCATION: CPT

## 2019-03-06 NOTE — PROGRESS NOTES
Daily Note     Today's date: 3/6/2019  Patient name: Gonzalo Salvador  : 1950  MRN: [id number]  Referring provider: Rose Mane DO  Dx:   Encounter Diagnosis     ICD-10-CM    1  Tear of right rotator cuff, unspecified tear extent M75 101    2  Chronic right shoulder pain M25 511     G89 29    3  Biceps tendonitis on right M75 21        Start Time: 1638  Stop Time: 1717  Total time in clinic (min): 39 minutes    Subjective: Pt states pain is a little worse since his last visit  Pain rated as 5/10 prior to start of session  Objective: See treatment diary below  Precautions: RTC Protocol DOS 19; DM, HTN, back pain    Daily Treatment Diary     Manual  2/27 3/4 3/6          PROM MM AFB AFB                                                                  Exercise Diary  2/27 3/4 3/6          Pendulums 30x ea 30x ea 30x ea          Elb Flex/Ext 2x10 (AA) 2x10 (AA) 2x10  (AA          Wrist flex/ext 30x 30x 30x          C/s AROM 10x ea  10x ea  20x ea          UT Stretch 20"x4 20"x4 20"x4          Scap retractions 20x 5" 20x 5" 20x5"          Ball Squeeze 3"x20 3"x20 5"x20                                                                                                                                                                                                                              Modalities  2/27 3/4 3/6          ICE 10' 10' 10'                                        Assessment: Tolerated treatment well  All pt's program performed within physicians protocol this session  Visible limitations in PROM present  Pt reports slight increase of pain at end range in flex, abd, and ER  Reps and hold times increased as tolerated by pt  Pt stated feeling better at end of session  Patient would benefit from continued PT  Plan: Continue per plan of care  Progress treament per protocol

## 2019-03-11 ENCOUNTER — OFFICE VISIT (OUTPATIENT)
Dept: PHYSICAL THERAPY | Facility: MEDICAL CENTER | Age: 69
End: 2019-03-11
Payer: MEDICARE

## 2019-03-11 DIAGNOSIS — M25.511 CHRONIC RIGHT SHOULDER PAIN: ICD-10-CM

## 2019-03-11 DIAGNOSIS — G89.29 CHRONIC RIGHT SHOULDER PAIN: ICD-10-CM

## 2019-03-11 DIAGNOSIS — M75.21 BICEPS TENDONITIS ON RIGHT: ICD-10-CM

## 2019-03-11 DIAGNOSIS — M75.101 TEAR OF RIGHT ROTATOR CUFF, UNSPECIFIED TEAR EXTENT: Primary | ICD-10-CM

## 2019-03-11 PROCEDURE — 97112 NEUROMUSCULAR REEDUCATION: CPT | Performed by: PHYSICAL THERAPIST

## 2019-03-11 PROCEDURE — 97140 MANUAL THERAPY 1/> REGIONS: CPT | Performed by: PHYSICAL THERAPIST

## 2019-03-11 NOTE — PROGRESS NOTES
Daily Note     Today's date: 3/11/2019  Patient name: Clyde Nieto  : 1950  MRN: [id number]  Referring provider: Helen Friend,   Dx:   Encounter Diagnosis     ICD-10-CM    1  Tear of right rotator cuff, unspecified tear extent M75 101    2  Chronic right shoulder pain M25 511     G89 29    3  Biceps tendonitis on right M75 21        Start Time: 1805  Stop Time: 1855  Total time in clinic (min): 50 minutes    Subjective: Patient reports he is still having trouble sleeping through the night  He mentions that he switches from the bed to the recliner a few times throughout the night  Objective: See treatment diary below    Precautions: RTC Protocol DOS 19; DM, HTN, back pain    Daily Treatment Diary     Manual  2/27 3/4 3/6 3/11         PROM MM AFB AFB SK                                                                 Exercise Diary  2/27 3/4 3/6 3/11         Pendulums 30x ea 30x ea 30x ea 30x ea         Elb Flex/Ext 2x10 (AA) 2x10 (AA) 2x10  (AA 2x10  (AA         Wrist flex/ext 30x 30x 30x 30x         C/s AROM 10x ea  10x ea  20x ea 20x ea         UT Stretch 20"x4 20"x4 20"x4 20"x4         Scap retractions 20x 5" 20x 5" 20x5" 25x5"         Ball Squeeze 3"x20 3"x20 5"x20 5"x20                                                                                                                                                                                                                             Modalities  2/27 3/4 3/6          ICE 10' 10' 10'                                        Assessment: Tolerated treatment well  Patient exhibited good technique with therapeutic exercises and would benefit from continued PT to continue to improve range of motion and increase functional ability  Plan: Continue per plan of care  Progress as per protocol and patient tolerance

## 2019-03-13 ENCOUNTER — OFFICE VISIT (OUTPATIENT)
Dept: PHYSICAL THERAPY | Facility: MEDICAL CENTER | Age: 69
End: 2019-03-13
Payer: MEDICARE

## 2019-03-13 DIAGNOSIS — M75.101 TEAR OF RIGHT ROTATOR CUFF, UNSPECIFIED TEAR EXTENT: Primary | ICD-10-CM

## 2019-03-13 DIAGNOSIS — G89.29 CHRONIC RIGHT SHOULDER PAIN: ICD-10-CM

## 2019-03-13 DIAGNOSIS — M75.21 BICEPS TENDONITIS ON RIGHT: ICD-10-CM

## 2019-03-13 DIAGNOSIS — M25.511 CHRONIC RIGHT SHOULDER PAIN: ICD-10-CM

## 2019-03-13 PROCEDURE — 97112 NEUROMUSCULAR REEDUCATION: CPT

## 2019-03-13 PROCEDURE — 97140 MANUAL THERAPY 1/> REGIONS: CPT

## 2019-03-13 NOTE — PROGRESS NOTES
Daily Note     Today's date: 3/13/2019  Patient name: Radha Bui  : 1950  MRN: [id number]  Referring provider: Eugene Tang DO  Dx:   Encounter Diagnosis     ICD-10-CM    1  Tear of right rotator cuff, unspecified tear extent M75 101    2  Chronic right shoulder pain M25 511     G89 29    3  Biceps tendonitis on right M75 21        Start Time: 1710  Stop Time: 1755  Total time in clinic (min): 45 minutes    Subjective: Patient reports "6/10" pain post therapy today  Continues to have the most pain at night  Objective: See treatment diary below    Precautions: RTC Protocol DOS 19; DM, HTN, back pain    Daily Treatment Diary     Manual  2/27 3/4 3/6 3/11 3/13        PROM MM AFB AFB SK JM                                                              Exercise Diary  2/27 3/4 3/6 3/11 3/13        Pendulums 30x ea 30x ea 30x ea 30x ea 30x ea        Elb Flex/Ext 2x10 (AA) 2x10 (AA) 2x10  (AA 2x10  (AA 3x10 (AA)        Wrist flex/ext 30x 30x 30x 30x 30x        C/s AROM 10x ea  10x ea  20x ea 20x ea 30x ea        UT Stretch 20"x4 20"x4 20"x4 20"x4 20"x4        Scap retractions 20x 5" 20x 5" 20x5" 25x5" 25"x5        Ball Squeeze 3"x20 3"x20 5"x20 5"x20 5"x20                                                                                                                                                                                                                          Modalities  2/27 3/4 3/6 3/13         ICE 10' 10' 10' 10'                                     Assessment: Tolerated treatment well  Patient exhibited good technique with therapeutic exercises and would benefit from continued PT  Patient demonstrated pain limited ROM in all cardinal planes  Improved ROM secondary to PROM  Good response to ice post session  Plan: Continue per plan of care  Progress as per protocol and patient tolerance

## 2019-03-18 ENCOUNTER — OFFICE VISIT (OUTPATIENT)
Dept: PHYSICAL THERAPY | Facility: MEDICAL CENTER | Age: 69
End: 2019-03-18
Payer: MEDICARE

## 2019-03-18 DIAGNOSIS — M25.511 CHRONIC RIGHT SHOULDER PAIN: ICD-10-CM

## 2019-03-18 DIAGNOSIS — G89.29 CHRONIC RIGHT SHOULDER PAIN: ICD-10-CM

## 2019-03-18 DIAGNOSIS — M75.101 TEAR OF RIGHT ROTATOR CUFF, UNSPECIFIED TEAR EXTENT: Primary | ICD-10-CM

## 2019-03-18 DIAGNOSIS — M75.21 BICEPS TENDONITIS ON RIGHT: ICD-10-CM

## 2019-03-18 PROCEDURE — 97140 MANUAL THERAPY 1/> REGIONS: CPT | Performed by: PHYSICAL THERAPIST

## 2019-03-18 PROCEDURE — 97110 THERAPEUTIC EXERCISES: CPT | Performed by: PHYSICAL THERAPIST

## 2019-03-19 NOTE — PROGRESS NOTES
Daily Note     Today's date: 3/18/2019  Patient name: Mathew Carter  : 1950  MRN: [id number]  Referring provider: Asif Littlejohn DO  Dx:   Encounter Diagnosis     ICD-10-CM    1  Tear of right rotator cuff, unspecified tear extent M75 101    2  Chronic right shoulder pain M25 511     G89 29    3  Biceps tendonitis on right M75 21                   Subjective: Chapito Och reports he is doing well, no complaints       Objective: See treatment diary below  Precautions: RTC Protocol DOS 19; DM, HTN, back pain    Daily Treatment Diary     Manual  2/27 3/4 3/6 3/11 3/13 3/18       PROM MM AFB AFB SK JM                                                              Exercise Diary  2/27 3/4 3/6 3/11 3/13        Pendulums 30x ea 30x ea 30x ea 30x ea 30x ea        Elb Flex/Ext 2x10 (AA) 2x10 (AA) 2x10  (AA 2x10  (AA 3x10 (AA)        Wrist flex/ext 30x 30x 30x 30x 30x        C/s AROM 10x ea  10x ea  20x ea 20x ea 30x ea        UT Stretch 20"x4 20"x4 20"x4 20"x4 20"x4        Scap retractions 20x 5" 20x 5" 20x5" 25x5" 25"x5        Ball Squeeze 3"x20 3"x20 5"x20 5"x20 5"x20        Pulleys      4 min       Table slides      Flex   10 sec  X10       Table slides      scap  10 sec  X10       Prone extension      3x10       SL ER      2X10       Supine AAROM      2X10                                                                                                                                           Modalities  2/27 3/4 3/6 3/13         ICE 10' 10' 10' 10'                                       Assessment: Tolerated treatment well  Patient fatigued post treatment with the progression of TE  Limited in PROM and educated on stretching at home now that he is out of sling  Plan: Continue per plan of care

## 2019-03-20 ENCOUNTER — APPOINTMENT (OUTPATIENT)
Dept: PHYSICAL THERAPY | Facility: MEDICAL CENTER | Age: 69
End: 2019-03-20
Payer: MEDICARE

## 2019-03-22 DIAGNOSIS — Z29.9 PREVENTIVE MEASURE: Primary | ICD-10-CM

## 2019-03-22 RX ORDER — OSELTAMIVIR PHOSPHATE 75 MG/1
75 CAPSULE ORAL DAILY
Qty: 5 CAPSULE | Refills: 0 | Status: SHIPPED | OUTPATIENT
Start: 2019-03-22 | End: 2019-03-27 | Stop reason: CLARIF

## 2019-03-25 ENCOUNTER — APPOINTMENT (OUTPATIENT)
Dept: PHYSICAL THERAPY | Facility: MEDICAL CENTER | Age: 69
End: 2019-03-25
Payer: MEDICARE

## 2019-03-27 ENCOUNTER — OFFICE VISIT (OUTPATIENT)
Dept: PHYSICAL THERAPY | Facility: MEDICAL CENTER | Age: 69
End: 2019-03-27
Payer: MEDICARE

## 2019-03-27 ENCOUNTER — OFFICE VISIT (OUTPATIENT)
Dept: FAMILY MEDICINE CLINIC | Facility: CLINIC | Age: 69
End: 2019-03-27
Payer: MEDICARE

## 2019-03-27 VITALS
RESPIRATION RATE: 18 BRPM | TEMPERATURE: 97.6 F | HEIGHT: 66 IN | BODY MASS INDEX: 26.13 KG/M2 | DIASTOLIC BLOOD PRESSURE: 84 MMHG | HEART RATE: 80 BPM | WEIGHT: 162.6 LBS | SYSTOLIC BLOOD PRESSURE: 132 MMHG

## 2019-03-27 DIAGNOSIS — E11.9 TYPE 2 DIABETES MELLITUS WITHOUT COMPLICATION, WITHOUT LONG-TERM CURRENT USE OF INSULIN (HCC): Primary | ICD-10-CM

## 2019-03-27 DIAGNOSIS — M75.21 BICEPS TENDONITIS ON RIGHT: ICD-10-CM

## 2019-03-27 DIAGNOSIS — M75.121 COMPLETE TEAR OF RIGHT ROTATOR CUFF: ICD-10-CM

## 2019-03-27 DIAGNOSIS — J31.0 RHINITIS, UNSPECIFIED TYPE: ICD-10-CM

## 2019-03-27 DIAGNOSIS — R20.2 NUMBNESS AND TINGLING OF RIGHT ARM: ICD-10-CM

## 2019-03-27 DIAGNOSIS — I10 ESSENTIAL HYPERTENSION: ICD-10-CM

## 2019-03-27 DIAGNOSIS — G89.29 CHRONIC RIGHT SHOULDER PAIN: ICD-10-CM

## 2019-03-27 DIAGNOSIS — G95.0 SYRINGOMYELIA (HCC): ICD-10-CM

## 2019-03-27 DIAGNOSIS — R20.0 NUMBNESS AND TINGLING OF RIGHT ARM: ICD-10-CM

## 2019-03-27 DIAGNOSIS — M75.101 TEAR OF RIGHT ROTATOR CUFF, UNSPECIFIED TEAR EXTENT: Primary | ICD-10-CM

## 2019-03-27 DIAGNOSIS — Z12.11 SCREENING FOR COLON CANCER: ICD-10-CM

## 2019-03-27 DIAGNOSIS — Z00.00 MEDICARE ANNUAL WELLNESS VISIT, SUBSEQUENT: ICD-10-CM

## 2019-03-27 DIAGNOSIS — M25.511 CHRONIC RIGHT SHOULDER PAIN: ICD-10-CM

## 2019-03-27 PROCEDURE — 99214 OFFICE O/P EST MOD 30 MIN: CPT | Performed by: FAMILY MEDICINE

## 2019-03-27 PROCEDURE — G0439 PPPS, SUBSEQ VISIT: HCPCS | Performed by: FAMILY MEDICINE

## 2019-03-27 PROCEDURE — 97110 THERAPEUTIC EXERCISES: CPT | Performed by: PHYSICAL THERAPIST

## 2019-03-27 PROCEDURE — 97140 MANUAL THERAPY 1/> REGIONS: CPT | Performed by: PHYSICAL THERAPIST

## 2019-03-27 RX ORDER — GABAPENTIN 600 MG/1
600 TABLET ORAL 3 TIMES DAILY PRN
Qty: 90 TABLET | Refills: 3 | Status: SHIPPED | OUTPATIENT
Start: 2019-03-27 | End: 2019-07-23 | Stop reason: SDUPTHER

## 2019-03-27 RX ORDER — TRAMADOL HYDROCHLORIDE 50 MG/1
TABLET ORAL
Qty: 90 TABLET | Refills: 0 | Status: SHIPPED | OUTPATIENT
Start: 2019-03-27 | End: 2019-05-21 | Stop reason: SDUPTHER

## 2019-03-27 RX ORDER — MOMETASONE FUROATE 50 UG/1
2 SPRAY, METERED NASAL DAILY
Qty: 1 ACT | Refills: 2 | Status: SHIPPED | OUTPATIENT
Start: 2019-03-27 | End: 2019-07-29 | Stop reason: CLARIF

## 2019-03-27 NOTE — PROGRESS NOTES
Assessment and Plan:    Problem List Items Addressed This Visit     None        Health Maintenance Due   Topic Date Due    Medicare Annual Wellness Visit (AWV)  1950    BMI: Followup Plan  03/22/1970    HEPATITIS B VACCINES (1 of 3 - Risk 3-dose series) 03/22/1971    CRC Screening: Colonoscopy  08/30/2017    DM Eye Exam  02/19/2019    PT PLAN OF CARE  03/27/2019         HPI:  Maya Abebe is a 81 y o  male here for his Initial Wellness Visit  Patient Active Problem List   Diagnosis    Type 2 diabetes mellitus (HCC)    Chronic pain disorder    DDD (degenerative disc disease), thoracic    Enlarged prostate with lower urinary tract symptoms (LUTS)    Hearing loss, right    Hyperlipidemia    Hypertension    Syringomyelia ([address] )    Pain of right upper extremity    Erectile dysfunction    Tear of right rotator cuff     Past Medical History:   Diagnosis Date    Anxiety     Chronic pain disorder     right shoulder    Diabetes mellitus ([address] )     type 2    Diverticulosis     Enlarged prostate     Hearing loss, right     Hemorrhoids     Hypertension     Polyp, sigmoid colon      Past Surgical History:   Procedure Laterality Date    COLONOSCOPY      EAR SURGERY      INNER EAR SURGERY      NASAL SINUS SURGERY      WI COLONOSCOPY FLX DX W/COLLJ SPEC WHEN PFRMD N/A 8/30/2016    Procedure: COLONOSCOPY;  Surgeon: America Traore MD;  Location: [facility];   Service: General    WI SHLDR ARTHROSCOP,SURG,W/ROTAT CUFF REPR Right 2/5/2019    Procedure: ARTHROSCOPY SHOULDER;  Surgeon: Luba Mayer DO;  Location: [facility] MA[facility];  Service: Orthopedics     Family History   Problem Relation Age of Onset    Hypertension Brother     Hepatitis Mother     Heart disease Father         CARDIAC DISORDER      Social History     Tobacco Use   Smoking Status Never Smoker   Smokeless Tobacco Never Used     Social History     Substance and Sexual Activity   Alcohol Use Yes    Comment: occassional      Social History     Substance and Sexual Activity   Drug Use No       Current Outpatient Medications   Medication Sig Dispense Refill    ALPRAZolam (XANAX) 0 5 mg tablet Take one tablet by mouth 30 minutes prior to flight  (Patient taking differently: Take 0 5 mg by mouth see administration instructions Take one tablet by mouth 30 minutes prior to flight  ) 4 tablet 0    amLODIPine (NORVASC) 10 mg tablet take 1 tablet by mouth once daily 90 tablet 3    Blood Glucose Monitoring Suppl (ONE TOUCH ULTRA 2) w/Device KIT by Does not apply route      gabapentin (NEURONTIN) 600 MG tablet Take 1 tablet (600 mg total) by mouth 3 (three) times a day as needed (pain) 90 tablet 0    glucose blood (ONE TOUCH ULTRA TEST) test strip by In Vitro route      hydrochlorothiazide (HYDRODIURIL) 25 mg tablet take 1 tablet by mouth once daily 90 tablet 1    Lancets (ONETOUCH ULTRASOFT) lancets by Other route daily 100 each 3    metFORMIN (GLUCOPHAGE) 500 mg tablet Take 1 tablet (500 mg total) by mouth 2 (two) times a day with meals (Patient taking differently: Take 500 mg by mouth daily at bedtime  ) 180 tablet 0    tamsulosin (FLOMAX) 0 4 mg take 1 capsule by mouth at bedtime 30 capsule 11    traMADol (ULTRAM) 50 mg tablet Take 1 tablet by mouth three times a day if needed for moderate pain 90 tablet 0    clobetasol (TEMOVATE) 0 05 % cream Use three times per week (Patient not taking: Reported on 3/27/2019) 60 g 2    oseltamivir (TAMIFLU) 75 mg capsule Take 1 capsule (75 mg total) by mouth daily for 5 days (Patient not taking: Reported on 3/27/2019) 5 capsule 0     No current facility-administered medications for this visit        Allergies   Allergen Reactions    Lipitor [Atorvastatin] Other (See Comments) and Myalgia     Other reaction(s): increased pain  arthralgia    Lyrica [Pregabalin] Other (See Comments)     Pt can't remmember reaction     Immunization History   Administered Date(s) Administered    INFLUENZA 10/18/2013, 10/01/2015    Influenza Quadrivalent Preservative Free 3 years and older IM 09/15/2015, 11/01/2017    Influenza, high dose seasonal 0 5 mL 09/26/2018    Pneumococcal Conjugate 13-Valent 01/13/2016    Pneumococcal Polysaccharide PPV23 05/16/2018    Tdap 06/01/2016       Patient Care Team:  Neisha Soto MD as PCP - MD Lian Mcdaniel MD    Medicare Screening Tests and Risk Assessments:      Health Risk Assessment:  Patient rates overall health as very good  Patient feels that their physical health rating is Same  Eyesight was rated as Same  Hearing was rated as Slightly worse  Patient feels that their emotional and mental health rating is Same  Pain experienced by patient in the last 7 days has been A lot  Patient's pain rating has been 9/10  Patient states that he has experienced no weight loss or gain in last 6 months  Emotional/Mental Health:  Patient has been feeling nervous/anxious  PHQ-9 Depression Screening:    Frequency of the following problems over the past two weeks:      1  Little interest or pleasure in doing things: 0 - not at all      2  Feeling down, depressed, or hopeless: 0 - not at all  PHQ-2 Score: 0          Broken Bones/Falls: Fall Risk Assessment:    In the past year, patient has experienced: No history of falling in past year          Bladder/Bowel:  Patient has leaked urine accidently in the last six months  Patient reports no loss of bowel control  Immunizations:  Patient has had a flu vaccination within the last year  Patient has received a pneumonia shot  Patient has not received a shingles shot  Patient has received tetanus/diphtheria shot  Home Safety:  Patient does not have trouble with stairs inside or outside of their home  Patient currently reports that there are no safety hazards present in home, working smoke alarms, no working carbon monoxide detectors        Nutrition:  Current diet: Regular with servings of the following:    Medications:  Patient is currently taking over-the-counter supplements  List of OTC medications includes: advil  Patient is able to manage medications  Lifestyle Choices:  Patient reports no tobacco use  Patient has not smoked or used tobacco in the past   Patient reports alcohol use  Patient drives a vehicle  Patient wears seat belt  Current level of exercise of physical activity described by patient as: good per pt           Activities of Daily Living:  Can get out of bed by his or her self, able to dress self, able to make own meals, able to do own shopping, able to bathe self, can do own laundry/housekeeping, can manage own money, pay bills and track expenses    Previous Hospitalizations:  Hospitalization or ED visit in past 12 months  Number of hospitalizations within the last year: 1-2        Advanced Directives:  Patient has not decided on power of   Patient has not completed advanced directive  Preventative Screening/Counseling:      Cardiovascular:      General: Screening Current      Counseling: Healthy Diet and Healthy Weight          Diabetes:      General: Screening Current      Counseling: Healthy Diet and Healthy Weight          Colorectal Cancer:      General: Risks and Benefits Discussed      Due for studies: Colonoscopy - High Risk          Prostate Cancer:      General: Screening Current          Osteoporosis:      General: Screening Not Indicated          AAA:      General: Screening Not Indicated          Glaucoma:      General: Risks and Benefits Discussed      Referrals: Optometry          HIV:      General: Screening Not Indicated          Hepatitis C:      General: Screening Current        Advanced Directives:   Patient has no living will for healthcare, does not have durable POA for healthcare, patient does not have an advanced directive       Immunizations:      Influenza: Influenza UTD This Year      Pneumococcal: Lifetime Vaccine Completed TDAP: Tdap Vaccine UTD      Other Preventative Counseling (Non-Medicare):   Fall Prevention, Increase physical activity, Car/seat belt/driving safety reviewed, Skin self-exam, Sunscreen use and Weight reduction discussed

## 2019-03-27 NOTE — PROGRESS NOTES
Assessment/Plan:    Tear of right rotator cuff  Slowly improving from surgery    Type 2 diabetes mellitus ([facility] [facility] )  Lab Results   Component Value Date    HGBA1C 7 3 (H) 01/25/2019   BS stable    No results for input(s): POCGLU in the last 72 hours  Blood Sugar Average: Last 72 hrs:         Diagnoses and all orders for this visit:    Type 2 diabetes mellitus without complication, without long-term current use of insulin ([facility] )    Medicare annual wellness visit, subsequent    Essential hypertension    Complete tear of right rotator cuff    Rhinitis, unspecified type  -     mometasone (NASONEX) 50 mcg/act nasal spray; 2 sprays into each nostril daily    Screening for colon cancer  -     Ambulatory referral to General Surgery; Future    Syringomyelia (HCC)  -     traMADol (ULTRAM) 50 mg tablet; Take 1 tablet by mouth three times a day if needed for moderate pain  -     gabapentin (NEURONTIN) 600 MG tablet; Take 1 tablet (600 mg total) by mouth 3 (three) times a day as needed (pain)          Subjective:      Patient ID: Yanira Puentes is a 81 y o  male  6-7 weeks out from shoulder surgery, slowly improving, taking Tramadol, reviewed lab, seeing foot doc this afternoon, eye doc in may and urology in April      The following portions of the patient's history were reviewed and updated as appropriate: allergies, current medications, past family history, past medical history, past social history, past surgical history and problem list       Review of Systems   Constitutional: Negative for chills, fatigue and fever  HENT: Positive for congestion, sinus pressure and sinus pain  Respiratory: Negative for shortness of breath  Cardiovascular: Negative for chest pain  Musculoskeletal: Positive for arthralgias and neck pain  Shoulder pain   Neurological: Positive for numbness           Objective:      /84 (BP Location: Left arm, Patient Position: Sitting, Cuff Size: Standard)   Pulse 80   Temp 97 6 °F (36 4 °C) (Temporal)   Resp 18   Ht 5' 6" (1 676 m)   Wt 73 8 kg (162 lb 9 6 oz)   BMI 26 24 kg/m²          Physical Exam   Constitutional: He appears well-developed and well-nourished  HENT:   Right Ear: Tympanic membrane is retracted  A middle ear effusion is present  Left Ear: Tympanic membrane normal    Nose: Mucosal edema and rhinorrhea present  Mouth/Throat: No oropharyngeal exudate, posterior oropharyngeal edema or posterior oropharyngeal erythema  Neck: No thyromegaly present  Cardiovascular: Normal rate, regular rhythm and normal heart sounds  Pulmonary/Chest: Effort normal and breath sounds normal    Musculoskeletal: He exhibits no edema  Lymphadenopathy:     He has no cervical adenopathy  Vitals reviewed

## 2019-03-27 NOTE — ASSESSMENT & PLAN NOTE
Lab Results   Component Value Date    HGBA1C 7 3 (H) 01/25/2019   BS stable    No results for input(s): POCGLU in the last 72 hours      Blood Sugar Average: Last 72 hrs:

## 2019-03-28 NOTE — PROGRESS NOTES
Daily Note     Today's date: 3/27/2019  Patient name: Maya Abebe  : 1950  MRN: [id number]  Referring provider: Viviana Perez DO  Dx:   Encounter Diagnosis     ICD-10-CM    1  Tear of right rotator cuff, unspecified tear extent M75 101    2  Chronic right shoulder pain M25 511     G89 29    3  Biceps tendonitis on right M75 21                   Subjective: Srinath Zambrano reports that he is feeling sore today       Objective: See treatment diary below  Precautions: RTC Protocol DOS 19; DM, HTN, back pain    Daily Treatment Diary     Manual  2/27 3/4 3/6 3/11 3/13 3/18 3/27      PROM MM AFB AFB SK JM  AF                                                            Exercise Diary  2/27 3/4 3/6 3/11 3/13        Pendulums 30x ea 30x ea 30x ea 30x ea 30x ea        Elb Flex/Ext 2x10 (AA) 2x10 (AA) 2x10  (AA 2x10  (AA 3x10 (AA)        Wrist flex/ext 30x 30x 30x 30x 30x        C/s AROM 10x ea  10x ea  20x ea 20x ea 30x ea        UT Stretch 20"x4 20"x4 20"x4 20"x4 20"x4        Scap retractions 20x 5" 20x 5" 20x5" 25x5" 25"x5        Ball Squeeze 3"x20 3"x20 5"x20 5"x20 5"x20        Pulleys      4 min       Table slides      Flex   10 sec  X10 10 sec  x10      Table slides      scap  10 sec  X10 10 sec  X10      Prone extension      3x10 3X10      SL ER      2X10 2X10      Supine AAROM      2X10 2X10      Supine ER stretch       30 sec  X5                                                                                                                             Modalities  2/27 3/4 3/6 3/13         ICE 10' 10' 10' 10'                                       Assessment: Tolerated treatment well  Patient with significant ROM restriction with firm tissue end feel  Will continue to focus on improving ROM along with initial muscular activation exercises to promote return to function       Plan: Continue per plan of care

## 2019-04-01 ENCOUNTER — OFFICE VISIT (OUTPATIENT)
Dept: OBGYN CLINIC | Facility: MEDICAL CENTER | Age: 69
End: 2019-04-01

## 2019-04-01 ENCOUNTER — OFFICE VISIT (OUTPATIENT)
Dept: PHYSICAL THERAPY | Facility: MEDICAL CENTER | Age: 69
End: 2019-04-01
Payer: MEDICARE

## 2019-04-01 VITALS
WEIGHT: 162 LBS | HEART RATE: 89 BPM | SYSTOLIC BLOOD PRESSURE: 134 MMHG | DIASTOLIC BLOOD PRESSURE: 87 MMHG | HEIGHT: 66 IN | BODY MASS INDEX: 26.03 KG/M2

## 2019-04-01 DIAGNOSIS — G89.29 CHRONIC RIGHT SHOULDER PAIN: Primary | ICD-10-CM

## 2019-04-01 DIAGNOSIS — M25.511 CHRONIC RIGHT SHOULDER PAIN: Primary | ICD-10-CM

## 2019-04-01 DIAGNOSIS — Z98.890 HISTORY OF ARTHROSCOPIC SURGERY OF SHOULDER: Primary | ICD-10-CM

## 2019-04-01 DIAGNOSIS — M75.21 BICEPS TENDONITIS ON RIGHT: ICD-10-CM

## 2019-04-01 PROCEDURE — 97110 THERAPEUTIC EXERCISES: CPT | Performed by: PHYSICAL THERAPIST

## 2019-04-01 PROCEDURE — 99024 POSTOP FOLLOW-UP VISIT: CPT | Performed by: ORTHOPAEDIC SURGERY

## 2019-04-01 PROCEDURE — 97140 MANUAL THERAPY 1/> REGIONS: CPT | Performed by: PHYSICAL THERAPIST

## 2019-04-02 ENCOUNTER — OFFICE VISIT (OUTPATIENT)
Dept: UROLOGY | Facility: CLINIC | Age: 69
End: 2019-04-02
Payer: MEDICARE

## 2019-04-02 VITALS
HEART RATE: 80 BPM | BODY MASS INDEX: 27.13 KG/M2 | DIASTOLIC BLOOD PRESSURE: 84 MMHG | HEIGHT: 66 IN | SYSTOLIC BLOOD PRESSURE: 120 MMHG | WEIGHT: 168.8 LBS

## 2019-04-02 DIAGNOSIS — R35.0 BENIGN PROSTATIC HYPERPLASIA WITH URINARY FREQUENCY: Primary | ICD-10-CM

## 2019-04-02 DIAGNOSIS — N40.1 BENIGN PROSTATIC HYPERPLASIA WITH URINARY FREQUENCY: Primary | ICD-10-CM

## 2019-04-02 DIAGNOSIS — R97.20 ELEVATED PSA: ICD-10-CM

## 2019-04-02 LAB — POST-VOID RESIDUAL VOLUME, ML POC: 12 ML

## 2019-04-02 PROCEDURE — 99214 OFFICE O/P EST MOD 30 MIN: CPT | Performed by: UROLOGY

## 2019-04-02 PROCEDURE — 51798 US URINE CAPACITY MEASURE: CPT | Performed by: UROLOGY

## 2019-04-03 ENCOUNTER — OFFICE VISIT (OUTPATIENT)
Dept: PHYSICAL THERAPY | Facility: MEDICAL CENTER | Age: 69
End: 2019-04-03
Payer: MEDICARE

## 2019-04-03 DIAGNOSIS — M75.21 BICEPS TENDONITIS ON RIGHT: ICD-10-CM

## 2019-04-03 DIAGNOSIS — G89.29 CHRONIC RIGHT SHOULDER PAIN: Primary | ICD-10-CM

## 2019-04-03 DIAGNOSIS — M25.511 CHRONIC RIGHT SHOULDER PAIN: Primary | ICD-10-CM

## 2019-04-03 PROCEDURE — 97140 MANUAL THERAPY 1/> REGIONS: CPT | Performed by: PHYSICAL THERAPIST

## 2019-04-03 PROCEDURE — 97110 THERAPEUTIC EXERCISES: CPT | Performed by: PHYSICAL THERAPIST

## 2019-04-04 ENCOUNTER — APPOINTMENT (OUTPATIENT)
Dept: PHYSICAL THERAPY | Facility: MEDICAL CENTER | Age: 69
End: 2019-04-04
Payer: MEDICARE

## 2019-04-08 ENCOUNTER — OFFICE VISIT (OUTPATIENT)
Dept: PHYSICAL THERAPY | Facility: MEDICAL CENTER | Age: 69
End: 2019-04-08
Payer: MEDICARE

## 2019-04-08 DIAGNOSIS — G89.29 CHRONIC RIGHT SHOULDER PAIN: Primary | ICD-10-CM

## 2019-04-08 DIAGNOSIS — M25.511 CHRONIC RIGHT SHOULDER PAIN: Primary | ICD-10-CM

## 2019-04-08 DIAGNOSIS — M75.21 BICEPS TENDONITIS ON RIGHT: ICD-10-CM

## 2019-04-08 PROCEDURE — 97110 THERAPEUTIC EXERCISES: CPT | Performed by: PHYSICAL THERAPIST

## 2019-04-08 PROCEDURE — 97140 MANUAL THERAPY 1/> REGIONS: CPT | Performed by: PHYSICAL THERAPIST

## 2019-04-10 ENCOUNTER — OFFICE VISIT (OUTPATIENT)
Dept: PHYSICAL THERAPY | Facility: MEDICAL CENTER | Age: 69
End: 2019-04-10
Payer: MEDICARE

## 2019-04-10 DIAGNOSIS — G89.29 CHRONIC RIGHT SHOULDER PAIN: Primary | ICD-10-CM

## 2019-04-10 DIAGNOSIS — M25.511 CHRONIC RIGHT SHOULDER PAIN: Primary | ICD-10-CM

## 2019-04-10 DIAGNOSIS — M75.21 BICEPS TENDONITIS ON RIGHT: ICD-10-CM

## 2019-04-10 PROCEDURE — 97140 MANUAL THERAPY 1/> REGIONS: CPT | Performed by: PHYSICAL THERAPIST

## 2019-04-10 PROCEDURE — 97110 THERAPEUTIC EXERCISES: CPT | Performed by: PHYSICAL THERAPIST

## 2019-04-15 ENCOUNTER — OFFICE VISIT (OUTPATIENT)
Dept: PHYSICAL THERAPY | Facility: MEDICAL CENTER | Age: 69
End: 2019-04-15
Payer: MEDICARE

## 2019-04-15 DIAGNOSIS — G89.29 CHRONIC RIGHT SHOULDER PAIN: ICD-10-CM

## 2019-04-15 DIAGNOSIS — M25.511 CHRONIC RIGHT SHOULDER PAIN: ICD-10-CM

## 2019-04-15 DIAGNOSIS — M75.21 BICEPS TENDONITIS ON RIGHT: Primary | ICD-10-CM

## 2019-04-15 PROCEDURE — 97110 THERAPEUTIC EXERCISES: CPT | Performed by: PHYSICAL THERAPIST

## 2019-04-15 PROCEDURE — 97140 MANUAL THERAPY 1/> REGIONS: CPT | Performed by: PHYSICAL THERAPIST

## 2019-04-17 ENCOUNTER — OFFICE VISIT (OUTPATIENT)
Dept: PHYSICAL THERAPY | Facility: MEDICAL CENTER | Age: 69
End: 2019-04-17
Payer: MEDICARE

## 2019-04-17 DIAGNOSIS — G89.29 CHRONIC RIGHT SHOULDER PAIN: ICD-10-CM

## 2019-04-17 DIAGNOSIS — M75.21 BICEPS TENDONITIS ON RIGHT: Primary | ICD-10-CM

## 2019-04-17 DIAGNOSIS — M25.511 CHRONIC RIGHT SHOULDER PAIN: ICD-10-CM

## 2019-04-17 PROCEDURE — 97110 THERAPEUTIC EXERCISES: CPT

## 2019-04-17 PROCEDURE — 95831: CPT

## 2019-04-22 ENCOUNTER — OFFICE VISIT (OUTPATIENT)
Dept: PHYSICAL THERAPY | Facility: MEDICAL CENTER | Age: 69
End: 2019-04-22
Payer: MEDICARE

## 2019-04-22 DIAGNOSIS — M25.511 CHRONIC RIGHT SHOULDER PAIN: ICD-10-CM

## 2019-04-22 DIAGNOSIS — G89.29 CHRONIC RIGHT SHOULDER PAIN: ICD-10-CM

## 2019-04-22 DIAGNOSIS — M75.21 BICEPS TENDONITIS ON RIGHT: Primary | ICD-10-CM

## 2019-04-22 PROCEDURE — 97140 MANUAL THERAPY 1/> REGIONS: CPT | Performed by: PHYSICAL THERAPIST

## 2019-04-22 PROCEDURE — 97110 THERAPEUTIC EXERCISES: CPT | Performed by: PHYSICAL THERAPIST

## 2019-04-24 ENCOUNTER — OFFICE VISIT (OUTPATIENT)
Dept: PHYSICAL THERAPY | Facility: MEDICAL CENTER | Age: 69
End: 2019-04-24
Payer: MEDICARE

## 2019-04-24 DIAGNOSIS — M25.511 CHRONIC RIGHT SHOULDER PAIN: ICD-10-CM

## 2019-04-24 DIAGNOSIS — G89.29 CHRONIC RIGHT SHOULDER PAIN: ICD-10-CM

## 2019-04-24 DIAGNOSIS — M75.21 BICEPS TENDONITIS ON RIGHT: Primary | ICD-10-CM

## 2019-04-24 PROCEDURE — 97140 MANUAL THERAPY 1/> REGIONS: CPT | Performed by: PHYSICAL THERAPIST

## 2019-04-24 PROCEDURE — 97110 THERAPEUTIC EXERCISES: CPT | Performed by: PHYSICAL THERAPIST

## 2019-04-25 ENCOUNTER — APPOINTMENT (OUTPATIENT)
Dept: LAB | Facility: MEDICAL CENTER | Age: 69
End: 2019-04-25
Payer: MEDICARE

## 2019-04-25 DIAGNOSIS — R97.20 ELEVATED PSA: ICD-10-CM

## 2019-04-25 LAB — PSA SERPL-MCNC: 2.3 NG/ML (ref 0–4)

## 2019-04-25 PROCEDURE — 84153 ASSAY OF PSA TOTAL: CPT

## 2019-04-29 ENCOUNTER — OFFICE VISIT (OUTPATIENT)
Dept: PHYSICAL THERAPY | Facility: MEDICAL CENTER | Age: 69
End: 2019-04-29
Payer: MEDICARE

## 2019-04-29 DIAGNOSIS — G89.29 CHRONIC RIGHT SHOULDER PAIN: ICD-10-CM

## 2019-04-29 DIAGNOSIS — M25.511 CHRONIC RIGHT SHOULDER PAIN: ICD-10-CM

## 2019-04-29 DIAGNOSIS — M75.21 BICEPS TENDONITIS ON RIGHT: Primary | ICD-10-CM

## 2019-04-29 PROCEDURE — 97110 THERAPEUTIC EXERCISES: CPT

## 2019-04-29 PROCEDURE — 97140 MANUAL THERAPY 1/> REGIONS: CPT

## 2019-05-01 ENCOUNTER — OFFICE VISIT (OUTPATIENT)
Dept: PHYSICAL THERAPY | Facility: MEDICAL CENTER | Age: 69
End: 2019-05-01
Payer: MEDICARE

## 2019-05-01 DIAGNOSIS — G89.29 CHRONIC RIGHT SHOULDER PAIN: ICD-10-CM

## 2019-05-01 DIAGNOSIS — M75.21 BICEPS TENDONITIS ON RIGHT: Primary | ICD-10-CM

## 2019-05-01 DIAGNOSIS — M25.511 CHRONIC RIGHT SHOULDER PAIN: ICD-10-CM

## 2019-05-01 PROBLEM — R97.20 ELEVATED PSA: Status: ACTIVE | Noted: 2019-05-01

## 2019-05-01 PROCEDURE — 97110 THERAPEUTIC EXERCISES: CPT | Performed by: PHYSICAL THERAPIST

## 2019-05-01 PROCEDURE — 97140 MANUAL THERAPY 1/> REGIONS: CPT | Performed by: PHYSICAL THERAPIST

## 2019-05-02 ENCOUNTER — OFFICE VISIT (OUTPATIENT)
Dept: UROLOGY | Facility: CLINIC | Age: 69
End: 2019-05-02
Payer: MEDICARE

## 2019-05-02 VITALS
DIASTOLIC BLOOD PRESSURE: 84 MMHG | BODY MASS INDEX: 26.36 KG/M2 | HEART RATE: 97 BPM | HEIGHT: 66 IN | WEIGHT: 164 LBS | SYSTOLIC BLOOD PRESSURE: 120 MMHG

## 2019-05-02 DIAGNOSIS — N40.1 BENIGN PROSTATIC HYPERPLASIA WITH LOWER URINARY TRACT SYMPTOMS, SYMPTOM DETAILS UNSPECIFIED: Primary | ICD-10-CM

## 2019-05-02 DIAGNOSIS — R97.20 ELEVATED PSA: ICD-10-CM

## 2019-05-02 PROCEDURE — 99213 OFFICE O/P EST LOW 20 MIN: CPT | Performed by: PHYSICIAN ASSISTANT

## 2019-05-06 ENCOUNTER — OFFICE VISIT (OUTPATIENT)
Dept: PHYSICAL THERAPY | Facility: MEDICAL CENTER | Age: 69
End: 2019-05-06
Payer: MEDICARE

## 2019-05-06 ENCOUNTER — OFFICE VISIT (OUTPATIENT)
Dept: OBGYN CLINIC | Facility: MEDICAL CENTER | Age: 69
End: 2019-05-06

## 2019-05-06 VITALS
DIASTOLIC BLOOD PRESSURE: 77 MMHG | BODY MASS INDEX: 25.74 KG/M2 | HEART RATE: 71 BPM | WEIGHT: 164 LBS | SYSTOLIC BLOOD PRESSURE: 120 MMHG | HEIGHT: 67 IN

## 2019-05-06 DIAGNOSIS — Z98.890 HISTORY OF ARTHROSCOPIC SURGERY OF SHOULDER: Primary | ICD-10-CM

## 2019-05-06 DIAGNOSIS — M25.511 CHRONIC RIGHT SHOULDER PAIN: ICD-10-CM

## 2019-05-06 DIAGNOSIS — M75.21 BICEPS TENDONITIS ON RIGHT: Primary | ICD-10-CM

## 2019-05-06 DIAGNOSIS — G89.29 CHRONIC RIGHT SHOULDER PAIN: ICD-10-CM

## 2019-05-06 PROCEDURE — 99024 POSTOP FOLLOW-UP VISIT: CPT | Performed by: ORTHOPAEDIC SURGERY

## 2019-05-06 PROCEDURE — 97140 MANUAL THERAPY 1/> REGIONS: CPT | Performed by: PHYSICAL THERAPIST

## 2019-05-06 PROCEDURE — 97110 THERAPEUTIC EXERCISES: CPT | Performed by: PHYSICAL THERAPIST

## 2019-05-06 RX ORDER — NAPROXEN 500 MG/1
500 TABLET ORAL 2 TIMES DAILY WITH MEALS
Qty: 20 TABLET | Refills: 0 | Status: SHIPPED | OUTPATIENT
Start: 2019-05-06 | End: 2019-07-29 | Stop reason: CLARIF

## 2019-05-08 ENCOUNTER — OFFICE VISIT (OUTPATIENT)
Dept: PHYSICAL THERAPY | Facility: MEDICAL CENTER | Age: 69
End: 2019-05-08
Payer: MEDICARE

## 2019-05-08 DIAGNOSIS — M25.511 CHRONIC RIGHT SHOULDER PAIN: ICD-10-CM

## 2019-05-08 DIAGNOSIS — M75.21 BICEPS TENDONITIS ON RIGHT: Primary | ICD-10-CM

## 2019-05-08 DIAGNOSIS — G89.29 CHRONIC RIGHT SHOULDER PAIN: ICD-10-CM

## 2019-05-08 PROCEDURE — 97140 MANUAL THERAPY 1/> REGIONS: CPT

## 2019-05-13 ENCOUNTER — OFFICE VISIT (OUTPATIENT)
Dept: PHYSICAL THERAPY | Facility: MEDICAL CENTER | Age: 69
End: 2019-05-13
Payer: MEDICARE

## 2019-05-13 DIAGNOSIS — G89.29 CHRONIC RIGHT SHOULDER PAIN: ICD-10-CM

## 2019-05-13 DIAGNOSIS — M25.511 CHRONIC RIGHT SHOULDER PAIN: ICD-10-CM

## 2019-05-13 DIAGNOSIS — M75.21 BICEPS TENDONITIS ON RIGHT: Primary | ICD-10-CM

## 2019-05-13 PROCEDURE — 97140 MANUAL THERAPY 1/> REGIONS: CPT | Performed by: PHYSICAL THERAPIST

## 2019-05-13 PROCEDURE — 97110 THERAPEUTIC EXERCISES: CPT | Performed by: PHYSICAL THERAPIST

## 2019-05-15 ENCOUNTER — OFFICE VISIT (OUTPATIENT)
Dept: PHYSICAL THERAPY | Facility: MEDICAL CENTER | Age: 69
End: 2019-05-15
Payer: MEDICARE

## 2019-05-15 DIAGNOSIS — G89.29 CHRONIC RIGHT SHOULDER PAIN: ICD-10-CM

## 2019-05-15 DIAGNOSIS — M75.21 BICEPS TENDONITIS ON RIGHT: Primary | ICD-10-CM

## 2019-05-15 DIAGNOSIS — M25.511 CHRONIC RIGHT SHOULDER PAIN: ICD-10-CM

## 2019-05-15 PROCEDURE — 97140 MANUAL THERAPY 1/> REGIONS: CPT | Performed by: PHYSICAL THERAPIST

## 2019-05-15 PROCEDURE — 97110 THERAPEUTIC EXERCISES: CPT | Performed by: PHYSICAL THERAPIST

## 2019-05-20 ENCOUNTER — OFFICE VISIT (OUTPATIENT)
Dept: PHYSICAL THERAPY | Facility: MEDICAL CENTER | Age: 69
End: 2019-05-20
Payer: MEDICARE

## 2019-05-20 DIAGNOSIS — M25.511 CHRONIC RIGHT SHOULDER PAIN: ICD-10-CM

## 2019-05-20 DIAGNOSIS — G89.29 CHRONIC RIGHT SHOULDER PAIN: ICD-10-CM

## 2019-05-20 DIAGNOSIS — M75.21 BICEPS TENDONITIS ON RIGHT: Primary | ICD-10-CM

## 2019-05-20 PROCEDURE — 97110 THERAPEUTIC EXERCISES: CPT | Performed by: PHYSICAL THERAPIST

## 2019-05-21 DIAGNOSIS — G95.0 SYRINGOMYELIA (HCC): ICD-10-CM

## 2019-05-21 RX ORDER — TRAMADOL HYDROCHLORIDE 50 MG/1
TABLET ORAL
Qty: 90 TABLET | Refills: 0 | Status: SHIPPED | OUTPATIENT
Start: 2019-05-21 | End: 2019-07-05 | Stop reason: SDUPTHER

## 2019-05-22 ENCOUNTER — OFFICE VISIT (OUTPATIENT)
Dept: PHYSICAL THERAPY | Facility: MEDICAL CENTER | Age: 69
End: 2019-05-22
Payer: MEDICARE

## 2019-05-22 DIAGNOSIS — M25.511 CHRONIC RIGHT SHOULDER PAIN: ICD-10-CM

## 2019-05-22 DIAGNOSIS — G89.29 CHRONIC RIGHT SHOULDER PAIN: ICD-10-CM

## 2019-05-22 DIAGNOSIS — M75.21 BICEPS TENDONITIS ON RIGHT: Primary | ICD-10-CM

## 2019-05-22 PROCEDURE — 97110 THERAPEUTIC EXERCISES: CPT | Performed by: PHYSICAL THERAPIST

## 2019-05-22 PROCEDURE — 97140 MANUAL THERAPY 1/> REGIONS: CPT | Performed by: PHYSICAL THERAPIST

## 2019-05-29 ENCOUNTER — OFFICE VISIT (OUTPATIENT)
Dept: PHYSICAL THERAPY | Facility: MEDICAL CENTER | Age: 69
End: 2019-05-29
Payer: MEDICARE

## 2019-05-29 DIAGNOSIS — G89.29 CHRONIC RIGHT SHOULDER PAIN: ICD-10-CM

## 2019-05-29 DIAGNOSIS — M25.511 CHRONIC RIGHT SHOULDER PAIN: ICD-10-CM

## 2019-05-29 DIAGNOSIS — M75.21 BICEPS TENDONITIS ON RIGHT: Primary | ICD-10-CM

## 2019-05-29 PROCEDURE — 97110 THERAPEUTIC EXERCISES: CPT | Performed by: PHYSICAL THERAPIST

## 2019-05-29 PROCEDURE — 97140 MANUAL THERAPY 1/> REGIONS: CPT | Performed by: PHYSICAL THERAPIST

## 2019-05-30 ENCOUNTER — OFFICE VISIT (OUTPATIENT)
Dept: PHYSICAL THERAPY | Facility: MEDICAL CENTER | Age: 69
End: 2019-05-30
Payer: MEDICARE

## 2019-05-30 DIAGNOSIS — M25.511 CHRONIC RIGHT SHOULDER PAIN: ICD-10-CM

## 2019-05-30 DIAGNOSIS — G89.29 CHRONIC RIGHT SHOULDER PAIN: ICD-10-CM

## 2019-05-30 DIAGNOSIS — M75.21 BICEPS TENDONITIS ON RIGHT: Primary | ICD-10-CM

## 2019-05-30 PROCEDURE — 97140 MANUAL THERAPY 1/> REGIONS: CPT | Performed by: PHYSICAL THERAPIST

## 2019-05-30 PROCEDURE — 97110 THERAPEUTIC EXERCISES: CPT | Performed by: PHYSICAL THERAPIST

## 2019-06-03 ENCOUNTER — OFFICE VISIT (OUTPATIENT)
Dept: OBGYN CLINIC | Facility: MEDICAL CENTER | Age: 69
End: 2019-06-03
Payer: MEDICARE

## 2019-06-03 ENCOUNTER — TELEPHONE (OUTPATIENT)
Dept: OBGYN CLINIC | Facility: MEDICAL CENTER | Age: 69
End: 2019-06-03

## 2019-06-03 ENCOUNTER — OFFICE VISIT (OUTPATIENT)
Dept: PHYSICAL THERAPY | Facility: MEDICAL CENTER | Age: 69
End: 2019-06-03
Payer: MEDICARE

## 2019-06-03 VITALS
BODY MASS INDEX: 25.74 KG/M2 | WEIGHT: 164 LBS | HEART RATE: 97 BPM | DIASTOLIC BLOOD PRESSURE: 91 MMHG | SYSTOLIC BLOOD PRESSURE: 131 MMHG | HEIGHT: 67 IN

## 2019-06-03 DIAGNOSIS — M75.101 TEAR OF RIGHT ROTATOR CUFF, UNSPECIFIED TEAR EXTENT, UNSPECIFIED WHETHER TRAUMATIC: Primary | ICD-10-CM

## 2019-06-03 DIAGNOSIS — M75.21 BICEPS TENDONITIS ON RIGHT: Primary | ICD-10-CM

## 2019-06-03 DIAGNOSIS — M25.511 CHRONIC RIGHT SHOULDER PAIN: ICD-10-CM

## 2019-06-03 DIAGNOSIS — S46.011D TRAUMATIC COMPLETE TEAR OF RIGHT ROTATOR CUFF, SUBSEQUENT ENCOUNTER: ICD-10-CM

## 2019-06-03 DIAGNOSIS — G56.21 ULNAR NEUROPATHY AT WRIST, RIGHT: Primary | ICD-10-CM

## 2019-06-03 DIAGNOSIS — G89.29 CHRONIC RIGHT SHOULDER PAIN: ICD-10-CM

## 2019-06-03 PROCEDURE — 97140 MANUAL THERAPY 1/> REGIONS: CPT | Performed by: PHYSICAL THERAPIST

## 2019-06-03 PROCEDURE — 97110 THERAPEUTIC EXERCISES: CPT | Performed by: PHYSICAL THERAPIST

## 2019-06-03 PROCEDURE — 99213 OFFICE O/P EST LOW 20 MIN: CPT | Performed by: ORTHOPAEDIC SURGERY

## 2019-06-03 RX ORDER — NAPROXEN 500 MG/1
500 TABLET ORAL 2 TIMES DAILY WITH MEALS
Qty: 20 TABLET | Refills: 0 | Status: SHIPPED | OUTPATIENT
Start: 2019-06-03 | End: 2019-07-29 | Stop reason: CLARIF

## 2019-06-04 LAB
LEFT EYE DIABETIC RETINOPATHY: NORMAL
RIGHT EYE DIABETIC RETINOPATHY: NORMAL

## 2019-06-05 ENCOUNTER — OFFICE VISIT (OUTPATIENT)
Dept: PHYSICAL THERAPY | Facility: MEDICAL CENTER | Age: 69
End: 2019-06-05
Payer: MEDICARE

## 2019-06-05 DIAGNOSIS — G89.29 CHRONIC RIGHT SHOULDER PAIN: ICD-10-CM

## 2019-06-05 DIAGNOSIS — M25.511 CHRONIC RIGHT SHOULDER PAIN: ICD-10-CM

## 2019-06-05 DIAGNOSIS — M75.21 BICEPS TENDONITIS ON RIGHT: Primary | ICD-10-CM

## 2019-06-05 PROCEDURE — 97112 NEUROMUSCULAR REEDUCATION: CPT | Performed by: PHYSICAL THERAPIST

## 2019-06-05 PROCEDURE — 97110 THERAPEUTIC EXERCISES: CPT | Performed by: PHYSICAL THERAPIST

## 2019-06-05 PROCEDURE — 97140 MANUAL THERAPY 1/> REGIONS: CPT | Performed by: PHYSICAL THERAPIST

## 2019-06-10 ENCOUNTER — OFFICE VISIT (OUTPATIENT)
Dept: PHYSICAL THERAPY | Facility: MEDICAL CENTER | Age: 69
End: 2019-06-10
Payer: MEDICARE

## 2019-06-10 DIAGNOSIS — M25.511 CHRONIC RIGHT SHOULDER PAIN: ICD-10-CM

## 2019-06-10 DIAGNOSIS — G89.29 CHRONIC RIGHT SHOULDER PAIN: ICD-10-CM

## 2019-06-10 DIAGNOSIS — M75.21 BICEPS TENDONITIS ON RIGHT: Primary | ICD-10-CM

## 2019-06-10 DIAGNOSIS — S46.011D TRAUMATIC COMPLETE TEAR OF RIGHT ROTATOR CUFF, SUBSEQUENT ENCOUNTER: ICD-10-CM

## 2019-06-10 PROCEDURE — 97110 THERAPEUTIC EXERCISES: CPT | Performed by: PHYSICAL THERAPIST

## 2019-06-10 PROCEDURE — 97140 MANUAL THERAPY 1/> REGIONS: CPT | Performed by: PHYSICAL THERAPIST

## 2019-06-12 ENCOUNTER — OFFICE VISIT (OUTPATIENT)
Dept: PHYSICAL THERAPY | Facility: MEDICAL CENTER | Age: 69
End: 2019-06-12
Payer: MEDICARE

## 2019-06-12 DIAGNOSIS — I10 ESSENTIAL HYPERTENSION: ICD-10-CM

## 2019-06-12 DIAGNOSIS — S46.011D TRAUMATIC COMPLETE TEAR OF RIGHT ROTATOR CUFF, SUBSEQUENT ENCOUNTER: Primary | ICD-10-CM

## 2019-06-12 DIAGNOSIS — G89.29 CHRONIC RIGHT SHOULDER PAIN: ICD-10-CM

## 2019-06-12 DIAGNOSIS — M75.21 BICEPS TENDONITIS ON RIGHT: ICD-10-CM

## 2019-06-12 DIAGNOSIS — M25.511 CHRONIC RIGHT SHOULDER PAIN: ICD-10-CM

## 2019-06-12 PROCEDURE — 97140 MANUAL THERAPY 1/> REGIONS: CPT | Performed by: PHYSICAL THERAPIST

## 2019-06-12 PROCEDURE — 97110 THERAPEUTIC EXERCISES: CPT | Performed by: PHYSICAL THERAPIST

## 2019-06-12 RX ORDER — HYDROCHLOROTHIAZIDE 25 MG/1
TABLET ORAL
Qty: 90 TABLET | Refills: 1 | Status: SHIPPED | OUTPATIENT
Start: 2019-06-12 | End: 2019-11-27 | Stop reason: SDUPTHER

## 2019-06-17 ENCOUNTER — OFFICE VISIT (OUTPATIENT)
Dept: PHYSICAL THERAPY | Facility: MEDICAL CENTER | Age: 69
End: 2019-06-17
Payer: MEDICARE

## 2019-06-17 DIAGNOSIS — M75.21 BICEPS TENDONITIS ON RIGHT: ICD-10-CM

## 2019-06-17 DIAGNOSIS — M25.511 CHRONIC RIGHT SHOULDER PAIN: ICD-10-CM

## 2019-06-17 DIAGNOSIS — S46.011D TRAUMATIC COMPLETE TEAR OF RIGHT ROTATOR CUFF, SUBSEQUENT ENCOUNTER: Primary | ICD-10-CM

## 2019-06-17 DIAGNOSIS — G89.29 CHRONIC RIGHT SHOULDER PAIN: ICD-10-CM

## 2019-06-17 PROCEDURE — 97110 THERAPEUTIC EXERCISES: CPT | Performed by: PHYSICAL THERAPIST

## 2019-06-17 PROCEDURE — 97140 MANUAL THERAPY 1/> REGIONS: CPT | Performed by: PHYSICAL THERAPIST

## 2019-06-19 ENCOUNTER — OFFICE VISIT (OUTPATIENT)
Dept: PHYSICAL THERAPY | Facility: MEDICAL CENTER | Age: 69
End: 2019-06-19
Payer: MEDICARE

## 2019-06-19 DIAGNOSIS — M75.21 BICEPS TENDONITIS ON RIGHT: ICD-10-CM

## 2019-06-19 DIAGNOSIS — S46.011D TRAUMATIC COMPLETE TEAR OF RIGHT ROTATOR CUFF, SUBSEQUENT ENCOUNTER: Primary | ICD-10-CM

## 2019-06-19 DIAGNOSIS — M25.511 CHRONIC RIGHT SHOULDER PAIN: ICD-10-CM

## 2019-06-19 DIAGNOSIS — G89.29 CHRONIC RIGHT SHOULDER PAIN: ICD-10-CM

## 2019-06-19 PROCEDURE — 97140 MANUAL THERAPY 1/> REGIONS: CPT

## 2019-06-19 PROCEDURE — 97110 THERAPEUTIC EXERCISES: CPT

## 2019-06-24 ENCOUNTER — OFFICE VISIT (OUTPATIENT)
Dept: PHYSICAL THERAPY | Facility: MEDICAL CENTER | Age: 69
End: 2019-06-24
Payer: MEDICARE

## 2019-06-24 ENCOUNTER — HOSPITAL ENCOUNTER (OUTPATIENT)
Dept: NEUROLOGY | Facility: CLINIC | Age: 69
Discharge: HOME | End: 2019-06-24
Payer: MEDICARE

## 2019-06-24 DIAGNOSIS — G89.29 CHRONIC RIGHT SHOULDER PAIN: ICD-10-CM

## 2019-06-24 DIAGNOSIS — R20.2 NUMBNESS AND TINGLING OF RIGHT ARM: ICD-10-CM

## 2019-06-24 DIAGNOSIS — R20.0 NUMBNESS AND TINGLING OF RIGHT ARM: ICD-10-CM

## 2019-06-24 DIAGNOSIS — G56.21 ULNAR NEUROPATHY AT WRIST, RIGHT: ICD-10-CM

## 2019-06-24 DIAGNOSIS — M25.511 CHRONIC RIGHT SHOULDER PAIN: ICD-10-CM

## 2019-06-24 DIAGNOSIS — S46.011D TRAUMATIC COMPLETE TEAR OF RIGHT ROTATOR CUFF, SUBSEQUENT ENCOUNTER: Primary | ICD-10-CM

## 2019-06-24 DIAGNOSIS — M75.21 BICEPS TENDONITIS ON RIGHT: ICD-10-CM

## 2019-06-24 PROCEDURE — 95913 NRV CNDJ TEST 13/> STUDIES: CPT | Performed by: PHYSICAL MEDICINE & REHABILITATION

## 2019-06-24 PROCEDURE — 97140 MANUAL THERAPY 1/> REGIONS: CPT | Performed by: PHYSICAL THERAPIST

## 2019-06-24 PROCEDURE — 95886 MUSC TEST DONE W/N TEST COMP: CPT | Performed by: PHYSICAL MEDICINE & REHABILITATION

## 2019-06-24 PROCEDURE — 97110 THERAPEUTIC EXERCISES: CPT | Performed by: PHYSICAL THERAPIST

## 2019-06-26 ENCOUNTER — OFFICE VISIT (OUTPATIENT)
Dept: PHYSICAL THERAPY | Facility: MEDICAL CENTER | Age: 69
End: 2019-06-26
Payer: MEDICARE

## 2019-06-26 DIAGNOSIS — G89.29 CHRONIC RIGHT SHOULDER PAIN: ICD-10-CM

## 2019-06-26 DIAGNOSIS — S46.011D TRAUMATIC COMPLETE TEAR OF RIGHT ROTATOR CUFF, SUBSEQUENT ENCOUNTER: Primary | ICD-10-CM

## 2019-06-26 DIAGNOSIS — M25.511 CHRONIC RIGHT SHOULDER PAIN: ICD-10-CM

## 2019-06-26 DIAGNOSIS — M75.21 BICEPS TENDONITIS ON RIGHT: ICD-10-CM

## 2019-06-26 PROCEDURE — 97110 THERAPEUTIC EXERCISES: CPT

## 2019-06-26 PROCEDURE — 97140 MANUAL THERAPY 1/> REGIONS: CPT

## 2019-07-01 ENCOUNTER — OFFICE VISIT (OUTPATIENT)
Dept: PHYSICAL THERAPY | Facility: MEDICAL CENTER | Age: 69
End: 2019-07-01
Payer: MEDICARE

## 2019-07-01 DIAGNOSIS — M25.511 CHRONIC RIGHT SHOULDER PAIN: ICD-10-CM

## 2019-07-01 DIAGNOSIS — S46.011D TRAUMATIC COMPLETE TEAR OF RIGHT ROTATOR CUFF, SUBSEQUENT ENCOUNTER: Primary | ICD-10-CM

## 2019-07-01 DIAGNOSIS — G89.29 CHRONIC RIGHT SHOULDER PAIN: ICD-10-CM

## 2019-07-01 DIAGNOSIS — M75.21 BICEPS TENDONITIS ON RIGHT: ICD-10-CM

## 2019-07-01 PROCEDURE — 97140 MANUAL THERAPY 1/> REGIONS: CPT | Performed by: PHYSICAL THERAPIST

## 2019-07-01 PROCEDURE — 97110 THERAPEUTIC EXERCISES: CPT | Performed by: PHYSICAL THERAPIST

## 2019-07-01 NOTE — PROGRESS NOTES
Progress Note     Today's date: 2019  Patient name: Kriste Brunner  : 1950  MRN: [id number]  Referring provider: Michelle Olivo  Dx:   Encounter Diagnosis     ICD-10-CM    1  Traumatic complete tear of right rotator cuff, subsequent encounter S46 011D    2  Biceps tendonitis on right M75 21    3  Chronic right shoulder pain M25 511     G89 29                   Subjective: Sherry Desai reports that he is doing well, feels he can reach overhead more easily  He is going to see MD next week        Objective: See treatment diary below  Precautions: RTC Protocol DOS 19; DM, HTN, back pain    Daily Treatment Diary     Manual  6/5 6/10 6/12 6/17 6/19 6/24 6/26 7/1     PROM AF AF AF AF AFB AF MM AF     Inferior GHJ mobs in flexion AF AF AF AF np AF np AF                                                Exercise Diary              UBE 5 min  5 min  5 min  5 min  5 min 5 min 5 min 5 min      Prone row 5#  3X10 5#  3x10 5#  3X10 5#  3X10 5#  3X12 5# 3x12 5# 3x12 5#  3X12     Prone I 3#  3X10  3#  3X10 3#  3X10 3#  3X10 3#  3X10  3# 3x12 3#  3X12     SL ER 3#  3X10 3#  3X10 3#  3X10 3#  3x10 3#  3x10        TB row and extension Black  3X15 Black  3X15 Black  3x15 Black   3X15 Black   3X15 Black 3x15 Black 3x15 Black   3X15     TB ER Blue   10 sec  X10   Blue  10 sec  X10 Blue  10 sec  X10 Blue  10X10 Blue  10"  x10 Blue 10"x10 Blue 10"x10 Blue  10X10     Active scaption - avoid shrug 3X10 3X10 3X10 3 X10 3x10 3x10 3x10 3X10      Wall slides 3#  3x10 3#  3X10 3#  3X10 3#  3x10         scap wall slide w/ lift off     2x10  2x10                                                                                                                                                                                                Modalities                   CP  5'              MHP 10' MHP 10'                       Assessment:   Kriste Brunner has been compliant with attending PT and home exercise program since initial eval  Farrah Chang  has made improvements in objective data and achieved desired functional goals  Patient reports having returned to their prior level or function  It was mutually agreed to Discharge to home exercise program at this time  Patient has good understanding of provided home exercise program and was instructed to call with any questions or if issues should arise          Plan: Transition to HEP

## 2019-07-05 DIAGNOSIS — G95.0 SYRINGOMYELIA (HCC): ICD-10-CM

## 2019-07-05 RX ORDER — TRAMADOL HYDROCHLORIDE 50 MG/1
TABLET ORAL
Qty: 90 TABLET | Refills: 0 | Status: SHIPPED | OUTPATIENT
Start: 2019-07-05 | End: 2019-08-20 | Stop reason: SDUPTHER

## 2019-07-08 ENCOUNTER — OFFICE VISIT (OUTPATIENT)
Dept: PHYSICAL THERAPY | Facility: MEDICAL CENTER | Age: 69
End: 2019-07-08
Payer: MEDICARE

## 2019-07-08 ENCOUNTER — OFFICE VISIT (OUTPATIENT)
Dept: OBGYN CLINIC | Facility: MEDICAL CENTER | Age: 69
End: 2019-07-08
Payer: MEDICARE

## 2019-07-08 VITALS
BODY MASS INDEX: 26.2 KG/M2 | HEART RATE: 73 BPM | HEIGHT: 66 IN | WEIGHT: 163 LBS | SYSTOLIC BLOOD PRESSURE: 125 MMHG | DIASTOLIC BLOOD PRESSURE: 75 MMHG

## 2019-07-08 DIAGNOSIS — G56.01 CARPAL TUNNEL SYNDROME ON RIGHT: ICD-10-CM

## 2019-07-08 DIAGNOSIS — Z98.890 HISTORY OF ARTHROSCOPIC SURGERY OF SHOULDER: ICD-10-CM

## 2019-07-08 DIAGNOSIS — G89.29 CHRONIC RIGHT SHOULDER PAIN: ICD-10-CM

## 2019-07-08 DIAGNOSIS — M75.21 BICEPS TENDONITIS ON RIGHT: ICD-10-CM

## 2019-07-08 DIAGNOSIS — M54.12 RIGHT CERVICAL RADICULOPATHY: ICD-10-CM

## 2019-07-08 DIAGNOSIS — S46.011D TRAUMATIC COMPLETE TEAR OF RIGHT ROTATOR CUFF, SUBSEQUENT ENCOUNTER: Primary | ICD-10-CM

## 2019-07-08 DIAGNOSIS — M25.511 CHRONIC RIGHT SHOULDER PAIN: ICD-10-CM

## 2019-07-08 PROCEDURE — 97112 NEUROMUSCULAR REEDUCATION: CPT | Performed by: PHYSICAL THERAPIST

## 2019-07-08 PROCEDURE — 99213 OFFICE O/P EST LOW 20 MIN: CPT | Performed by: ORTHOPAEDIC SURGERY

## 2019-07-08 PROCEDURE — 97140 MANUAL THERAPY 1/> REGIONS: CPT | Performed by: PHYSICAL THERAPIST

## 2019-07-08 NOTE — PROGRESS NOTES
Daily Note     Today's date: 2019  Patient name: Bryce Molina  : 1950  MRN: [id number]  Referring provider: Steven Hernandez  Dx:   Encounter Diagnosis     ICD-10-CM    1  Traumatic complete tear of right rotator cuff, subsequent encounter S46 011D    2  Biceps tendonitis on right M75 21    3  Chronic right shoulder pain M25 511     G89 29                   Subjective: Amarjit De La Fuente reports he saw MD, feels his Is progressing well  Continues to have R hand numbness an pain    Will continue PT to further progress strength of R shoulder         Objective: See treatment diary below  Precautions: RTC Protocol DOS 19; DM, HTN, back pain    Daily Treatment Diary     Manual  6/5 6/10 6/12 6/17 6/19 6/24 6/26 7/1 7/8    PROM AF AF AF AF AFB AF MM AF AF    Inferior GHJ mobs in flexion AF AF AF AF np AF np AF AF                                               Exercise Diary              UBE 5 min  5 min  5 min  5 min  5 min 5 min 5 min 5 min  5 min     Prone row 5#  3X10 5#  3x10 5#  3X10 5#  3X10 5#  3X12 5# 3x12 5# 3x12 5#  3X12 5#  3X12    Prone I 3#  3X10  3#  3X10 3#  3X10 3#  3X10 3#  3X10  3# 3x12 3#  3X12 3#  3X12    SL ER 3#  3X10 3#  3X10 3#  3X10 3#  3x10 3#  3x10        TB row and extension Black  3X15 Black  3X15 Black  3x15 Black   3X15 Black   3X15 Black 3x15 Black 3x15 Black   3X15 Black   3X15    TB ER Blue   10 sec  X10   Blue  10 sec  X10 Blue  10 sec  X10 Blue  10X10 Blue  10"  x10 Blue 10"x10 Blue 10"x10 Blue  10X10     Active scaption - avoid shrug 3X10 3X10 3X10 3 X10 3x10 3x10 3x10 3X10  3X10    Wall slides 3#  3x10 3#  3X10 3#  3X10 3#  3x10     3#  3x10    scap wall slide w/ lift off     2x10  2x10  3X10                                                                                                                                                                                              Modalities                   CP  5'              MHP 10' MHP 10' Assessment: Tolerated treatment well  Patient demonstrated fatigue post treatment and exhibited good technique with therapeutic exercises  Focused on end range activation in overhead elevation       Plan: Continue per plan of care

## 2019-07-08 NOTE — PROGRESS NOTES
Shoulder Post Operative Visit     Assesment:     5 months s/p Surgical Arthroscopy of the right shoulder with rotator cuff repair and arthroscopic biceps tenodesis  Mild Carpal Tunnel Syndrome, Right  Right sided cervical radiculopathy    Plan:    Post-Operative treatment:    Ice to shoulder 1-2 times daily, for 20 minutes at a time  PT for ROM and strengthening to shoulder, rotator cuff, scapular stabilizers for another 3-4 weeks  Home exercise program for shoulder, including ROM and strenthening  Instructions given to patient of what exercises to perform  Will provide patient with cock-up wrist splint to wear mainly at night  He will also be referred to spine and pain for further evaluation and treatment of continued cervical radiculopathy symptoms about his right upper extremity  Imaging: All imaging from today was reviewed by myself and explained to the patient  Sling:  never, as they have completed this portion of the post op period  DVT Prophylaxis:  Ambulation    Follow up:   6 weeks    Patient was advised that if they have any fevers, chills, chest pain, shortness of breath, redness or drainage from the incision, please let our office know immediately  History of Present Illness: The patient is a 81 y o  male who is being evaluated post operatively 5 months  status post Rotator cuff Repair with arthroscopic biceps tenodesis done on 2/5/19  Since the prior visit, He reports significant improvement in his right shoulder  Patient continues to report decreased  strength of the right hand as well as numbness and tingling into his forearm, wrist and hand  His symptoms going to the 3rd 4th and 5th digit  Symptoms are worse in the morning  Pain is well controlled  Noting generalized soreness about the shoulder after activities but this is improving  The patient is using ice to control swelling  They have started physical therapy       The patient is ambulating for DVT ppx     The patient is using their sling never, as they have completed this portion of the post op period  The patient denies any fevers, chills, calf pain, chest pain/shortness of breath, redness or drainage from the incision  I have reviewed the past medical, surgical, social and family history, medications and allergies as documented in the EMR  Review of systems: ROS is negative other than that noted in the HPI  Constitutional: Negative for fatigue and fever  Physical Exam:    Blood pressure 125/75, pulse 73, height 5' 6" (1 676 m), weight 73 9 kg (163 lb)  General/Constitutional: NAD, well developed, well nourished  HENT: Normocephalic, atraumatic  CV: Intact distal pulses, regular rate  Resp: No respiratory distress or labored breathing  Lymphatic: No lymphadenopathy palpated  Neuro: Alert and Oriented x 3, no focal deficits  Psych: Normal mood, normal affect, normal judgement, normal behavior  Skin: Warm, dry, no rashes, no erythema    Shoulder focused exam:       RIGHT LEFT    Scapula Atrophy Negative Negative     Winging Negative Negative     Protraction Negative Negative    Rotator cuff SS 5/5 5/5     IS 5/5 5/5     SubS 5/5 5/5    ROM  170     ER0 60 60     ER90 90 90     IR90 T6 T6     IRb T6 T6    TTP:  None None    Stability:  stable stable      Incisions show no erythema, no drainage    UE NV Exam: +2 Radial pulses bilaterally  Sensation intact to light touch C5-T1 bilaterally, Radial/median/ulnar nerve motor intact    MRI Cervical Spine: Multiple degenerative changes as detailed above  There is no significant canal stenosis  Foraminal stenosis is greatest on the right at C5-C6  I have reviewed the radiology report agree with the impression  EMG Right Upper Extremity: Mild to moderate right carpal tunnel syndrome  No other sensory neuropathy seen    I have reviewed the radiology report    Scribe Attestation    I,:   Erika Rizzo am acting as a scribe while in the presence of the attending physician :        I,:   Acosta Denney DO personally performed the services described in this documentation    as scribed in my presence :

## 2019-07-10 ENCOUNTER — OFFICE VISIT (OUTPATIENT)
Dept: PHYSICAL THERAPY | Facility: MEDICAL CENTER | Age: 69
End: 2019-07-10
Payer: MEDICARE

## 2019-07-10 DIAGNOSIS — S46.011D TRAUMATIC COMPLETE TEAR OF RIGHT ROTATOR CUFF, SUBSEQUENT ENCOUNTER: Primary | ICD-10-CM

## 2019-07-10 DIAGNOSIS — M25.511 CHRONIC RIGHT SHOULDER PAIN: ICD-10-CM

## 2019-07-10 DIAGNOSIS — G89.29 CHRONIC RIGHT SHOULDER PAIN: ICD-10-CM

## 2019-07-10 DIAGNOSIS — M75.21 BICEPS TENDONITIS ON RIGHT: ICD-10-CM

## 2019-07-10 PROCEDURE — 97112 NEUROMUSCULAR REEDUCATION: CPT | Performed by: PHYSICAL THERAPIST

## 2019-07-10 PROCEDURE — 97110 THERAPEUTIC EXERCISES: CPT | Performed by: PHYSICAL THERAPIST

## 2019-07-10 PROCEDURE — 97140 MANUAL THERAPY 1/> REGIONS: CPT | Performed by: PHYSICAL THERAPIST

## 2019-07-11 NOTE — PROGRESS NOTES
Daily Note     Today's date: 7/10/2019  Patient name: Lamar Leiva  : 1950  MRN: [id number]  Referring provider: Armida Clemente  Dx:   Encounter Diagnosis     ICD-10-CM    1  Traumatic complete tear of right rotator cuff, subsequent encounter S46 011D    2  Biceps tendonitis on right M75 21    3  Chronic right shoulder pain M25 511     G89 29                   Subjective: Destiney Kruger reports no issues, feels that his motion continues to improve       Objective: See treatment diary below  Precautions: RTC Protocol DOS 19; DM, HTN, back pain    Daily Treatment Diary     Manual  6/5 6/10 6/12 6/17 6/19 6/24 6/26 7/1 7/8 7/10   PROM AF AF AF AF AFB AF MM AF AF AF   Inferior GHJ mobs in flexion AF AF AF AF np AF np AF AF AF                                              Exercise Diary              UBE 5 min  5 min  5 min  5 min  5 min 5 min 5 min 5 min  5 min  5 min    Prone row 5#  3X10 5#  3x10 5#  3X10 5#  3X10 5#  3X12 5# 3x12 5# 3x12 5#  3X12 5#  3X12    Prone I 3#  3X10  3#  3X10 3#  3X10 3#  3X10 3#  3X10  3# 3x12 3#  3X12 3#  3X12    SL ER 3#  3X10 3#  3X10 3#  3X10 3#  3x10 3#  3x10        TB row and extension Black  3X15 Black  3X15 Black  3x15 Black   3X15 Black   3X15 Black 3x15 Black 3x15 Black   3X15 Black   3X15    TB ER Blue   10 sec  X10   Blue  10 sec  X10 Blue  10 sec  X10 Blue  10X10 Blue  10"  x10 Blue 10"x10 Blue 10"x10 Blue  10X10     Active scaption - avoid shrug 3X10 3X10 3X10 3 X10 3x10 3x10 3x10 3X10  3X10 3X10   Wall slides 3#  3x10 3#  3X10 3#  3X10 3#  3x10     3#  3x10 3#  3X10   scap wall slide w/ lift off     2x10  2x10  3X10 10 sec  x10   TB press          Pink  3x102                                                                                                                                                                                Modalities                   CP  5'              MHP 10' MHP 10'                       Assessment: Tolerated treatment well   Patient continues to imrpove in overhead strength and endurance  Progress as able      Plan: Continue per plan of care

## 2019-07-15 ENCOUNTER — OFFICE VISIT (OUTPATIENT)
Dept: PHYSICAL THERAPY | Facility: MEDICAL CENTER | Age: 69
End: 2019-07-15
Payer: MEDICARE

## 2019-07-15 DIAGNOSIS — M75.21 BICEPS TENDONITIS ON RIGHT: ICD-10-CM

## 2019-07-15 DIAGNOSIS — G89.29 CHRONIC RIGHT SHOULDER PAIN: ICD-10-CM

## 2019-07-15 DIAGNOSIS — S46.011D TRAUMATIC COMPLETE TEAR OF RIGHT ROTATOR CUFF, SUBSEQUENT ENCOUNTER: Primary | ICD-10-CM

## 2019-07-15 DIAGNOSIS — M25.511 CHRONIC RIGHT SHOULDER PAIN: ICD-10-CM

## 2019-07-15 PROCEDURE — 97140 MANUAL THERAPY 1/> REGIONS: CPT | Performed by: PHYSICAL THERAPIST

## 2019-07-15 PROCEDURE — 97110 THERAPEUTIC EXERCISES: CPT | Performed by: PHYSICAL THERAPIST

## 2019-07-15 PROCEDURE — 97112 NEUROMUSCULAR REEDUCATION: CPT | Performed by: PHYSICAL THERAPIST

## 2019-07-15 NOTE — PROGRESS NOTES
Daily Note     Today's date: 7/15/2019  Patient name: Norm Crain  : 1950  MRN: [id number]  Referring provider: Hakeem Coates  Dx:   Encounter Diagnosis     ICD-10-CM    1  Traumatic complete tear of right rotator cuff, subsequent encounter S46 011D    2  Biceps tendonitis on right M75 21    3  Chronic right shoulder pain M25 511     G89 29        Start Time: 1700  Stop Time: 1745  Total time in clinic (min): 45 minutes    Subjective: Patient reports he is doing well and he is happy with his progress so far  Objective: See treatment diary below      Assessment: Patient continues to make good gains in shoulder ROM  Responding well to strengthening and stretching in therapy  Will continue to progress as able  Tolerated treatment well  Patient demonstrated fatigue post treatment, exhibited good technique with therapeutic exercises and would benefit from continued PT      Plan: Continue per plan of care        Daily Treatment Diary     Manual  7/15 6/10 6/12 6/17 6/19 6/24 6/26 7/1 7/8 7/10   PROM AF AF AF AF AFB AF MM AF AF AF   Inferior GHJ mobs in flexion AF AF AF AF np AF np AF AF AF                                              Exercise Diary  7/15            UBE 5 min  5 min  5 min  5 min  5 min 5 min 5 min 5 min  5 min  5 min    Prone row  5#  3x10 5#  3X10 5#  3X10 5#  3X12 5# 3x12 5# 3x12 5#  3X12 5#  3X12    Prone I  3#  3X10 3#  3X10 3#  3X10 3#  3X10  3# 3x12 3#  1F62 3#  3X12    SL ER  3#  3X10 3#  3X10 3#  3x10 3#  3x10        TB row and extension  Black  3X15 Black  3x15 Black   3X15 Black   3X15 Black 3x15 Black 3x15 Black   3X15 Black   3X15    TB ER Blue  10 sec  X10   Blue  10 sec  X10 Blue  10 sec  X10 Blue  10X10 Blue  10"  x10 Blue 10"x10 Blue 10"x10 Blue  10X10     Active scaption - avoid shrug 3X10 3X10 3X10 3 X10 3x10 3x10 3x10 3X10  3X10 3X10   Wall slides 3#  3x10 3#  3X10 3#  3X10 3#  3x10     3#  3x10 3#  3X10   scap wall slide w/ lift off 10 sec x10    2x10  2x10 3X10 10 sec  x10   TB press          [place]  3x102                                                                                                                                                                                Modalities                   CP  5'              MHP 10' MHP 10'                       Assessment: Tolerated treatment well  Patient continues to imrpove in overhead strength and endurance  Progress as able      Plan: Continue per plan of care

## 2019-07-17 ENCOUNTER — OFFICE VISIT (OUTPATIENT)
Dept: PHYSICAL THERAPY | Facility: MEDICAL CENTER | Age: 69
End: 2019-07-17
Payer: MEDICARE

## 2019-07-17 DIAGNOSIS — G89.29 CHRONIC RIGHT SHOULDER PAIN: ICD-10-CM

## 2019-07-17 DIAGNOSIS — M75.21 BICEPS TENDONITIS ON RIGHT: ICD-10-CM

## 2019-07-17 DIAGNOSIS — M25.511 CHRONIC RIGHT SHOULDER PAIN: ICD-10-CM

## 2019-07-17 DIAGNOSIS — S46.011D TRAUMATIC COMPLETE TEAR OF RIGHT ROTATOR CUFF, SUBSEQUENT ENCOUNTER: Primary | ICD-10-CM

## 2019-07-17 PROCEDURE — 97140 MANUAL THERAPY 1/> REGIONS: CPT | Performed by: PHYSICAL THERAPIST

## 2019-07-17 NOTE — PROGRESS NOTES
Daily Note     Today's date: 2019  Patient name: Dusty Cueva  : 1950  MRN: [id number]  Referring provider: Justyna Mckeon  Dx:   Encounter Diagnosis     ICD-10-CM    1  Traumatic complete tear of right rotator cuff, subsequent encounter S46 011D    2  Biceps tendonitis on right M75 21    3  Chronic right shoulder pain M25 511     G89 29                   Subjective: Patient reports he is still doing well and is happy with his progress  Objective: See treatment diary below      Assessment: Patient is continuing to make gains in shoulder ROM and strength  Patient is still lacking glenohumeral ER ROM that will be addressed with continued stretching and ant [address] mobs  Following shoulder PROM patient reports more limitation when scapula is blocked  Will continue to work on [address] ROM  Tolerated treatment well  Patient demonstrated fatigue post treatment and exhibited good technique with therapeutic exercises and would benefit from continued skilled PT for optimal return to function  Plan: Continue per plan of care        Daily Treatment Diary     Manual  7/15 7/17 6/12 6/17 6/19 6/24 6/26 7/1 7/8 7/10   PROM AF KD AF AF AFB AF MM AF AF AF   Inferior GHJ mobs in flexion AF  AF AF np AF np AF AF AF   Scapular mobs  KD           Long axis distraction  KD                            Exercise Diary  7/15 7/17           UBE 5 min  5 min  5 min  5 min  5 min 5 min 5 min 5 min  5 min  5 min    Prone row  5#  3x12 5#  3X10 5#  3X10 5#  3X12 5# 3x12 5# 3x12 5#  3X12 5#  3X12    Prone I   3#  3X10 3#  3X10 3#  3X10  3# 3x12 3#  3X12 3#  3X12    SL ER   3#  3X10 3#  3x10 3#  3x10        TB row and extension  Black  3X15 Black  3x15 Black   3X15 Black   3X15 Black 3x15 Black 3x15 Black   3X15 Black   3X15    TB ER Blue  10 sec  X10   Black  5 sec  X10 Blue  10 sec  X10 Blue  10X10 Blue  10"  x10 Blue 10"x10 Blue 10"x10 Blue  10X10     Active scaption - avoid shrug 3X10  3X10 3 X10 3x10 3x10 3x10 3X10  3X10 3X10   Wall slides 3#  3x10  3#  3X10 3#  3x10     3#  3x10 3#  3X10   scap wall slide w/ lift off 10 sec x10 20x 3 sec hold   2x10  2x10  3X10 10 sec  x10   TB press          [place]  3x102   Prone Ext and ER   0# 1 set   1# 2 sets to fatigue                                                                                                                                                                           Modalities   7/17             CP 5'   CP  5'              MHP 10' MHP 10'

## 2019-07-22 ENCOUNTER — OFFICE VISIT (OUTPATIENT)
Dept: PHYSICAL THERAPY | Facility: MEDICAL CENTER | Age: 69
End: 2019-07-22
Payer: MEDICARE

## 2019-07-22 DIAGNOSIS — S46.011D TRAUMATIC COMPLETE TEAR OF RIGHT ROTATOR CUFF, SUBSEQUENT ENCOUNTER: Primary | ICD-10-CM

## 2019-07-22 DIAGNOSIS — G89.29 CHRONIC RIGHT SHOULDER PAIN: ICD-10-CM

## 2019-07-22 DIAGNOSIS — M25.511 CHRONIC RIGHT SHOULDER PAIN: ICD-10-CM

## 2019-07-22 DIAGNOSIS — M75.21 BICEPS TENDONITIS ON RIGHT: ICD-10-CM

## 2019-07-22 PROCEDURE — 97110 THERAPEUTIC EXERCISES: CPT | Performed by: PHYSICAL THERAPIST

## 2019-07-22 NOTE — PROGRESS NOTES
Daily Note     Today's date: 2019  Patient name: Mckenzie Nelson  : 1950  MRN: [id number]  Referring provider: Guillermina Rowley  Dx:   Encounter Diagnosis     ICD-10-CM    1  Traumatic complete tear of right rotator cuff, subsequent encounter S46 011D    2  Biceps tendonitis on right M75 21    3  Chronic right shoulder pain M25 511     G89 29                   Subjective: Haley Allen reports that he is doing well, continues to note progression of strength lately       Objective: See treatment diary below      Assessment: Tolerated treatment well  Patient limited in end ranges of motion with regard to strength and scapular control  Cotninue to progress and focus on those ranges in preparation for DC to [facility]      Plan: Continue per plan of care        Daily Treatment Diary     Manual  7/15 7/17 7/22 6/17 6/19 6/24 6/26 7/1 7/8 7/10   PROM AF KD AF AF AFB AF MM AF AF AF   Inferior GHJ mobs in flexion AF  AF AF np AF np AF AF AF   Scapular mobs  KD           Long axis distraction  KD                            Exercise Diary  7/15 7/17           UBE 5 min  5 min  5 min  5 min  5 min 5 min 5 min 5 min  5 min  5 min    Prone row  5#  3x12 5#  3X10 5#  3X10 5#  3X12 5# 3x12 5# 3x12 5#  3X12 5#  3X12    Prone I   3#  3X10 3#  3X10 3#  3X10  3# 3x12 3#  3X12 3#  3X12    SL ER   3#  3X10 3#  3x10 3#  3x10        TB row and extension  Black  3X15 Black  3x15 Black   3X15 Black   3X15 Black 3x15 Black 3x15 Black   3X15 Black   3X15    TB ER Blue  10 sec  X10   Black  5 sec  X10 Blue  10 sec  X10 Blue  10X10 Blue  10"  x10 Blue 10"x10 Blue 10"x10 Blue  10X10     Active scaption - avoid shrug 3X10  3X10 3 X10 3x10 3x10 3x10 3X10  3X10 3X10   Wall slides 3#  3x10  3#  3X10 3#  3x10     3#  3x10 3#  3X10   scap wall slide w/ lift off 10 sec x10 20x 3 sec hold 10 sec  X10  2x10  2x10  3X10 10 sec  x10   TB press          [place]  3x102   Prone Ext and ER   0# 1 set   1# 2 sets to fatigue 0# 1 set   1# 2 sets to fatigu Modalities   7/17             CP 5'   CP  5'              MHP 10' MHP 10'

## 2019-07-23 DIAGNOSIS — G95.0 SYRINGOMYELIA (HCC): ICD-10-CM

## 2019-07-23 RX ORDER — GABAPENTIN 600 MG/1
TABLET ORAL
Qty: 90 TABLET | Refills: 3 | Status: SHIPPED | OUTPATIENT
Start: 2019-07-23 | End: 2020-01-24 | Stop reason: SDUPTHER

## 2019-07-24 ENCOUNTER — OFFICE VISIT (OUTPATIENT)
Dept: PHYSICAL THERAPY | Facility: MEDICAL CENTER | Age: 69
End: 2019-07-24
Payer: MEDICARE

## 2019-07-24 DIAGNOSIS — G89.29 CHRONIC RIGHT SHOULDER PAIN: ICD-10-CM

## 2019-07-24 DIAGNOSIS — M75.21 BICEPS TENDONITIS ON RIGHT: ICD-10-CM

## 2019-07-24 DIAGNOSIS — S46.011D TRAUMATIC COMPLETE TEAR OF RIGHT ROTATOR CUFF, SUBSEQUENT ENCOUNTER: Primary | ICD-10-CM

## 2019-07-24 DIAGNOSIS — M25.511 CHRONIC RIGHT SHOULDER PAIN: ICD-10-CM

## 2019-07-24 PROCEDURE — 97140 MANUAL THERAPY 1/> REGIONS: CPT

## 2019-07-24 PROCEDURE — 97110 THERAPEUTIC EXERCISES: CPT

## 2019-07-24 NOTE — PROGRESS NOTES
Daily Note     Today's date: 2019  Patient name: Zayda Braxton  : 1950  MRN: [id number]  Referring provider: Clovis Montes  Dx:   Encounter Diagnosis     ICD-10-CM    1  Traumatic complete tear of right rotator cuff, subsequent encounter S46 011D    2  Biceps tendonitis on right M75 21    3  Chronic right shoulder pain M25 511     G89 29          Subjective: Alyson Yin reports no pain in R shoulder upon arrival  He states he continues to notice improvements since starting therapy  He still experiences pain with overhead motion  Objective: See treatment diary below      Assessment: Pt tolerated treatment well  Pt most limited in shoulder PROM flexion and ER  Alyson Yin continues to be challenged with overhead weighted activities and scapular control, compensation with shoulder shrugging  Progressing well with current POC  Fatigue present post session  Pt would benefit from continued PT  CP declined  Plan: Continue per plan of care      Pt 1:1 with PTA JW 3:30-4:00  IEP 4:00-4:15     Daily Treatment Diary     Manual  7/15 7/17 7/22 7/24  6/24 6/26 7/1 7/8 7/10   PROM AF KD AF JW  AF MM AF AF AF   Inferior GHJ mobs in flexion AF  AF np  AF np AF AF AF   Scapular mobs  KD           Long axis distraction  KD                            Exercise Diary  7/15 7/17 7/22 7/24         UBE 5 min  5 min  5 min  5 min  5 min 5 min 5 min 5 min  5 min  5 min    Prone row  5#  3x12 5#  3X10 5#  3X10 5#  3X12 5# 3x12 5# 3x12 5#  3X12 5#  3X12    Prone I   3#  3X10 3#  3X10 3#  3X10  3# 3x12 3#  3X12 3#  3X12    SL ER   3#  3X10 3#  3x10 3#  3x10        TB row and extension  Black  3X15 Black  3x15 Black  3x15 Black   3X15 Black 3x15 Black 3x15 Black   3X15 Black   3X15    TB ER Blue  10 sec  X10   Black  5 sec  X10 Blue  10 sec  X10 Blue  10 secx10  Blue  10"  x10 Blue 10"x10 Blue 10"x10 Blue  10X10     Active scaption - avoid shrug 3X10  3X10 3x10 3x10 3x10 3x10 3X10  3X10 3X10   Wall slides 3#  3x10  3#  3X10 3#  3X10     3#  3x10 3#  3X10   scap wall slide w/ lift off 10 sec x10 20x 3 sec hold 10 sec  X10 10 sec  x10 2x10  2x10  3X10 10 sec  x10   TB press    --      [place]  3x102   Prone Ext and ER   0# 1 set   1# 2 sets to fatigue 0# 1 set   1# 2 sets to fatigu np                                                                                                                                                                         Modalities   7/17 7/24           CP 5'  NP CP  5'              MHP 10' MHP 10'

## 2019-07-29 ENCOUNTER — OFFICE VISIT (OUTPATIENT)
Dept: PHYSICAL THERAPY | Facility: MEDICAL CENTER | Age: 69
End: 2019-07-29
Payer: MEDICARE

## 2019-07-29 ENCOUNTER — OFFICE VISIT (OUTPATIENT)
Dept: FAMILY MEDICINE CLINIC | Facility: CLINIC | Age: 69
End: 2019-07-29
Payer: MEDICARE

## 2019-07-29 VITALS
WEIGHT: 162.8 LBS | RESPIRATION RATE: 16 BRPM | TEMPERATURE: 97.8 F | OXYGEN SATURATION: 98 % | HEIGHT: 66 IN | HEART RATE: 79 BPM | SYSTOLIC BLOOD PRESSURE: 132 MMHG | BODY MASS INDEX: 26.16 KG/M2 | DIASTOLIC BLOOD PRESSURE: 80 MMHG

## 2019-07-29 DIAGNOSIS — I10 ESSENTIAL HYPERTENSION: ICD-10-CM

## 2019-07-29 DIAGNOSIS — M75.21 BICEPS TENDONITIS ON RIGHT: ICD-10-CM

## 2019-07-29 DIAGNOSIS — M25.511 CHRONIC RIGHT SHOULDER PAIN: ICD-10-CM

## 2019-07-29 DIAGNOSIS — E78.5 HYPERLIPIDEMIA, UNSPECIFIED HYPERLIPIDEMIA TYPE: ICD-10-CM

## 2019-07-29 DIAGNOSIS — E11.9 TYPE 2 DIABETES MELLITUS WITHOUT COMPLICATION, WITHOUT LONG-TERM CURRENT USE OF INSULIN (HCC): ICD-10-CM

## 2019-07-29 DIAGNOSIS — M79.645 PAIN OF FINGER OF LEFT HAND: ICD-10-CM

## 2019-07-29 DIAGNOSIS — G89.29 CHRONIC RIGHT SHOULDER PAIN: ICD-10-CM

## 2019-07-29 DIAGNOSIS — Z11.59 ENCOUNTER FOR HEPATITIS C SCREENING TEST FOR LOW RISK PATIENT: Primary | ICD-10-CM

## 2019-07-29 DIAGNOSIS — L30.9 DERMATITIS: ICD-10-CM

## 2019-07-29 DIAGNOSIS — S46.011D TRAUMATIC COMPLETE TEAR OF RIGHT ROTATOR CUFF, SUBSEQUENT ENCOUNTER: Primary | ICD-10-CM

## 2019-07-29 PROCEDURE — 97112 NEUROMUSCULAR REEDUCATION: CPT | Performed by: PHYSICAL THERAPIST

## 2019-07-29 PROCEDURE — 97110 THERAPEUTIC EXERCISES: CPT | Performed by: PHYSICAL THERAPIST

## 2019-07-29 PROCEDURE — 99214 OFFICE O/P EST MOD 30 MIN: CPT | Performed by: FAMILY MEDICINE

## 2019-07-29 PROCEDURE — 97140 MANUAL THERAPY 1/> REGIONS: CPT | Performed by: PHYSICAL THERAPIST

## 2019-07-29 RX ORDER — LOSARTAN POTASSIUM 50 MG/1
50 TABLET ORAL DAILY
Qty: 30 TABLET | Refills: 5 | Status: SHIPPED | OUTPATIENT
Start: 2019-07-29 | End: 2020-01-24 | Stop reason: SDUPTHER

## 2019-07-29 RX ORDER — CLOBETASOL PROPIONATE 0.5 MG/G
CREAM TOPICAL 2 TIMES DAILY
Qty: 30 G | Refills: 1 | Status: SHIPPED | OUTPATIENT
Start: 2019-07-29 | End: 2019-08-06 | Stop reason: CLARIF

## 2019-07-29 NOTE — ASSESSMENT & PLAN NOTE
Lab Results   Component Value Date    HGBA1C 7 3 (H) 01/25/2019     Due for lab  No results for input(s): POCGLU in the last 72 hours      Blood Sugar Average: Last 72 hrs:

## 2019-07-29 NOTE — PROGRESS NOTES
Daily Note     Today's date: 2019  Patient name: Braxton Dominique  : 1950  MRN: [id number]  Referring provider: Rick Frank  Dx:   Encounter Diagnosis     ICD-10-CM    1  Traumatic complete tear of right rotator cuff, subsequent encounter S46 011D    2  Biceps tendonitis on right M75 21    3  Chronic right shoulder pain M25 511     G89 29                   Subjective: Patient reports he is doing well and is not experiencing any pain  Objective: See treatment diary below      Assessment: Patient continues to be able to make ROM gains, but would benefit from continued self and therapist stretching  Patient was unable to perform TB elevation without UT shrug  Will continue to work on elevation without compensating with UT  Patient reported no ant shoulder pain with scapular reposition and was able to perform one set of 10 on his own with min pain  Patient reported pain with isometric serratus punch secondary to poor scapular mechanics with elevation  Tolerated treatment well  Patient would benefit from continued PT for optimal return to function  Plan: Continue per plan of care           Daily Treatment Diary     Manual  7/15 7/17 7/22 7/24 7/29 6/24 6/26 7/1 7/8 7/10   PROM AF KD AF JW KD AF MM AF AF AF   Inferior GHJ mobs in flexion AF  AF np  AF np AF AF AF   Scapular mobs  KD           Long axis distraction  KD                            Exercise Diary  7/15 7/17 7/22 7/24 7/29        UBE 5 min  5 min  5 min  5 min  5 min 5 min 5 min 5 min  5 min  5 min    Prone row  5#  3x12 5#  3X10 5#  3X10 5#  3X12 5# 3x12 5# 3x12 5#  3X12 5#  3X12    Prone I   3#  3X10 3#  3X10   3# 3x12 3#  3X12 3#  3X12    SL ER   3#  3X10 3#  3x10         TB row and extension  Black  3X15 Black  3x15 Black  3x15 Black   3X15 Black 3x15 Black 3x15 Black   3X15 Black   3X15    TB ER Blue  10 sec  X10   Black  5 sec  X10 Blue  10 sec  X10 Blue  10 secx10  Black  10"  x10 Blue 10"x10 Blue 10"x10 Blue  10X10 Active scaption - avoid shrug 3X10  3X10 3x10 2x10 1# (1x w/ scap reposition 1x w/o) 3x10 3x10 3X10  3X10 3X10   Wall slides 3#  3x10  3#  3X10 3#  3X10     3#  3x10 3#  3X10   scap wall slide w/ lift off 10 sec x10 20x 3 sec hold 10 sec  X10 10 sec  x10   2x10  3X10 10 sec  x10   TB press    --      [place]  3x102   Prone Ext and ER   0# 1 set   1# 2 sets to fatigue 0# 1 set   1# 2 sets to fatigu np         Standing serratus ant punch into ball     10x 3 sec hold        Prone Ext     5# 3x12                                                                                                                                              Modalities   7/17 7/24 7/29          CP 5'  NP CP  5'              MHP 10' MHP 10'

## 2019-07-29 NOTE — PROGRESS NOTES
Assessment/Plan:    Hypertension  Having side effects on amlodipine       Diagnoses and all orders for this visit:    Encounter for hepatitis C screening test for low risk patient  -     Hepatitis C antibody; Future    Type 2 diabetes mellitus without complication, without long-term current use of insulin (HCC)  -     Comprehensive metabolic panel; Future  -     Hemoglobin A1C; Future    Hyperlipidemia, unspecified hyperlipidemia type  -     Lipid Panel with Direct LDL reflex; Future  -     TSH, 3rd generation; Future          Subjective:      Patient ID: Lilliana Quiñones is a 81 y o  male  Diabetes   He presents for his follow-up diabetic visit  He has type 2 diabetes mellitus  No MedicAlert identification noted  His disease course has been stable  Pertinent negatives for hypoglycemia include no dizziness  Pertinent negatives for diabetes include no chest pain  There are no diabetic complications  Risk factors for coronary artery disease include diabetes mellitus, male sex and hypertension  Current diabetic treatment includes oral agent (monotherapy)  He is compliant with treatment all of the time  His weight is stable  He is following a generally healthy diet  Meal planning includes ADA exchanges  He has not had a previous visit with a dietitian  He participates in exercise daily  There is no change in his home blood glucose trend  An ACE inhibitor/angiotensin II receptor blocker is being taken  He sees a podiatrist Eye exam is current  The following portions of the patient's history were reviewed and updated as appropriate: allergies, current medications, past family history, past medical history, past social history, past surgical history and problem list     Review of Systems   Constitutional: Negative for activity change, appetite change and unexpected weight change  Respiratory: Negative for shortness of breath  Cardiovascular: Negative for chest pain  Musculoskeletal: Positive for arthralgias  R arm pain, l 1st finger pain   Neurological: Negative for dizziness and light-headedness  Psychiatric/Behavioral: Positive for dysphoric mood  Occ depression/ irritability-apparently son had problem with same BP med         Objective:      /80   Pulse 79   Temp 97 8 °F (36 6 °C) (Temporal)   Resp 16   Ht 5' 6" (1 676 m)   Wt 73 8 kg (162 lb 12 8 oz)   SpO2 98%   BMI 26 28 kg/m²          Physical Exam   Constitutional: He appears well-developed and well-nourished  Neck: No thyromegaly present  Cardiovascular: Normal rate, regular rhythm and normal heart sounds  Pulmonary/Chest: Effort normal and breath sounds normal    Musculoskeletal: He exhibits tenderness  L thumb pain   Lymphadenopathy:     He has no cervical adenopathy  Vitals reviewed

## 2019-07-31 ENCOUNTER — OFFICE VISIT (OUTPATIENT)
Dept: PHYSICAL THERAPY | Facility: MEDICAL CENTER | Age: 69
End: 2019-07-31
Payer: MEDICARE

## 2019-07-31 DIAGNOSIS — G89.29 CHRONIC RIGHT SHOULDER PAIN: ICD-10-CM

## 2019-07-31 DIAGNOSIS — S46.011D TRAUMATIC COMPLETE TEAR OF RIGHT ROTATOR CUFF, SUBSEQUENT ENCOUNTER: Primary | ICD-10-CM

## 2019-07-31 DIAGNOSIS — M75.21 BICEPS TENDONITIS ON RIGHT: ICD-10-CM

## 2019-07-31 DIAGNOSIS — M25.511 CHRONIC RIGHT SHOULDER PAIN: ICD-10-CM

## 2019-07-31 PROCEDURE — 97112 NEUROMUSCULAR REEDUCATION: CPT | Performed by: PHYSICAL THERAPIST

## 2019-07-31 PROCEDURE — 97140 MANUAL THERAPY 1/> REGIONS: CPT | Performed by: PHYSICAL THERAPIST

## 2019-07-31 PROCEDURE — 97110 THERAPEUTIC EXERCISES: CPT | Performed by: PHYSICAL THERAPIST

## 2019-07-31 NOTE — PROGRESS NOTES
Daily Note     Today's date: 2019  Patient name: Laya Morel  : 1950  MRN: [id number]  Referring provider: Dalia Holloway  Dx:   Encounter Diagnosis     ICD-10-CM    1  Traumatic complete tear of right rotator cuff, subsequent encounter S46 011D    2  Biceps tendonitis on right M75 21    3  Chronic right shoulder pain M25 511     G89 29                   Subjective: Rodger Santizo had no complaints with his shoulder  He reported he is seeing his surgeon on   Objective: See treatment diary below      Assessment: Patient continues to gain motion and tolerated PROM well  He continues to do well with progressive resistance exercises, but is having min pain with elevation  Therefore, did not do any resisted elevation exercises  Tolerated treatment well  Patient would benefit from continued PT      Plan: Continue per plan of care        Daily Treatment Diary     Manual  7/15 7/17 7/22 7/24 7/29 7/31 6/26 7/1 7/8 7/10   PROM AF KD AF JW KD KD MM AF AF AF   Inferior GHJ mobs in flexion AF  AF np  KD np AF AF AF   Scapular mobs  KD    KD       Long axis distraction  KD                            Exercise Diary  7/15 7/17 7/22 7/24 7/29 7/31       UBE 5 min  5 min  5 min  5 min  5 min 5 min 5 min 5 min  5 min  5 min    Prone row  5#  3x12 5#  3X10 5#  3X10 5#  3X12 5# 3x12 5# 3x12 5#  3X12 5#  3X12    Prone I   3#  3X10 3#  3X10   3# 3x12 3#  3X12 3#  3X12    SL ER   3#  3X10 3#  3x10         TB row and extension  Black  3X15 Black  3x15 Black  3x15 Black   3X15  Black 3x15 Black   3X15 Black   3X15    TB ER Blue  10 sec  X10   Black  5 sec  X10 Blue  10 sec  X10 Blue  10 secx10  Black  10"  x10  Blue 10"x10 Blue  10X10     Active scaption - avoid shrug 3X10  3X10 3x10 2x10 1# (1x w/ scap reposition 1x w/o)  3x10 3X10  3X10 3X10   Wall slides 3#  3x10  3#  3X10 3#  3X10  20x (up, side, down)   3#  3x10 3#  3X10   scap wall slide w/ lift off 10 sec x10 20x 3 sec hold 10 sec  X10 10 sec  x10   2x10  3X10 10 sec  x10   TB press    --      [place]  3x102   Prone Ext and ER   0# 1 set   1# 2 sets to fatigue 0# 1 set   1# 2 sets to fatigu np         Standing serratus ant punch into ball     10x 3 sec hold        Prone Ext     5# 3x12 5# 3x12       Serratus punch [place] w/ TB             Prone Y             TB ER and IR on PB       30x each [place]                                                                                                      Modalities   7/17 7/24 7/29          CP 5'  NP CP  5'              MHP 10' MHP 10'

## 2019-08-01 ENCOUNTER — APPOINTMENT (OUTPATIENT)
Dept: LAB | Facility: MEDICAL CENTER | Age: 69
End: 2019-08-01
Payer: MEDICARE

## 2019-08-01 ENCOUNTER — OFFICE VISIT (OUTPATIENT)
Dept: PAIN MEDICINE | Facility: MEDICAL CENTER | Age: 69
End: 2019-08-01
Payer: MEDICARE

## 2019-08-01 ENCOUNTER — APPOINTMENT (OUTPATIENT)
Dept: RADIOLOGY | Facility: MEDICAL CENTER | Age: 69
End: 2019-08-01
Payer: MEDICARE

## 2019-08-01 VITALS
HEIGHT: 66 IN | DIASTOLIC BLOOD PRESSURE: 75 MMHG | HEART RATE: 75 BPM | SYSTOLIC BLOOD PRESSURE: 118 MMHG | BODY MASS INDEX: 26.03 KG/M2 | WEIGHT: 162 LBS | RESPIRATION RATE: 16 BRPM

## 2019-08-01 DIAGNOSIS — E11.9 TYPE 2 DIABETES MELLITUS WITHOUT COMPLICATION, WITHOUT LONG-TERM CURRENT USE OF INSULIN (HCC): ICD-10-CM

## 2019-08-01 DIAGNOSIS — Z11.59 ENCOUNTER FOR HEPATITIS C SCREENING TEST FOR LOW RISK PATIENT: ICD-10-CM

## 2019-08-01 DIAGNOSIS — M79.641 PAIN OF RIGHT HAND: Primary | ICD-10-CM

## 2019-08-01 DIAGNOSIS — E78.5 HYPERLIPIDEMIA, UNSPECIFIED HYPERLIPIDEMIA TYPE: ICD-10-CM

## 2019-08-01 DIAGNOSIS — M79.645 PAIN OF FINGER OF LEFT HAND: ICD-10-CM

## 2019-08-01 LAB
ALBUMIN SERPL BCP-MCNC: 3.8 G/DL (ref 3.5–5)
ALP SERPL-CCNC: 97 U/L (ref 46–116)
ALT SERPL W P-5'-P-CCNC: 23 U/L (ref 12–78)
ANION GAP SERPL CALCULATED.3IONS-SCNC: 5 MMOL/L (ref 4–13)
AST SERPL W P-5'-P-CCNC: 9 U/L (ref 5–45)
BILIRUB SERPL-MCNC: 0.74 MG/DL (ref 0.2–1)
BUN SERPL-MCNC: 19 MG/DL (ref 5–25)
CALCIUM SERPL-MCNC: 9.1 MG/DL (ref 8.3–10.1)
CHLORIDE SERPL-SCNC: 104 MMOL/L (ref 100–108)
CHOLEST SERPL-MCNC: 196 MG/DL (ref 50–200)
CO2 SERPL-SCNC: 28 MMOL/L (ref 21–32)
CREAT SERPL-MCNC: 0.88 MG/DL (ref 0.6–1.3)
EST. AVERAGE GLUCOSE BLD GHB EST-MCNC: 140 MG/DL
GFR SERPL CREATININE-BSD FRML MDRD: 89 ML/MIN/1.73SQ M
GLUCOSE P FAST SERPL-MCNC: 125 MG/DL (ref 65–99)
HBA1C MFR BLD: 6.5 % (ref 4.2–6.3)
HCV AB SER QL: NORMAL
HDLC SERPL-MCNC: 36 MG/DL (ref 40–60)
LDLC SERPL CALC-MCNC: 128 MG/DL (ref 0–100)
POTASSIUM SERPL-SCNC: 3.6 MMOL/L (ref 3.5–5.3)
PROT SERPL-MCNC: 7.9 G/DL (ref 6.4–8.2)
SODIUM SERPL-SCNC: 137 MMOL/L (ref 136–145)
TRIGL SERPL-MCNC: 158 MG/DL
TSH SERPL DL<=0.05 MIU/L-ACNC: 1.39 UIU/ML (ref 0.36–3.74)

## 2019-08-01 PROCEDURE — 73140 X-RAY EXAM OF FINGER(S): CPT

## 2019-08-01 PROCEDURE — 80061 LIPID PANEL: CPT

## 2019-08-01 PROCEDURE — 99204 OFFICE O/P NEW MOD 45 MIN: CPT | Performed by: PHYSICAL MEDICINE & REHABILITATION

## 2019-08-01 PROCEDURE — 36415 COLL VENOUS BLD VENIPUNCTURE: CPT

## 2019-08-01 PROCEDURE — 84443 ASSAY THYROID STIM HORMONE: CPT

## 2019-08-01 PROCEDURE — 83036 HEMOGLOBIN GLYCOSYLATED A1C: CPT

## 2019-08-01 PROCEDURE — 1124F ACP DISCUSS-NO DSCNMKR DOCD: CPT | Performed by: PHYSICAL MEDICINE & REHABILITATION

## 2019-08-01 PROCEDURE — 86803 HEPATITIS C AB TEST: CPT

## 2019-08-01 PROCEDURE — 80053 COMPREHEN METABOLIC PANEL: CPT

## 2019-08-01 NOTE — PROGRESS NOTES
Assessment  1  Pain of right hand        Plan  1  At this time the patient has no symptomatology consistent with cervical radiculopathy  He is complaining of hand pain on the right especially with gripping  He states that the 4th digit refers pain throughout the palm and then up towards the elbow  I reviewed with him that he could follow up with Dr Marilin Whatley or I could refer him to 1 of our hand specialists  He preferred hand specialty referral   He may follow up with me as needed  My impressions and treatment recommendations were discussed in detail with the patient who verbalized understanding and had no further questions  Discharge instructions were provided  I personally saw and examined the patient and I agree with the above discussed plan of care  No orders of the defined types were placed in this encounter  No orders of the defined types were placed in this encounter  History of Present Illness    Arlander Burkitt is a 81 y o  male sent from Dr Marilin Whatley for consultation regarding question for cervical radiculopathy  The patient did have an MRI of the cervical spine demonstrating some focal narrowing at the right C5-6 foramen  The patient is complaining of pain in the right shoulder related to his surgery but has noted improvement in this gradually  He is also complaining of pain in the hand which is worse with gripping activities and this refers up towards the elbow  He describes moderate to severe intensity symptoms rated as a 9/10 which are constant but worse in the morning and nighttime  This is burning, shooting, pins and needles in nature  Aggravating factors include lying down, standing, bending, sitting, exercise  Diagnostic studies include MRI of the cervical spine as noted  He states that he does have history of syringomyelia and believes this is in the thoracic spine  This was not visualized in the cervical imaging          I have personally reviewed and/or updated the patient's past medical history, past surgical history, family history, social history, current medications, allergies, and vital signs today  Review of Systems   Constitutional: Negative for fever and unexpected weight change  HENT: Negative for trouble swallowing  Eyes: Negative for visual disturbance  Respiratory: Negative for shortness of breath and wheezing  Cardiovascular: Negative for chest pain and palpitations  Gastrointestinal: Negative for constipation, diarrhea, nausea and vomiting  Endocrine: Negative for cold intolerance, heat intolerance and polydipsia  Genitourinary: Positive for frequency  Negative for difficulty urinating  Musculoskeletal: Positive for joint swelling, myalgias and neck pain (right lateral to the shoulder and down the arm to the hand)  Negative for arthralgias and gait problem  Skin: Negative for rash  Neurological: Positive for numbness (b/l hands right mostly)  Negative for dizziness, seizures, syncope, weakness and headaches  Hematological: Does not bruise/bleed easily  Psychiatric/Behavioral: Negative for dysphoric mood  All other systems reviewed and are negative        Patient Active Problem List   Diagnosis    Type 2 diabetes mellitus (HCC)    Chronic pain disorder    DDD (degenerative disc disease), thoracic    Enlarged prostate with lower urinary tract symptoms (LUTS)    Hearing loss, right    Hyperlipidemia    Hypertension    Syringomyelia ([address] )    Pain of right upper extremity    Erectile dysfunction    Tear of right rotator cuff    Elevated PSA       Past Medical History:   Diagnosis Date    Anxiety     Chronic pain disorder     right shoulder    Diabetes mellitus ([address] )     type 2    Diverticulosis     Enlarged prostate     Extremity pain     Hearing loss, right     Hemorrhoids     Hypertension     Neck pain     Polyp, sigmoid colon        Past Surgical History:   Procedure Laterality Date    COLONOSCOPY      EAR SURGERY      INNER EAR SURGERY      NASAL SINUS SURGERY      [place] COLONOSCOPY FLX DX W/COLLJ SPEC WHEN PFRMD N/A 8/30/2016    Procedure: COLONOSCOPY;  Surgeon: Mattie Zhu MD;  Location: [facility]; Service: General    [place] SHLDR ARTHROSCOP,SURG,W/ROTAT CUFF REPR Right 2/5/2019    Procedure: ARTHROSCOPY SHOULDER;  Surgeon: Nadia Dumont DO;  Location: [facility] MA[facility] OR;  Service: Orthopedics       Family History   Problem Relation Age of Onset    Hypertension Brother     Hepatitis Mother     Heart disease Father         CARDIAC DISORDER        Social History     Occupational History    Occupation: FULL TIME EMPLOYMENT   Tobacco Use    Smoking status: Never Smoker    Smokeless tobacco: Never Used   Substance and Sexual Activity    Alcohol use: Yes     Comment: occassional    Drug use: No    Sexual activity: Yes     Partners: Female       Current Outpatient Medications on File Prior to Visit   Medication Sig    ALPRAZolam (XANAX) 0 5 mg tablet Take one tablet by mouth 30 minutes prior to flight      gabapentin (NEURONTIN) 600 MG tablet take 1 tablet by mouth three times a day if needed for pain    hydrochlorothiazide (HYDRODIURIL) 25 mg tablet take 1 tablet by mouth once daily    losartan (COZAAR) 50 mg tablet Take 1 tablet (50 mg total) by mouth daily    tamsulosin (FLOMAX) 0 4 mg take 1 capsule by mouth at bedtime    traMADol (ULTRAM) 50 mg tablet Take 1 tablet by mouth three times a day if needed for moderate pain    Blood Glucose Monitoring Suppl (ONE TOUCH ULTRA 2) w/Device KIT by Does not apply route    clobetasol (TEMOVATE) 0 05 % cream Apply topically 2 (two) times a day (Patient not taking: Reported on 8/1/2019)    glucose blood (ONE TOUCH ULTRA TEST) test strip by In Vitro route    Lancets (ONETOUCH ULTRASOFT) lancets by Other route daily (Patient not taking: Reported on 8/1/2019)    metFORMIN (GLUCOPHAGE) 500 mg tablet Take 1 tablet (500 mg total) by mouth 2 (two) times a day with meals (Patient taking differently: Take 500 mg by mouth daily at bedtime  )     No current facility-administered medications on file prior to visit  Allergies   Allergen Reactions    Lipitor [Atorvastatin] Other (See Comments) and Myalgia     Other reaction(s): increased pain  arthralgia    Lyrica [Pregabalin] Other (See Comments)     Pt can't remmember reaction       Physical Exam    /75   Pulse 75   Resp 16   Ht 5' 6" (1 676 m)   Wt 73 5 kg (162 lb)   BMI 26 15 kg/m²     CERVICAL  General: Well-developed, well-nourished individual in no acute distress  Mental: Appropriate mood and affect  Grossly oriented with coherent speech and thought processing   Neuro:  Cranial nerves: Cranial nerve function is grossly intact bilaterally   Strength: Bilateral upper extremity strength is normal and symmetric   No atrophy or tone abnormalities noted   Reflexes: Bilateral upper extremity muscle stretch reflexes are physiologic and symmetric   No Martinez sign   Sensation: No loss of sensation is noted   Foraminal Compression Maneuvers:  Spurling sign is absent   Gait:  Gait/gross motor: Gait is normal  Station is normal      Musculoskeletal:  Palpation: Inspection and palpation of the spine and extremities are unremarkable except for tenderness to palpation in the palm on the right reproducing his pain  Spine: Normal pain-free range of motion   No gross axial skeletal deformities   Hand:  Pain with gripping of the right hand and pain on palpation of the forearm reproducing his complaint    Skin: Skin inspection grossly negative for erythema, breakdown, or concerning lesions in affected area   Lymph: No lymphadenopathy is appreciated in the involved extremity   Vessels: No lower extremity edema   Lungs: Breathing is comfortable and regular  No dyspnea noted during examination   Eyes: Visual field grossly intact to confrontation  No redness appreciated      ENT: No craniofacial deformities or asymmetry  No neck masses appreciated            Imaging

## 2019-08-05 ENCOUNTER — TELEPHONE (OUTPATIENT)
Dept: FAMILY MEDICINE CLINIC | Facility: CLINIC | Age: 69
End: 2019-08-05

## 2019-08-05 ENCOUNTER — OFFICE VISIT (OUTPATIENT)
Dept: PHYSICAL THERAPY | Facility: MEDICAL CENTER | Age: 69
End: 2019-08-05
Payer: MEDICARE

## 2019-08-05 DIAGNOSIS — M25.511 CHRONIC RIGHT SHOULDER PAIN: ICD-10-CM

## 2019-08-05 DIAGNOSIS — S46.011D TRAUMATIC COMPLETE TEAR OF RIGHT ROTATOR CUFF, SUBSEQUENT ENCOUNTER: Primary | ICD-10-CM

## 2019-08-05 DIAGNOSIS — G89.29 CHRONIC RIGHT SHOULDER PAIN: ICD-10-CM

## 2019-08-05 DIAGNOSIS — E78.5 HYPERLIPIDEMIA, UNSPECIFIED HYPERLIPIDEMIA TYPE: Primary | ICD-10-CM

## 2019-08-05 DIAGNOSIS — M75.21 BICEPS TENDONITIS ON RIGHT: ICD-10-CM

## 2019-08-05 PROCEDURE — 97140 MANUAL THERAPY 1/> REGIONS: CPT

## 2019-08-05 PROCEDURE — 97110 THERAPEUTIC EXERCISES: CPT

## 2019-08-05 RX ORDER — SIMVASTATIN 10 MG
10 TABLET ORAL
Qty: 30 TABLET | Refills: 5 | Status: SHIPPED | OUTPATIENT
Start: 2019-08-05 | End: 2019-08-13 | Stop reason: SINTOL

## 2019-08-05 NOTE — PROGRESS NOTES
Daily Note     Today's date: 2019  Patient name: Kriste Brunner  : 1950  MRN: [id number]  Referring provider: Michelle Olivo  Dx:   Encounter Diagnosis     ICD-10-CM    1  Traumatic complete tear of right rotator cuff, subsequent encounter S46 011D    2  Biceps tendonitis on right M75 21    3  Chronic right shoulder pain M25 511     G89 29                   Subjective: Patient states he is feeling good today  Reports mild pain arriving to therapy  Objective: See treatment diary below      Assessment: Tolerated treatment well  Patient required verbal cues on PB with TB for correct technique, demonstrated good understanding once corrected  He did experience increase in pain with wall slides, instructed to stop, pain diminished  Patient continues to make improvements into PROM, most restriction into ER  Patient demonstrated fatigue post treatment and would benefit from continued PT      Plan: Continue per plan of care  Progress treatment as tolerated         Daily Treatment Diary     Manual  7/15 7/17 7/22 7/24 7/29 7/31 8 7 7 7/10   PROM AF KD AF JW KD KD RA AF AF AF   Inferior GHJ mobs in flexion AF  AF np  KD np AF AF AF   Scapular mobs  KD    KD       Long axis distraction  KD                             Exercise Diary  7/15 7/17 7/22 7/24 7/29 7/31 8/5      UBE 5 min  5 min  5 min  5 min  5 min 5 min 5 min 5 min  5 min  5 min    Prone row  5#  3x12 5#  3X10 5#  3X10 5#  3X12 5# 3x12 5#  3x12 5#  3X12 5#  3X12    Prone I   3#  3X10 3#  3X10    3#  3X12 3#  3X12    SL ER   3#  3X10 3#  3x10         TB row and extension  Black  3X15 Black  3x15 Black  3x15 Black   3X15 Black  3x15 Black 3x15 Black   3X15 Black   3X15    TB ER Blue  10 sec  X10   Black  5 sec  X10 Blue  10 sec  X10 Blue  10 secx10  Black  10"  x10 Black  10"  x10 Blue 10"x10 Blue  10X10     Active scaption - avoid shrug 3X10  3X10 3x10 2x10 1# (1x w/ scap reposition 1x w/o)   3X10  3X10 3X10   Wall slides 3#  3x10 3#  3X10 3#  3X10  20x (up, side, down) 20x    3#  3x10 3#  3X10   scap wall slide w/ lift off 10 sec x10 20x 3 sec hold 10 sec  X10 10 sec  x10   10 sec  10  PAIN!  3X10 10 sec  x10   TB press    --      Pink  3x102   Prone Ext and ER   0# 1 set   1# 2 sets to fatigue 0# 1 set   1# 2 sets to fatigu np         Standing serratus ant punch into ball     10x 3 sec hold        Prone Ext     5# 3x12 5# 3x12 5#  3x12      Serratus punch [place] w/ TB             Prone Y             TB ER and IR on PB       30x each Black 30x  Each  black                                                                                                     Modalities   7/17 7/24 7/29          CP 5'  NP CP  5'              MHP 10' MHP 10'

## 2019-08-06 ENCOUNTER — TELEPHONE (OUTPATIENT)
Dept: FAMILY MEDICINE CLINIC | Facility: CLINIC | Age: 69
End: 2019-08-06

## 2019-08-06 DIAGNOSIS — L30.9 DERMATITIS: Primary | ICD-10-CM

## 2019-08-06 RX ORDER — MOMETASONE FUROATE 1 MG/G
CREAM TOPICAL DAILY
Qty: 45 G | Refills: 2 | Status: SHIPPED | OUTPATIENT
Start: 2019-08-06 | End: 2020-01-24 | Stop reason: ALTCHOICE

## 2019-08-06 NOTE — TELEPHONE ENCOUNTER
Dr Leeanna Arechiga, I received a fax from pts pharmacy stating that the Rx for Clobestol Cream is not covered without Prior Auth, The alternatives pharmacy gave that are covered are either Memethasone or Metamethasone, Would you like to prescribe an alternative or should we attempt a P  A?

## 2019-08-12 ENCOUNTER — OFFICE VISIT (OUTPATIENT)
Dept: PHYSICAL THERAPY | Facility: MEDICAL CENTER | Age: 69
End: 2019-08-12
Payer: MEDICARE

## 2019-08-12 DIAGNOSIS — G89.29 CHRONIC RIGHT SHOULDER PAIN: ICD-10-CM

## 2019-08-12 DIAGNOSIS — S46.011D TRAUMATIC COMPLETE TEAR OF RIGHT ROTATOR CUFF, SUBSEQUENT ENCOUNTER: Primary | ICD-10-CM

## 2019-08-12 DIAGNOSIS — M25.511 CHRONIC RIGHT SHOULDER PAIN: ICD-10-CM

## 2019-08-12 DIAGNOSIS — M75.21 BICEPS TENDONITIS ON RIGHT: ICD-10-CM

## 2019-08-12 PROCEDURE — 97110 THERAPEUTIC EXERCISES: CPT | Performed by: PHYSICAL THERAPIST

## 2019-08-12 PROCEDURE — 97530 THERAPEUTIC ACTIVITIES: CPT | Performed by: PHYSICAL THERAPIST

## 2019-08-12 PROCEDURE — 97140 MANUAL THERAPY 1/> REGIONS: CPT | Performed by: PHYSICAL THERAPIST

## 2019-08-12 NOTE — PROGRESS NOTES
Daily Note     Today's date: 2019  Patient name: Baron Young  : 1950  MRN: [id number]  Referring provider: Ana Herzog  Dx:   Encounter Diagnosis     ICD-10-CM    1  Traumatic complete tear of right rotator cuff, subsequent encounter S46 011D    2  Biceps tendonitis on right M75 21    3  Chronic right shoulder pain M25 511     G89 29                   Subjective: Patient had no reports of pain or discomfort, just that he is still has difficulties with elevation  Objective: See treatment diary below      Assessment: Patient presents with deficits in active elevation  Being addressed with PROM, inc strength of shoulder and scapular muscles  Continuing to strengthen as tolerated  Patient sees his surgeon on 19 for follow up, will call to schedule more appointments if necessary  Tolerated treatment well  Patient would benefit from continued PT for an optimal return to function  Plan: Continue per plan of care        Daily Treatment Diary     Manual  7/15 7/17 7/22 7/24 7/29 7/31 8/1 8/12 7/8 7/10   PROM AF KD AF JW KD KD RA KD AF AF   Inferior GHJ mobs in flexion AF  AF np  KD np  AF AF   Scapular mobs  KD    KD  KD in sidelying w/ flexion     Long axis distraction  KD           ART to lat        KD          Exercise Diary  7/15 7/17 7/22 7/24 7/29 7/31 8/5 8/12     UBE 5 min  5 min  5 min  5 min  5 min 5 min 5 min   5 min  5 min    Prone row  5#  3x12 5#  3X10 5#  3X10 5#  3X12 5# 3x12 5#  3x12 10#  3X12 5#  3X12    Prone I   3#  3X10 3#  3X10     3#  3X12    SL ER   3#  3X10 3#  3x10    3# 40x     TB row and extension  Black  3X15 Black  3x15 Black  3x15 Black   3X15 Black  3x15 Black 3x15  Black   3X15    TB ER Blue  10 sec  X10   Black  5 sec  X10 Blue  10 sec  X10 Blue  10 secx10  Black  10"  x10 Black  10"  x10 Blue 10"x10      Active scaption - avoid shrug 3X10  3X10 3x10 2x10 1# (1x w/ scap reposition 1x w/o)   30x pink TB 3X10 3X10   Wall slides 3#  3x10  3#  3X10 3#  3X10  20x (up, side, down) 20x    3#  3x10 3#  3X10   scap wall slide w/ lift off 10 sec x10 20x 3 sec hold 10 sec  X10 10 sec  x10   10 sec  10  PAIN!  3X10 10 sec  x10   TB press    --      Pink  3x102   Prone Ext and ER   0# 1 set   1# 2 sets to fatigue 0# 1 set   1# 2 sets to fatigu np         Standing serratus ant punch into ball     10x 3 sec hold        Prone Ext     5# 3x12 5# 3x12 5#  3x12      Serratus punch [place] w/ TB             Prone Y        40x     TB ER and IR on PB       30x each Black 30x  Each  black                                                                                                     Modalities   7/17 7/24 7/29 8/12       CP 5'  NP CP  5'   CP 5'           MHP 10' MHP 10'

## 2019-08-13 DIAGNOSIS — E78.5 HYPERLIPIDEMIA, UNSPECIFIED HYPERLIPIDEMIA TYPE: Primary | ICD-10-CM

## 2019-08-13 RX ORDER — EZETIMIBE 10 MG/1
10 TABLET ORAL DAILY
Qty: 30 TABLET | Refills: 5 | Status: SHIPPED | OUTPATIENT
Start: 2019-08-13 | End: 2020-01-24 | Stop reason: ALTCHOICE

## 2019-08-19 ENCOUNTER — OFFICE VISIT (OUTPATIENT)
Dept: OBGYN CLINIC | Facility: MEDICAL CENTER | Age: 69
End: 2019-08-19
Payer: MEDICARE

## 2019-08-19 VITALS
SYSTOLIC BLOOD PRESSURE: 120 MMHG | WEIGHT: 162.2 LBS | BODY MASS INDEX: 26.07 KG/M2 | DIASTOLIC BLOOD PRESSURE: 80 MMHG | HEIGHT: 66 IN

## 2019-08-19 DIAGNOSIS — Z98.890 HISTORY OF ARTHROSCOPIC SURGERY OF SHOULDER: Primary | ICD-10-CM

## 2019-08-19 DIAGNOSIS — S46.011D TRAUMATIC COMPLETE TEAR OF RIGHT ROTATOR CUFF, SUBSEQUENT ENCOUNTER: ICD-10-CM

## 2019-08-19 PROCEDURE — 99213 OFFICE O/P EST LOW 20 MIN: CPT | Performed by: ORTHOPAEDIC SURGERY

## 2019-08-19 NOTE — PROGRESS NOTES
Ortho Sports Medicine Shoulder Visit     Assesment:     right shoulder s/p arthroscopic rotator cuff repair with arthroscopic biceps tenodesis 2/5/19 doing very well with no issues  Plan:    Conservative treatment:    Ice to shoulder 1-2 times daily, for 20 minutes at a time  Home exercise program for shoulder, including ROM and strenthening  Instructions given to patient of what exercises to perform  Let pain guide return to activities  PRN      Imaging: All imaging from today was reviewed by myself and explained to the patient  Injection:    No Injection planned at this time  History of Present Illness: The patient is returns for follow up of his right shoulder s/p a/rotator cuff repair with a/biceps tenodesis 2/5/19  Since the prior visit, He reports significant improvement  Pain is improved by rest, ice, NSAIDS and physical therapy  He is reporting no pain today, still has difficulty with end range motion but overall feels great  No weakness  The patient denies weakness  He is back to all activities without any issues  The patient has tried rest, ice, NSAIDS and physical therapy  I have reviewed the past medical, surgical, social and family history, medications and allergies as documented in the EMR  Review of systems: ROS is negative other than that noted in the HPI  Constitutional: Negative for fatigue and fever  Physical Exam:    Height 5' 6" (1 676 m), weight 73 6 kg (162 lb 3 2 oz)  General/Constitutional: NAD, well developed, well nourished  HENT: Normocephalic, atraumatic  CV: Intact distal pulses, regular rate  Resp: No respiratory distress or labored breathing  Lymphatic: No lymphadenopathy palpated  Neuro: Alert and Oriented x 3, no focal deficits  Psych: Normal mood, normal affect, normal judgement, normal behavior  Skin: Warm, dry, no rashes, no erythema     Shoulder Exam (focused):     Shoulder focused exam:       RIGHT LEFT    Scapula Atrophy Negative Negative     Winging Negative Negative     Protraction Negative Negative    Rotator cuff SS 5/5 5/5     IS 5/5 5/5     SubS 5/5 5/5    ROM  170     ER0 60 60     ER90 90 90     IR90 40 40     IRb T8 T6    TTP: AC Negative Negative     Biceps Negative Negative     Coracoid Negative Negative    Special Tests: O'Briens Negative Negative     Snyder-shear Negative Negative     Cross body Adduction Negative Negative     Speeds  Negative Negative     Clara's Negative Negative     Whipple Negative Negative       Neer Negative Negative     Ewing Negative Negative    Instability: Apprehension & relocation not tested not tested     Load & shift not tested not tested    Other: Crank Negative Negative               UE NV Exam: +2 Radial pulses bilaterally  Sensation intact to light touch C5-T1 bilaterally, Radial/median/ulnar nerve motor intact    Cervical ROM is full without pain, numbness or tingling      Shoulder Imaging    No imaging was performed today

## 2019-08-20 ENCOUNTER — TELEPHONE (OUTPATIENT)
Dept: FAMILY MEDICINE CLINIC | Facility: CLINIC | Age: 69
End: 2019-08-20

## 2019-08-20 DIAGNOSIS — G95.0 SYRINGOMYELIA (HCC): ICD-10-CM

## 2019-08-20 RX ORDER — TRAMADOL HYDROCHLORIDE 50 MG/1
TABLET ORAL
Qty: 90 TABLET | Refills: 0 | Status: SHIPPED | OUTPATIENT
Start: 2019-08-20 | End: 2019-10-07 | Stop reason: SDUPTHER

## 2019-09-08 DIAGNOSIS — N40.1 BENIGN LOCALIZED PROSTATIC HYPERPLASIA WITH LOWER URINARY TRACT SYMPTOMS (LUTS): ICD-10-CM

## 2019-09-09 RX ORDER — TAMSULOSIN HYDROCHLORIDE 0.4 MG/1
CAPSULE ORAL
Qty: 30 CAPSULE | Refills: 11 | Status: SHIPPED | OUTPATIENT
Start: 2019-09-09 | End: 2020-09-29

## 2019-09-13 ENCOUNTER — OFFICE VISIT (OUTPATIENT)
Dept: OBGYN CLINIC | Facility: HOSPITAL | Age: 69
End: 2019-09-13
Payer: MEDICARE

## 2019-09-13 VITALS
DIASTOLIC BLOOD PRESSURE: 98 MMHG | WEIGHT: 161 LBS | HEART RATE: 67 BPM | HEIGHT: 66 IN | BODY MASS INDEX: 25.88 KG/M2 | SYSTOLIC BLOOD PRESSURE: 175 MMHG

## 2019-09-13 DIAGNOSIS — M65.331 TRIGGER MIDDLE FINGER OF RIGHT HAND: Primary | ICD-10-CM

## 2019-09-13 DIAGNOSIS — M65.341 TRIGGER RING FINGER OF RIGHT HAND: ICD-10-CM

## 2019-09-13 PROCEDURE — 99213 OFFICE O/P EST LOW 20 MIN: CPT | Performed by: ORTHOPAEDIC SURGERY

## 2019-09-13 PROCEDURE — 20550 NJX 1 TENDON SHEATH/LIGAMENT: CPT | Performed by: ORTHOPAEDIC SURGERY

## 2019-09-13 RX ORDER — LIDOCAINE HYDROCHLORIDE 20 MG/ML
1 INJECTION, SOLUTION EPIDURAL; INFILTRATION; INTRACAUDAL; PERINEURAL
Status: COMPLETED | OUTPATIENT
Start: 2019-09-13 | End: 2019-09-13

## 2019-09-13 RX ORDER — BETAMETHASONE SODIUM PHOSPHATE AND BETAMETHASONE ACETATE 3; 3 MG/ML; MG/ML
6 INJECTION, SUSPENSION INTRA-ARTICULAR; INTRALESIONAL; INTRAMUSCULAR; SOFT TISSUE
Status: COMPLETED | OUTPATIENT
Start: 2019-09-13 | End: 2019-09-13

## 2019-09-13 RX ADMIN — BETAMETHASONE SODIUM PHOSPHATE AND BETAMETHASONE ACETATE 6 MG: 3; 3 INJECTION, SUSPENSION INTRA-ARTICULAR; INTRALESIONAL; INTRAMUSCULAR; SOFT TISSUE at 09:29

## 2019-09-13 RX ADMIN — LIDOCAINE HYDROCHLORIDE 1 ML: 20 INJECTION, SOLUTION EPIDURAL; INFILTRATION; INTRACAUDAL; PERINEURAL at 09:29

## 2019-09-13 NOTE — PROGRESS NOTES
ASSESSMENT/PLAN:    Assessment:   Right long trigger finger  Right ring trigger finger    Plan:   Trigger fingers injected today  Activities as tolerated    Follow Up:  6  week(s)    To Do Next Visit:   recheck    General Discussions:     Trigger FInger: The anatomy and physiology of trigger finger was discussed with the patient today in the office  Edema and increased contact pressure within the flexor tendons at the A1 pulley can cause pain, crepitation, and limitation of function  Treatment options include resting MP blocking splints to decrease edema, oral anti-inflammatory medications, home or formal therapy exercises, up to 2 steroid injections within the tendon sheath, or surgical release  While majority of patients do respond to conservative treatment, up to 20% may require surgical release        _____________________________________________________  CHIEF COMPLAINT:  Chief Complaint   Patient presents with    Right Hand - Pain         SUBJECTIVE:  Felix Macias is a 81y o  year old male who presents with right long and ring finger pain  He denies any injury  This started a few month(s) ago as Insidious  Patient notes increased pain over the MP joints  He notes locking and catching    Radiation: Yes to the  forearm  Previous Treatments: None without relief  Associated symptoms: Pain  Moderate  Intermittant  Sharp, Dull and Aching    PAST MEDICAL HISTORY:  Past Medical History:   Diagnosis Date    Anxiety     Chronic pain disorder     right shoulder    Diabetes mellitus (HCC)     type 2    Diverticulosis     Enlarged prostate     Extremity pain     Hearing loss, right     Hemorrhoids     Hypertension     Neck pain     Polyp, sigmoid colon        PAST SURGICAL HISTORY:  Past Surgical History:   Procedure Laterality Date    COLONOSCOPY      EAR SURGERY      INNER EAR SURGERY      NASAL SINUS SURGERY      [place] COLONOSCOPY FLX DX W/COLLJ SPEC WHEN PFRMD N/A 8/30/2016    Procedure: COLONOSCOPY;  Surgeon: Pascual Troncoso MD;  Location: [facility];   Service: General    [place] SHLDR ARTHROSCOP,SURG,W/ROTAT CUFF REPR Right 2/5/2019    Procedure: ARTHROSCOPY SHOULDER;  Surgeon: Therese Perez DO;  Location:  MAIN OR;  Service: Orthopedics       FAMILY HISTORY:  Family History   Problem Relation Age of Onset    Hypertension Brother     Hepatitis Mother     Heart disease Father         CARDIAC DISORDER        SOCIAL HISTORY:  Social History     Tobacco Use    Smoking status: Never Smoker    Smokeless tobacco: Never Used   Substance Use Topics    Alcohol use: Yes     Comment: occassional    Drug use: No       MEDICATIONS:    Current Outpatient Medications:     ALPRAZolam (XANAX) 0 5 mg tablet, Take one tablet by mouth 30 minutes prior to flight , Disp: 4 tablet, Rfl: 0    Blood Glucose Monitoring Suppl (ONE TOUCH ULTRA 2) w/Device KIT, by Does not apply route, Disp: , Rfl:     gabapentin (NEURONTIN) 600 MG tablet, take 1 tablet by mouth three times a day if needed for pain, Disp: 90 tablet, Rfl: 3    glucose blood (ONE TOUCH ULTRA TEST) test strip, by In Vitro route, Disp: , Rfl:     hydrochlorothiazide (HYDRODIURIL) 25 mg tablet, take 1 tablet by mouth once daily, Disp: 90 tablet, Rfl: 1    Lancets (ONETOUCH ULTRASOFT) lancets, by Other route daily, Disp: 100 each, Rfl: 3    losartan (COZAAR) 50 mg tablet, Take 1 tablet (50 mg total) by mouth daily, Disp: 30 tablet, Rfl: 5    metFORMIN (GLUCOPHAGE) 500 mg tablet, Take 1 tablet (500 mg total) by mouth 2 (two) times a day with meals (Patient taking differently: Take 500 mg by mouth daily at bedtime  ), Disp: 180 tablet, Rfl: 0    mometasone (ELOCON) 0 1 % cream, Apply topically daily, Disp: 45 g, Rfl: 2    tamsulosin (FLOMAX) 0 4 mg, take 1 capsule by mouth at bedtime, Disp: 30 capsule, Rfl: 11    traMADol (ULTRAM) 50 mg tablet, Take 1 tablet by mouth three times a day if needed for moderate pain, Disp: 90 tablet, Rfl: 0    ezetimibe (ZETIA) 10 mg tablet, Take 1 tablet (10 mg total) by mouth daily (Patient not taking: Reported on 9/13/2019), Disp: 30 tablet, Rfl: 5    ALLERGIES:  Allergies   Allergen Reactions    Lipitor [Atorvastatin] Other (See Comments) and Myalgia     Other reaction(s): increased pain  arthralgia    Lyrica [Pregabalin] Other (See Comments)     Pt can't remmember reaction       REVIEW OF SYSTEMS:  Pertinent items are noted in HPI  A comprehensive review of systems was negative  LABS:  HgA1c:   Lab Results   Component Value Date    HGBA1C 6 5 (H) 08/01/2019     BMP:   Lab Results   Component Value Date    CALCIUM 9 1 08/01/2019     05/23/2016    K 3 6 08/01/2019    CO2 28 08/01/2019     08/01/2019    BUN 19 08/01/2019    CREATININE 0 88 08/01/2019         _____________________________________________________  PHYSICAL EXAMINATION:  BP (!) 175/98   Pulse 67   Ht 5' 6" (1 676 m)   Wt 73 kg (161 lb)   BMI 25 99 kg/m²   General: well developed and well nourished, alert, oriented times 3 and appears comfortable  Psychiatric: Normal  HEENT: Trachea Midline, No torticollis  Cardiovascular: No discernable arrhythmia  Pulmonary: No wheezing or stridor  Skin: No masses, erthema, lacerations, fluctation, ulcerations  Neurovascular: Sensation Intact to the Median, Ulnar, Radial Nerve, Motor Intact to the Median, Ulnar, Radial Nerve and Pulses Intact    MUSCULOSKELETAL EXAMINATION:  Right hand  right ring and long fingers:  Positive palpable nodule over the A1 pulley  Positive tenderness to palpation over A1 pulley  Positive catching  Positive clicking    Palpable dupuytren's cords ring finger    _____________________________________________________  STUDIES REVIEWED:  No Studies to review      PROCEDURES PERFORMED:  Hand/upper extremity injection  Date/Time: 9/13/2019 9:29 AM  Consent given by: patient  Site marked: site marked  Timeout: Immediately prior to procedure a time out was called to verify the correct patient, procedure, equipment, support staff and site/side marked as required   Supporting Documentation  Indications: pain   Procedure Details  Condition:trigger finger Location: long and ring finger A1 pulley    Needle size: 25 G  Ultrasound guidance: no  Medications administered: 6 mg betamethasone acetate-betamethasone sodium phosphate 6 (3-3) mg/mL; 1 mL lidocaine (PF) 2 %    Patient tolerance: patient tolerated the procedure well with no immediate complications  Dressing:  Sterile dressing applied                Scribe Attestation    I,:   Krystyna Escobar am acting as a scribe while in the presence of the attending physician :        I,:   Brianna Gaytan MD personally performed the services described in this documentation    as scribed in my presence :

## 2019-09-13 NOTE — PATIENT INSTRUCTIONS
What is it TRIGGER FINGER? Stenosing tenosynovitis, commonly known as trigger finger or trigger thumb, involves the pulleys and tendons in the hand that bend the fingers  The tendons work like long ropes connecting the muscles of the forearm with the bones of the fingers and thumb  In the finger, the pulleys are a series of rings that form a tunnel through which the tendons must glide, much like the guides on a fishing zain through which the line (or tendon) must pass  These pulleys hold the tendons close against the bone  The tendons and the tunnel have a slick lining that allows easy gliding of the tendon through the pulleys (see Figure 1)  Trigger finger/thumb occurs when the pulley at the base of the finger becomes too thick and constricting around the tendon, making it hard for the tendon to move freely through the pulley  Sometimes the tendon develops a nodule (knot) or swelling of its lining  Because of the increased resistance to the gliding of the tendon through the  pulley, one may feel pain, popping, or a catching feeling in the finger or thumb (see Figure 2)  When the tendon catches, it produces irritation and more swelling  This causes a vicious cycle of triggering, irritation, and swelling  Sometimes the finger becomes stuck or locked, and is hard to straighten or bend  What causes it? Causes for this condition are not always clear  Some trigger fingers are associated with medical conditions such as rheumatoid arthritis, gout, and diabetes  Local trauma to the palm/base of the finger may be a factor on occasion, but in most cases there is not a clear cause  Signs and symptoms   Trigger finger/thumb may start with discomfort felt at the base of the finger or thumb, where they join the palm  This area is often tender to local pressure  A nodule may sometimes be found in this area   When the finger begins to trigger or lock, the patient may think the  problem is at the middle knuckle of the finger or the tip knuckle of the thumb, since the tendon that is sticking is the one that moves these joints  Treatment  The goal of treatment in trigger finger/thumb is to eliminate the catching or locking and allow full movement of the finger or thumb without discomfort  Swelling around the flexor tendon and tendon sheath must be reduced to allow smooth gliding of the tendon  The wearing of a splint or taking an oral anti-inflammatory medication may sometimes  help  Treatment may also include changing activities to reduce swelling  An injection of steroid into the area around the tendon and pulley is often effective in relieving the trigger finger/thumb  If non-surgical forms of treatment do not relieve the symptoms, surgery may be recommended  This surgery is performed as an outpatient, usually with simple local anesthesia  The goal of surgery is to open the pulley at the base of the finger so that the tendon can glide more freely (see Figure 3)  Active motion of the finger generally begins immediately after surgery  Normal use of the hand can usually be resumed once comfort permits  Some patients may feel tenderness, discomfort, and swelling about the area of their surgery longer than others  Occasionally, hand therapy is required after surgery to regain  better use  © 2012 [employer]  www handcare  org

## 2019-10-07 DIAGNOSIS — G95.0 SYRINGOMYELIA (HCC): ICD-10-CM

## 2019-10-07 RX ORDER — TRAMADOL HYDROCHLORIDE 50 MG/1
TABLET ORAL
Qty: 60 TABLET | Refills: 0 | Status: SHIPPED | OUTPATIENT
Start: 2019-10-07 | End: 2019-11-06 | Stop reason: SDUPTHER

## 2019-10-24 ENCOUNTER — OFFICE VISIT (OUTPATIENT)
Dept: FAMILY MEDICINE CLINIC | Facility: CLINIC | Age: 69
End: 2019-10-24
Payer: MEDICARE

## 2019-10-24 VITALS
HEIGHT: 66 IN | HEART RATE: 64 BPM | SYSTOLIC BLOOD PRESSURE: 130 MMHG | BODY MASS INDEX: 26.2 KG/M2 | WEIGHT: 163 LBS | DIASTOLIC BLOOD PRESSURE: 80 MMHG

## 2019-10-24 DIAGNOSIS — E66.3 OVERWEIGHT (BMI 25.0-29.9): ICD-10-CM

## 2019-10-24 DIAGNOSIS — E78.5 HYPERLIPIDEMIA, UNSPECIFIED HYPERLIPIDEMIA TYPE: ICD-10-CM

## 2019-10-24 DIAGNOSIS — E11.9 TYPE 2 DIABETES MELLITUS WITHOUT COMPLICATION, WITHOUT LONG-TERM CURRENT USE OF INSULIN (HCC): ICD-10-CM

## 2019-10-24 DIAGNOSIS — M51.34 DDD (DEGENERATIVE DISC DISEASE), THORACIC: ICD-10-CM

## 2019-10-24 DIAGNOSIS — L30.9 DERMATITIS: Primary | ICD-10-CM

## 2019-10-24 DIAGNOSIS — M25.50 ARTHRALGIA, UNSPECIFIED JOINT: ICD-10-CM

## 2019-10-24 DIAGNOSIS — R13.10 DYSPHAGIA, UNSPECIFIED TYPE: ICD-10-CM

## 2019-10-24 DIAGNOSIS — G95.0 SYRINGOMYELIA (HCC): ICD-10-CM

## 2019-10-24 PROCEDURE — 99214 OFFICE O/P EST MOD 30 MIN: CPT | Performed by: FAMILY MEDICINE

## 2019-10-24 RX ORDER — CLOBETASOL PROPIONATE 0.5 MG/G
OINTMENT TOPICAL 2 TIMES DAILY
Qty: 30 G | Refills: 2 | Status: SHIPPED | OUTPATIENT
Start: 2019-10-24 | End: 2020-01-24 | Stop reason: ALTCHOICE

## 2019-10-24 NOTE — ASSESSMENT & PLAN NOTE
Lab Results   Component Value Date    HGBA1C 6 5 (H) 08/01/2019   lab stable, possible low bs episode

## 2019-10-24 NOTE — ASSESSMENT & PLAN NOTE
Takes 2 tramadol/day for neck pain and stable on dose, needs uds and pain management contract signed, concern whether it is pressing on esophagus, await upper GI

## 2019-10-24 NOTE — PATIENT INSTRUCTIONS
Lab due in January, if has another dizzy check BS!!!! Await upper GI  Weight Management   AMBULATORY CARE:   Why it is important to manage your weight:  Being overweight increases your risk of health conditions such as heart disease, high blood pressure, type 2 diabetes, and certain types of cancer  It can also increase your risk for osteoarthritis, sleep apnea, and other respiratory problems  Aim for a slow, steady weight loss  Even a small amount of weight loss can lower your risk of health problems  How to lose weight safely:  A safe and healthy way to lose weight is to eat fewer calories and get regular exercise  You can lose up about 1 pound a week by decreasing the number of calories you eat by 500 calories each day  You can decrease calories by eating smaller portion sizes or by cutting out high-calorie foods  Read labels to find out how many calories are in the foods you eat  You can also burn calories with exercise such as walking, swimming, or biking  You will be more likely to keep weight off if you make these changes part of your lifestyle  Healthy meal plan for weight management:  A healthy meal plan includes a variety of foods, contains fewer calories, and helps you stay healthy  A healthy meal plan includes the following:  · Eat whole-grain foods more often  A healthy meal plan should contain fiber  Fiber is the part of grains, fruits, and vegetables that is not broken down by your body  Whole-grain foods are healthy and provide extra fiber in your diet  Some examples of whole-grain foods are whole-wheat breads and pastas, oatmeal, brown rice, and bulgur  · Eat a variety of vegetables every day  Include dark, leafy greens such as spinach, kale, micah greens, and mustard greens  Eat yellow and orange vegetables such as carrots, sweet potatoes, and winter squash  · Eat a variety of fruits every day  Choose fresh or canned fruit (canned in its own juice or light syrup) instead of juice  Fruit juice has very little or no fiber  · Eat low-fat dairy foods  Drink fat-free (skim) milk or 1% milk  Eat fat-free yogurt and low-fat cottage cheese  Try low-fat cheeses such as mozzarella and other reduced-fat cheeses  · Choose meat and other protein foods that are low in fat  Choose beans or other legumes such as split peas or lentils  Choose fish, skinless poultry (chicken or turkey), or lean cuts of red meat (beef or pork)  Before you cook meat or poultry, cut off any visible fat  · Use less fat and oil  Try baking foods instead of frying them  Add less fat, such as margarine, sour cream, regular salad dressing and mayonnaise to foods  Eat fewer high-fat foods  Some examples of high-fat foods include french fries, doughnuts, ice cream, and cakes  · Eat fewer sweets  Limit foods and drinks that are high in sugar  This includes candy, cookies, regular soda, and sweetened drinks  Ways to decrease calories:   · Eat smaller portions  ¨ Use a small plate with smaller servings  ¨ Do not eat second helpings  ¨ When you eat at a restaurant, ask for a box and place half of your meal in the box before you eat  ¨ Share an entrée with someone else  · Replace high-calorie snacks with healthy, low-calorie snacks  ¨ Choose fresh fruit, vegetables, fat-free rice cakes, or air-popped popcorn instead of potato chips, nuts, or chocolate  ¨ Choose water or calorie-free drinks instead of soda or sweetened drinks  · Eat regular meals  Skipping meals can lead to overeating later in the day  Eat a healthy snack in place of a meal if you do not have time to eat a regular meal      · Do not shop for groceries when you are hungry  You may be more likely to make unhealthy food choices  Take a grocery list of healthy foods and shop after you have eaten  Exercise:  Exercise at least 30 minutes per day on most days of the week   Some examples of exercise include walking, biking, dancing, and swimming  You can also fit in more physical activity by taking the stairs instead of the elevator or parking farther away from stores  Ask your healthcare provider about the best exercise plan for you  Other things to consider as you try to lose weight:   · Be aware of situations that may give you the urge to overeat, such as eating while watching television  Find ways to avoid these situations  For example, read a book, go for a walk, or do crafts  · Meet with a weight loss support group or friends who are also trying to lose weight  This may help you stay motivated to continue working on your weight loss goals  © 2017 [address] [employer] Information is for End User's use only and may not be sold, redistributed or otherwise used for commercial purposes  All illustrations and images included in CareNotes® are the copyrighted property of A D A M [employer] Inc  or Cristian Colvin  The above information is an  only  It is not intended as medical advice for individual conditions or treatments  Talk to your doctor, nurse or pharmacist before following any medical regimen to see if it is safe and effective for you

## 2019-10-24 NOTE — PROGRESS NOTES
Assessment and Plan:    Problem List Items Addressed This Visit        Endocrine    Type 2 diabetes mellitus ([facility] [address] )       Lab Results   Component Value Date    HGBA1C 6 5 (H) 08/01/2019   lab stable, possible low bs episode         Relevant Orders    Comprehensive metabolic panel    Hemoglobin A1C       Nervous and Auditory    Syringomyelia (HCC)     Takes 2 tramadol/day for neck pain and stable on dose, needs uds and pain management contract signed, concern whether it is pressing on esophagus, await upper GI         Relevant Orders    TRAMADOL, URINE       Musculoskeletal and Integument    DDD (degenerative disc disease), thoracic     Having increased pain in back when swallowing         Relevant Orders    TRAMADOL, URINE       Other    Hyperlipidemia     Await lab         Relevant Orders    Lipid panel      Other Visit Diagnoses     Dermatitis    -  Primary    Relevant Medications    clobetasol (TEMOVATE) 0 05 % ointment    Arthralgia, unspecified joint         Relevant Orders    TRAMADOL, URINE    Dysphagia, unspecified type        Relevant Orders    FL UPPER GI UGI    Overweight (BMI 25 0-29  9)                     Diagnoses and all orders for this visit:    Dermatitis  -     clobetasol (TEMOVATE) 0 05 % ointment; Apply topically 2 (two) times a day    Type 2 diabetes mellitus without complication, without long-term current use of insulin ([facility])  -     Comprehensive metabolic panel; Future  -     Hemoglobin A1C; Future    Hyperlipidemia, unspecified hyperlipidemia type  -     Lipid panel; Future    Syringomyelia (HCC)  -     TRAMADOL, URINE    DDD (degenerative disc disease), thoracic  -     TRAMADOL, URINE    Arthralgia, unspecified joint   -     TRAMADOL, URINE    Dysphagia, unspecified type  -     FL UPPER GI UGI; Future    Overweight (BMI 25 0-29  9)              Subjective:      Patient ID: Desi Frye is a 81 y o  male      CC:    Chief Complaint   Patient presents with    Diabetes     Pt states he sees podiatry for diabetic foot exams and was there within the past week  He had colonoscopy in Aug 2016 Dr Kathy Ferrara   Hyperlipidemia    Hypertension    Dizziness     Pt notes he had episode of dizziness and generalized weakness when he got out of bed yesterday  -  [facility]       HPI:    Here for f/u diabetes, had episode of dizziness and weakness , resolved after eating so suspect had low BS but didn't check BS,wants rx for clobetasol-elocon didn't work,describes pain in upper back when swallowing-not sure if related to arthritis or syringomyelia      The following portions of the patient's history were reviewed and updated as appropriate: allergies, current medications, past family history, past medical history, past social history, past surgical history and problem list       Review of Systems   Constitutional: Negative for activity change, appetite change and unexpected weight change  HENT: Positive for trouble swallowing  Respiratory: Negative for shortness of breath  Cardiovascular: Negative for chest pain  Musculoskeletal: Positive for arthralgias, back pain and neck pain  Occ shoulder pain   Neurological: Positive for dizziness  Psychiatric/Behavioral: The patient is not nervous/anxious  Data to review:       Objective:    Vitals:    10/24/19 0853   BP: 130/80   BP Location: Left arm   Patient Position: Sitting   Cuff Size: Large   Pulse: 64   Weight: 73 9 kg (163 lb)   Height: 5' 6" (1 676 m)        Physical Exam   Constitutional: He appears well-developed and well-nourished  HENT:   Right Ear: Tympanic membrane normal    Left Ear: Tympanic membrane normal    Nose: No mucosal edema or rhinorrhea  Neck: No thyromegaly present  Cardiovascular: Normal rate, regular rhythm and normal heart sounds  Pulmonary/Chest: Effort normal and breath sounds normal    Lymphadenopathy:     He has no cervical adenopathy  Vitals reviewed  BMI Counseling: Body mass index is 26 31 kg/m²  The BMI is above normal  Nutrition recommendations include reducing portion sizes, decreasing overall calorie intake, 3-5 servings of fruits/vegetables daily, reducing fast food intake and consuming healthier snacks  Exercise recommendations include exercising 3-5 times per week

## 2019-10-25 ENCOUNTER — OFFICE VISIT (OUTPATIENT)
Dept: OBGYN CLINIC | Facility: HOSPITAL | Age: 69
End: 2019-10-25
Payer: MEDICARE

## 2019-10-25 VITALS
WEIGHT: 164 LBS | BODY MASS INDEX: 26.36 KG/M2 | DIASTOLIC BLOOD PRESSURE: 84 MMHG | HEART RATE: 67 BPM | SYSTOLIC BLOOD PRESSURE: 162 MMHG | HEIGHT: 66 IN

## 2019-10-25 DIAGNOSIS — M65.331 TRIGGER MIDDLE FINGER OF RIGHT HAND: Primary | ICD-10-CM

## 2019-10-25 DIAGNOSIS — M65.341 TRIGGER RING FINGER OF RIGHT HAND: ICD-10-CM

## 2019-10-25 PROCEDURE — 99213 OFFICE O/P EST LOW 20 MIN: CPT | Performed by: ORTHOPAEDIC SURGERY

## 2019-10-25 NOTE — PROGRESS NOTES
ASSESSMENT/PLAN:    Assessment:   Right long trigger finger  Right ring trigger finger    Plan:   Resume activities as tolerated    Follow Up:  PRN      _____________________________________________________  CHIEF COMPLAINT:  Chief Complaint   Patient presents with    Right Middle Finger - Follow-up    Right Ring Finger - Follow-up         SUBJECTIVE:  Loyda Ornelas is a 81y o  year old male who presents for follow up regarding Trigger Finger  right  long finger, ring finger  Since last visit, Loyda Ornelas has tried steroid injections with relief  Today there is No Complaints to the right long finger and ring finger  PAST MEDICAL HISTORY:  Past Medical History:   Diagnosis Date    Anxiety     Chronic pain disorder     right shoulder    Diabetes mellitus (HCC)     type 2    Diverticulosis     Enlarged prostate     Extremity pain     Hearing loss, right     Hemorrhoids     Hypertension     Neck pain     Polyp, sigmoid colon        PAST SURGICAL HISTORY:  Past Surgical History:   Procedure Laterality Date    COLONOSCOPY      EAR SURGERY      INNER EAR SURGERY      NASAL SINUS SURGERY      CT COLONOSCOPY FLX DX W/COLLJ SPEC WHEN PFRMD N/A 8/30/2016    Procedure: COLONOSCOPY;  Surgeon: Sally Beckham MD;  Location: [facility];   Service: General    CT SHLDR ARTHROSCOP,SURG,W/ROTAT CUFF REPR Right 2/5/2019    Procedure: ARTHROSCOPY SHOULDER;  Surgeon: Getachew Newell DO;  Location: [address] [place] [address];  Service: Orthopedics       FAMILY HISTORY:  Family History   Problem Relation Age of Onset    Hypertension Brother     Hepatitis Mother     Heart disease Father         CARDIAC DISORDER        SOCIAL HISTORY:  Social History     Tobacco Use    Smoking status: Never Smoker    Smokeless tobacco: Never Used   Substance Use Topics    Alcohol use: Yes     Comment: occassional    Drug use: No       MEDICATIONS:    Current Outpatient Medications:     ALPRAZolam (XANAX) 0 5 mg tablet, Take one tablet by mouth 30 minutes prior to flight , Disp: 4 tablet, Rfl: 0    Blood Glucose Monitoring Suppl (ONE TOUCH ULTRA 2) w/Device KIT, by Does not apply route, Disp: , Rfl:     clobetasol (TEMOVATE) 0 05 % ointment, Apply topically 2 (two) times a day, Disp: 30 g, Rfl: 2    gabapentin (NEURONTIN) 600 MG tablet, take 1 tablet by mouth three times a day if needed for pain, Disp: 90 tablet, Rfl: 3    glucose blood (ONE TOUCH ULTRA TEST) test strip, by In Vitro route, Disp: , Rfl:     hydrochlorothiazide (HYDRODIURIL) 25 mg tablet, take 1 tablet by mouth once daily, Disp: 90 tablet, Rfl: 1    Lancets (ONETOUCH ULTRASOFT) lancets, by Other route daily, Disp: 100 each, Rfl: 3    losartan (COZAAR) 50 mg tablet, Take 1 tablet (50 mg total) by mouth daily, Disp: 30 tablet, Rfl: 5    metFORMIN (GLUCOPHAGE) 500 mg tablet, Take 1 tablet (500 mg total) by mouth 2 (two) times a day with meals (Patient taking differently: Take 500 mg by mouth daily at bedtime  ), Disp: 180 tablet, Rfl: 0    mometasone (ELOCON) 0 1 % cream, Apply topically daily, Disp: 45 g, Rfl: 2    tamsulosin (FLOMAX) 0 4 mg, take 1 capsule by mouth at bedtime, Disp: 30 capsule, Rfl: 11    traMADol (ULTRAM) 50 mg tablet, Take 1 tablet by mouth two to three times a day if needed for moderate pain, Disp: 60 tablet, Rfl: 0    ezetimibe (ZETIA) 10 mg tablet, Take 1 tablet (10 mg total) by mouth daily (Patient not taking: Reported on 9/13/2019), Disp: 30 tablet, Rfl: 5    ALLERGIES:  Allergies   Allergen Reactions    Lipitor [Atorvastatin] Other (See Comments) and Myalgia     Other reaction(s): increased pain  arthralgia    Lyrica [Pregabalin] Other (See Comments)     Pt can't remmember reaction       REVIEW OF SYSTEMS:  Pertinent items are noted in HPI  A comprehensive review of systems was negative      LABS:  HgA1c:   Lab Results   Component Value Date    HGBA1C 6 5 (H) 08/01/2019     BMP:   Lab Results   Component Value Date    CALCIUM 9 1 08/01/2019  05/23/2016    K 3 6 08/01/2019    CO2 28 08/01/2019     08/01/2019    BUN 19 08/01/2019    CREATININE 0 88 08/01/2019           _____________________________________________________    PHYSICAL EXAMINATION:  /84   Pulse 67   Ht 5' 6" (1 676 m)   Wt 74 4 kg (164 lb)   BMI 26 47 kg/m²   General: well developed and well nourished, alert, oriented times 3 and appears comfortable  Psychiatric: Normal  HEENT: Trachea Midline, No torticollis  Cardiovascular: No discernable arrhythmia  Pulmonary: No wheezing or stridor  Skin: No masses, erythema, lacerations, fluctation, ulcerations  Neurovascular: Sensation Intact to the Median, Ulnar, Radial Nerve, Motor Intact to the Median, Ulnar, Radial Nerve and Pulses Intact    MUSCULOSKELETAL EXAMINATION:  Right hand  right ring and long fingers:  Positive palpable nodule over the A1 pulley  Positive tenderness to palpation over A1 pulley  Negative catching  Negative clicking    Palpable dupuytren's cords ring finger    _____________________________________________________  STUDIES REVIEWED:  No Studies to review      PROCEDURES PERFORMED:  Procedures  No Procedures performed today      Scribe Attestation    I,:   Dharmesh Leung am acting as a scribe while in the presence of the attending physician :        I,:   Sommer Plummer MD personally performed the services described in this documentation    as scribed in my presence :

## 2019-11-05 ENCOUNTER — HOSPITAL ENCOUNTER (OUTPATIENT)
Dept: RADIOLOGY | Facility: HOSPITAL | Age: 69
Discharge: HOME | End: 2019-11-05
Payer: MEDICARE

## 2019-11-05 DIAGNOSIS — R13.10 DYSPHAGIA, UNSPECIFIED TYPE: ICD-10-CM

## 2019-11-05 PROCEDURE — 74246 X-RAY XM UPR GI TRC 2CNTRST: CPT

## 2019-11-06 DIAGNOSIS — G95.0 SYRINGOMYELIA (HCC): ICD-10-CM

## 2019-11-06 DIAGNOSIS — K44.9 HIATAL HERNIA: Primary | ICD-10-CM

## 2019-11-07 RX ORDER — TRAMADOL HYDROCHLORIDE 50 MG/1
TABLET ORAL
Qty: 60 TABLET | Refills: 0 | Status: SHIPPED | OUTPATIENT
Start: 2019-11-07 | End: 2019-12-09 | Stop reason: SDUPTHER

## 2019-11-08 NOTE — TELEPHONE ENCOUNTER
I do not see a diagnosis for this medication  Please call patient  And see what he is using this cream 4    And I can prescribe a similar product that might be covered until Dr Kyrie Griggs gets back

## 2019-11-27 DIAGNOSIS — I10 ESSENTIAL HYPERTENSION: ICD-10-CM

## 2019-11-27 RX ORDER — HYDROCHLOROTHIAZIDE 25 MG/1
TABLET ORAL
Qty: 90 TABLET | Refills: 1 | Status: SHIPPED | OUTPATIENT
Start: 2019-11-27 | End: 2020-03-05 | Stop reason: SDUPTHER

## 2019-12-02 ENCOUNTER — TELEPHONE (OUTPATIENT)
Dept: FAMILY MEDICINE CLINIC | Facility: CLINIC | Age: 69
End: 2019-12-02

## 2019-12-02 NOTE — TELEPHONE ENCOUNTER
PT REFUSED TO LEAVE A MESSAGE ON THE REFILL LINE  PT SEES DR Campbell Friday  TRAMADOL AND METFORMIN  NEEDS REFILLS  PHARMACY IS [facility] ON [address]  THANK YOU

## 2019-12-03 DIAGNOSIS — IMO0001 UNCONTROLLED TYPE 2 DIABETES MELLITUS WITHOUT COMPLICATION, WITHOUT LONG-TERM CURRENT USE OF INSULIN: ICD-10-CM

## 2019-12-09 DIAGNOSIS — G95.0 SYRINGOMYELIA (HCC): ICD-10-CM

## 2019-12-09 RX ORDER — TRAMADOL HYDROCHLORIDE 50 MG/1
TABLET ORAL
Qty: 60 TABLET | Refills: 0 | Status: SHIPPED | OUTPATIENT
Start: 2019-12-09 | End: 2020-01-10 | Stop reason: SDUPTHER

## 2020-01-03 ENCOUNTER — ANESTHESIA EVENT (OUTPATIENT)
Dept: GASTROENTEROLOGY | Facility: MEDICAL CENTER | Age: 70
End: 2020-01-03

## 2020-01-03 ENCOUNTER — OFFICE VISIT (OUTPATIENT)
Dept: GASTROENTEROLOGY | Facility: MEDICAL CENTER | Age: 70
End: 2020-01-03
Payer: MEDICARE

## 2020-01-03 VITALS
WEIGHT: 165 LBS | DIASTOLIC BLOOD PRESSURE: 82 MMHG | SYSTOLIC BLOOD PRESSURE: 142 MMHG | HEIGHT: 66 IN | TEMPERATURE: 98.2 F | BODY MASS INDEX: 26.52 KG/M2 | HEART RATE: 63 BPM

## 2020-01-03 DIAGNOSIS — K44.9 HIATAL HERNIA: ICD-10-CM

## 2020-01-03 DIAGNOSIS — R13.10 DYSPHAGIA, UNSPECIFIED TYPE: Primary | ICD-10-CM

## 2020-01-03 DIAGNOSIS — E11.9 TYPE 2 DIABETES MELLITUS WITHOUT COMPLICATION, WITHOUT LONG-TERM CURRENT USE OF INSULIN (HCC): ICD-10-CM

## 2020-01-03 DIAGNOSIS — Z12.11 SPECIAL SCREENING FOR MALIGNANT NEOPLASMS, COLON: ICD-10-CM

## 2020-01-03 PROCEDURE — 99204 OFFICE O/P NEW MOD 45 MIN: CPT | Performed by: INTERNAL MEDICINE

## 2020-01-03 NOTE — PROGRESS NOTES
[facility] - Outpatient Consultation  Quiana Smith 81 y o  male MRN: [id number]  Encounter: [id number]          ASSESSMENT AND PLAN:    Quiana Smith is a 81 y o  male who presents with complaint of trouble swallowing and need for colonoscopy  Dysphagia may be secondary to GERD and inflammation vs EoE vs strictures vs HH vs malignancy vs other  He is also overdue for a repeat colonoscopy  Available labs and imaging reviewed  1  Dysphagia, unspecified type    2  Hiatal hernia    3  Special screening for malignant neoplasms, colon    4  Type 2 diabetes mellitus without complication, without long-term current use of insulin ([facility] )      Orders Placed This Encounter   Procedures    Colonoscopy    EGD     -- Plan for EGD given dysphagia and colonoscopy for screening  -- Diabetes as per PCP    ______________________________________________________________________    HPI:    Quiana Smith is a 81 y o  male who presents with complaint of trouble swallowing  When he swallows this back hurts and he feels like there is something in his throat  He did an upper GI which revealed small sliding HH and GERD  + globus  Some dysphagia when swallowing  + Odynophagia  No heartburn, nausea, vomiting  No abdominal pain, diarrhea, constipation, BRBPR, melena, weight loss  No FH of GI cancers  No prior EGD  He had 1 colonoscopy 5 years ago - he was told to repeat it in 1 year but he did not go back         REVIEW OF SYSTEMS:  10 point ROS reviewed and negative, except as above    Historical Information   Past Medical History:   Diagnosis Date    Anxiety     Chronic pain disorder     right shoulder    Diabetes mellitus (HCC)     type 2    Diverticulosis     Enlarged prostate     Extremity pain     Hearing loss, right     Hemorrhoids     Hypertension     Neck pain     Polyp, sigmoid colon      Past Surgical History:   Procedure Laterality Date    COLONOSCOPY      EAR SURGERY      INNER EAR SURGERY      NASAL SINUS SURGERY      ID COLONOSCOPY FLX DX W/COLLJ SPEC WHEN PFRMD N/A 8/30/2016    Procedure: COLONOSCOPY;  Surgeon: Paulo Smith MD;  Location[facility]; Service: General    ID SHLDR ARTHROSCOP,SURG,W/ROTAT CUFF REPR Right 2/5/2019    Procedure: ARTHROSCOPY SHOULDER;  Surgeon: Tonya Alonso DO;  Location:  MAIN OR;  Service: Orthopedics     Social History   Social History     Substance and Sexual Activity   Alcohol Use Yes    Comment: occassional     Social History     Substance and Sexual Activity   Drug Use No     Social History     Tobacco Use   Smoking Status Never Smoker   Smokeless Tobacco Never Used     Family History   Problem Relation Age of Onset    Hypertension Brother     Hepatitis Mother     Heart disease Father         CARDIAC DISORDER        Meds/Allergies       Current Outpatient Medications:     ALPRAZolam (XANAX) 0 5 mg tablet    Blood Glucose Monitoring Suppl (ONE TOUCH ULTRA 2) w/Device KIT    clobetasol (TEMOVATE) 0 05 % ointment    gabapentin (NEURONTIN) 600 MG tablet    glucose blood (ONE TOUCH ULTRA TEST) test strip    hydrochlorothiazide (HYDRODIURIL) 25 mg tablet    Lancets (ONETOUCH ULTRASOFT) lancets    losartan (COZAAR) 50 mg tablet    metFORMIN (GLUCOPHAGE) 500 mg tablet    mometasone (ELOCON) 0 1 % cream    tamsulosin (FLOMAX) 0 4 mg    traMADol (ULTRAM) 50 mg tablet    ezetimibe (ZETIA) 10 mg tablet    Allergies   Allergen Reactions    Lipitor [Atorvastatin] Other (See Comments) and Myalgia     Other reaction(s): increased pain  arthralgia    Lyrica [Pregabalin] Other (See Comments)     Pt can't remmember reaction           Objective     Blood pressure 142/82, pulse 63, temperature 98 2 °F (36 8 °C), height 5' 6" (1 676 m), weight 74 8 kg (165 lb)  Body mass index is 26 63 kg/m²  PHYSICAL EXAM:      General Appearance:   Alert, cooperative, no distress   HEENT:   Normocephalic, atraumatic, anicteric       Neck:  Supple, symmetrical, trachea midline   Lungs:   Clear to auscultation bilaterally; no rales, rhonchi or wheezing; respirations unlabored    Heart[de-identified]   Regular rate and rhythm; no murmur, rub, or gallop  Abdomen:   Soft, non-tender, non-distended; normal bowel sounds; no masses, no organomegaly    Genitalia:   Deferred    Rectal:   Deferred    Extremities:  No cyanosis, clubbing or edema    Pulses:  2+ and symmetric    Skin:  No jaundice, rashes, or lesions    Lymph nodes:  No palpable cervical lymphadenopathy        Lab Results:   No visits with results within 1 Day(s) from this visit  Latest known visit with results is:   Appointment on 08/01/2019   Component Date Value    Hepatitis C Ab 08/01/2019 Non-reactive     Sodium 08/01/2019 137     Potassium 08/01/2019 3 6     Chloride 08/01/2019 104     CO2 08/01/2019 28     ANION GAP 08/01/2019 5     BUN 08/01/2019 19     Creatinine 08/01/2019 0 88     Glucose, Fasting 08/01/2019 125*    Calcium 08/01/2019 9 1     AST 08/01/2019 9     ALT 08/01/2019 23     Alkaline Phosphatase 08/01/2019 97     Total Protein 08/01/2019 7 9     Albumin 08/01/2019 3 8     Total Bilirubin 08/01/2019 0 74     eGFR 08/01/2019 89     Hemoglobin A1C 08/01/2019 6 5*    EAG 08/01/2019 140     Cholesterol 08/01/2019 196     Triglycerides 08/01/2019 158*    HDL, Direct 08/01/2019 36*    LDL Calculated 08/01/2019 128*    TSH 3RD GENERATON 08/01/2019 1 390          Radiology Results:   No results found

## 2020-01-03 NOTE — PATIENT INSTRUCTIONS
Patient scheduled for procedure 01/13/2020 with Dr Kem Ward @ [address] [facility] [address]   Patient did not have questions regarding prep Miralax/Dulcolax  He is aware he needs a

## 2020-01-03 NOTE — H&P (VIEW-ONLY)
[facility] - Outpatient Consultation  Eliana Trent 81 y o  male MRN: [id number]  Encounter: [id number]          ASSESSMENT AND PLAN:    Eliana Trent is a 81 y o  male who presents with complaint of trouble swallowing and need for colonoscopy  Dysphagia may be secondary to GERD and inflammation vs EoE vs strictures vs HH vs malignancy vs other  He is also overdue for a repeat colonoscopy  Available labs and imaging reviewed  1  Dysphagia, unspecified type    2  Hiatal hernia    3  Special screening for malignant neoplasms, colon    4  Type 2 diabetes mellitus without complication, without long-term current use of insulin ([facility] )      Orders Placed This Encounter   Procedures    Colonoscopy    EGD     -- Plan for EGD given dysphagia and colonoscopy for screening  -- Diabetes as per PCP    ______________________________________________________________________    HPI:    Eliana Trent is a 81 y o  male who presents with complaint of trouble swallowing  When he swallows this back hurts and he feels like there is something in his throat  He did an upper GI which revealed small sliding HH and GERD  + globus  Some dysphagia when swallowing  + Odynophagia  No heartburn, nausea, vomiting  No abdominal pain, diarrhea, constipation, BRBPR, melena, weight loss  No FH of GI cancers  No prior EGD  He had 1 colonoscopy 5 years ago - he was told to repeat it in 1 year but he did not go back         REVIEW OF SYSTEMS:  10 point ROS reviewed and negative, except as above    Historical Information   Past Medical History:   Diagnosis Date    Anxiety     Chronic pain disorder     right shoulder    Diabetes mellitus (HCC)     type 2    Diverticulosis     Enlarged prostate     Extremity pain     Hearing loss, right     Hemorrhoids     Hypertension     Neck pain     Polyp, sigmoid colon      Past Surgical History:   Procedure Laterality Date    COLONOSCOPY      EAR SURGERY      INNER EAR SURGERY      NASAL SINUS SURGERY      [place] COLONOSCOPY FLX DX W/COLLJ SPEC WHEN PFRMD N/A 8/30/2016    Procedure: COLONOSCOPY;  Surgeon: Matheus Lynch MD;  Location[facility]; Service: General    [place] SHLDR ARTHROSCOP,SURG,W/ROTAT CUFF REPR Right 2/5/2019    Procedure: ARTHROSCOPY SHOULDER;  Surgeon: Michoacano Cruz DO;  Location:  MAIN OR;  Service: Orthopedics     Social History   Social History     Substance and Sexual Activity   Alcohol Use Yes    Comment: occassional     Social History     Substance and Sexual Activity   Drug Use No     Social History     Tobacco Use   Smoking Status Never Smoker   Smokeless Tobacco Never Used     Family History   Problem Relation Age of Onset    Hypertension Brother     Hepatitis Mother     Heart disease Father         CARDIAC DISORDER        Meds/Allergies       Current Outpatient Medications:     ALPRAZolam (XANAX) 0 5 mg tablet    Blood Glucose Monitoring Suppl (ONE TOUCH ULTRA 2) w/Device KIT    clobetasol (TEMOVATE) 0 05 % ointment    gabapentin (NEURONTIN) 600 MG tablet    glucose blood (ONE TOUCH ULTRA TEST) test strip    hydrochlorothiazide (HYDRODIURIL) 25 mg tablet    Lancets (ONETOUCH ULTRASOFT) lancets    losartan (COZAAR) 50 mg tablet    metFORMIN (GLUCOPHAGE) 500 mg tablet    mometasone (ELOCON) 0 1 % cream    tamsulosin (FLOMAX) 0 4 mg    traMADol (ULTRAM) 50 mg tablet    ezetimibe (ZETIA) 10 mg tablet    Allergies   Allergen Reactions    Lipitor [Atorvastatin] Other (See Comments) and Myalgia     Other reaction(s): increased pain  arthralgia    Lyrica [Pregabalin] Other (See Comments)     Pt can't remmember reaction           Objective     Blood pressure 142/82, pulse 63, temperature 98 2 °F (36 8 °C), height 5' 6" (1 676 m), weight 74 8 kg (165 lb)  Body mass index is 26 63 kg/m²  PHYSICAL EXAM:      General Appearance:   Alert, cooperative, no distress   HEENT:   Normocephalic, atraumatic, anicteric       Neck:  Supple, symmetrical, trachea midline   Lungs:   Clear to auscultation bilaterally; no rales, rhonchi or wheezing; respirations unlabored    Heart[de-identified]   Regular rate and rhythm; no murmur, rub, or gallop  Abdomen:   Soft, non-tender, non-distended; normal bowel sounds; no masses, no organomegaly    Genitalia:   Deferred    Rectal:   Deferred    Extremities:  No cyanosis, clubbing or edema    Pulses:  2+ and symmetric    Skin:  No jaundice, rashes, or lesions    Lymph nodes:  No palpable cervical lymphadenopathy        Lab Results:   No visits with results within 1 Day(s) from this visit  Latest known visit with results is:   Appointment on 08/01/2019   Component Date Value    Hepatitis C Ab 08/01/2019 Non-reactive     Sodium 08/01/2019 137     Potassium 08/01/2019 3 6     Chloride 08/01/2019 104     CO2 08/01/2019 28     ANION GAP 08/01/2019 5     BUN 08/01/2019 19     Creatinine 08/01/2019 0 88     Glucose, Fasting 08/01/2019 125*    Calcium 08/01/2019 9 1     AST 08/01/2019 9     ALT 08/01/2019 23     Alkaline Phosphatase 08/01/2019 97     Total Protein 08/01/2019 7 9     Albumin 08/01/2019 3 8     Total Bilirubin 08/01/2019 0 74     eGFR 08/01/2019 89     Hemoglobin A1C 08/01/2019 6 5*    EAG 08/01/2019 140     Cholesterol 08/01/2019 196     Triglycerides 08/01/2019 158*    HDL, Direct 08/01/2019 36*    LDL Calculated 08/01/2019 128*    TSH 3RD GENERATON 08/01/2019 1 390          Radiology Results:   No results found

## 2020-01-09 DIAGNOSIS — G95.0 SYRINGOMYELIA (HCC): ICD-10-CM

## 2020-01-09 RX ORDER — TRAMADOL HYDROCHLORIDE 50 MG/1
TABLET ORAL
Qty: 60 TABLET | Refills: 0 | Status: CANCELLED | OUTPATIENT
Start: 2020-01-09

## 2020-01-10 DIAGNOSIS — G95.0 SYRINGOMYELIA (HCC): ICD-10-CM

## 2020-01-10 RX ORDER — TRAMADOL HYDROCHLORIDE 50 MG/1
TABLET ORAL
Qty: 60 TABLET | Refills: 0 | Status: SHIPPED | OUTPATIENT
Start: 2020-01-10 | End: 2020-02-10 | Stop reason: SDUPTHER

## 2020-01-13 ENCOUNTER — HOSPITAL ENCOUNTER (OUTPATIENT)
Dept: GASTROENTEROLOGY | Facility: MEDICAL CENTER | Age: 70
Setting detail: OUTPATIENT SURGERY
Discharge: HOME | End: 2020-01-13
Attending: INTERNAL MEDICINE
Payer: MEDICARE

## 2020-01-13 ENCOUNTER — ANESTHESIA (OUTPATIENT)
Dept: GASTROENTEROLOGY | Facility: MEDICAL CENTER | Age: 70
End: 2020-01-13

## 2020-01-13 VITALS
BODY MASS INDEX: 26.03 KG/M2 | OXYGEN SATURATION: 99 % | RESPIRATION RATE: 16 BRPM | TEMPERATURE: 98.3 F | SYSTOLIC BLOOD PRESSURE: 150 MMHG | HEIGHT: 66 IN | HEART RATE: 64 BPM | DIASTOLIC BLOOD PRESSURE: 84 MMHG | WEIGHT: 162 LBS

## 2020-01-13 DIAGNOSIS — K20.90 ESOPHAGITIS: Primary | ICD-10-CM

## 2020-01-13 DIAGNOSIS — K44.9 HIATAL HERNIA: ICD-10-CM

## 2020-01-13 DIAGNOSIS — R13.10 DYSPHAGIA, UNSPECIFIED TYPE: ICD-10-CM

## 2020-01-13 DIAGNOSIS — Z12.11 SPECIAL SCREENING FOR MALIGNANT NEOPLASMS, COLON: ICD-10-CM

## 2020-01-13 PROCEDURE — 87254 VIRUS INOCULATION SHELL VIA: CPT | Performed by: INTERNAL MEDICINE

## 2020-01-13 PROCEDURE — 88341 IMHCHEM/IMCYTCHM EA ADD ANTB: CPT | Performed by: PATHOLOGY

## 2020-01-13 PROCEDURE — 88312 SPECIAL STAINS GROUP 1: CPT | Performed by: PATHOLOGY

## 2020-01-13 PROCEDURE — 88305 TISSUE EXAM BY PATHOLOGIST: CPT | Performed by: PATHOLOGY

## 2020-01-13 PROCEDURE — 87252 VIRUS INOCULATION TISSUE: CPT | Performed by: INTERNAL MEDICINE

## 2020-01-13 PROCEDURE — 88342 IMHCHEM/IMCYTCHM 1ST ANTB: CPT | Performed by: PATHOLOGY

## 2020-01-13 PROCEDURE — NC001 PR NO CHARGE: Performed by: INTERNAL MEDICINE

## 2020-01-13 PROCEDURE — 45380 COLONOSCOPY AND BIOPSY: CPT | Performed by: INTERNAL MEDICINE

## 2020-01-13 PROCEDURE — 43235 EGD DIAGNOSTIC BRUSH WASH: CPT | Performed by: INTERNAL MEDICINE

## 2020-01-13 PROCEDURE — 45385 COLONOSCOPY W/LESION REMOVAL: CPT | Performed by: INTERNAL MEDICINE

## 2020-01-13 RX ORDER — SODIUM CHLORIDE 9 MG/ML
125 INJECTION, SOLUTION INTRAVENOUS CONTINUOUS
Status: DISCONTINUED | OUTPATIENT
Start: 2020-01-13 | End: 2020-01-13

## 2020-01-13 RX ORDER — PROPOFOL 10 MG/ML
INJECTION, EMULSION INTRAVENOUS AS NEEDED
Status: DISCONTINUED | OUTPATIENT
Start: 2020-01-13 | End: 2020-01-13 | Stop reason: SURG

## 2020-01-13 RX ORDER — LIDOCAINE HYDROCHLORIDE 20 MG/ML
INJECTION, SOLUTION EPIDURAL; INFILTRATION; INTRACAUDAL; PERINEURAL AS NEEDED
Status: DISCONTINUED | OUTPATIENT
Start: 2020-01-13 | End: 2020-01-13 | Stop reason: SURG

## 2020-01-13 RX ORDER — OMEPRAZOLE 40 MG/1
40 CAPSULE, DELAYED RELEASE ORAL
Qty: 30 CAPSULE | Refills: 3 | Status: SHIPPED | OUTPATIENT
Start: 2020-01-13 | End: 2020-04-08 | Stop reason: SDUPTHER

## 2020-01-13 RX ORDER — PROPOFOL 10 MG/ML
INJECTION, EMULSION INTRAVENOUS CONTINUOUS PRN
Status: DISCONTINUED | OUTPATIENT
Start: 2020-01-13 | End: 2020-01-13 | Stop reason: SURG

## 2020-01-13 RX ADMIN — PROPOFOL 20 MG: 10 INJECTION, EMULSION INTRAVENOUS at 07:35

## 2020-01-13 RX ADMIN — SODIUM CHLORIDE 125 ML/HR: 0.9 INJECTION, SOLUTION INTRAVENOUS at 07:02

## 2020-01-13 RX ADMIN — PROPOFOL 20 MG: 10 INJECTION, EMULSION INTRAVENOUS at 07:29

## 2020-01-13 RX ADMIN — PROPOFOL 20 MG: 10 INJECTION, EMULSION INTRAVENOUS at 07:31

## 2020-01-13 RX ADMIN — PROPOFOL 140 MCG/KG/MIN: 10 INJECTION, EMULSION INTRAVENOUS at 07:35

## 2020-01-13 RX ADMIN — PROPOFOL 140 MG: 10 INJECTION, EMULSION INTRAVENOUS at 07:27

## 2020-01-13 RX ADMIN — SODIUM CHLORIDE: 0.9 INJECTION, SOLUTION INTRAVENOUS at 07:47

## 2020-01-13 RX ADMIN — LIDOCAINE HYDROCHLORIDE 3 ML: 20 INJECTION, SOLUTION EPIDURAL; INFILTRATION; INTRACAUDAL; PERINEURAL at 07:27

## 2020-01-13 RX ADMIN — PROPOFOL 20 MG: 10 INJECTION, EMULSION INTRAVENOUS at 07:33

## 2020-01-13 NOTE — ANESTHESIA PREPROCEDURE EVALUATION
Review of Systems/Medical History  Patient summary reviewed  Chart reviewed      Cardiovascular  Hyperlipidemia, Hypertension ,    Pulmonary  Negative pulmonary ROS        GI/Hepatic    GERD , Bowel prep       Prostatic disorder, benign prostatic hyperplasia       Endo/Other  Diabetes (HbA1c 7 3) type 2 Oral agent,      GYN  Negative gynecology ROS          Hematology  Negative hematology ROS      Musculoskeletal    Arthritis     Neurology  Negative neurology ROS      Psychology   Anxiety,          ECG 12 lead   Order: [id number]   Collected:  1/25/2019 09:19    Ref Range & Units 11d ago   Ventricular Rate BPM 69    Atrial Rate BPM 69    NM Interval ms 166    QRSD Interval ms 68    QT Interval ms 386    QTC Interval ms 413    P Axis degrees 51    QRS Axis degrees -6    T Wave Axis degrees 22    View Full Report  Narrative     Normal sinus rhythm  Normal ECG  No previous ECGs available  Confirmed by Neville Larsen ([id number]) on 1/26/2019 1:55:22 PM         Linked Documents     View Image                  Physical Exam    Airway    Mallampati score: II  TM Distance: <3 FB  Neck ROM: full     Dental       Cardiovascular  Rhythm: regular, Rate: normal, Cardiovascular exam normal    Pulmonary  Pulmonary exam normal     Other Findings        Anesthesia Plan  ASA Score- 3     Anesthesia Type- IV sedation with anesthesia with ASA Monitors  Additional Monitors:   Airway Plan:         Plan Factors- Patient instructed to abstain from smoking on day of procedure  Patient did not smoke on day of surgery  Induction- intravenous  Postoperative Plan-     Informed Consent- Anesthetic plan and risks discussed with patient

## 2020-01-13 NOTE — INTERVAL H&P NOTE
H&P reviewed  After examining the patient I find no changes in the patients condition since the H&P had been written      Vitals:    01/13/20 0657   BP: 167/90   Pulse: 68   Resp: 16   Temp: 98 3 °F (36 8 °C)   SpO2: 98%

## 2020-01-13 NOTE — DISCHARGE INSTRUCTIONS
Upper Endoscopy   WHAT YOU NEED TO KNOW:   An upper endoscopy is also called an upper gastrointestinal (GI) endoscopy, or an esophagogastroduodenoscopy (EGD)  You may feel bloated, gassy, or have some abdominal discomfort after your procedure  Your throat may be sore for 24 to 36 hours  You may burp or pass gas from air that is still inside your body  DISCHARGE INSTRUCTIONS:   Call 911 if:   · You have sudden chest pain or trouble breathing  Seek care immediately if:   · You feel dizzy or faint  · You have trouble swallowing  · You have severe throat pain  · Your bowel movements are very dark or black  · Your abdomen is hard and firm and you have severe pain  · You vomit blood  Contact your healthcare provider if:   · You feel full or bloated and cannot burp or pass gas  · You have not had a bowel movement for 3 days after your procedure  · You have neck pain  · You have a fever or chills  · You have nausea or are vomiting  · You have a rash or hives  · You have questions or concerns about your endoscopy  Relieve a sore throat:  Suck on throat lozenges or crushed ice  Gargle with a small amount of warm salt water  Mix 1 teaspoon of salt and 1 cup of warm water to make salt water  Relieve gas and discomfort from bloating:  Lie on your right side with a heating pad on your abdomen  Take short walks to help pass gas  Eat small meals until bloating is relieved  Rest after your procedure:  Do not drive or make important decisions until the day after your procedure  Return to your normal activity as directed  You can usually return to work the day after your procedure  Follow up with your healthcare provider as directed:  Write down your questions so you remember to ask them during your visits     © 2017 [employer] Information is for End User's use only and may not be sold, redistributed or otherwise used for commercial purposes  All illustrations and images included in CareNotes® are the copyrighted property of [employer]  or Cristian Colvin  The above information is an  only  It is not intended as medical advice for individual conditions or treatments  Talk to your doctor, nurse or pharmacist before following any medical regimen to see if it is safe and effective for you  Esophagitis   WHAT YOU NEED TO KNOW:   Esophagitis is inflammation or irritation of the lining of the esophagus  DISCHARGE INSTRUCTIONS:   Call 911 for any of the following:   · You have chest pain that does not go away within a few minutes or gets worse  Seek care immediately if:   · You feel like you have food stuck in your throat and you cannot cough it out  Contact your healthcare provider if:   · You have new or worsening symptoms, even after treatment  · You have questions or concerns about your condition or care  Medicines:   · Medicines  may be given to fight an infection or to control stomach acid  · Take your medicine as directed  Contact your healthcare provider if you think your medicine is not helping or if you have side effects  Tell him or her if you are allergic to any medicine  Keep a list of the medicines, vitamins, and herbs you take  Include the amounts, and when and why you take them  Bring the list or the pill bottles to follow-up visits  Carry your medicine list with you in case of an emergency  Follow up with your healthcare provider as directed: You may need ongoing tests or treatment  Write down your questions so you remember to ask them during your visits  Do not smoke:  Nicotine and other chemicals in cigarettes and cigars can cause blood vessel and lung damage  Ask your healthcare provider for information if you currently smoke and need help to quit  E-cigarettes or smokeless tobacco still contain nicotine  Talk to your healthcare provider before you use these products     Do not drink alcohol:  Alcohol can irritate your esophagus  Talk to your healthcare provider if you need help to stop drinking  Keep batteries and similar objects out of the reach of children:  Babies often put items in their mouths to explore them  Button batteries are easy to swallow and can cause serious damage  Keep the battery covers of electronic devices such as remote controls taped closed  Store all batteries and toxic materials where children cannot get to them  Use childproof locks to keep children away from dangerous materials  Manage or prevent esophagitis:   · Limit or do not eat foods that can lead to esophagitis  Foods such as oranges and salsa can irritate your esophagus  Caffeine and chocolate can cause acid reflux  High-fat and fried foods make your stomach digest food more slowly  This increases the amount of stomach acid your esophagus is exposed to  Eat small meals, and drink water with your meals  Soft foods such as yogurt and applesauce may help soothe your throat  Do not eat for at least 3 hours before you go to bed  · Prevent acid reflux  Do not bend over unless it is necessary  Acid may back up into your esophagus when you bend over  If possible, keep the head of your bed elevated while you sleep  This will help keep acid from backing up  Manage stress  Stress can make your symptoms worse or cause stomach acid to back up  · Drink more liquid when you take pills  Drink a full glass of water when you take your pills  Ask your healthcare provider if you can take your pills at least an hour before you go to bed  © 2017 [address] [employer] Information is for End User's use only and may not be sold, redistributed or otherwise used for commercial purposes  All illustrations and images included in CareNotes® are the copyrighted property of [employer]  or Cristian Colvin  The above information is an  only   It is not intended as medical advice for individual conditions or treatments  Talk to your doctor, nurse or pharmacist before following any medical regimen to see if it is safe and effective for you  Hiatal Hernia   WHAT YOU NEED TO KNOW:   What is a hiatal hernia? A hiatal hernia is a condition that causes part of your stomach to bulge through the hiatus (small opening) in your diaphragm  The part of the stomach may move up and down, or it may get trapped above the diaphragm  What increases my risk for a hiatal hernia? The exact cause of a hiatal hernia is not known  You may have been born with a large hiatus  The following may increase your risk of a hiatal hernia:  · Obesity    · Older age    · Medical conditions such as diverticulosis or esophagitis    · Previous surgery of the esophagus or stomach or trauma such as from a motor vehicle accident  What are the types of hiatal hernia? · Type I (sliding hiatal hernia): A portion of the stomach slides in and out of the hiatus  This type is the most common and usually causes gastroesophageal reflux disease (GERD)  GERD occurs when the esophageal sphincter does not close properly and causes acid reflux  The esophageal sphincter is the lower muscle of the esophagus  · Type II (paraesophageal hiatal hernia):  Type II hiatal hernia forms when a part of the stomach squeezes through the hiatus and lies next to the esophagus  · Type III (combined):  Type III hiatal hernia is a combination of a sliding and a paraesophageal hiatal hernia  · Type IV (complex paraesophageal hiatal hernia): The whole stomach, the small and large bowels, spleen, pancreas, or liver is pushed up into the chest   What are the signs and symptoms of a hiatal hernia? The most common symptom is heartburn  This usually occurs after meals and spreads to your neck, jaw, or shoulder   You may have no signs or symptoms, or you may have any of the following:  · Abdominal pain, especially in the area just above your navel    · Bitter or acid taste in your mouth    · Trouble swallowing    · Coughing or hoarseness    · Chest pain or shortness of breath that occurs after eating    · Frequent burping or hiccups    · Uncomfortable feeling of fullness after eating  How is a hiatal hernia diagnosed? · An upper GI series test  includes x-rays of your esophagus, stomach, and your small intestines  It is also called a barium swallow test  You will be given barium (a chalky liquid) to drink before the pictures are taken  This liquid helps your stomach and intestines show up better on the x-rays  An upper GI series can show if you have an ulcer, a blocked intestine, or other problems  · An endoscopy  uses a scope to see the inside of your digestive tract  A scope is a long, bendable tube with a light on the end of it  A camera may be hooked to the scope to take pictures  How is a hiatal hernia treated? Treatment depends on the type of hiatal hernia you have and on your symptoms  You may not need any treatment  You may need any of the following:  · Medicines  may be given to relieve heartburn symptoms  These medicines help to decrease or block stomach acid  You may also be given medicines that help to tighten the esophageal sphincter  · Surgery  may be done when medicines cannot control your symptoms, or other problems are present  Your healthcare provider may also suggest surgery depending on the type of hernia you have  Your healthcare provider can put your stomach back into its normal location  He may make the hiatus (hole) smaller and anchor your stomach in your abdomen  Fundoplication is a surgery that wraps the upper part of the stomach around the esophageal sphincter to strengthen it  How can I manage symptoms? The following nutrition and lifestyle changes may be recommended to relieve symptoms of heartburn  · Avoid foods that make your symptoms worse  These may include spicy foods, fruit juices, alcohol, caffeine, chocolate, and mint  · Eat several small meals during the day  Small meals give your stomach less food to digest     · Avoid lying down and bending forward after you eat  Do not eat meals 2 to 3 hours before bedtime  This decreases your risk for reflux  · Maintain a healthy weight  If you are overweight, weight loss may help relieve your symptoms  · Sleep with your head elevated  at least 6 inches  · Do not smoke  Smoking can increase your symptoms of heartburn  When should I seek immediate care? · You have severe abdominal pain  · You try to vomit but nothing comes out (retching)  · You have severe chest pain and sudden trouble breathing  · Your bowel movements are black or bloody  · Your vomit looks like coffee grounds or has blood in it  When should I contact my healthcare provider? · Your symptoms are getting worse  · You have nausea, and you are vomiting  · You are losing weight without trying  · You have questions or concerns about your condition or care  CARE AGREEMENT:   You have the right to help plan your care  Learn about your health condition and how it may be treated  Discuss treatment options with your caregivers to decide what care you want to receive  You always have the right to refuse treatment  The above information is an  only  It is not intended as medical advice for individual conditions or treatments  Talk to your doctor, nurse or pharmacist before following any medical regimen to see if it is safe and effective for you  © 2017 [address] [employer] Information is for End User's use only and may not be sold, redistributed or otherwise used for commercial purposes  All illustrations and images included in CareNotes® are the copyrighted property of [employer]  or Cristian Colvin  Colonoscopy   WHAT YOU NEED TO KNOW:   A colonoscopy is a procedure to examine the inside of your colon (intestine) with a scope   Polyps or tissue growths may have been removed during your colonoscopy  It is normal to feel bloated and to have some abdominal discomfort  You should be passing gas  If you have hemorrhoids or you had polyps removed, you may have a small amount of bleeding  DISCHARGE INSTRUCTIONS:   Seek care immediately if:   · You have a large amount of bright red blood in your bowel movements  · Your abdomen is hard and firm and you have severe pain  · You have sudden trouble breathing  Contact your healthcare provider if:   · You develop a rash or hives  · You have a fever within 24 hours of your procedure  · You have not had a bowel movement for 3 days after your procedure  · You have questions or concerns about your condition or care  Activity:   · Do not lift, strain, or run  for 3 days after your procedure  · Rest after your procedure  You have been given medicine to relax you  Do not  drive or make important decisions until the day after your procedure  Return to your normal activity as directed  · Relieve gas and discomfort from bloating  by lying on your right side with a heating pad on your abdomen  You may need to take short walks to help the gas move out  Eat small meals until bloating is relieved  If you had polyps removed: For 7 days after your procedure:  · Do not  take aspirin  · Do not  go on long car rides  Help prevent constipation:   · Eat a variety of healthy foods  Healthy foods include fruit, vegetables, whole-grain breads, low-fat dairy products, beans, lean meat, and fish  Ask if you need to be on a special diet  Your healthcare provider may recommend that you eat high-fiber foods such as cooked beans  Fiber helps you have regular bowel movements  · Drink liquids as directed  Adults should drink between 9 and 13 eight-ounce cups of liquid every day  Ask what amount is best for you  For most people, good liquids to drink are water, juice, and milk  · Exercise as directed    Talk to your healthcare provider about the best exercise plan for you  Exercise can help prevent constipation, decrease your blood pressure and improve your health  Follow up with your healthcare provider as directed:  Write down your questions so you remember to ask them during your visits  © 2017 [facility] Information is for End User's use only and may not be sold, redistributed or otherwise used for commercial purposes  All illustrations and images included in CareNotes® are the copyrighted property of [employer]  or Cristian Colvin  The above information is an  only  It is not intended as medical advice for individual conditions or treatments  Talk to your doctor, nurse or pharmacist before following any medical regimen to see if it is safe and effective for you  Colorectal Polyps   WHAT YOU NEED TO KNOW:   Colorectal polyps are small growths of tissue in the lining of the colon and rectum  Most polyps are hyperplastic polyps and are usually benign (noncancerous)  Certain types of polyps, called adenomatous polyps, may turn into cancer  DISCHARGE INSTRUCTIONS:   Follow up with your healthcare provider or gastroenterologist as directed: You may need to return for more tests, such as another colonoscopy  Write down your questions so you remember to ask them during your visits  Reduce your risk for colorectal polyps:   · Eat a variety of healthy foods:  Healthy foods include fruit, vegetables, whole-grain breads, low-fat dairy products, beans, lean meat, and fish  Ask if you need to be on a special diet  · Maintain a healthy weight:  Ask your healthcare provider if you need to lose weight and how much you need to lose  Ask for help with a weight loss program     · Exercise:  Begin to exercise slowly and do more as you get stronger   Talk with your healthcare provider before you start an exercise program      · Limit alcohol:  Your risk for polyps increases the more you drink  · Do not smoke: If you smoke, it is never too late to quit  Ask for information about how to stop  For support and more information:   · Leonel Leahy ([facility])  [address] , [place] [address]  Phone: [phone]  Web Address: www digestive  [facility]  Contact your healthcare provider or gastroenterologist if:   · You have a fever  · You have chills, a cough, or feel weak and achy  · You have abdominal pain that does not go away or gets worse after you take medicine  · Your abdomen is swollen  · You are losing weight without trying  · You have questions or concerns about your condition or care  Seek care immediately or call 911 if:   · You have sudden shortness of breath  · You have a fast heart rate, fast breathing, or are too dizzy to stand up  · You have severe abdominal pain  · You see blood in your bowel movement  © 2017 [address]  Information is for End User's use only and may not be sold, redistributed or otherwise used for commercial purposes  All illustrations and images included in CareNotes® are the copyrighted property of [employer]  or Cristian Colvin  The above information is an  only  It is not intended as medical advice for individual conditions or treatments  Talk to your doctor, nurse or pharmacist before following any medical regimen to see if it is safe and effective for you  Hemorrhoids   WHAT YOU NEED TO KNOW:   What are hemorrhoids? Hemorrhoids are swollen blood vessels inside your rectum (internal hemorrhoids) or on your anus (external hemorrhoids)  Sometimes a hemorrhoid may prolapse  This means it extends out of your anus  What increases my risk for hemorrhoids?    · Pregnancy or obesity    · Straining or sitting for a long time during bowel movements    · Liver disease    · Weak muscles around the anus caused by older age, rectal surgery, or anal intercourse    · A lack of physical activity    · Chronic diarrhea or constipation    · A low-fiber diet  What are the signs and symptoms of hemorrhoids? · Pain or itching around your anus or inside your rectum    · Swelling or bumps around your anus    · Bright red blood in your bowel movement, on the toilet paper, or in the toilet bowl    · Tissue bulging out of your anus (prolapsed hemorrhoids)    · Incontinence (poor control over urine or bowel movements)  How are hemorrhoids diagnosed? Your healthcare provider will ask about your symptoms, the foods you eat, and your bowel movements  He will examine your anus for external hemorrhoids  You may need the following:  · A digital rectal exam  is a test to check for hemorrhoids  Your healthcare provider will put a gloved finger inside your anus to feel for the hemorrhoids  · An anoscopy  is a test that uses a scope (small tube with a light and camera on the end) to look at your hemorrhoids  How are hemorrhoids treated? Treatment will depend on your symptoms  You may need any of the following:  · Medicines  can help decrease pain and swelling, and soften your bowel movement  The medicine may be a pill, pad, cream, or ointment  · Procedures  may be used to shrink or remove your hemorrhoid  Examples include rubber-band ligation, sclerotherapy, and photocoagulation  These procedures may be done in your healthcare provider's office  Ask your healthcare provider for more information about these procedures  · Surgery  may be needed to shrink or remove your hemorrhoids  How can I manage my symptoms? · Apply ice on your anus for 15 to 20 minutes every hour or as directed  Use an ice pack, or put crushed ice in a plastic bag  Cover it with a towel before you apply it to your anus  Ice helps prevent tissue damage and decreases swelling and pain  · Take a sitz bath  Fill a bathtub with 4 to 6 inches of warm water   You may also use a sitz bath pan that fits inside a toilet bowl  Sit in the sitz bath for 15 minutes  Do this 3 times a day, and after each bowel movement  The warm water can help decrease pain and swelling  · Keep your anal area clean  Gently wash the area with warm water daily  Soap may irritate the area  After a bowel movement, wipe with moist towelettes or wet toilet paper  Dry toilet paper can irritate the area  How can I help prevent hemorrhoids? · Do not strain to have a bowel movement  Do not sit on the toilet too long  These actions can increase pressure on the tissues in your rectum and anus  · Drink plenty of liquids  Liquids can help prevent constipation  Ask how much liquid to drink each day and which liquids are best for you  · Eat a variety of high-fiber foods  Examples include fruits, vegetables, and whole grains  Ask your healthcare provider how much fiber you need each day  You may need to take a fiber supplement  · Exercise as directed  Exercise, such as walking, may make it easier to have a bowel movement  Ask your healthcare provider to help you create an exercise plan  · Do not have anal sex  Anal sex can weaken the skin around your rectum and anus  · Avoid heavy lifting  This can cause straining and increase your risk for another hemorrhoid  When should I seek immediate care? · You have severe pain in your rectum or around your anus  · You have severe pain in your abdomen and you are vomiting  · You have bleeding from your anus that soaks through your underwear  When should I contact my healthcare provider? · You have frequent and painful bowel movements  · Your hemorrhoid looks or feels more swollen than usual      · You do not have a bowel movement for 2 days or more  · You see or feel tissue coming through your anus  · You have questions or concerns about your condition or care  CARE AGREEMENT:   You have the right to help plan your care   Learn about your health condition and how it may be treated  Discuss treatment options with your caregivers to decide what care you want to receive  You always have the right to refuse treatment  The above information is an  only  It is not intended as medical advice for individual conditions or treatments  Talk to your doctor, nurse or pharmacist before following any medical regimen to see if it is safe and effective for you  © 2017 [address] Information is for End User's use only and may not be sold, redistributed or otherwise used for commercial purposes  All illustrations and images included in CareNotes® are the copyrighted property of [employer]  or Cristian Marii  [facility]   WHAT YOU NEED TO KNOW:   Diverticulosis is a condition that causes small pockets called diverticula to form in your intestine  These pockets make it difficult for bowel movements to pass through your digestive system  DISCHARGE INSTRUCTIONS:   Seek care immediately if:   · You have severe pain on the left side of your lower abdomen  · Your bowel movements are bright or dark red  Contact your healthcare provider if:   · You have a fever and chills  · You feel dizzy or lightheaded  · You have nausea, or you are vomiting  · You have a change in your bowel movements  · You have questions or concerns about your condition or care  Medicines:   · Medicines  to soften your bowel movements may be given  You may also need medicines to treat symptoms such as bloating and pain  · Take your medicine as directed  Contact your healthcare provider if you think your medicine is not helping or if you have side effects  Tell him or her if you are allergic to any medicine  Keep a list of the medicines, vitamins, and herbs you take  Include the amounts, and when and why you take them  Bring the list or the pill bottles to follow-up visits   Carry your medicine list with you in case of an emergency  Self-care: The goal of treatment is to manage any symptoms you have and prevent other problems such as diverticulitis  Diverticulitis is swelling or infection of the diverticula  Your healthcare provider may recommend any of the following:  · Eat a variety of high-fiber foods  High-fiber foods help you have regular bowel movements  High-fiber foods include cooked beans, fruits, vegetables, and some cereals  Most adults need 25 to 35 grams of fiber each day  Your healthcare provider may recommend that you have more  Ask your healthcare provider how much fiber you need  Increase fiber slowly  You may have abdominal discomfort, bloating, and gas if you add fiber to your diet too quickly  You may need to take a fiber supplement if you are not getting enough fiber from food  · Drink liquids as directed  You may need to drink 2 to 3 liters (8 to 12 cups) of liquids every day  Ask your healthcare provider how much liquid to drink each day and which liquids are best for you  · Apply heat  on your abdomen for 20 to 30 minutes every 2 hours for as many days as directed  Heat helps decrease pain and muscle spasms  Help prevent diverticulitis or other symptoms: The following may help decrease your risk for diverticulitis or symptoms, such as bleeding  Talk to your provider about these or other things you can do to prevent problems that may occur with diverticulosis  · Exercise regularly  Ask your healthcare provider about the best exercise plan for you  Exercise can help you have regular bowel movements  Get 30 minutes of exercise on most days of the week  · Maintain a healthy weight  Ask your healthcare provider how much you should weigh  Ask him or her to help you create a weight loss plan if you are overweight  · Do not smoke  Nicotine and other chemicals in cigarettes increase your risk for diverticulitis   Ask your healthcare provider for information if you currently smoke and need help to quit  E-cigarettes or smokeless tobacco still contain nicotine  Talk to your healthcare provider before you use these products  · Ask your healthcare provider if it is safe to take NSAIDs  NSAIDs may increase your risk of diverticulitis  Follow up with your healthcare provider as directed:  Write down your questions so you remember to ask them during your visits  © 2017 [employer] Information is for End User's use only and may not be sold, redistributed or otherwise used for commercial purposes  All illustrations and images included in CareNotes® are the copyrighted property of [employer]  or [employer]  The above information is an  only  It is not intended as medical advice for individual conditions or treatments  Talk to your doctor, nurse or pharmacist before following any medical regimen to see if it is safe and effective for you  Diverticulosis Diet   AMBULATORY CARE:   A diverticulosis diet  includes high-fiber foods  High-fiber foods help you have regular bowel movements  Extra fiber may decrease your risk of forming new diverticula (small pockets) in your intestine  A high-fiber diet may also help prevent diverticulitis  Diverticulitis is a painful condition that occurs when diverticula become inflamed or infected  You do not need to avoid nuts, seeds, corn, or popcorn while you are on a diverticulosis diet  Contact your healthcare provider if:   · You have questions about a high-fiber diet  · You have a change in your bowel movements  · You have an upset stomach  · You have a fever  · You have pain in your lower abdomen on the left side  · You have questions about your condition or care  Amount of fiber you need: You may need 25 to 35 grams of fiber each day  Ask your dietitian or healthcare provider how much fiber you should have  Increase your intake of fiber slowly   When you eat more fiber, you may have gas and feel bloated  You may need to take a fiber supplement if you do not get enough fiber from food  Drink plenty of liquids as you increase the fiber in your diet  Your dietitian or healthcare provider may recommend 8 eight-ounce cups or more each day  Ask which liquids are best for you  Foods that are high in fiber:   · Foods with at least 4 grams of fiber per serving:      ¨ ? to ½ cup of high-fiber cereal (check the nutrition label on the box)    ¨ ½ cup of blackberries or raspberries    ¨ 4 dried prunes    ¨ 1 cooked artichoke    ¨ ½ cup of cooked legumes, such as lentils, or red, kidney, and moran beans    · Foods with 1 to 3 grams of fiber per serving:      ¨ 1 slice of whole-wheat, pumpernickel, or rye bread    ¨ 4 whole-wheat crackers    ¨ ½ cup of cereal with 1 to 3 grams of fiber per serving (check the nutrition label on the box)    ¨ 1 piece of fruit, such as an apple, banana, pear, kiwi, or orange    ¨ 3 dates    ¨ ½ cup of canned apricots, fruit cocktail, peaches, or pears    ¨ ½ cup of raw or cooked vegetables, such as carrots, cauliflower, cabbage, spinach, squash, or corn  © 2017 [employer]  Information is for End User's use only and may not be sold, redistributed or otherwise used for commercial purposes  All illustrations and images included in CareNotes® are the copyrighted property of [employer]  or [employer]  The above information is an  only  It is not intended as medical advice for individual conditions or treatments  Talk to your doctor, nurse or pharmacist before following any medical regimen to see if it is safe and effective for you  Hemorrhoids   WHAT YOU NEED TO KNOW:   What are hemorrhoids? Hemorrhoids are swollen blood vessels inside your rectum (internal hemorrhoids) or on your anus (external hemorrhoids)  Sometimes a hemorrhoid may prolapse  This means it extends out of your anus  What increases my risk for hemorrhoids? · Pregnancy or obesity    · Straining or sitting for a long time during bowel movements    · Liver disease    · Weak muscles around the anus caused by older age, rectal surgery, or anal intercourse    · A lack of physical activity    · Chronic diarrhea or constipation    · A low-fiber diet  What are the signs and symptoms of hemorrhoids? · Pain or itching around your anus or inside your rectum    · Swelling or bumps around your anus    · Bright red blood in your bowel movement, on the toilet paper, or in the toilet bowl    · Tissue bulging out of your anus (prolapsed hemorrhoids)    · Incontinence (poor control over urine or bowel movements)  How are hemorrhoids diagnosed? Your healthcare provider will ask about your symptoms, the foods you eat, and your bowel movements  He will examine your anus for external hemorrhoids  You may need the following:  · A digital rectal exam  is a test to check for hemorrhoids  Your healthcare provider will put a gloved finger inside your anus to feel for the hemorrhoids  · An anoscopy  is a test that uses a scope (small tube with a light and camera on the end) to look at your hemorrhoids  How are hemorrhoids treated? Treatment will depend on your symptoms  You may need any of the following:  · Medicines  can help decrease pain and swelling, and soften your bowel movement  The medicine may be a pill, pad, cream, or ointment  · Procedures  may be used to shrink or remove your hemorrhoid  Examples include rubber-band ligation, sclerotherapy, and photocoagulation  These procedures may be done in your healthcare provider's office  Ask your healthcare provider for more information about these procedures  · Surgery  may be needed to shrink or remove your hemorrhoids  How can I manage my symptoms? · Apply ice on your anus for 15 to 20 minutes every hour or as directed  Use an ice pack, or put crushed ice in a plastic bag   Cover it with a towel before you apply it to your anus  Ice helps prevent tissue damage and decreases swelling and pain  · Take a sitz bath  Fill a bathtub with 4 to 6 inches of warm water  You may also use a sitz bath pan that fits inside a toilet bowl  Sit in the sitz bath for 15 minutes  Do this 3 times a day, and after each bowel movement  The warm water can help decrease pain and swelling  · Keep your anal area clean  Gently wash the area with warm water daily  Soap may irritate the area  After a bowel movement, wipe with moist towelettes or wet toilet paper  Dry toilet paper can irritate the area  How can I help prevent hemorrhoids? · Do not strain to have a bowel movement  Do not sit on the toilet too long  These actions can increase pressure on the tissues in your rectum and anus  · Drink plenty of liquids  Liquids can help prevent constipation  Ask how much liquid to drink each day and which liquids are best for you  · Eat a variety of high-fiber foods  Examples include fruits, vegetables, and whole grains  Ask your healthcare provider how much fiber you need each day  You may need to take a fiber supplement  · Exercise as directed  Exercise, such as walking, may make it easier to have a bowel movement  Ask your healthcare provider to help you create an exercise plan  · Do not have anal sex  Anal sex can weaken the skin around your rectum and anus  · Avoid heavy lifting  This can cause straining and increase your risk for another hemorrhoid  When should I seek immediate care? · You have severe pain in your rectum or around your anus  · You have severe pain in your abdomen and you are vomiting  · You have bleeding from your anus that soaks through your underwear  When should I contact my healthcare provider? · You have frequent and painful bowel movements  · Your hemorrhoid looks or feels more swollen than usual      · You do not have a bowel movement for 2 days or more       · You see or feel tissue coming through your anus  · You have questions or concerns about your condition or care  CARE AGREEMENT:   You have the right to help plan your care  Learn about your health condition and how it may be treated  Discuss treatment options with your caregivers to decide what care you want to receive  You always have the right to refuse treatment  The above information is an  only  It is not intended as medical advice for individual conditions or treatments  Talk to your doctor, nurse or pharmacist before following any medical regimen to see if it is safe and effective for you  © 2017 [employer]  Information is for End User's use only and may not be sold, redistributed or otherwise used for commercial purposes  All illustrations and images included in CareNotes® are the copyrighted property of [employer]  or [employer]

## 2020-01-23 LAB — CMV SPEC QL CULT: NORMAL

## 2020-01-24 ENCOUNTER — OFFICE VISIT (OUTPATIENT)
Dept: FAMILY MEDICINE CLINIC | Facility: CLINIC | Age: 70
End: 2020-01-24
Payer: MEDICARE

## 2020-01-24 VITALS
WEIGHT: 165.6 LBS | OXYGEN SATURATION: 97 % | TEMPERATURE: 97.9 F | DIASTOLIC BLOOD PRESSURE: 90 MMHG | SYSTOLIC BLOOD PRESSURE: 144 MMHG | RESPIRATION RATE: 16 BRPM | HEIGHT: 66 IN | BODY MASS INDEX: 26.61 KG/M2 | HEART RATE: 67 BPM

## 2020-01-24 DIAGNOSIS — L85.3 DRY SKIN: ICD-10-CM

## 2020-01-24 DIAGNOSIS — G95.0 SYRINGOMYELIA (HCC): ICD-10-CM

## 2020-01-24 DIAGNOSIS — E78.5 HYPERLIPIDEMIA, UNSPECIFIED HYPERLIPIDEMIA TYPE: Primary | ICD-10-CM

## 2020-01-24 DIAGNOSIS — I10 ESSENTIAL HYPERTENSION: ICD-10-CM

## 2020-01-24 DIAGNOSIS — E11.9 TYPE 2 DIABETES MELLITUS WITHOUT COMPLICATION, WITHOUT LONG-TERM CURRENT USE OF INSULIN (HCC): ICD-10-CM

## 2020-01-24 PROBLEM — K21.00 GASTROESOPHAGEAL REFLUX DISEASE WITH ESOPHAGITIS: Status: ACTIVE | Noted: 2020-01-24

## 2020-01-24 PROCEDURE — 99214 OFFICE O/P EST MOD 30 MIN: CPT | Performed by: FAMILY MEDICINE

## 2020-01-24 RX ORDER — LOSARTAN POTASSIUM 50 MG/1
50 TABLET ORAL DAILY
Qty: 90 TABLET | Refills: 1 | Status: SHIPPED | OUTPATIENT
Start: 2020-01-24 | End: 2020-05-27 | Stop reason: DRUGHIGH

## 2020-01-24 RX ORDER — GABAPENTIN 600 MG/1
600 TABLET ORAL 3 TIMES DAILY PRN
Qty: 270 TABLET | Refills: 1 | Status: SHIPPED | OUTPATIENT
Start: 2020-01-24 | End: 2020-09-20

## 2020-01-24 NOTE — ASSESSMENT & PLAN NOTE
Just had scope and has esophagitis-they want to see him back  In 3-4 months, colonoscopy showed 4 polyps, recall 3 years

## 2020-01-24 NOTE — PROGRESS NOTES
Assessment and Plan:    Problem List Items Addressed This Visit        Endocrine    Type 2 diabetes mellitus ([facility] [address] )       Lab Results   Component Value Date    HGBA1C 6 5 (H) 08/01/2019   due for lab later this month         Relevant Orders    Microalbumin / creatinine urine ratio       Cardiovascular and Mediastinum    Hypertension     BP  stable         Relevant Medications    losartan (COZAAR) 50 mg tablet       Nervous and Auditory    Syringomyelia (HCC)     On tramadol and neurontin         Relevant Medications    gabapentin (NEURONTIN) 600 MG tablet       Other    Hyperlipidemia - Primary      Other Visit Diagnoses     Dry skin        Relevant Medications    urea (CARMOL) 30 % cream                 Diagnoses and all orders for this visit:    Hyperlipidemia, unspecified hyperlipidemia type    Syringomyelia (HCC)  -     gabapentin (NEURONTIN) 600 MG tablet; Take 1 tablet (600 mg total) by mouth 3 (three) times a day as needed (pain)    Essential hypertension  -     losartan (COZAAR) 50 mg tablet; Take 1 tablet (50 mg total) by mouth daily    Type 2 diabetes mellitus without complication, without long-term current use of insulin (HCC)  -     Microalbumin / creatinine urine ratio    Dry skin  -     urea (CARMOL) 30 % cream; Apply topically as needed for dry skin              Subjective:      Patient ID: Ashley Stallings is a 81 y o  male  CC:    Chief Complaint   Patient presents with    Follow-up     pt is here for a 3 month follow up       HPI:    F/u diabetes, BP, having am nausea with some runny nose, c/o dry skin      The following portions of the patient's history were reviewed and updated as appropriate: allergies, current medications, past family history, past medical history, past social history, past surgical history and problem list       Review of Systems   Constitutional: Negative for activity change, appetite change and unexpected weight change  HENT: Positive for rhinorrhea   Negative for ear pain and sore throat  Respiratory: Negative for shortness of breath  Cardiovascular: Negative for chest pain  Gastrointestinal: Positive for nausea  Musculoskeletal: Positive for neck pain  Neurological: Negative for dizziness and headaches  Data to review:       Objective:    Vitals:    01/24/20 0938   BP: 144/90   BP Location: Left arm   Patient Position: Sitting   Cuff Size: Adult   Pulse: 67   Resp: 16   Temp: 97 9 °F (36 6 °C)   TempSrc: Temporal   SpO2: 97%   Weight: 75 1 kg (165 lb 9 6 oz)   Height: 5' 6" (1 676 m)        Physical Exam   Constitutional: He appears well-developed and well-nourished  Neck: No thyromegaly present  Cardiovascular: Normal rate, regular rhythm and normal heart sounds  Pulses are no weak pulses  Pulses:       Dorsalis pedis pulses are 2+ on the right side, and 2+ on the left side  Posterior tibial pulses are 2+ on the right side, and 2+ on the left side  Pulmonary/Chest: Effort normal and breath sounds normal    Musculoskeletal: He exhibits no edema  Feet:   Right Foot:   Skin Integrity: Negative for ulcer, skin breakdown, erythema, warmth, callus or dry skin  Left Foot:   Skin Integrity: Negative for ulcer, skin breakdown, erythema, warmth, callus or dry skin  Lymphadenopathy:     He has no cervical adenopathy  Vitals reviewed  Patient's shoes and socks removed  Right Foot/Ankle   Right Foot Inspection  Skin Exam: skin normal and skin intact no dry skin, no warmth, no callus, no erythema, no maceration, no abnormal color, no pre-ulcer, no ulcer and no callus                          Toe Exam: ROM and strength within normal limits  Sensory       Monofilament testing: intact  Vascular  Capillary refills: < 3 seconds  The right DP pulse is 2+  The right PT pulse is 2+       Left Foot/Ankle  Left Foot Inspection  Skin Exam: skin normal and skin intactno dry skin, no warmth, no erythema, no maceration, normal color, no pre-ulcer, no ulcer and no callus                         Toe Exam: ROM and strength within normal limits                   Sensory       Monofilament: intact  Vascular  Capillary refills: < 3 seconds  The left DP pulse is 2+  The left PT pulse is 2+  Assign Risk Category:  No deformity present; No loss of protective sensation;  No weak pulses       Risk: 0

## 2020-01-30 ENCOUNTER — APPOINTMENT (OUTPATIENT)
Dept: LAB | Facility: MEDICAL CENTER | Age: 70
End: 2020-01-30
Payer: MEDICARE

## 2020-01-30 ENCOUNTER — TELEPHONE (OUTPATIENT)
Dept: GASTROENTEROLOGY | Facility: MEDICAL CENTER | Age: 70
End: 2020-01-30

## 2020-01-30 DIAGNOSIS — E11.9 TYPE 2 DIABETES MELLITUS WITHOUT COMPLICATION, WITHOUT LONG-TERM CURRENT USE OF INSULIN (HCC): ICD-10-CM

## 2020-01-30 DIAGNOSIS — E78.5 HYPERLIPIDEMIA, UNSPECIFIED HYPERLIPIDEMIA TYPE: ICD-10-CM

## 2020-01-30 LAB
ALBUMIN SERPL BCP-MCNC: 3.9 G/DL (ref 3.5–5)
ALP SERPL-CCNC: 75 U/L (ref 46–116)
ALT SERPL W P-5'-P-CCNC: 29 U/L (ref 12–78)
ANION GAP SERPL CALCULATED.3IONS-SCNC: 5 MMOL/L (ref 4–13)
AST SERPL W P-5'-P-CCNC: 10 U/L (ref 5–45)
BILIRUB SERPL-MCNC: 0.54 MG/DL (ref 0.2–1)
BUN SERPL-MCNC: 21 MG/DL (ref 5–25)
CALCIUM SERPL-MCNC: 9.4 MG/DL (ref 8.3–10.1)
CHLORIDE SERPL-SCNC: 106 MMOL/L (ref 100–108)
CHOLEST SERPL-MCNC: 189 MG/DL (ref 50–200)
CO2 SERPL-SCNC: 29 MMOL/L (ref 21–32)
CREAT SERPL-MCNC: 0.98 MG/DL (ref 0.6–1.3)
CREAT UR-MCNC: 171 MG/DL
EST. AVERAGE GLUCOSE BLD GHB EST-MCNC: 151 MG/DL
GFR SERPL CREATININE-BSD FRML MDRD: 79 ML/MIN/1.73SQ M
GLUCOSE P FAST SERPL-MCNC: 140 MG/DL (ref 65–99)
HBA1C MFR BLD: 6.9 % (ref 4.2–6.3)
HDLC SERPL-MCNC: 36 MG/DL
LDLC SERPL CALC-MCNC: 123 MG/DL (ref 0–100)
MICROALBUMIN UR-MCNC: 29.1 MG/L (ref 0–20)
MICROALBUMIN/CREAT 24H UR: 17 MG/G CREATININE (ref 0–30)
NONHDLC SERPL-MCNC: 153 MG/DL
POTASSIUM SERPL-SCNC: 4.2 MMOL/L (ref 3.5–5.3)
PROT SERPL-MCNC: 8 G/DL (ref 6.4–8.2)
SODIUM SERPL-SCNC: 140 MMOL/L (ref 136–145)
TRIGL SERPL-MCNC: 152 MG/DL

## 2020-01-30 PROCEDURE — 36415 COLL VENOUS BLD VENIPUNCTURE: CPT

## 2020-01-30 PROCEDURE — 83036 HEMOGLOBIN GLYCOSYLATED A1C: CPT

## 2020-01-30 PROCEDURE — 82043 UR ALBUMIN QUANTITATIVE: CPT | Performed by: FAMILY MEDICINE

## 2020-01-30 PROCEDURE — 80061 LIPID PANEL: CPT

## 2020-01-30 PROCEDURE — 82570 ASSAY OF URINE CREATININE: CPT | Performed by: FAMILY MEDICINE

## 2020-01-30 PROCEDURE — 80307 DRUG TEST PRSMV CHEM ANLYZR: CPT | Performed by: FAMILY MEDICINE

## 2020-01-30 PROCEDURE — 80053 COMPREHEN METABOLIC PANEL: CPT

## 2020-01-30 NOTE — TELEPHONE ENCOUNTER
----- Message from Allison Lorenz MD sent at 1/23/2020 12:21 PM EST -----  Hi,    Can you let him know we removed several pre-cancerous polyps and his next colonoscopy should be in 3 years  Additionally, given the inflammation we saw in the esophagus, he should repeat the EGD in 12 weeks  In the meantime he should continue the omeprazole       Thank you,  Paige Solomon

## 2020-01-31 LAB
LABORATORY COMMENT REPORT: ABNORMAL
TRAMADOL UR QL SCN: POSITIVE NG/ML

## 2020-02-10 DIAGNOSIS — G95.0 SYRINGOMYELIA (HCC): ICD-10-CM

## 2020-02-10 RX ORDER — TRAMADOL HYDROCHLORIDE 50 MG/1
TABLET ORAL
Qty: 60 TABLET | Refills: 0 | Status: SHIPPED | OUTPATIENT
Start: 2020-02-10 | End: 2020-03-12 | Stop reason: SDUPTHER

## 2020-02-20 ENCOUNTER — TELEPHONE (OUTPATIENT)
Dept: GASTROENTEROLOGY | Facility: MEDICAL CENTER | Age: 70
End: 2020-02-20

## 2020-02-20 ENCOUNTER — PREP FOR PROCEDURE (OUTPATIENT)
Dept: GASTROENTEROLOGY | Facility: MEDICAL CENTER | Age: 70
End: 2020-02-20

## 2020-02-20 DIAGNOSIS — K20.90 ESOPHAGITIS: Primary | ICD-10-CM

## 2020-02-20 NOTE — TELEPHONE ENCOUNTER
Pt is scheduled with dr Shun Munoz at [facility] for recall egd on 4/20/20, I mailed out instructions to pt home pt is diabetic and an Am procedure was requested

## 2020-02-20 NOTE — TELEPHONE ENCOUNTER
----- Message from Regis Mensah sent at 1/30/2020 12:33 PM EST -----  Regarding: 3 mo EGD      ----- Message -----  From: Ida Montano MD  Sent: 1/23/2020  12:21 PM EST  To: Ashley Phillips MA    Hi,    Can you let him know we removed several pre-cancerous polyps and his next colonoscopy should be in 3 years  Additionally, given the inflammation we saw in the esophagus, he should repeat the EGD in 12 weeks  In the meantime he should continue the omeprazole       Thank you,  Dia Hernandez

## 2020-02-26 ENCOUNTER — ANESTHESIA EVENT (OUTPATIENT)
Dept: GASTROENTEROLOGY | Facility: MEDICAL CENTER | Age: 70
End: 2020-02-26

## 2020-03-04 DIAGNOSIS — IMO0001 UNCONTROLLED TYPE 2 DIABETES MELLITUS WITHOUT COMPLICATION, WITHOUT LONG-TERM CURRENT USE OF INSULIN: ICD-10-CM

## 2020-03-05 DIAGNOSIS — I10 ESSENTIAL HYPERTENSION: ICD-10-CM

## 2020-03-06 RX ORDER — HYDROCHLOROTHIAZIDE 25 MG/1
25 TABLET ORAL DAILY
Qty: 90 TABLET | Refills: 1 | Status: SHIPPED | OUTPATIENT
Start: 2020-03-06 | End: 2021-04-22 | Stop reason: SDUPTHER

## 2020-03-12 DIAGNOSIS — G95.0 SYRINGOMYELIA (HCC): ICD-10-CM

## 2020-03-12 RX ORDER — TRAMADOL HYDROCHLORIDE 50 MG/1
TABLET ORAL
Qty: 60 TABLET | Refills: 0 | Status: SHIPPED | OUTPATIENT
Start: 2020-03-12 | End: 2020-04-09 | Stop reason: SDUPTHER

## 2020-03-24 ENCOUNTER — TELEPHONE (OUTPATIENT)
Dept: GASTROENTEROLOGY | Facility: MEDICAL CENTER | Age: 70
End: 2020-03-24

## 2020-03-24 NOTE — TELEPHONE ENCOUNTER
lvm for patient to call and move procedure up to 04/02/2020 at the [facility] with Dr Arnoldo Berman

## 2020-03-27 NOTE — TELEPHONE ENCOUNTER
Pt called to reschedule does not feel comfortable putting his family at risk to bring him to a procedure, he is moved to 7/1/20 at [facility] with dr Jack Molina

## 2020-04-08 DIAGNOSIS — K20.90 ESOPHAGITIS: ICD-10-CM

## 2020-04-08 RX ORDER — OMEPRAZOLE 40 MG/1
CAPSULE, DELAYED RELEASE ORAL
Qty: 30 CAPSULE | Refills: 3 | Status: SHIPPED | OUTPATIENT
Start: 2020-04-08 | End: 2020-05-01

## 2020-04-09 DIAGNOSIS — G95.0 SYRINGOMYELIA (HCC): ICD-10-CM

## 2020-04-10 RX ORDER — TRAMADOL HYDROCHLORIDE 50 MG/1
TABLET ORAL
Qty: 60 TABLET | Refills: 0 | Status: SHIPPED | OUTPATIENT
Start: 2020-04-10 | End: 2020-05-11 | Stop reason: SDUPTHER

## 2020-04-20 ENCOUNTER — ANESTHESIA (OUTPATIENT)
Dept: GASTROENTEROLOGY | Facility: MEDICAL CENTER | Age: 70
End: 2020-04-20

## 2020-04-23 ENCOUNTER — TELEMEDICINE (OUTPATIENT)
Dept: FAMILY MEDICINE CLINIC | Facility: CLINIC | Age: 70
End: 2020-04-23
Payer: MEDICARE

## 2020-04-23 VITALS — TEMPERATURE: 98.4 F

## 2020-04-23 DIAGNOSIS — Z20.828 EXPOSURE TO SARS-ASSOCIATED CORONAVIRUS: ICD-10-CM

## 2020-04-23 DIAGNOSIS — Z20.828 EXPOSURE TO SARS-ASSOCIATED CORONAVIRUS: Primary | ICD-10-CM

## 2020-04-23 PROCEDURE — 99213 OFFICE O/P EST LOW 20 MIN: CPT | Performed by: NURSE PRACTITIONER

## 2020-04-23 PROCEDURE — 87635 SARS-COV-2 COVID-19 AMP PRB: CPT

## 2020-04-24 LAB — SARS-COV-2 RNA SPEC QL NAA+PROBE: DETECTED

## 2020-04-25 ENCOUNTER — TELEMEDICINE (OUTPATIENT)
Dept: FAMILY MEDICINE CLINIC | Facility: CLINIC | Age: 70
End: 2020-04-25
Payer: MEDICARE

## 2020-04-25 VITALS — TEMPERATURE: 98 F

## 2020-04-25 DIAGNOSIS — U07.1 COVID-19 VIRUS INFECTION: Primary | ICD-10-CM

## 2020-04-25 PROCEDURE — 99212 OFFICE O/P EST SF 10 MIN: CPT | Performed by: FAMILY MEDICINE

## 2020-04-26 ENCOUNTER — TELEMEDICINE (OUTPATIENT)
Dept: FAMILY MEDICINE CLINIC | Facility: CLINIC | Age: 70
End: 2020-04-26
Payer: MEDICARE

## 2020-04-26 VITALS — TEMPERATURE: 97 F

## 2020-04-26 DIAGNOSIS — U07.1 COVID-19 VIRUS INFECTION: Primary | ICD-10-CM

## 2020-04-26 PROCEDURE — 99213 OFFICE O/P EST LOW 20 MIN: CPT | Performed by: FAMILY MEDICINE

## 2020-04-29 ENCOUNTER — TELEMEDICINE (OUTPATIENT)
Dept: FAMILY MEDICINE CLINIC | Facility: CLINIC | Age: 70
End: 2020-04-29
Payer: MEDICARE

## 2020-04-29 VITALS — TEMPERATURE: 98 F

## 2020-04-29 DIAGNOSIS — U07.1 COVID-19 VIRUS INFECTION: Primary | ICD-10-CM

## 2020-04-29 PROCEDURE — 99213 OFFICE O/P EST LOW 20 MIN: CPT | Performed by: FAMILY MEDICINE

## 2020-05-01 DIAGNOSIS — K20.90 ESOPHAGITIS: ICD-10-CM

## 2020-05-01 RX ORDER — OMEPRAZOLE 40 MG/1
CAPSULE, DELAYED RELEASE ORAL
Qty: 30 CAPSULE | Refills: 3 | Status: SHIPPED | OUTPATIENT
Start: 2020-05-01 | End: 2020-12-25 | Stop reason: SDUPTHER

## 2020-05-08 ENCOUNTER — TELEPHONE (OUTPATIENT)
Dept: UROLOGY | Facility: CLINIC | Age: 70
End: 2020-05-08

## 2020-05-08 ENCOUNTER — TELEMEDICINE (OUTPATIENT)
Dept: UROLOGY | Facility: CLINIC | Age: 70
End: 2020-05-08
Payer: MEDICARE

## 2020-05-08 DIAGNOSIS — R97.20 ELEVATED PSA: Primary | ICD-10-CM

## 2020-05-08 PROCEDURE — 99213 OFFICE O/P EST LOW 20 MIN: CPT | Performed by: PHYSICIAN ASSISTANT

## 2020-05-11 ENCOUNTER — TELEPHONE (OUTPATIENT)
Dept: FAMILY MEDICINE CLINIC | Facility: CLINIC | Age: 70
End: 2020-05-11

## 2020-05-11 DIAGNOSIS — G95.0 SYRINGOMYELIA (HCC): ICD-10-CM

## 2020-05-11 RX ORDER — TRAMADOL HYDROCHLORIDE 50 MG/1
TABLET ORAL
Qty: 60 TABLET | Refills: 0 | Status: SHIPPED | OUTPATIENT
Start: 2020-05-11 | End: 2020-06-10 | Stop reason: SDUPTHER

## 2020-05-15 ENCOUNTER — PATIENT OUTREACH (OUTPATIENT)
Dept: FAMILY MEDICINE CLINIC | Facility: CLINIC | Age: 70
End: 2020-05-15

## 2020-05-26 DIAGNOSIS — E11.9 TYPE 2 DIABETES MELLITUS WITHOUT COMPLICATION, WITHOUT LONG-TERM CURRENT USE OF INSULIN (HCC): Primary | ICD-10-CM

## 2020-05-27 ENCOUNTER — TELEMEDICINE (OUTPATIENT)
Dept: FAMILY MEDICINE CLINIC | Facility: CLINIC | Age: 70
End: 2020-05-27
Payer: MEDICARE

## 2020-05-27 VITALS
SYSTOLIC BLOOD PRESSURE: 179 MMHG | HEIGHT: 66 IN | HEART RATE: 72 BPM | DIASTOLIC BLOOD PRESSURE: 103 MMHG | TEMPERATURE: 96.4 F | BODY MASS INDEX: 26.03 KG/M2 | WEIGHT: 162 LBS

## 2020-05-27 DIAGNOSIS — E78.5 HYPERLIPIDEMIA, UNSPECIFIED HYPERLIPIDEMIA TYPE: ICD-10-CM

## 2020-05-27 DIAGNOSIS — G95.0 SYRINGOMYELIA (HCC): ICD-10-CM

## 2020-05-27 DIAGNOSIS — M51.34 DDD (DEGENERATIVE DISC DISEASE), THORACIC: ICD-10-CM

## 2020-05-27 DIAGNOSIS — E11.9 TYPE 2 DIABETES MELLITUS WITHOUT COMPLICATION, WITHOUT LONG-TERM CURRENT USE OF INSULIN (HCC): ICD-10-CM

## 2020-05-27 DIAGNOSIS — I10 ESSENTIAL HYPERTENSION: Primary | ICD-10-CM

## 2020-05-27 PROBLEM — Z20.828 EXPOSURE TO SARS-ASSOCIATED CORONAVIRUS: Status: RESOLVED | Noted: 2020-04-23 | Resolved: 2020-05-27

## 2020-05-27 PROCEDURE — 99213 OFFICE O/P EST LOW 20 MIN: CPT | Performed by: FAMILY MEDICINE

## 2020-05-27 RX ORDER — LOSARTAN POTASSIUM 100 MG/1
100 TABLET ORAL DAILY
Qty: 90 TABLET | Refills: 1 | Status: SHIPPED | OUTPATIENT
Start: 2020-05-27 | End: 2020-11-16

## 2020-06-01 DIAGNOSIS — E11.9 TYPE 2 DIABETES MELLITUS WITHOUT COMPLICATION, WITHOUT LONG-TERM CURRENT USE OF INSULIN (HCC): Primary | ICD-10-CM

## 2020-06-04 ENCOUNTER — APPOINTMENT (OUTPATIENT)
Dept: LAB | Facility: MEDICAL CENTER | Age: 70
End: 2020-06-04
Payer: MEDICARE

## 2020-06-04 ENCOUNTER — TELEPHONE (OUTPATIENT)
Dept: UROLOGY | Facility: CLINIC | Age: 70
End: 2020-06-04

## 2020-06-04 DIAGNOSIS — E78.5 HYPERLIPIDEMIA, UNSPECIFIED HYPERLIPIDEMIA TYPE: ICD-10-CM

## 2020-06-04 DIAGNOSIS — Z12.5 PROSTATE CANCER SCREENING: Primary | ICD-10-CM

## 2020-06-04 DIAGNOSIS — R97.20 ELEVATED PSA: ICD-10-CM

## 2020-06-04 DIAGNOSIS — E11.9 TYPE 2 DIABETES MELLITUS WITHOUT COMPLICATION, WITHOUT LONG-TERM CURRENT USE OF INSULIN (HCC): ICD-10-CM

## 2020-06-04 DIAGNOSIS — E11.9 TYPE 2 DIABETES MELLITUS WITHOUT COMPLICATION, WITHOUT LONG-TERM CURRENT USE OF INSULIN (HCC): Primary | ICD-10-CM

## 2020-06-04 LAB
ALBUMIN SERPL BCP-MCNC: 3.9 G/DL (ref 3.5–5)
ALP SERPL-CCNC: 86 U/L (ref 46–116)
ALT SERPL W P-5'-P-CCNC: 25 U/L (ref 12–78)
ANION GAP SERPL CALCULATED.3IONS-SCNC: 5 MMOL/L (ref 4–13)
AST SERPL W P-5'-P-CCNC: 12 U/L (ref 5–45)
BILIRUB SERPL-MCNC: 0.63 MG/DL (ref 0.2–1)
BUN SERPL-MCNC: 22 MG/DL (ref 5–25)
CALCIUM SERPL-MCNC: 8.9 MG/DL (ref 8.3–10.1)
CHLORIDE SERPL-SCNC: 103 MMOL/L (ref 100–108)
CHOLEST SERPL-MCNC: 190 MG/DL (ref 50–200)
CO2 SERPL-SCNC: 28 MMOL/L (ref 21–32)
CREAT SERPL-MCNC: 0.94 MG/DL (ref 0.6–1.3)
CREAT UR-MCNC: 62 MG/DL
EST. AVERAGE GLUCOSE BLD GHB EST-MCNC: 134 MG/DL
GFR SERPL CREATININE-BSD FRML MDRD: 83 ML/MIN/1.73SQ M
GLUCOSE P FAST SERPL-MCNC: 132 MG/DL (ref 65–99)
HBA1C MFR BLD: 6.3 %
HDLC SERPL-MCNC: 34 MG/DL
LDLC SERPL CALC-MCNC: 127 MG/DL (ref 0–100)
MICROALBUMIN UR-MCNC: 5.4 MG/L (ref 0–20)
MICROALBUMIN/CREAT 24H UR: 9 MG/G CREATININE (ref 0–30)
NONHDLC SERPL-MCNC: 156 MG/DL
POTASSIUM SERPL-SCNC: 4 MMOL/L (ref 3.5–5.3)
PROT SERPL-MCNC: 7.9 G/DL (ref 6.4–8.2)
PSA SERPL-MCNC: 2.1 NG/ML (ref 0–4)
SODIUM SERPL-SCNC: 136 MMOL/L (ref 136–145)
TRIGL SERPL-MCNC: 143 MG/DL

## 2020-06-04 PROCEDURE — 80053 COMPREHEN METABOLIC PANEL: CPT

## 2020-06-04 PROCEDURE — 83036 HEMOGLOBIN GLYCOSYLATED A1C: CPT

## 2020-06-04 PROCEDURE — 84153 ASSAY OF PSA TOTAL: CPT

## 2020-06-04 PROCEDURE — 82570 ASSAY OF URINE CREATININE: CPT | Performed by: FAMILY MEDICINE

## 2020-06-04 PROCEDURE — 80061 LIPID PANEL: CPT

## 2020-06-04 PROCEDURE — 36415 COLL VENOUS BLD VENIPUNCTURE: CPT

## 2020-06-04 PROCEDURE — 82043 UR ALBUMIN QUANTITATIVE: CPT | Performed by: FAMILY MEDICINE

## 2020-06-04 RX ORDER — EZETIMIBE 10 MG/1
10 TABLET ORAL DAILY
Qty: 30 TABLET | Refills: 5 | Status: SHIPPED | OUTPATIENT
Start: 2020-06-04 | End: 2020-11-23

## 2020-06-10 DIAGNOSIS — G95.0 SYRINGOMYELIA (HCC): ICD-10-CM

## 2020-06-10 RX ORDER — TRAMADOL HYDROCHLORIDE 50 MG/1
TABLET ORAL
Qty: 60 TABLET | Refills: 0 | Status: SHIPPED | OUTPATIENT
Start: 2020-06-10 | End: 2020-07-09 | Stop reason: SDUPTHER

## 2020-06-12 ENCOUNTER — TELEPHONE (OUTPATIENT)
Dept: FAMILY MEDICINE CLINIC | Facility: CLINIC | Age: 70
End: 2020-06-12

## 2020-06-22 ENCOUNTER — PREP FOR PROCEDURE (OUTPATIENT)
Dept: GASTROENTEROLOGY | Facility: MEDICAL CENTER | Age: 70
End: 2020-06-22

## 2020-06-22 DIAGNOSIS — Z20.822 ENCOUNTER FOR LABORATORY TESTING FOR COVID-19 VIRUS: Primary | ICD-10-CM

## 2020-06-24 DIAGNOSIS — Z20.822 ENCOUNTER FOR LABORATORY TESTING FOR COVID-19 VIRUS: ICD-10-CM

## 2020-06-24 PROCEDURE — U0003 INFECTIOUS AGENT DETECTION BY NUCLEIC ACID (DNA OR RNA); SEVERE ACUTE RESPIRATORY SYNDROME CORONAVIRUS 2 (SARS-COV-2) (CORONAVIRUS DISEASE [COVID-19]), AMPLIFIED PROBE TECHNIQUE, MAKING USE OF HIGH THROUGHPUT TECHNOLOGIES AS DESCRIBED BY CMS-2020-01-R: HCPCS

## 2020-06-25 LAB — SARS-COV-2 RNA SPEC QL NAA+PROBE: NOT DETECTED

## 2020-06-30 NOTE — ANESTHESIA PREPROCEDURE EVALUATION
Review of Systems/Medical History  Patient summary reviewed  Chart reviewed      Cardiovascular  Exercise tolerance (METS): >4,  Hyperlipidemia, Hypertension controlled,    Pulmonary  Negative pulmonary ROS        GI/Hepatic    GERD well controlled,        Negative  ROS        Endo/Other  Diabetes poorly controlled type 2 ,      GYN       Hematology  Negative hematology ROS      Musculoskeletal  Negative musculoskeletal ROS        Neurology  Negative neurology ROS      Psychology   Anxiety,              Physical Exam    Airway    Mallampati score: II  TM Distance: <3 FB  Neck ROM: full     Dental       Cardiovascular  Rhythm: regular, Rate: normal,     Pulmonary  Breath sounds clear to auscultation,     Other Findings        Anesthesia Plan  ASA Score- 3     Anesthesia Type- IV sedation with anesthesia with ASA Monitors  Additional Monitors:   Airway Plan:         Plan Factors-Patient not instructed to abstain from smoking on day of procedure  Patient did not smoke on day of surgery  Induction- intravenous  Postoperative Plan-     Informed Consent- Anesthetic plan and risks discussed with patient

## 2020-07-01 ENCOUNTER — HOSPITAL ENCOUNTER (OUTPATIENT)
Dept: GASTROENTEROLOGY | Facility: MEDICAL CENTER | Age: 70
Setting detail: OUTPATIENT SURGERY
Discharge: HOME | End: 2020-07-01
Attending: INTERNAL MEDICINE | Admitting: INTERNAL MEDICINE
Payer: MEDICARE

## 2020-07-01 VITALS
OXYGEN SATURATION: 98 % | TEMPERATURE: 97.9 F | RESPIRATION RATE: 16 BRPM | DIASTOLIC BLOOD PRESSURE: 67 MMHG | WEIGHT: 162 LBS | SYSTOLIC BLOOD PRESSURE: 108 MMHG | HEIGHT: 66 IN | HEART RATE: 65 BPM | BODY MASS INDEX: 26.03 KG/M2

## 2020-07-01 DIAGNOSIS — K20.90 ESOPHAGITIS: ICD-10-CM

## 2020-07-01 PROCEDURE — 88305 TISSUE EXAM BY PATHOLOGIST: CPT | Performed by: PATHOLOGY

## 2020-07-01 PROCEDURE — 43239 EGD BIOPSY SINGLE/MULTIPLE: CPT | Performed by: INTERNAL MEDICINE

## 2020-07-01 RX ORDER — SODIUM CHLORIDE 9 MG/ML
125 INJECTION, SOLUTION INTRAVENOUS CONTINUOUS
Status: DISCONTINUED | OUTPATIENT
Start: 2020-07-01 | End: 2020-07-05 | Stop reason: HOSPADM

## 2020-07-01 RX ORDER — PROPOFOL 10 MG/ML
INJECTION, EMULSION INTRAVENOUS AS NEEDED
Status: DISCONTINUED | OUTPATIENT
Start: 2020-07-01 | End: 2020-07-01 | Stop reason: SURG

## 2020-07-01 RX ADMIN — SODIUM CHLORIDE 125 ML/HR: 0.9 INJECTION, SOLUTION INTRAVENOUS at 07:03

## 2020-07-01 RX ADMIN — PROPOFOL 150 MG: 10 INJECTION, EMULSION INTRAVENOUS at 07:23

## 2020-07-01 RX ADMIN — PROPOFOL 50 MG: 10 INJECTION, EMULSION INTRAVENOUS at 07:26

## 2020-07-01 RX ADMIN — PROPOFOL 50 MG: 10 INJECTION, EMULSION INTRAVENOUS at 07:25

## 2020-07-01 NOTE — ANESTHESIA POSTPROCEDURE EVALUATION
Post-Op Assessment Note    CV Status:  Stable  Pain Score: 2    Pain management: adequate     Mental Status:  Alert and awake   Hydration Status:  Euvolemic   PONV Controlled:  Controlled   Airway Patency:  Patent   Post Op Vitals Reviewed: Yes      Staff: Anesthesiologist           BP     Temp      Pulse     Resp      SpO2

## 2020-07-01 NOTE — H&P
History and Physical - [facility]  Spenser Cobos 78 y o  male MRN: [id number]                  HPI: Spenser Cobos is a 78y o  year old male who presents for esophagitis and evaluation for Clement's esophagus  REVIEW OF SYSTEMS: Per the HPI, and otherwise unremarkable  Historical Information   Past Medical History:   Diagnosis Date    Anxiety     Chronic pain disorder     right shoulder    Diabetes mellitus ([address] )     type 2, blood sugar 109 at 0500    Diverticulosis     Enlarged prostate     Extremity pain     GERD (gastroesophageal reflux disease)     Hearing loss, right     Hemorrhoids     Hypertension     Neck pain     Polyp, sigmoid colon      Past Surgical History:   Procedure Laterality Date    COLONOSCOPY      EAR SURGERY      INNER EAR SURGERY      NASAL SINUS SURGERY      NM COLONOSCOPY FLX DX W/COLLJ SPEC WHEN PFRMD N/A 8/30/2016    Procedure: COLONOSCOPY;  Surgeon: Rosy Ramachandran MD;  Location: [facility];   Service: General    NM SHLDR ARTHROSCOP,SURG,W/ROTAT CUFF REPR Right 2/5/2019    Procedure: ARTHROSCOPY SHOULDER;  Surgeon: Gilmer Sidhu DO;  Location:  MAIN OR;  Service: Orthopedics     Social History   Social History     Substance and Sexual Activity   Alcohol Use Yes    Comment: occassional     Social History     Substance and Sexual Activity   Drug Use No     Social History     Tobacco Use   Smoking Status Never Smoker   Smokeless Tobacco Never Used     Family History   Problem Relation Age of Onset    Hypertension Brother     Hepatitis Mother     Heart disease Father         CARDIAC DISORDER        Meds/Allergies       (Not in a hospital admission)    Allergies   Allergen Reactions    Lipitor [Atorvastatin] Other (See Comments) and Myalgia     Other reaction(s): increased pain  arthralgia    Lyrica [Pregabalin] Other (See Comments)     Pt can't remmember reaction       Objective     Blood pressure (!) 174/81, pulse 64, temperature 97 9 °F (36 6 °C), temperature source Temporal, resp  rate 17, height 5' 6" (1 676 m), weight 73 5 kg (162 lb), SpO2 97 %  PHYSICAL EXAMINATION:    General Appearance:   Alert, cooperative, no distress   HEENT:  Normocephalic, atraumatic, anicteric  Neck supple, symmetrical, trachea midline  Lungs:   Equal chest rise and unlabored breathing, normal effort, no coughing  Cardiovascular:   No visualized JVD  Abdomen:   No abdominal distension  Skin:   No jaundice, rashes, or lesions  Musculoskeletal:   Normal range of motion visualized  Psych:  Normal affect and normal insight  Neuro:  Alert and appropriate  ASSESSMENT/PLAN:  This is a 78y o  year old male here for EGD, and he is stable and optimized for his procedure

## 2020-07-01 NOTE — DISCHARGE INSTRUCTIONS
Clement Esophagus   WHAT YOU NEED TO KNOW:   What is Clement esophagus? Clement esophagus is a condition in which the cells that line your esophagus are damaged  The damage can cause abnormal changes in the cells  These abnormal changes increase your risk of esophageal cancer  What increases my risk for Clement esophagus? The exact cause of Clement esophagus is not known  The following may increase your risk:  · Long-term gastroesophageal reflux disease (GERD), a condition that causes stomach acid to back up into the esophagus    · Age older than 48    · Family history of GERD, Clement esophagus, or esophageal cancer    · Obesity    · Smoking  What are the signs and symptoms of Clement esophagus? Signs and symptoms of Clement esophagus are usually related to the signs and symptoms of GERD  You may also have any of the following:  · Heartburn    · Difficult or painful swallowing    · Bitter or acid taste in your mouth    · Cough    · Frequent burping or hiccups    · Vomiting blood or having black, tarry bowel movements    · Weight loss without trying  How is Clement esophagus diagnosed? · An endoscopy  is a procedure in which a scope is used to see the inside of your esophagus and stomach  A scope is a long, bendable tube with a light on the end of it  A camera may be hooked to the scope  A biopsy may also be done  A biopsy is when your healthcare provider takes tissue samples from your esophagus and sends it to a lab for tests  These tests will show if the cells of your esophagus have dysplasia (abnormal changes in your cells and tissues)  · The grade  of dysplasia is the amount of abnormal change that is present in your esophagus  The grade can range from negative (no dysplasia) to high-grade (a large amount of dysplasia)  There is a higher risk of cancer with high-grade dysplasia  How is Clement esophagus treated? Treatment depends on the grade of dysplasia   The goal of treatment is to control your symptoms and prevent esophageal cancer  Your healthcare provider may also suggest that you make changes in the foods you eat and in your lifestyle  You may need any of the following:  · Anti-reflux medicines  help decrease the stomach acid that can irritate your esophagus and stomach  These medicines may include proton pump inhibitors (PPI) and histamine type-2 receptor (H2) blockers  You may also be given medicines to stop vomiting  · Surgery or other procedures  may be done if you have high-grade dysplasia  ¨ Fundoplication  is a surgery that wraps the upper part of your stomach around the esophageal sphincter to strengthen it  The esophageal sphincter is the muscle at the lower end of the esophagus, right above the stomach  This may help to prevent reflux  ¨ Resection or esophagectomy  is surgery to remove a part of or the entire esophagus  ¨ Ablation  is procedure that kills abnormal cells with heat or by freezing them  ¨ Photodynamic therapy  is a procedure that uses a special type of light to kill abnormal cells  It is used in combination with medicines that make the abnormal cells sensitive to light  When should I contact my healthcare provider? · Your symptoms do not improve with treatment  · You have questions or concerns about your condition or care  When should I seek immediate care or call 911? · You have severe chest pain and shortness of breath  · Your bowel movements are black, bloody, or tarry  · Your vomit looks like coffee grounds or has blood in it  CARE AGREEMENT:   You have the right to help plan your care  Learn about your health condition and how it may be treated  Discuss treatment options with your caregivers to decide what care you want to receive  You always have the right to refuse treatment  The above information is an  only  It is not intended as medical advice for individual conditions or treatments   Talk to your doctor, nurse or pharmacist before following any medical regimen to see if it is safe and effective for you  © 2017 [address] [employer] Information is for End User's use only and may not be sold, redistributed or otherwise used for commercial purposes  All illustrations and images included in CareNotes® are the copyrighted property of [employer]  or Cristian Colvin  Hiatal Hernia   WHAT YOU NEED TO KNOW:   What is a hiatal hernia? A hiatal hernia is a condition that causes part of your stomach to bulge through the hiatus (small opening) in your diaphragm  The part of the stomach may move up and down, or it may get trapped above the diaphragm  What increases my risk for a hiatal hernia? The exact cause of a hiatal hernia is not known  You may have been born with a large hiatus  The following may increase your risk of a hiatal hernia:  · Obesity    · Older age    · Medical conditions such as diverticulosis or esophagitis    · Previous surgery of the esophagus or stomach or trauma such as from a motor vehicle accident  What are the types of hiatal hernia? · Type I (sliding hiatal hernia): A portion of the stomach slides in and out of the hiatus  This type is the most common and usually causes gastroesophageal reflux disease (GERD)  GERD occurs when the esophageal sphincter does not close properly and causes acid reflux  The esophageal sphincter is the lower muscle of the esophagus  · Type II (paraesophageal hiatal hernia):  Type II hiatal hernia forms when a part of the stomach squeezes through the hiatus and lies next to the esophagus  · Type III (combined):  Type III hiatal hernia is a combination of a sliding and a paraesophageal hiatal hernia  · Type IV (complex paraesophageal hiatal hernia): The whole stomach, the small and large bowels, spleen, pancreas, or liver is pushed up into the chest   What are the signs and symptoms of a hiatal hernia? The most common symptom is heartburn  This usually occurs after meals and spreads to your neck, jaw, or shoulder  You may have no signs or symptoms, or you may have any of the following:  · Abdominal pain, especially in the area just above your navel    · Bitter or acid taste in your mouth    · Trouble swallowing    · Coughing or hoarseness    · Chest pain or shortness of breath that occurs after eating    · Frequent burping or hiccups    · Uncomfortable feeling of fullness after eating  How is a hiatal hernia diagnosed? · An upper GI series test  includes x-rays of your esophagus, stomach, and your small intestines  It is also called a barium swallow test  You will be given barium (a chalky liquid) to drink before the pictures are taken  This liquid helps your stomach and intestines show up better on the x-rays  An upper GI series can show if you have an ulcer, a blocked intestine, or other problems  · An endoscopy  uses a scope to see the inside of your digestive tract  A scope is a long, bendable tube with a light on the end of it  A camera may be hooked to the scope to take pictures  How is a hiatal hernia treated? Treatment depends on the type of hiatal hernia you have and on your symptoms  You may not need any treatment  You may need any of the following:  · Medicines  may be given to relieve heartburn symptoms  These medicines help to decrease or block stomach acid  You may also be given medicines that help to tighten the esophageal sphincter  · Surgery  may be done when medicines cannot control your symptoms, or other problems are present  Your healthcare provider may also suggest surgery depending on the type of hernia you have  Your healthcare provider can put your stomach back into its normal location  He may make the hiatus (hole) smaller and anchor your stomach in your abdomen  Fundoplication is a surgery that wraps the upper part of the stomach around the esophageal sphincter to strengthen it  How can I manage symptoms?   The following nutrition and lifestyle changes may be recommended to relieve symptoms of heartburn  · Avoid foods that make your symptoms worse  These may include spicy foods, fruit juices, alcohol, caffeine, chocolate, and mint  · Eat several small meals during the day  Small meals give your stomach less food to digest     · Avoid lying down and bending forward after you eat  Do not eat meals 2 to 3 hours before bedtime  This decreases your risk for reflux  · Maintain a healthy weight  If you are overweight, weight loss may help relieve your symptoms  · Sleep with your head elevated  at least 6 inches  · Do not smoke  Smoking can increase your symptoms of heartburn  When should I seek immediate care? · You have severe abdominal pain  · You try to vomit but nothing comes out (retching)  · You have severe chest pain and sudden trouble breathing  · Your bowel movements are black or bloody  · Your vomit looks like coffee grounds or has blood in it  When should I contact my healthcare provider? · Your symptoms are getting worse  · You have nausea, and you are vomiting  · You are losing weight without trying  · You have questions or concerns about your condition or care  CARE AGREEMENT:   You have the right to help plan your care  Learn about your health condition and how it may be treated  Discuss treatment options with your caregivers to decide what care you want to receive  You always have the right to refuse treatment  The above information is an  only  It is not intended as medical advice for individual conditions or treatments  Talk to your doctor, nurse or pharmacist before following any medical regimen to see if it is safe and effective for you  © 2017 [address] Information is for End User's use only and may not be sold, redistributed or otherwise used for commercial purposes   All illustrations and images included in CareNotes® are the copyrighted property of [employer]  or Cristian Colvin

## 2020-07-09 DIAGNOSIS — G95.0 SYRINGOMYELIA (HCC): ICD-10-CM

## 2020-07-09 RX ORDER — TRAMADOL HYDROCHLORIDE 50 MG/1
TABLET ORAL
Qty: 60 TABLET | Refills: 0 | Status: SHIPPED | OUTPATIENT
Start: 2020-07-09 | End: 2020-08-10 | Stop reason: SDUPTHER

## 2020-08-10 DIAGNOSIS — G95.0 SYRINGOMYELIA (HCC): ICD-10-CM

## 2020-08-10 RX ORDER — TRAMADOL HYDROCHLORIDE 50 MG/1
TABLET ORAL
Qty: 60 TABLET | Refills: 0 | Status: SHIPPED | OUTPATIENT
Start: 2020-08-10 | End: 2020-09-08 | Stop reason: SDUPTHER

## 2020-08-27 ENCOUNTER — OFFICE VISIT (OUTPATIENT)
Dept: FAMILY MEDICINE CLINIC | Facility: CLINIC | Age: 70
End: 2020-08-27
Payer: MEDICARE

## 2020-08-27 VITALS
DIASTOLIC BLOOD PRESSURE: 72 MMHG | BODY MASS INDEX: 29.81 KG/M2 | WEIGHT: 162 LBS | HEART RATE: 64 BPM | TEMPERATURE: 97.8 F | HEIGHT: 62 IN | RESPIRATION RATE: 20 BRPM | SYSTOLIC BLOOD PRESSURE: 118 MMHG

## 2020-08-27 DIAGNOSIS — E11.9 TYPE 2 DIABETES MELLITUS WITHOUT COMPLICATION, WITHOUT LONG-TERM CURRENT USE OF INSULIN (HCC): ICD-10-CM

## 2020-08-27 DIAGNOSIS — E78.5 HYPERLIPIDEMIA, UNSPECIFIED HYPERLIPIDEMIA TYPE: ICD-10-CM

## 2020-08-27 DIAGNOSIS — Z00.00 MEDICARE ANNUAL WELLNESS VISIT, SUBSEQUENT: Primary | ICD-10-CM

## 2020-08-27 DIAGNOSIS — G95.0 SYRINGOMYELIA (HCC): ICD-10-CM

## 2020-08-27 DIAGNOSIS — I10 ESSENTIAL HYPERTENSION: ICD-10-CM

## 2020-08-27 LAB
LEFT EYE DIABETIC RETINOPATHY: NORMAL
RIGHT EYE DIABETIC RETINOPATHY: NORMAL

## 2020-08-27 PROCEDURE — 2022F DILAT RTA XM EVC RTNOPTHY: CPT | Performed by: FAMILY MEDICINE

## 2020-08-27 PROCEDURE — 1160F RVW MEDS BY RX/DR IN RCRD: CPT | Performed by: FAMILY MEDICINE

## 2020-08-27 PROCEDURE — 3074F SYST BP LT 130 MM HG: CPT | Performed by: FAMILY MEDICINE

## 2020-08-27 PROCEDURE — 1125F AMNT PAIN NOTED PAIN PRSNT: CPT | Performed by: FAMILY MEDICINE

## 2020-08-27 PROCEDURE — G0439 PPPS, SUBSEQ VISIT: HCPCS | Performed by: FAMILY MEDICINE

## 2020-08-27 PROCEDURE — 4040F PNEUMOC VAC/ADMIN/RCVD: CPT | Performed by: FAMILY MEDICINE

## 2020-08-27 PROCEDURE — 3078F DIAST BP <80 MM HG: CPT | Performed by: FAMILY MEDICINE

## 2020-08-27 PROCEDURE — 3044F HG A1C LEVEL LT 7.0%: CPT | Performed by: FAMILY MEDICINE

## 2020-08-27 PROCEDURE — 1036F TOBACCO NON-USER: CPT | Performed by: FAMILY MEDICINE

## 2020-08-27 PROCEDURE — 1170F FXNL STATUS ASSESSED: CPT | Performed by: FAMILY MEDICINE

## 2020-08-27 PROCEDURE — 3008F BODY MASS INDEX DOCD: CPT | Performed by: FAMILY MEDICINE

## 2020-08-27 PROCEDURE — 99214 OFFICE O/P EST MOD 30 MIN: CPT | Performed by: FAMILY MEDICINE

## 2020-08-27 NOTE — PROGRESS NOTES
Assessment and Plan:    Problem List Items Addressed This Visit        Endocrine    Type 2 diabetes mellitus ([facility] )    Relevant Orders    Comprehensive metabolic panel    Hemoglobin A1C       Cardiovascular and Mediastinum    Hypertension       Nervous and Auditory    Syringomyelia (HCC)     Sx stable, ok on meds, utd uds, no early refills            Other    Hyperlipidemia    Relevant Orders    Lipid panel      Other Visit Diagnoses     Medicare annual wellness visit, subsequent    -  Primary                 Diagnoses and all orders for this visit:    Medicare annual wellness visit, subsequent    Essential hypertension    Type 2 diabetes mellitus without complication, without long-term current use of insulin ([facility] [address] )  -     Comprehensive metabolic panel; Future  -     Hemoglobin A1C; Future    Hyperlipidemia, unspecified hyperlipidemia type  -     Lipid panel; Future    Syringomyelia (HCC)              Subjective:      Patient ID: Jono Verma is a 78 y o  male  CC:    Chief Complaint   Patient presents with    Follow-up     Patient present today for his 3 month follow up   Medicare Wellness Visit     pt due for his Medicare wellness visit  HPI:    HPI    The following portions of the patient's history were reviewed and updated as appropriate: allergies, current medications, past family history, past medical history, past social history, past surgical history and problem list         Review of Systems   Constitutional: Negative for activity change, appetite change and unexpected weight change  HENT: Positive for hearing loss  Respiratory: Negative for shortness of breath  Cardiovascular: Negative for chest pain  Musculoskeletal: Positive for neck pain  Occ   Neurological: Positive for light-headedness  Negative for dizziness and headaches          If in sun too long         Data to review:       Objective:    Vitals:    08/27/20 0943   BP: 118/72   BP Location: Left arm   Patient Position: Sitting   Cuff Size: Large   Pulse: 64   Resp: 20   Temp: 97 8 °F (36 6 °C)   TempSrc: Temporal   Weight: 73 5 kg (162 lb)   Height: 5' 2" (1 575 m)        Physical Exam  Vitals signs reviewed  Constitutional:       Appearance: Normal appearance  Cardiovascular:      Rate and Rhythm: Normal rate and regular rhythm  Pulses: Normal pulses  Heart sounds: Normal heart sounds  Pulmonary:      Effort: Pulmonary effort is normal       Breath sounds: Normal breath sounds  Musculoskeletal:         General: No tenderness  Right lower leg: No edema  Left lower leg: No edema  Neurological:      Mental Status: He is alert

## 2020-08-27 NOTE — PROGRESS NOTES
Assessment and Plan:     Problem List Items Addressed This Visit        Endocrine    Type 2 diabetes mellitus ([facility] )    Relevant Orders    Comprehensive metabolic panel    Hemoglobin A1C       Cardiovascular and Mediastinum    Hypertension       Other    Hyperlipidemia    Relevant Orders    Lipid panel      Other Visit Diagnoses     Medicare annual wellness visit, subsequent    -  Primary           Preventive health issues were discussed with patient, and age appropriate screening tests were ordered as noted in patient's After Visit Summary  Personalized health advice and appropriate referrals for health education or preventive services given if needed, as noted in patient's After Visit Summary       History of Present Illness:     Patient presents for Medicare Annual Wellness visit    Patient Care Team:  Kacy Szymanski MD as PCP - MD Rae Estrella MD Laveda Gilding, MD as Endoscopist     Problem List:     Patient Active Problem List   Diagnosis    Type 2 diabetes mellitus ([facility] [address] )    Chronic pain disorder    DDD (degenerative disc disease), thoracic    Enlarged prostate with lower urinary tract symptoms (LUTS)    Hearing loss, right    Hyperlipidemia    Hypertension    Syringomyelia ([address] )    Pain of right upper extremity    Erectile dysfunction    Tear of right rotator cuff    Elevated PSA    Gastroesophageal reflux disease with esophagitis      Past Medical and Surgical History:     Past Medical History:   Diagnosis Date    Anxiety     Chronic pain disorder     right shoulder    Diabetes mellitus ([facility] [facility] )     type 2, blood sugar 109 at 0500    Diverticulosis     Enlarged prostate     Extremity pain     GERD (gastroesophageal reflux disease)     Hearing loss, right     Hemorrhoids     Hypertension     Neck pain     Polyp, sigmoid colon      Past Surgical History:   Procedure Laterality Date    COLONOSCOPY      EAR SURGERY      INNER EAR SURGERY      NASAL SINUS SURGERY      MN COLONOSCOPY FLX DX W/COLLJ SPEC WHEN PFRMD N/A 8/30/2016    Procedure: COLONOSCOPY;  Surgeon: Hernandez Gutierrez MD;  Location: [facility];   Service: General    MN SHLDR ARTHROSCOP,SURG,W/ROTAT CUFF REPR Right 2/5/2019    Procedure: ARTHROSCOPY SHOULDER;  Surgeon: Jennifer Wood DO;  Location: [address] [place] [address] OR;  Service: Orthopedics      Family History:     Family History   Problem Relation Age of Onset    Hypertension Brother     Hepatitis Mother     Heart disease Father         CARDIAC DISORDER       Social History:     E-Cigarette/Vaping    E-Cigarette Use Never User      E-Cigarette/Vaping Substances    Nicotine No     THC No     CBD No     Flavoring No     Other No     Unknown No      Social History     Socioeconomic History    Marital status: /Civil Union     Spouse name: None    Number of children: 3    Years of education: None    Highest education level: None   Occupational History    Occupation: FULL TIME EMPLOYMENT   Social Needs    Financial resource strain: None    Food insecurity     Worry: None     Inability: None    Transportation needs     Medical: None     Non-medical: None   Tobacco Use    Smoking status: Never Smoker    Smokeless tobacco: Never Used   Substance and Sexual Activity    Alcohol use: Yes     Comment: occassional    Drug use: No    Sexual activity: Yes     Partners: Female   Lifestyle    Physical activity     Days per week: None     Minutes per session: None    Stress: None   Relationships    Social connections     Talks on phone: None     Gets together: None     Attends Religion service: None     Active member of club or organization: None     Attends meetings of clubs or organizations: None     Relationship status: None    Intimate partner violence     Fear of current or ex partner: None     Emotionally abused: None     Physically abused: None     Forced sexual activity: None   Other Topics Concern    None   Social History Narrative Lives with wife,daughter, and 2 grandchildren    Retired        Advance directive on file     Denied domestic violence     Denied problem with [employer] beliefs regarding medical care      Medications and Allergies:     Current Outpatient Medications   Medication Sig Dispense Refill    ezetimibe (ZETIA) 10 mg tablet Take 1 tablet (10 mg total) by mouth daily 30 tablet 5    gabapentin (NEURONTIN) 600 MG tablet Take 1 tablet (600 mg total) by mouth 3 (three) times a day as needed (pain) 270 tablet 1    hydrochlorothiazide (HYDRODIURIL) 25 mg tablet Take 1 tablet (25 mg total) by mouth daily 90 tablet 1    losartan (COZAAR) 100 MG tablet Take 1 tablet (100 mg total) by mouth daily 90 tablet 1    metFORMIN (GLUCOPHAGE) 500 mg tablet take 1 tablet by mouth twice a day with meals 180 tablet 1    omeprazole (PriLOSEC) 40 MG capsule take 1 capsule by mouth every morning BEFORE BREAKFAST 30 capsule 3    tamsulosin (FLOMAX) 0 4 mg take 1 capsule by mouth at bedtime 30 capsule 11    traMADol (ULTRAM) 50 mg tablet Take 1 tablet by mouth two to three times a day if needed for moderate pain 60 tablet 0    Blood Glucose Monitoring Suppl (ONE TOUCH ULTRA 2) w/Device KIT by Does not apply route      glucose blood (ONE TOUCH ULTRA TEST) test strip 1 each by Other route daily 100 each 2    Lancets (ONETOUCH ULTRASOFT) lancets by Other route daily 100 each 3    urea (CARMOL) 30 % cream Apply topically as needed for dry skin (Patient not taking: Reported on 5/8/2020) 60 g 2     No current facility-administered medications for this visit        Allergies   Allergen Reactions    Lipitor [Atorvastatin] Other (See Comments) and Myalgia     Other reaction(s): increased pain  arthralgia    Lyrica [Pregabalin] Other (See Comments)     Pt can't remmember reaction      Immunizations:     Immunization History   Administered Date(s) Administered    INFLUENZA 10/18/2013, 10/01/2015    Influenza Quadrivalent Preservative Free 3 years and older IM 09/15/2015, 11/01/2017    Influenza, high dose seasonal 0 7 mL 09/26/2018    Pneumococcal Conjugate 13-Valent 01/13/2016, 09/19/2019    Pneumococcal Polysaccharide PPV23 05/16/2018    Tdap 06/01/2016    influenza, trivalent, adjuvanted 09/19/2019      Health Maintenance:         Topic Date Due    Hepatitis C Screening  Completed     There are no preventive care reminders to display for this patient  Medicare Health Risk Assessment:     /72 (BP Location: Left arm, Patient Position: Sitting, Cuff Size: Large)   Pulse 64   Temp 97 8 °F (36 6 °C) (Temporal)   Resp 20   Ht 5' 2" (1 575 m)   Wt 73 5 kg (162 lb)   BMI 29 63 kg/m²      Mary Jane Carrington is here for his Subsequent Wellness visit  Health Risk Assessment:   Patient rates overall health as excellent  Patient feels that their physical health rating is slightly better  Eyesight was rated as same  Hearing was rated as slightly worse  Patient feels that their emotional and mental health rating is same  Pain experienced in the last 7 days has been none  Patient states that he has experienced no weight loss or gain in last 6 months  Fall Risk Screening: In the past year, patient has experienced: no history of falling in past year      Home Safety:  Patient does not have trouble with stairs inside or outside of their home  Patient has working smoke alarms and has working carbon monoxide detector  Home safety hazards include: none  Nutrition:   Current diet is Regular  Medications:   Patient is not currently taking any over-the-counter supplements  Patient is able to manage medications  Activities of Daily Living (ADLs)/Instrumental Activities of Daily Living (IADLs):   Walk and transfer into and out of bed and chair?: Yes  Dress and groom yourself?: Yes    Bathe or shower yourself?: Yes    Feed yourself?  Yes  Do your laundry/housekeeping?: Yes  Manage your money, pay your bills and track your expenses?: Yes  Make your own meals?: Yes    Do your own shopping?: Yes    Previous Hospitalizations:   Any hospitalizations or ED visits within the last 12 months?: No      Advance Care Planning:   Living will: Yes    Durable POA for healthcare:  Yes    Advanced directive: Yes      Cognitive Screening:   Provider or family/friend/caregiver concerned regarding cognition?: No    PREVENTIVE SCREENINGS      Cardiovascular Screening:    General: History Lipid Disorder and Screening Current      Diabetes Screening:     General: History Diabetes and Screening Current      Colorectal Cancer Screening:     General: Screening Current      Prostate Cancer Screening:    General: Screening Current      Osteoporosis Screening:    General: Screening Not Indicated      Abdominal Aortic Aneurysm (AAA) Screening:    Risk factors include: age between 73-67 yo        Lung Cancer Screening:     General: Screening Not Indicated      Hepatitis C Screening:    General: Screening Current      Mayra Zheng MD

## 2020-09-08 DIAGNOSIS — G95.0 SYRINGOMYELIA (HCC): ICD-10-CM

## 2020-09-08 RX ORDER — TRAMADOL HYDROCHLORIDE 50 MG/1
TABLET ORAL
Qty: 60 TABLET | Refills: 0 | Status: SHIPPED | OUTPATIENT
Start: 2020-09-08 | End: 2020-10-08 | Stop reason: SDUPTHER

## 2020-09-20 DIAGNOSIS — G95.0 SYRINGOMYELIA (HCC): ICD-10-CM

## 2020-09-20 RX ORDER — GABAPENTIN 600 MG/1
TABLET ORAL
Qty: 270 TABLET | Refills: 1 | Status: SHIPPED | OUTPATIENT
Start: 2020-09-20 | End: 2021-03-01 | Stop reason: SDUPTHER

## 2020-09-27 DIAGNOSIS — N40.1 BENIGN LOCALIZED PROSTATIC HYPERPLASIA WITH LOWER URINARY TRACT SYMPTOMS (LUTS): ICD-10-CM

## 2020-09-29 RX ORDER — TAMSULOSIN HYDROCHLORIDE 0.4 MG/1
CAPSULE ORAL
Qty: 30 CAPSULE | Refills: 11 | Status: SHIPPED | OUTPATIENT
Start: 2020-09-29 | End: 2021-09-27

## 2020-10-08 DIAGNOSIS — G95.0 SYRINGOMYELIA (HCC): ICD-10-CM

## 2020-10-08 RX ORDER — TRAMADOL HYDROCHLORIDE 50 MG/1
TABLET ORAL
Qty: 60 TABLET | Refills: 0 | Status: SHIPPED | OUTPATIENT
Start: 2020-10-08 | End: 2020-11-06 | Stop reason: SDUPTHER

## 2020-10-14 ENCOUNTER — LAB (OUTPATIENT)
Dept: LAB | Facility: MEDICAL CENTER | Age: 70
End: 2020-10-14
Payer: MEDICARE

## 2020-10-14 DIAGNOSIS — E78.5 HYPERLIPIDEMIA, UNSPECIFIED HYPERLIPIDEMIA TYPE: ICD-10-CM

## 2020-10-14 DIAGNOSIS — E11.9 TYPE 2 DIABETES MELLITUS WITHOUT COMPLICATION, WITHOUT LONG-TERM CURRENT USE OF INSULIN (HCC): ICD-10-CM

## 2020-10-14 LAB
ALBUMIN SERPL BCP-MCNC: 4.1 G/DL (ref 3.5–5)
ALP SERPL-CCNC: 89 U/L (ref 46–116)
ALT SERPL W P-5'-P-CCNC: 33 U/L (ref 12–78)
ANION GAP SERPL CALCULATED.3IONS-SCNC: 5 MMOL/L (ref 4–13)
AST SERPL W P-5'-P-CCNC: 12 U/L (ref 5–45)
BILIRUB SERPL-MCNC: 0.86 MG/DL (ref 0.2–1)
BUN SERPL-MCNC: 22 MG/DL (ref 5–25)
CALCIUM SERPL-MCNC: 9.9 MG/DL (ref 8.3–10.1)
CHLORIDE SERPL-SCNC: 104 MMOL/L (ref 100–108)
CHOLEST SERPL-MCNC: 164 MG/DL (ref 50–200)
CO2 SERPL-SCNC: 30 MMOL/L (ref 21–32)
CREAT SERPL-MCNC: 1.05 MG/DL (ref 0.6–1.3)
EST. AVERAGE GLUCOSE BLD GHB EST-MCNC: 140 MG/DL
GFR SERPL CREATININE-BSD FRML MDRD: 73 ML/MIN/1.73SQ M
GLUCOSE P FAST SERPL-MCNC: 139 MG/DL (ref 65–99)
HBA1C MFR BLD: 6.5 %
HDLC SERPL-MCNC: 36 MG/DL
LDLC SERPL CALC-MCNC: 85 MG/DL (ref 0–100)
NONHDLC SERPL-MCNC: 128 MG/DL
POTASSIUM SERPL-SCNC: 3.9 MMOL/L (ref 3.5–5.3)
PROT SERPL-MCNC: 8 G/DL (ref 6.4–8.2)
SODIUM SERPL-SCNC: 139 MMOL/L (ref 136–145)
TRIGL SERPL-MCNC: 215 MG/DL

## 2020-10-14 PROCEDURE — 83036 HEMOGLOBIN GLYCOSYLATED A1C: CPT

## 2020-10-14 PROCEDURE — 80061 LIPID PANEL: CPT

## 2020-10-14 PROCEDURE — 80053 COMPREHEN METABOLIC PANEL: CPT

## 2020-10-14 PROCEDURE — 36415 COLL VENOUS BLD VENIPUNCTURE: CPT

## 2020-11-06 DIAGNOSIS — G95.0 SYRINGOMYELIA (HCC): ICD-10-CM

## 2020-11-06 RX ORDER — TRAMADOL HYDROCHLORIDE 50 MG/1
TABLET ORAL
Qty: 60 TABLET | Refills: 0 | Status: SHIPPED | OUTPATIENT
Start: 2020-11-06 | End: 2020-12-07 | Stop reason: SDUPTHER

## 2020-11-16 DIAGNOSIS — I10 ESSENTIAL HYPERTENSION: ICD-10-CM

## 2020-11-16 RX ORDER — LOSARTAN POTASSIUM 100 MG/1
TABLET ORAL
Qty: 90 TABLET | Refills: 1 | Status: SHIPPED | OUTPATIENT
Start: 2020-11-16 | End: 2021-04-22 | Stop reason: SDUPTHER

## 2020-11-23 DIAGNOSIS — E78.5 HYPERLIPIDEMIA, UNSPECIFIED HYPERLIPIDEMIA TYPE: ICD-10-CM

## 2020-11-23 RX ORDER — EZETIMIBE 10 MG/1
TABLET ORAL
Qty: 30 TABLET | Refills: 5 | Status: SHIPPED | OUTPATIENT
Start: 2020-11-23 | End: 2021-05-16

## 2020-12-07 DIAGNOSIS — G95.0 SYRINGOMYELIA (HCC): ICD-10-CM

## 2020-12-07 RX ORDER — TRAMADOL HYDROCHLORIDE 50 MG/1
TABLET ORAL
Qty: 60 TABLET | Refills: 0 | Status: SHIPPED | OUTPATIENT
Start: 2020-12-07 | End: 2021-01-05 | Stop reason: SDUPTHER

## 2020-12-25 DIAGNOSIS — K20.90 ESOPHAGITIS: ICD-10-CM

## 2020-12-25 RX ORDER — OMEPRAZOLE 40 MG/1
CAPSULE, DELAYED RELEASE ORAL
Qty: 30 CAPSULE | Refills: 3 | Status: SHIPPED | OUTPATIENT
Start: 2020-12-25 | End: 2021-04-26

## 2021-01-05 DIAGNOSIS — G95.0 SYRINGOMYELIA (HCC): ICD-10-CM

## 2021-01-05 RX ORDER — TRAMADOL HYDROCHLORIDE 50 MG/1
TABLET ORAL
Qty: 60 TABLET | Refills: 0 | Status: SHIPPED | OUTPATIENT
Start: 2021-01-05 | End: 2021-02-03 | Stop reason: SDUPTHER

## 2021-01-26 ENCOUNTER — TELEPHONE (OUTPATIENT)
Dept: FAMILY MEDICINE CLINIC | Facility: CLINIC | Age: 71
End: 2021-01-26

## 2021-02-03 DIAGNOSIS — G95.0 SYRINGOMYELIA (HCC): ICD-10-CM

## 2021-02-03 RX ORDER — TRAMADOL HYDROCHLORIDE 50 MG/1
TABLET ORAL
Qty: 60 TABLET | Refills: 0 | Status: SHIPPED | OUTPATIENT
Start: 2021-02-03 | End: 2021-03-01 | Stop reason: SDUPTHER

## 2021-03-01 ENCOUNTER — OFFICE VISIT (OUTPATIENT)
Dept: FAMILY MEDICINE CLINIC | Facility: CLINIC | Age: 71
End: 2021-03-01
Payer: MEDICARE

## 2021-03-01 VITALS
BODY MASS INDEX: 30.18 KG/M2 | WEIGHT: 164 LBS | TEMPERATURE: 97.9 F | HEART RATE: 64 BPM | SYSTOLIC BLOOD PRESSURE: 128 MMHG | RESPIRATION RATE: 16 BRPM | HEIGHT: 62 IN | DIASTOLIC BLOOD PRESSURE: 82 MMHG

## 2021-03-01 DIAGNOSIS — R06.83 SNORING: Primary | ICD-10-CM

## 2021-03-01 DIAGNOSIS — E11.51 DIABETES MELLITUS WITH PERIPHERAL ARTERY DISEASE (HCC): ICD-10-CM

## 2021-03-01 DIAGNOSIS — M79.604 PAIN OF RIGHT LOWER EXTREMITY: ICD-10-CM

## 2021-03-01 DIAGNOSIS — G95.0 SYRINGOMYELIA (HCC): ICD-10-CM

## 2021-03-01 DIAGNOSIS — E78.5 HYPERLIPIDEMIA, UNSPECIFIED HYPERLIPIDEMIA TYPE: ICD-10-CM

## 2021-03-01 DIAGNOSIS — E11.9 TYPE 2 DIABETES MELLITUS WITHOUT COMPLICATION, WITHOUT LONG-TERM CURRENT USE OF INSULIN (HCC): ICD-10-CM

## 2021-03-01 DIAGNOSIS — I10 ESSENTIAL HYPERTENSION: ICD-10-CM

## 2021-03-01 PROCEDURE — 99214 OFFICE O/P EST MOD 30 MIN: CPT | Performed by: FAMILY MEDICINE

## 2021-03-01 RX ORDER — TRAMADOL HYDROCHLORIDE 50 MG/1
TABLET ORAL
Qty: 60 TABLET | Refills: 0 | Status: SHIPPED | OUTPATIENT
Start: 2021-03-01 | End: 2021-04-05 | Stop reason: SDUPTHER

## 2021-03-01 RX ORDER — GABAPENTIN 600 MG/1
600 TABLET ORAL 3 TIMES DAILY PRN
Qty: 270 TABLET | Refills: 1 | Status: SHIPPED | OUTPATIENT
Start: 2021-03-01 | End: 2021-08-24

## 2021-03-01 RX ORDER — LANCETS
EACH MISCELLANEOUS DAILY
Qty: 100 EACH | Refills: 3 | Status: SHIPPED | OUTPATIENT
Start: 2021-03-01 | End: 2021-06-14 | Stop reason: CLARIF

## 2021-03-01 NOTE — PROGRESS NOTES
Assessment and Plan:    Problem List Items Addressed This Visit        Endocrine    Diabetes mellitus with peripheral artery disease ([facility] [address] )       Lab Results   Component Value Date    HGBA1C 6 5 (H) 10/14/2020   await lab         Relevant Medications    Lancets (onetouch ultrasoft) lancets       Cardiovascular and Mediastinum    Hypertension     BP stable            Nervous and Auditory    Syringomyelia (HCC)    Relevant Medications    gabapentin (NEURONTIN) 600 MG tablet    traMADol (ULTRAM) 50 mg tablet       Other    Hyperlipidemia     Await lab         Relevant Orders    Lipid panel      Other Visit Diagnoses     Snoring    -  Primary    Relevant Orders    Ambulatory referral to Sleep Medicine    Pain of right lower extremity        Relevant Orders    VAS lower limb venous duplex study, unilateral/limited                 Diagnoses and all orders for this visit:    Snoring  -     Ambulatory referral to Sleep Medicine; Future    Syringomyelia (HCC)  -     gabapentin (NEURONTIN) 600 MG tablet; Take 1 tablet (600 mg total) by mouth 3 (three) times a day as needed (pain)  -     traMADol (ULTRAM) 50 mg tablet; Take 1 tablet by mouth two to three times a day if needed for moderate pain    Type 2 diabetes mellitus without complication, without long-term current use of insulin (HCC)  -     Lancets (onetouch ultrasoft) lancets; Use daily  -     Comprehensive metabolic panel; Future  -     Hemoglobin A1C; Future    Diabetes mellitus with peripheral artery disease (HCC)    Hyperlipidemia, unspecified hyperlipidemia type  -     Lipid panel; Future    Pain of right lower extremity  -     VAS lower limb venous duplex study, unilateral/limited; Future    Essential hypertension              Subjective:      Patient ID: Anai Carranza is a 78 y o  male  CC:    Chief Complaint   Patient presents with    Follow-up     Patient present today for his 6 month follow up   Patient will like to discuss with Dr Aleshia Asencio about possible sleep apnea     Leg Pain     Pt complaints of right leg pain for the past 3 weeks  HPI:    F/u diabetes, lipids, wife concerned he is holding breath and snoring at night, r calf pain a couple weeks ago, better now, wife but Mary Kate Lopez on it, got 1st covid shot      The following portions of the patient's history were reviewed and updated as appropriate: allergies, current medications, past family history, past medical history, past social history, past surgical history and problem list         Review of Systems   Constitutional: Negative for activity change, appetite change and fatigue  Respiratory: Negative for shortness of breath  Cardiovascular: Negative for chest pain  Musculoskeletal: Positive for arthralgias and neck pain  Leg pain r   Neurological: Negative for dizziness and headaches  Data to review:       Objective:    Vitals:    03/01/21 1016   BP: 128/82   BP Location: Left arm   Patient Position: Sitting   Cuff Size: Large   Pulse: 64   Resp: 16   Temp: 97 9 °F (36 6 °C)   TempSrc: Tympanic   Weight: 74 4 kg (164 lb)   Height: 5' 2" (1 575 m)        Physical Exam  Vitals signs reviewed  Constitutional:       Appearance: Normal appearance  He is obese  Cardiovascular:      Rate and Rhythm: Normal rate and regular rhythm  Pulses: Pulses are weak  Dorsalis pedis pulses are 1+ on the right side and 1+ on the left side  Posterior tibial pulses are 1+ on the right side and 1+ on the left side  Heart sounds: Normal heart sounds  Pulmonary:      Effort: Pulmonary effort is normal       Breath sounds: Normal breath sounds  Musculoskeletal:      Right lower leg: No edema  Left lower leg: No edema  Feet:      Right foot:      Skin integrity: No ulcer, skin breakdown, erythema, warmth, callus or dry skin  Left foot:      Skin integrity: No ulcer, skin breakdown, erythema, warmth, callus or dry skin  Neurological:      Mental Status: He is alert  Patient's shoes and socks removed  Right Foot/Ankle   Right Foot Inspection  Skin Exam: skin normal and skin intact no dry skin, no warmth, no callus, no erythema, no maceration, no abnormal color, no pre-ulcer, no ulcer and no callus                          Toe Exam: ROM and strength within normal limits  Sensory       Monofilament testing: intact  Vascular  Capillary refills: < 3 seconds  The right DP pulse is 1+  The right PT pulse is 1+  Left Foot/Ankle  Left Foot Inspection  Skin Exam: skin normal and skin intactno dry skin, no warmth, no erythema, no maceration, normal color, no pre-ulcer, no ulcer and no callus                         Toe Exam: ROM and strength within normal limits                   Sensory       Monofilament: intact  Vascular  Capillary refills: < 3 seconds  The left DP pulse is 1+  The left PT pulse is 1+  Assign Risk Category:  No deformity present;  No loss of protective sensation; Weak pulses       Risk: 1

## 2021-03-11 ENCOUNTER — HOSPITAL ENCOUNTER (OUTPATIENT)
Dept: NON INVASIVE DIAGNOSTICS | Facility: HOSPITAL | Age: 71
Discharge: HOME | End: 2021-03-11
Payer: MEDICARE

## 2021-03-11 DIAGNOSIS — M79.604 PAIN OF RIGHT LOWER EXTREMITY: ICD-10-CM

## 2021-03-11 PROCEDURE — 93971 EXTREMITY STUDY: CPT | Performed by: SURGERY

## 2021-03-11 PROCEDURE — 93971 EXTREMITY STUDY: CPT

## 2021-04-02 ENCOUNTER — TELEPHONE (OUTPATIENT)
Dept: SLEEP CENTER | Facility: CLINIC | Age: 71
End: 2021-04-02

## 2021-04-02 ENCOUNTER — OFFICE VISIT (OUTPATIENT)
Dept: SLEEP CENTER | Facility: CLINIC | Age: 71
End: 2021-04-02
Payer: MEDICARE

## 2021-04-02 VITALS
SYSTOLIC BLOOD PRESSURE: 144 MMHG | BODY MASS INDEX: 26.36 KG/M2 | DIASTOLIC BLOOD PRESSURE: 80 MMHG | HEIGHT: 66 IN | WEIGHT: 164 LBS

## 2021-04-02 DIAGNOSIS — Z01.812 ENCOUNTER FOR PREPROCEDURE SCREENING LABORATORY TESTING FOR COVID-19: Primary | ICD-10-CM

## 2021-04-02 DIAGNOSIS — R06.83 SNORING: ICD-10-CM

## 2021-04-02 DIAGNOSIS — E11.51 DIABETES MELLITUS WITH PERIPHERAL ARTERY DISEASE (HCC): ICD-10-CM

## 2021-04-02 DIAGNOSIS — I10 ESSENTIAL HYPERTENSION: ICD-10-CM

## 2021-04-02 DIAGNOSIS — K21.00 GASTROESOPHAGEAL REFLUX DISEASE WITH ESOPHAGITIS WITHOUT HEMORRHAGE: ICD-10-CM

## 2021-04-02 DIAGNOSIS — G47.19 EXCESSIVE DAYTIME SLEEPINESS: ICD-10-CM

## 2021-04-02 DIAGNOSIS — E66.3 OVERWEIGHT (BMI 25.0-29.9): ICD-10-CM

## 2021-04-02 DIAGNOSIS — J31.0 CHRONIC RHINITIS: ICD-10-CM

## 2021-04-02 DIAGNOSIS — G47.33 OSA (OBSTRUCTIVE SLEEP APNEA): Primary | ICD-10-CM

## 2021-04-02 DIAGNOSIS — Z20.822 ENCOUNTER FOR PREPROCEDURE SCREENING LABORATORY TESTING FOR COVID-19: Primary | ICD-10-CM

## 2021-04-02 DIAGNOSIS — J34.2 DEVIATED NASAL SEPTUM: ICD-10-CM

## 2021-04-02 PROCEDURE — 99204 OFFICE O/P NEW MOD 45 MIN: CPT | Performed by: INTERNAL MEDICINE

## 2021-04-02 NOTE — PROGRESS NOTES
Review of Systems      Genitourinary need to urinate more than twice a night and difficulty with erection   Cardiology none   Gastrointestinal frequent heartburn/acid reflux   Neurology balance problems   Constitutional claustrophobia and excessive sweating at night   Integumentary rash or dry skin and itching   Psychiatry anxiety   Musculoskeletal back pain   Pulmonary shortness of breath with activity, wheezing, snoring and difficulty breathing when lying flat    ENT ringing in ears   Endocrine frequent urination   Hematological none

## 2021-04-02 NOTE — PROGRESS NOTES
Consultation - [address] [facility] [address]  73 y o  male  GWA:[id number]  XRL:[id number]    Physician Requesting Consult: Shandra Alas MD     Reason for Consult : At your kind request I saw Lian Spencer for initial sleep evaluation today  The patient is here to evaluate for suspected Obstructive Sleep Apnea  PFSH, Problem List, Medications & Allergies were reviewed in EMR  Mary Jane Carrington  has a past medical history of Anxiety, Chronic pain disorder, Diabetes mellitus ([address] ), Diverticulosis, Enlarged prostate, Extremity pain, GERD (gastroesophageal reflux disease), Hearing loss, right, Hemorrhoids, Hypertension, Neck pain, and Polyp, sigmoid colon  He has a current medication list which includes the following prescription(s): one touch ultra 2, ezetimibe, gabapentin, glucose blood, hydrochlorothiazide, onetouch ultrasoft, losartan, metformin, omeprazole, tamsulosin, tramadol, urea, and omeprazole  HPI:  Wife reports loud snoring of several years duration that has escalated over time  In the past year, she has witnessed apneas  He is not aware of snoring, breathing difficulties during sleep or modifying factors  Other Complaints: none  Restless Leg Syndrome: reports no suggestive symptoms    Parasomnia: no features reported    Sleep Routine (averages): Typical Bedtime:  10:00 p m  Gets OOB:  7:30 a m  TIB:9 5 hrs  Sleep latency:<  30 minutes Sleep Interruptions:2-3/nite because of nocturia and struggles to fall back asleep  Awakens: spontaneously  Upon awakening: usually feels refreshed  He estimates getting 5-6 hrs sleep  Mary Jane Carrington reports Excessive Daytime Sleepiness dozes off when sedentary at home and rated himself at Total score: 16 /24 on the [place] Sleepiness Scale  Habits:  reports that he has never smoked   He has never used smokeless tobacco  ;   E-Cigarette/Vaping    E-Cigarette Use Never User     ;  reports no history of drug use ;  reports current alcohol use  ; Caffeine use:limited ; Exercise routine: sometimes   Family History:  Sister has obstructive sleep apnea  ROS - see attached  Significant for weight has been stable  He has postnasal drip upon awakening  He reports some shortness of breath and wheezing  He reported no cardiac symptoms  EXAM:  /80   Ht 5' 6" (1 676 m)   Wt 74 4 kg (164 lb)   BMI 26 47 kg/m²    General: Well groomed male, well appearing, in no apparent distress  Neurological: Alert and orientated;  cooperative; Cranial nerves intact;    Psychiatric: Speech:clear and coherent; Normal mood, affect & thought   Skin: warm and dry; Color& Hydration good; no facial rashes or lesions   HEENT:  Craniofacial anatomy: normal Sinuses: non- tender  TMJ: Normal    Eyes: EOM's intact;  conjunctiva/corneas clear   Ears: Externallyappear normal     Nasal Airway: is patent and assymetric nares Septum:  Deviated; Mucosa: normal; Turbinates: normal; Rhinorrhea:  Postnasal drip   Mouth: Lips: normal posture; Dentition: normal   Mucosa:moist  ; Hard Palate:normal    Oropharryx: crowded and AP narrowing Tongue: Mallampati:Class IV, Mobile and MacroglossiaSoft Palate:  redundant  and Uvular Hypertrophy Tonsils: cryptic  Neck: Neck Circumference: 15 "; Supple; no abnormal masses; Thyroid:normal  Trachea:central     Lymph: No Cervical or Submandibular Lymhadenopathy  Heart: S1,S2 normal; RRR; no gallop; nomurmurs   Lungs: Respiratory Effort:normal  Air entry good bilaterally  No wheezes  No rales  Abdomen: Obese, Soft & non-tender    Extremities: No pedal edema  No clubbing or cyanosis  Musculoskeletal:  Motor normal; Gait:normal        IMPRESSION: Primary, Secondary (to Medical or Psych conditions) Diagnoses & Comorbidities   1  ARPAN (obstructive sleep apnea)  Diagnostic Sleep Study    CPAP Study   2  Excessive daytime sleepiness     3  Deviated nasal septum     4  Chronic rhinitis     5  Snoring  Ambulatory referral to Sleep Medicine   6   Gastroesophageal reflux disease with esophagitis without hemorrhage     7  Essential hypertension     8  Diabetes mellitus with peripheral artery disease ([facility] [address] )     9  Overweight (BMI 25 0-29  9)       PLAN:   With respect to above conditions, comprehensive counseling provided on pathophysiology; effects on symptoms and comorbidities, diagnostic strategies & limitations; treatment options; risks or no treament; risks & benefits of the various therapeutic options; costs and insurance aspects  I advised weight reduction, avoiding sleeping supine, using alcohol or sedating medications close to bed time and on safe driving practices  Nocturnal polysomnography is indicated and a diagnostic study will be scheduled  Patient is willing to try Positive airway pressure therapy and will be scheduled for a titration study if indicated  Nasal symptoms may improve with regular nasal saline rinse followed by topical nasal steroid if necessary  Follow-up to be scheduled after the studies to review results, further details of treatment options and to initiate/adjust therapy        Sincerely,     Authenticated electronically by Jasen Chavez MD   on 60/[birth year]/47   Board Certified Specialist

## 2021-04-02 NOTE — PATIENT INSTRUCTIONS
What is ARPAN? Obstructive sleep apnea is a common and serious sleep disorder that causes you to stop breathing during sleep  The airway repeatedly becomes blocked, limiting the amount of air that reaches your lungs  When this happens, you may snore loudly or making choking noises as you try to breathe  Your brain and body becomes oxygen deprived and you may wake up  This may happen a few times a night, or in more severe cases, several hundred times a night  Sleep apnea can make you wake up in the morning feeling tired or unrefreshed even though you have had a full night of sleep  During the day, you may feel fatigued, have difficulty concentrating or you may even unintentionally fall asleep  This is because your body is waking up numerous times throughout the night, even though you might not be conscious of each awakening  The lack of oxygen your body receives can have negative long-term consequences for your health  This includes:  High blood pressure  Heart disease  Irregular heart rhythms  Stroke  Pre-diabetes and diabetes  Depression    Testing  An objective evaluation of your sleep may be needed before your board certified sleep physician can make a diagnosis  Options include:   In-lab overnight sleep study  This type of sleep study requires you to stay overnight at a sleep center, in a bed that may resemble a hotel room  You will sleep with sensors hooked up to various parts of your body  These sensors record your brain waves, heartbeat, breathing and movement  An overnight sleep study also provides your doctor with the most complete information about your sleep  Learn more about an overnight sleep study      Home sleep apnea test  Some patients with high risk factors for obstructive sleep apnea and no other medical disorders may be candidates for a home sleep apnea test  The testing equipment differs in that it is less complicated than what is used in an overnight sleep study   As such, does not give all the data an in-lab will and if negative, may not mean you do not have the problem  Treatment for sleep apnea  includes using a continuous positive airway pressure (CPAP) machine to keep your airway open during sleep  A mask is placed over your nose and mouth, or just your nose  The mask is hooked to the CPAP machine that blows a gentle stream of air into the mask when you breathe  This helps keep your airway open so you can breathe more regularly  Extra oxygen may be given to you through the machine  You may be given a mouth device  It looks like a mouth guard or dental retainer and stops your tongue and mouth tissues from blocking your throat while you sleep  Surgery may be needed to remove extra tissues that block your mouth, throat, or nose  Manage sleep apnea:   Do not smoke  Nicotine and other chemicals in cigarettes and cigars can cause lung damage  Ask your healthcare provider for information if you currently smoke and need help to quit  E-cigarettes or smokeless tobacco still contain nicotine  Talk to your healthcare provider before you use these products  Do not drink alcohol or take sedative medicine before you go to sleep  Alcohol and sedatives can relax the muscles and tissues around your throat  This can block the airflow to your lungs  Maintain a healthy weight  Excess tissue around your throat may restrict your breathing  Ask your healthcare provider for information if you need to lose weight  Sleep on your side or use pillows designed to prevent sleep apnea  This prevents your tongue or other tissues from blocking your throat  You can also raise the head of your bed  Driving Safety  Refrain from driving when drowsy  Follow up with your healthcare provider as directed:  Write down your questions so you remember to ask them during your visits  Go to AASM website for more information: Sleepeducation  org     What is ARPAN?    Obstructive sleep apnea is a common and serious sleep disorder that causes you to stop breathing during sleep  The airway repeatedly becomes blocked, limiting the amount of air that reaches your lungs  When this happens, you may snore loudly or making choking noises as you try to breathe  Your brain and body becomes oxygen deprived and you may wake up  This may happen a few times a night, or in more severe cases, several hundred times a night  Sleep apnea can make you wake up in the morning feeling tired or unrefreshed even though you have had a full night of sleep  During the day, you may feel fatigued, have difficulty concentrating or you may even unintentionally fall asleep  This is because your body is waking up numerous times throughout the night, even though you might not be conscious of each awakening  The lack of oxygen your body receives can have negative long-term consequences for your health  This includes:  High blood pressure  Heart disease  Irregular heart rhythms  Stroke  Pre-diabetes and diabetes  Depression    Testing  An objective evaluation of your sleep may be needed before your board certified sleep physician can make a diagnosis  Options include:   In-lab overnight sleep study  This type of sleep study requires you to stay overnight at a sleep center, in a bed that may resemble a hotel room  You will sleep with sensors hooked up to various parts of your body  These sensors record your brain waves, heartbeat, breathing and movement  An overnight sleep study also provides your doctor with the most complete information about your sleep  Learn more about an overnight sleep study      Home sleep apnea test  Some patients with high risk factors for obstructive sleep apnea and no other medical disorders may be candidates for a home sleep apnea test  The testing equipment differs in that it is less complicated than what is used in an overnight sleep study   As such, does not give all the data an in-lab will and if negative, may not mean you do not have the problem  Treatment for sleep apnea  includes using a continuous positive airway pressure (CPAP) machine to keep your airway open during sleep  A mask is placed over your nose and mouth, or just your nose  The mask is hooked to the CPAP machine that blows a gentle stream of air into the mask when you breathe  This helps keep your airway open so you can breathe more regularly  Extra oxygen may be given to you through the machine  You may be given a mouth device  It looks like a mouth guard or dental retainer and stops your tongue and mouth tissues from blocking your throat while you sleep  Surgery may be needed to remove extra tissues that block your mouth, throat, or nose  Manage sleep apnea:   Do not smoke  Nicotine and other chemicals in cigarettes and cigars can cause lung damage  Ask your healthcare provider for information if you currently smoke and need help to quit  E-cigarettes or smokeless tobacco still contain nicotine  Talk to your healthcare provider before you use these products  Do not drink alcohol or take sedative medicine before you go to sleep  Alcohol and sedatives can relax the muscles and tissues around your throat  This can block the airflow to your lungs  Maintain a healthy weight  Excess tissue around your throat may restrict your breathing  Ask your healthcare provider for information if you need to lose weight  Sleep on your side or use pillows designed to prevent sleep apnea  This prevents your tongue or other tissues from blocking your throat  You can also raise the head of your bed  Driving Safety  Refrain from driving when drowsy  Follow up with your healthcare provider as directed:  Write down your questions so you remember to ask them during your visits  Go to AAS website for more information: Sleepeducation  org       Nursing Support:  When: Monday through Friday 7A-5PM except holidays  Where: Our direct line is [phone]      If you are having a true emergency please call 911  In the event that the line is busy or it is after hours please leave a voice message and we will return your call  Please speak clearly, leaving your full name, birth date, best number to reach you and the reason for your call  Medication refills: We will need the name of the medication, the dosage, the ordering provider, whether you get a 30 or 90 day refill, and the pharmacy name and address  Medications will be ordered by the provider only  Nurses cannot call in prescriptions  Please allow 7 days for medication refills  Physician requested updates: If your provider requested that you call with an update after starting medication, please be ready to provide us the medication and dosage, what time you take your medication, the time you attempt to fall asleep, time you fall asleep, when you wake up, and what time you get out of bed  Sleep Study Results: We will contact you with sleep study results and/or next steps after the physician has reviewed your testing

## 2021-04-05 DIAGNOSIS — G95.0 SYRINGOMYELIA (HCC): ICD-10-CM

## 2021-04-05 RX ORDER — TRAMADOL HYDROCHLORIDE 50 MG/1
TABLET ORAL
Qty: 60 TABLET | Refills: 0 | Status: SHIPPED | OUTPATIENT
Start: 2021-04-05 | End: 2021-05-10 | Stop reason: SDUPTHER

## 2021-04-08 ENCOUNTER — APPOINTMENT (OUTPATIENT)
Dept: LAB | Facility: MEDICAL CENTER | Age: 71
End: 2021-04-08
Payer: MEDICARE

## 2021-04-08 DIAGNOSIS — E11.9 TYPE 2 DIABETES MELLITUS WITHOUT COMPLICATION, WITHOUT LONG-TERM CURRENT USE OF INSULIN (HCC): ICD-10-CM

## 2021-04-08 DIAGNOSIS — E78.5 HYPERLIPIDEMIA, UNSPECIFIED HYPERLIPIDEMIA TYPE: ICD-10-CM

## 2021-04-08 LAB
ALBUMIN SERPL BCP-MCNC: 3.9 G/DL (ref 3.5–5)
ALP SERPL-CCNC: 83 U/L (ref 46–116)
ALT SERPL W P-5'-P-CCNC: 29 U/L (ref 12–78)
ANION GAP SERPL CALCULATED.3IONS-SCNC: 4 MMOL/L (ref 4–13)
AST SERPL W P-5'-P-CCNC: 10 U/L (ref 5–45)
BILIRUB SERPL-MCNC: 0.7 MG/DL (ref 0.2–1)
BUN SERPL-MCNC: 23 MG/DL (ref 5–25)
CALCIUM SERPL-MCNC: 9.3 MG/DL (ref 8.3–10.1)
CHLORIDE SERPL-SCNC: 106 MMOL/L (ref 100–108)
CHOLEST SERPL-MCNC: 162 MG/DL (ref 50–200)
CO2 SERPL-SCNC: 29 MMOL/L (ref 21–32)
CREAT SERPL-MCNC: 1.09 MG/DL (ref 0.6–1.3)
EST. AVERAGE GLUCOSE BLD GHB EST-MCNC: 140 MG/DL
GFR SERPL CREATININE-BSD FRML MDRD: 69 ML/MIN/1.73SQ M
GLUCOSE P FAST SERPL-MCNC: 144 MG/DL (ref 65–99)
HBA1C MFR BLD: 6.5 %
HDLC SERPL-MCNC: 37 MG/DL
LDLC SERPL CALC-MCNC: 96 MG/DL (ref 0–100)
NONHDLC SERPL-MCNC: 125 MG/DL
POTASSIUM SERPL-SCNC: 4 MMOL/L (ref 3.5–5.3)
PROT SERPL-MCNC: 8.2 G/DL (ref 6.4–8.2)
SODIUM SERPL-SCNC: 139 MMOL/L (ref 136–145)
TRIGL SERPL-MCNC: 146 MG/DL

## 2021-04-08 PROCEDURE — 83036 HEMOGLOBIN GLYCOSYLATED A1C: CPT

## 2021-04-08 PROCEDURE — 36415 COLL VENOUS BLD VENIPUNCTURE: CPT

## 2021-04-08 PROCEDURE — 80061 LIPID PANEL: CPT

## 2021-04-08 PROCEDURE — 80053 COMPREHEN METABOLIC PANEL: CPT

## 2021-04-11 ENCOUNTER — HOSPITAL ENCOUNTER (OUTPATIENT)
Dept: SLEEP CENTER | Facility: CLINIC | Age: 71
Discharge: HOME | End: 2021-04-11
Payer: MEDICARE

## 2021-04-11 DIAGNOSIS — G47.33 OSA (OBSTRUCTIVE SLEEP APNEA): ICD-10-CM

## 2021-04-11 PROCEDURE — 95810 POLYSOM 6/> YRS 4/> PARAM: CPT

## 2021-04-12 NOTE — PROGRESS NOTES
Sleep Study Documentation    Pre-Sleep Study       Sleep testing procedure explained to patient:YES    Patient napped prior to study:NO    Caffeine:Nightshift worker after midnight  Caffeine use:NO    Alcohol:Nightshift workers after 7AM: Alcohol use:NO    Typical day for patient:YES       Study Documentation    Sleep Study Indications:  Snoring, BMI>30, EDS, witnessed apneas  Sleep Study: Diagnostic   Snore: Moderate  Supplemental O2: no    O2 flow rate (L/min) range 0  O2 flow rate (L/min) final 0  Minimum SaO2  81 %  Baseline SaO2  94 %            EKG abnormalities: no     EEG abnormalities: no    Sleep Study Recorded < 2 hours: N/A    Sleep Study Recorded > 2 hours but incomplete study: N/A    Sleep Study Recorded 6 hours but no sleep obtained: NO          Post-Sleep Study    Medication used at bedtime or during sleep study:YES other prescription medications    Patient reports time it took to fall asleep:less than 20 minutes    Patient reports waking up during study:1 to 2 times  Patient reports returning to sleep in 10 to 30 minutes  Patient reports sleeping 4 to 6 hours with dreaming  Patient reports sleep during study:typical    Patient rated sleepiness: Not sleepy or tired    PAP treatment:no

## 2021-04-15 ENCOUNTER — TELEPHONE (OUTPATIENT)
Dept: SLEEP CENTER | Facility: CLINIC | Age: 71
End: 2021-04-15

## 2021-04-15 NOTE — TELEPHONE ENCOUNTER
Spoke with patient and his wife, advise sleep study reveals moderate ARPAN, recommend titration study already scheduled for 5/20/2021    Reminded patient to go for COVID test

## 2021-04-22 DIAGNOSIS — I10 ESSENTIAL HYPERTENSION: ICD-10-CM

## 2021-04-22 RX ORDER — HYDROCHLOROTHIAZIDE 25 MG/1
25 TABLET ORAL DAILY
Qty: 90 TABLET | Refills: 1 | Status: SHIPPED | OUTPATIENT
Start: 2021-04-22 | End: 2021-10-25

## 2021-04-22 RX ORDER — LOSARTAN POTASSIUM 100 MG/1
100 TABLET ORAL DAILY
Qty: 90 TABLET | Refills: 1 | Status: SHIPPED | OUTPATIENT
Start: 2021-04-22 | End: 2021-10-20

## 2021-04-26 DIAGNOSIS — K20.90 ESOPHAGITIS: ICD-10-CM

## 2021-04-26 RX ORDER — OMEPRAZOLE 40 MG/1
CAPSULE, DELAYED RELEASE ORAL
Qty: 30 CAPSULE | Refills: 3 | Status: SHIPPED | OUTPATIENT
Start: 2021-04-26 | End: 2021-08-25

## 2021-05-07 DIAGNOSIS — E11.9 TYPE 2 DIABETES MELLITUS WITHOUT COMPLICATION, WITHOUT LONG-TERM CURRENT USE OF INSULIN (HCC): ICD-10-CM

## 2021-05-10 DIAGNOSIS — G95.0 SYRINGOMYELIA (HCC): ICD-10-CM

## 2021-05-10 RX ORDER — TRAMADOL HYDROCHLORIDE 50 MG/1
TABLET ORAL
Qty: 60 TABLET | Refills: 0 | Status: SHIPPED | OUTPATIENT
Start: 2021-05-10 | End: 2021-06-08 | Stop reason: SDUPTHER

## 2021-05-14 DIAGNOSIS — Z01.812 ENCOUNTER FOR PREPROCEDURE SCREENING LABORATORY TESTING FOR COVID-19: ICD-10-CM

## 2021-05-14 DIAGNOSIS — Z20.822 ENCOUNTER FOR PREPROCEDURE SCREENING LABORATORY TESTING FOR COVID-19: ICD-10-CM

## 2021-05-14 PROCEDURE — U0003 INFECTIOUS AGENT DETECTION BY NUCLEIC ACID (DNA OR RNA); SEVERE ACUTE RESPIRATORY SYNDROME CORONAVIRUS 2 (SARS-COV-2) (CORONAVIRUS DISEASE [COVID-19]), AMPLIFIED PROBE TECHNIQUE, MAKING USE OF HIGH THROUGHPUT TECHNOLOGIES AS DESCRIBED BY CMS-2020-01-R: HCPCS | Performed by: INTERNAL MEDICINE

## 2021-05-14 PROCEDURE — U0005 INFEC AGEN DETEC AMPLI PROBE: HCPCS | Performed by: INTERNAL MEDICINE

## 2021-05-15 LAB — SARS-COV-2 RNA RESP QL NAA+PROBE: NEGATIVE

## 2021-05-16 DIAGNOSIS — E78.5 HYPERLIPIDEMIA, UNSPECIFIED HYPERLIPIDEMIA TYPE: ICD-10-CM

## 2021-05-16 RX ORDER — EZETIMIBE 10 MG/1
TABLET ORAL
Qty: 90 TABLET | Refills: 1 | Status: SHIPPED | OUTPATIENT
Start: 2021-05-16 | End: 2021-08-03 | Stop reason: SINTOL

## 2021-05-18 NOTE — TELEPHONE ENCOUNTER
Patients wife called, aware of negative COVID test, she is confirming patient will be there for sleep study 5/20/2021

## 2021-05-20 ENCOUNTER — HOSPITAL ENCOUNTER (OUTPATIENT)
Dept: SLEEP CENTER | Facility: CLINIC | Age: 71
Discharge: HOME | End: 2021-05-20
Payer: MEDICARE

## 2021-05-20 DIAGNOSIS — G47.33 OSA (OBSTRUCTIVE SLEEP APNEA): ICD-10-CM

## 2021-05-20 PROCEDURE — 95811 POLYSOM 6/>YRS CPAP 4/> PARM: CPT

## 2021-05-21 DIAGNOSIS — G47.33 OSA (OBSTRUCTIVE SLEEP APNEA): Primary | ICD-10-CM

## 2021-05-21 NOTE — PROGRESS NOTES
Sleep Study Documentation    Pre-Sleep Study       Sleep testing procedure explained to patient:YES    Patient napped prior to study:NO    [address] [place] worker after 12PM   Caffeine use:NO    Alcohol:Dayshift workers after 5PM: Alcohol use:NO    Typical day for patient:YES       Study Documentation    Sleep Study Indications: Moderate obstructive sleep apnea demonstrated on diagnostic study performed 4/11/2021  Sleep Study: Treatment   Optimal PAP pressure: 10cm  Leak:Small  Snore:Eliminated  REM Obtained:yes  Supplemental O2: no    Minimum SaO2 88%  Baseline SaO2 95%  PAP mask tried (list all) ResMed AirFit P10 mask system, [employer] ESON2 nasal   PAP mask choice (final) [employer] ESON2 nasal (medium)   PAP mask type:nasal  PAP pressure at which snoring was eliminated 4cm   Minimum SaO2 at final PAP pressure 93%  Mode of Therapy:CPAP  ETCO2:No  CPAP changed to BiPAP:No        EKG abnormalities: no     EEG abnormalities: no    Sleep Study Recorded < 2 hours: N/A    Sleep Study Recorded > 2 hours but incomplete study: N/A    Sleep Study Recorded 6 hours but no sleep obtained: NO    Patient classification: retired       Post-Sleep Study    Medication used at bedtime or during sleep study:YES other prescription medications    Patient reports time it took to fall asleep:less than 20 minutes    Patient reports waking up during study:3 or more times  Patient reports returning to sleep in 10 to 30 minutes  Patient reports sleeping 6 to 8 hours without dreaming  Patient reports sleep during study:worse than usual    Patient rated sleepiness: Not sleepy or tired    PAP treatment:yes: Post PAP treatment patient reports feeling unsure if a change is noted and  would wear PAP mask at home

## 2021-05-25 ENCOUNTER — TELEPHONE (OUTPATIENT)
Dept: SLEEP CENTER | Facility: CLINIC | Age: 71
End: 2021-05-25

## 2021-05-25 NOTE — TELEPHONE ENCOUNTER
Spoke with patient, advised Dr Nahid Conteh ordered CPAP    Already scheduled for DME set up 6/3/2021 in [place] representative aware    Compliance follow up already scheduled for 8/6/2021

## 2021-06-04 ENCOUNTER — TELEPHONE (OUTPATIENT)
Dept: SLEEP CENTER | Facility: CLINIC | Age: 71
End: 2021-06-04

## 2021-06-08 DIAGNOSIS — G95.0 SYRINGOMYELIA (HCC): ICD-10-CM

## 2021-06-08 RX ORDER — TRAMADOL HYDROCHLORIDE 50 MG/1
TABLET ORAL
Qty: 60 TABLET | Refills: 0 | Status: SHIPPED | OUTPATIENT
Start: 2021-06-08 | End: 2021-07-06 | Stop reason: SDUPTHER

## 2021-06-14 DIAGNOSIS — E78.5 HYPERLIPIDEMIA, UNSPECIFIED HYPERLIPIDEMIA TYPE: Primary | ICD-10-CM

## 2021-06-14 DIAGNOSIS — E11.9 TYPE 2 DIABETES MELLITUS WITHOUT COMPLICATION, WITHOUT LONG-TERM CURRENT USE OF INSULIN (HCC): ICD-10-CM

## 2021-06-14 RX ORDER — LANCETS 30 GAUGE
1 EACH MISCELLANEOUS 2 TIMES DAILY
Qty: 100 EACH | Refills: 2 | Status: SHIPPED | OUTPATIENT
Start: 2021-06-14

## 2021-07-06 DIAGNOSIS — G95.0 SYRINGOMYELIA (HCC): ICD-10-CM

## 2021-07-06 RX ORDER — TRAMADOL HYDROCHLORIDE 50 MG/1
TABLET ORAL
Qty: 60 TABLET | Refills: 0 | Status: SHIPPED | OUTPATIENT
Start: 2021-07-06 | End: 2021-08-09 | Stop reason: SDUPTHER

## 2021-08-03 ENCOUNTER — OFFICE VISIT (OUTPATIENT)
Dept: FAMILY MEDICINE CLINIC | Facility: CLINIC | Age: 71
End: 2021-08-03
Payer: MEDICARE

## 2021-08-03 VITALS
WEIGHT: 165 LBS | TEMPERATURE: 98.2 F | DIASTOLIC BLOOD PRESSURE: 78 MMHG | HEART RATE: 92 BPM | HEIGHT: 66 IN | SYSTOLIC BLOOD PRESSURE: 134 MMHG | BODY MASS INDEX: 26.52 KG/M2

## 2021-08-03 DIAGNOSIS — Z12.11 SCREENING FOR COLON CANCER: Primary | ICD-10-CM

## 2021-08-03 DIAGNOSIS — H91.93 BILATERAL HEARING LOSS, UNSPECIFIED HEARING LOSS TYPE: Primary | ICD-10-CM

## 2021-08-03 DIAGNOSIS — M70.52 BURSITIS OF OTHER BURSA OF LEFT KNEE: ICD-10-CM

## 2021-08-03 PROCEDURE — 99213 OFFICE O/P EST LOW 20 MIN: CPT | Performed by: FAMILY MEDICINE

## 2021-08-03 RX ORDER — MELOXICAM 15 MG/1
15 TABLET ORAL DAILY
Qty: 30 TABLET | Refills: 2 | Status: SHIPPED | OUTPATIENT
Start: 2021-08-03 | End: 2022-01-31

## 2021-08-03 NOTE — PATIENT INSTRUCTIONS
Ice, elevate,see how pt does on meds  Weight Management   AMBULATORY CARE:   Why it is important to manage your weight:  Being overweight increases your risk of health conditions such as heart disease, high blood pressure, type 2 diabetes, and certain types of cancer  It can also increase your risk for osteoarthritis, sleep apnea, and other respiratory problems  Aim for a slow, steady weight loss  Even a small amount of weight loss can lower your risk of health problems  How to lose weight safely:  A safe and healthy way to lose weight is to eat fewer calories and get regular exercise  · You can lose up about 1 pound a week by decreasing the number of calories you eat by 500 calories each day  You can decrease calories by eating smaller portion sizes or by cutting out high-calorie foods  Read labels to find out how many calories are in the foods you eat  · You can also burn calories with exercise such as walking, swimming, or biking  You will be more likely to keep weight off if you make these changes part of your lifestyle  Exercise at least 30 minutes per day on most days of the week  You can also fit in more physical activity by taking the stairs instead of the elevator or parking farther away from stores  Ask your healthcare provider about the best exercise plan for you  Healthy meal plan for weight management:  A healthy meal plan includes a variety of foods, contains fewer calories, and helps you stay healthy  A healthy meal plan includes the following:     · Eat whole-grain foods more often  A healthy meal plan should contain fiber  Fiber is the part of grains, fruits, and vegetables that is not broken down by your body  Whole-grain foods are healthy and provide extra fiber in your diet  Some examples of whole-grain foods are whole-wheat breads and pastas, oatmeal, brown rice, and bulgur  · Eat a variety of vegetables every day    Include dark, leafy greens such as spinach, kale, micah greens, and mustard greens  Eat yellow and orange vegetables such as carrots, sweet potatoes, and winter squash  · Eat a variety of fruits every day  Choose fresh or canned fruit (canned in its own juice or light syrup) instead of juice  Fruit juice has very little or no fiber  · Eat low-fat dairy foods  Drink fat-free (skim) milk or 1% milk  Eat fat-free yogurt and low-fat cottage cheese  Try low-fat cheeses such as mozzarella and other reduced-fat cheeses  · Choose meat and other protein foods that are low in fat  Choose beans or other legumes such as split peas or lentils  Choose fish, skinless poultry (chicken or turkey), or lean cuts of red meat (beef or pork)  Before you cook meat or poultry, cut off any visible fat  · Use less fat and oil  Try baking foods instead of frying them  Add less fat, such as margarine, sour cream, regular salad dressing and mayonnaise to foods  Eat fewer high-fat foods  Some examples of high-fat foods include french fries, doughnuts, ice cream, and cakes  · Eat fewer sweets  Limit foods and drinks that are high in sugar  This includes candy, cookies, regular soda, and sweetened drinks  Ways to decrease calories:   · Eat smaller portions  ? Use a small plate with smaller servings  ? Do not eat second helpings  ? When you eat at a restaurant, ask for a box and place half of your meal in the box before you eat  ? Share an entrée with someone else  · Replace high-calorie snacks with healthy, low-calorie snacks  ? Choose fresh fruit, vegetables, fat-free rice cakes, or air-popped popcorn instead of potato chips, nuts, or chocolate  ? Choose water or calorie-free drinks instead of soda or sweetened drinks  · Do not shop for groceries when you are hungry  You may be more likely to make unhealthy food choices  Take a grocery list of healthy foods and shop after you have eaten  · Eat regular meals  Do not skip meals   Skipping meals can lead to overeating later in the day  This can make it harder for you to lose weight  Eat a healthy snack in place of a meal if you do not have time to eat a regular meal  Talk with a dietitian to help you create a meal plan and schedule that is right for you  Other things to consider as you try to lose weight:   · Be aware of situations that may give you the urge to overeat, such as eating while watching television  Find ways to avoid these situations  For example, read a book, go for a walk, or do crafts  · Meet with a weight loss support group or friends who are also trying to lose weight  This may help you stay motivated to continue working on your weight loss goals  © Copyright [employer] 2021 Information is for End User's use only and may not be sold, redistributed or otherwise used for commercial purposes  All illustrations and images included in CareNotes® are the copyrighted property of [employer]  or [facility] Juan Diego Bueno   The above information is an  only  It is not intended as medical advice for individual conditions or treatments  Talk to your doctor, nurse or pharmacist before following any medical regimen to see if it is safe and effective for you

## 2021-08-03 NOTE — PROGRESS NOTES
Assessment and Plan:    Problem List Items Addressed This Visit     None      Visit Diagnoses     Screening for colon cancer    -  Primary    Relevant Orders    Cologuard    Bursitis of other bursa of left knee        Relevant Medications    meloxicam (MOBIC) 15 mg tablet    BMI 26 0-26 9,adult                     Diagnoses and all orders for this visit:    Screening for colon cancer  -     Cologuard; Future    Bursitis of other bursa of left knee  -     meloxicam (MOBIC) 15 mg tablet; Take 1 tablet (15 mg total) by mouth daily    BMI 26 0-26 9,adult              Subjective:      Patient ID: Lissette Rodas is a 73 y o  male  CC:    Chief Complaint   Patient presents with    Knee Pain     pt is c/o left knee pain, ongoing for a week  HPI:    Here for l knee pain, was kneeling a lot and it hurts him, was icing it with mild relief, got swollen      The following portions of the patient's history were reviewed and updated as appropriate: allergies, current medications, past family history, past medical history, past social history, past surgical history and problem list       Review of Systems   Constitutional: Negative for activity change, appetite change, chills and fatigue  HENT: Positive for hearing loss  Respiratory: Negative for shortness of breath  Cardiovascular: Negative for chest pain  Musculoskeletal: Positive for arthralgias  L knee pain         Data to review:       Objective:    Vitals:    08/03/21 1402 08/03/21 1431   BP: 134/78    BP Location: Left arm    Patient Position: Sitting    Cuff Size: Adult    Pulse: (!) 108 92   Temp: 98 2 °F (36 8 °C)    TempSrc: Tympanic    Weight: 74 8 kg (165 lb)    Height: 5' 6" (1 676 m)         Physical Exam  Vitals reviewed  Constitutional:       Appearance: Normal appearance  HENT:      Ears:      Comments: Scarring of TMs  Cardiovascular:      Rate and Rhythm: Normal rate and regular rhythm  Pulses: Normal pulses        Heart sounds: Normal heart sounds  Pulmonary:      Effort: Pulmonary effort is normal       Breath sounds: Normal breath sounds  Musculoskeletal:      Right knee: Normal       Left knee: Swelling and effusion present  Tenderness present over the medial joint line  Neurological:      Mental Status: He is alert  BMI Counseling: Body mass index is 26 63 kg/m²  The BMI is above normal  Nutrition recommendations include reducing portion sizes, decreasing overall calorie intake, 3-5 servings of fruits/vegetables daily, reducing fast food intake and consuming healthier snacks  Exercise recommendations include exercising 3-5 times per week  BMI Counseling: Body mass index is 26 63 kg/m²  The BMI is above normal  Nutrition recommendations include reducing portion sizes, decreasing overall calorie intake, 3-5 servings of fruits/vegetables daily, reducing fast food intake and consuming healthier snacks  Exercise recommendations include exercising 3-5 times per week

## 2021-08-06 ENCOUNTER — OFFICE VISIT (OUTPATIENT)
Dept: SLEEP CENTER | Facility: CLINIC | Age: 71
End: 2021-08-06
Payer: MEDICARE

## 2021-08-06 VITALS
SYSTOLIC BLOOD PRESSURE: 140 MMHG | WEIGHT: 168 LBS | DIASTOLIC BLOOD PRESSURE: 76 MMHG | HEIGHT: 66 IN | BODY MASS INDEX: 27 KG/M2

## 2021-08-06 DIAGNOSIS — J34.2 DEVIATED NASAL SEPTUM: ICD-10-CM

## 2021-08-06 DIAGNOSIS — K21.00 GASTROESOPHAGEAL REFLUX DISEASE WITH ESOPHAGITIS WITHOUT HEMORRHAGE: ICD-10-CM

## 2021-08-06 DIAGNOSIS — G47.19 EXCESSIVE DAYTIME SLEEPINESS: ICD-10-CM

## 2021-08-06 DIAGNOSIS — E11.51 DIABETES MELLITUS WITH PERIPHERAL ARTERY DISEASE (HCC): ICD-10-CM

## 2021-08-06 DIAGNOSIS — G47.33 OSA (OBSTRUCTIVE SLEEP APNEA): Primary | ICD-10-CM

## 2021-08-06 DIAGNOSIS — J31.0 CHRONIC RHINITIS: ICD-10-CM

## 2021-08-06 DIAGNOSIS — I10 ESSENTIAL HYPERTENSION: ICD-10-CM

## 2021-08-06 DIAGNOSIS — E66.3 OVERWEIGHT (BMI 25.0-29.9): ICD-10-CM

## 2021-08-06 PROCEDURE — 99214 OFFICE O/P EST MOD 30 MIN: CPT | Performed by: INTERNAL MEDICINE

## 2021-08-06 NOTE — PROGRESS NOTES
Review of Systems      Genitourinary need to urinate more than twice a night   Cardiology none   Gastrointestinal frequent heartburn/acid reflux   Neurology none   Constitutional none   Integumentary none   Psychiatry none   Musculoskeletal none   Pulmonary none   ENT none   Endocrine none   Hematological none

## 2021-08-06 NOTE — PATIENT INSTRUCTIONS

## 2021-08-06 NOTE — PROGRESS NOTES
Follow-Up Note - [facility]   Emilia Anne Depari  73 y o  male  ILQ:[id number]  OUV:[id number]  DOS:8/6/2021    CC: I saw this patient for follow-up in clinic today for Sleep disordered breathing, Coexisting Sleep and Medical Problems  The patient had both diagnostic and therapeutic sleep studies and is here to review results and to adjust therapy  Treatment was initiated using a Beth dream station2  The diagnostic study confirmed   obstructive sleep apnea:  AHI 16 6/hour considerably  higher during stage REM  Intermittent snoring of moderate intensity was noted  Minimum oxygen saturation 81 %  and 10 4 minutes of total sleep time was spent with saturations less than 90% ]  During the subsequent therapeutic study, sleep disordered breathing was adequately remediated with PAP at 10cm H2O  PFSH, Problem List, Medications & Allergies were reviewed in EMR  Interval changes: [none reported ]   He  has a past medical history of Anxiety, Chronic pain disorder, Diabetes mellitus ([address] ), Diverticulosis, Enlarged prostate, Extremity pain, GERD (gastroesophageal reflux disease), Hearing loss, right, Hemorrhoids, Hypertension, Neck pain, and Polyp, sigmoid colon  He has a current medication list which includes the following prescription(s): one touch ultra 2, gabapentin, glucose blood, hydrochlorothiazide, losartan, meloxicam, metformin, omeprazole, onetouch delica lancets 36Q, tamsulosin, tramadol, urea, and [DISCONTINUED] omeprazole  PHYSIOLOGICAL DATA REVIEW AND INTERPRETATION: [ ] using PAP > 4 hours/night 100%  Estimated URBANO 3 6/hour with pressure of 10cm H2O ;Compliance: excellent; Sleep disordered breathing:stable & within target range; Patient has not been using ozone based or other devices to sanitize the machine  SUBJECTIVE: Regarding use of PAP, Emilia Clareshaista reports:   · He is experiencing some adverse effects: dry mouth/throat; has not noticed any fibres or foreign material in air line       · He is[ ]benefiting from use: sleeping better   Sleep Routine: Amarjit De La Fuente reports getting 8 hrs sleep[  ]; he has no difficulty initiating or maintaining sleep   He arises spontaneously and feels refreshed  Amarjit De La Fuente denies [Excessive] [Daytime] Sleepiness, naps occasionally because I want to  [and] rated [himself] at Total score: 12 /24 on the [place] Sleepiness Scale  Habits:[ reports that he has never smoked  He has never used smokeless tobacco ], [ reports current alcohol use ], [ reports no history of drug use ], Caffeine use: limited[ ], Exercise routine: sometimes[ ]  ROS: as attached  [Significant for few lb weight gain since his last visit  Reported no nasal, respiratory or cardiac symptoms ]     EXAM: /76   Ht 5' 6" (1 676 m)   Wt 76 2 kg (168 lb)   BMI 27 12 kg/m²     Patient is well groomed; well appearing  H&N: EOMI; NC/AT:[no] facial pressure marks, [no] rashes  Skin/Extrem: col & hydration [normal]; [no] edema  Psych: cooperative[and in no distress]  Mental state:[appears normal]  Resp: Respiratory effort [is normal]  CNS: Alert, orientated, clear & coherent speech  [Physical findings otherwise essentially unchanged from previous ]    IMPRESSION: Problem List Items & Comorbidities Addressed this Visit    1  ARPAN (obstructive sleep apnea)     2  Deviated nasal septum     3  Chronic rhinitis     4  Excessive daytime sleepiness     5  Gastroesophageal reflux disease with esophagitis without hemorrhage     6  Essential hypertension     7  Overweight (BMI 25 0-29 9)     8  Diabetes mellitus with peripheral artery disease ([facility] [address] )     1-4 improved/stable    PLAN:  1  I reviewed [results of prior studies and] [physiologic data ]with the patient  [I discussed treatment options with risks and benefits ]  2  Treatment with  PAP is medically necessary and Amarjit De La Fuente is agreable to continue use     3  Care of equipment, methods to improve comfort using PAP and importance of compliance with therapy were discussed  4  Pressure setting: continue 10 cmH2O     5  Rx provided to replace supplies and Care coordinated with DME provider  6  [Discussed] strategies for weight [reduction]  7  Follow-up is advised in [1 Clara Fabian [or sooner if needed] [to monitor progress, compliance and to adjust therapy]  Thank you for allowing me to participate in the care of this patient  Sincerely,    Authenticated electronically by Bonnie East MD on 41/12/81   Board Certified Specialist     Portions of the record may have been created with voice recognition software  Occasional wrong word or "sound a like" substitutions may have occurred due to the inherent limitations of voice recognition software  There may also be notations and random deletions of words or characters from malfunctioning software  Read the chart carefully and recognize, using context, where substitutions/deletions have occurred

## 2021-08-09 ENCOUNTER — TELEPHONE (OUTPATIENT)
Dept: SLEEP CENTER | Facility: CLINIC | Age: 71
End: 2021-08-09

## 2021-08-09 DIAGNOSIS — G95.0 SYRINGOMYELIA (HCC): ICD-10-CM

## 2021-08-09 RX ORDER — TRAMADOL HYDROCHLORIDE 50 MG/1
TABLET ORAL
Qty: 60 TABLET | Refills: 0 | Status: SHIPPED | OUTPATIENT
Start: 2021-08-09 | End: 2021-09-01 | Stop reason: SDUPTHER

## 2021-08-12 ENCOUNTER — APPOINTMENT (OUTPATIENT)
Dept: LAB | Facility: MEDICAL CENTER | Age: 71
End: 2021-08-12
Payer: MEDICARE

## 2021-08-12 DIAGNOSIS — E11.9 TYPE 2 DIABETES MELLITUS WITHOUT COMPLICATION, WITHOUT LONG-TERM CURRENT USE OF INSULIN (HCC): ICD-10-CM

## 2021-08-12 DIAGNOSIS — E78.5 HYPERLIPIDEMIA, UNSPECIFIED HYPERLIPIDEMIA TYPE: ICD-10-CM

## 2021-08-12 LAB
ALBUMIN SERPL BCP-MCNC: 3.5 G/DL (ref 3.5–5)
ALP SERPL-CCNC: 84 U/L (ref 46–116)
ALT SERPL W P-5'-P-CCNC: 27 U/L (ref 12–78)
ANION GAP SERPL CALCULATED.3IONS-SCNC: 6 MMOL/L (ref 4–13)
AST SERPL W P-5'-P-CCNC: 15 U/L (ref 5–45)
BILIRUB SERPL-MCNC: 0.47 MG/DL (ref 0.2–1)
BUN SERPL-MCNC: 23 MG/DL (ref 5–25)
CALCIUM SERPL-MCNC: 9.3 MG/DL (ref 8.3–10.1)
CHLORIDE SERPL-SCNC: 107 MMOL/L (ref 100–108)
CHOLEST SERPL-MCNC: 195 MG/DL (ref 50–200)
CO2 SERPL-SCNC: 27 MMOL/L (ref 21–32)
CREAT SERPL-MCNC: 1.01 MG/DL (ref 0.6–1.3)
EST. AVERAGE GLUCOSE BLD GHB EST-MCNC: 143 MG/DL
GFR SERPL CREATININE-BSD FRML MDRD: 76 ML/MIN/1.73SQ M
GLUCOSE P FAST SERPL-MCNC: 142 MG/DL (ref 65–99)
HBA1C MFR BLD: 6.6 %
HDLC SERPL-MCNC: 34 MG/DL
LDLC SERPL CALC-MCNC: 129 MG/DL (ref 0–100)
NONHDLC SERPL-MCNC: 161 MG/DL
POTASSIUM SERPL-SCNC: 4.2 MMOL/L (ref 3.5–5.3)
PROT SERPL-MCNC: 7.6 G/DL (ref 6.4–8.2)
SODIUM SERPL-SCNC: 140 MMOL/L (ref 136–145)
TRIGL SERPL-MCNC: 158 MG/DL

## 2021-08-12 PROCEDURE — 80061 LIPID PANEL: CPT

## 2021-08-12 PROCEDURE — 83036 HEMOGLOBIN GLYCOSYLATED A1C: CPT

## 2021-08-12 PROCEDURE — 36415 COLL VENOUS BLD VENIPUNCTURE: CPT

## 2021-08-12 PROCEDURE — 80053 COMPREHEN METABOLIC PANEL: CPT

## 2021-08-24 DIAGNOSIS — G95.0 SYRINGOMYELIA (HCC): ICD-10-CM

## 2021-08-24 RX ORDER — GABAPENTIN 600 MG/1
TABLET ORAL
Qty: 270 TABLET | Refills: 1 | Status: SHIPPED | OUTPATIENT
Start: 2021-08-24 | End: 2021-11-30 | Stop reason: SDUPTHER

## 2021-08-25 DIAGNOSIS — K20.90 ESOPHAGITIS: ICD-10-CM

## 2021-08-25 RX ORDER — OMEPRAZOLE 40 MG/1
CAPSULE, DELAYED RELEASE ORAL
Qty: 30 CAPSULE | Refills: 3 | Status: SHIPPED | OUTPATIENT
Start: 2021-08-25 | End: 2021-12-24 | Stop reason: SDUPTHER

## 2021-08-29 ENCOUNTER — HOSPITAL ENCOUNTER (OUTPATIENT)
Dept: CT IMAGING | Facility: HOSPITAL | Age: 71
Discharge: HOME | End: 2021-08-29
Payer: MEDICARE

## 2021-08-29 DIAGNOSIS — H90.A11 CONDUCTIVE HEARING LOSS OF RIGHT EAR WITH RESTRICTED HEARING OF LEFT EAR: ICD-10-CM

## 2021-08-29 PROCEDURE — G1004 CDSM NDSC: HCPCS

## 2021-08-29 PROCEDURE — 70480 CT ORBIT/EAR/FOSSA W/O DYE: CPT

## 2021-09-01 ENCOUNTER — OFFICE VISIT (OUTPATIENT)
Dept: FAMILY MEDICINE CLINIC | Facility: CLINIC | Age: 71
End: 2021-09-01
Payer: MEDICARE

## 2021-09-01 ENCOUNTER — TELEPHONE (OUTPATIENT)
Dept: FAMILY MEDICINE CLINIC | Facility: CLINIC | Age: 71
End: 2021-09-01

## 2021-09-01 VITALS
DIASTOLIC BLOOD PRESSURE: 86 MMHG | WEIGHT: 167 LBS | BODY MASS INDEX: 27.82 KG/M2 | RESPIRATION RATE: 18 BRPM | HEIGHT: 65 IN | OXYGEN SATURATION: 98 % | SYSTOLIC BLOOD PRESSURE: 128 MMHG | HEART RATE: 66 BPM

## 2021-09-01 DIAGNOSIS — L85.3 DRY SKIN: ICD-10-CM

## 2021-09-01 DIAGNOSIS — G95.0 SYRINGOMYELIA (HCC): ICD-10-CM

## 2021-09-01 DIAGNOSIS — Z79.891 LONG TERM (CURRENT) USE OF OPIATE ANALGESIC: ICD-10-CM

## 2021-09-01 DIAGNOSIS — E78.5 HYPERLIPIDEMIA, UNSPECIFIED HYPERLIPIDEMIA TYPE: Primary | ICD-10-CM

## 2021-09-01 DIAGNOSIS — Z00.00 MEDICARE ANNUAL WELLNESS VISIT, SUBSEQUENT: ICD-10-CM

## 2021-09-01 DIAGNOSIS — E11.9 TYPE 2 DIABETES MELLITUS WITHOUT COMPLICATION, WITHOUT LONG-TERM CURRENT USE OF INSULIN (HCC): ICD-10-CM

## 2021-09-01 PROCEDURE — G0438 PPPS, INITIAL VISIT: HCPCS | Performed by: FAMILY MEDICINE

## 2021-09-01 PROCEDURE — 1123F ACP DISCUSS/DSCN MKR DOCD: CPT | Performed by: FAMILY MEDICINE

## 2021-09-01 PROCEDURE — 99214 OFFICE O/P EST MOD 30 MIN: CPT | Performed by: FAMILY MEDICINE

## 2021-09-01 PROCEDURE — 80307 DRUG TEST PRSMV CHEM ANLYZR: CPT | Performed by: FAMILY MEDICINE

## 2021-09-01 RX ORDER — BLOOD-GLUCOSE METER
EACH MISCELLANEOUS DAILY
Qty: 1 KIT | Refills: 0 | Status: SHIPPED | OUTPATIENT
Start: 2021-09-01

## 2021-09-01 RX ORDER — OMEGA-3-ACID ETHYL ESTERS 1 G/1
2 CAPSULE, LIQUID FILLED ORAL 2 TIMES DAILY
Qty: 180 CAPSULE | Refills: 1 | Status: SHIPPED | OUTPATIENT
Start: 2021-09-01 | End: 2022-01-31 | Stop reason: SDUPTHER

## 2021-09-01 RX ORDER — TRAMADOL HYDROCHLORIDE 50 MG/1
TABLET ORAL
Qty: 60 TABLET | Refills: 0 | Status: SHIPPED | OUTPATIENT
Start: 2021-09-01 | End: 2021-10-04 | Stop reason: SDUPTHER

## 2021-09-01 NOTE — PROGRESS NOTES
Assessment and Plan:     Problem List Items Addressed This Visit        Nervous and Auditory    Syringomyelia (HCC)     Stable on Tramadol, uds obtained, pain management signed         Relevant Medications    traMADol (ULTRAM) 50 mg tablet    Other Relevant Orders    TRAMADOL, URINE       Other    Hyperlipidemia - Primary     Lipids up a tad so will try Lovaza, if not covered rec otc fish oil 3000 mg/day         Relevant Medications    omega-3-acid ethyl esters (LOVAZA) 1 g capsule    Other Relevant Orders    Lipid panel      Other Visit Diagnoses     Medicare annual wellness visit, subsequent        Type 2 diabetes mellitus without complication, without long-term current use of insulin (HCC)        Relevant Medications    glucose blood (ONE TOUCH ULTRA TEST) test strip    Blood Glucose Monitoring Suppl (ONE TOUCH ULTRA 2) w/Device KIT    Other Relevant Orders    Comprehensive metabolic panel    Hemoglobin A1C    Dry skin        Relevant Medications    urea (CARMOL) 30 % cream    Long term (current) use of opiate analgesic         Relevant Orders    TRAMADOL, URINE           Preventive health issues were discussed with patient, and age appropriate screening tests were ordered as noted in patient's After Visit Summary  Personalized health advice and appropriate referrals for health education or preventive services given if needed, as noted in patient's After Visit Summary       History of Present Illness:     Patient presents for Medicare Annual Wellness visit    Patient Care Team:  Ayah Herrera MD as PCP - General (Family Medicine)  MD Lei Melendez MD as Endoscopist     Problem List:     Patient Active Problem List   Diagnosis    Diabetes mellitus with peripheral artery disease ([facility] [address] )    Chronic pain disorder    DDD (degenerative disc disease), thoracic    Enlarged prostate with lower urinary tract symptoms (LUTS)    Hearing loss, right    Hyperlipidemia    Hypertension    Syringomyelia (HCC)    Pain of right upper extremity    Erectile dysfunction    Tear of right rotator cuff    Elevated PSA    Gastroesophageal reflux disease with esophagitis    ARPAN (obstructive sleep apnea)      Past Medical and Surgical History:     Past Medical History:   Diagnosis Date    Anxiety     Chronic pain disorder     right shoulder    Diabetes mellitus ([address] )     type 2, blood sugar 109 at 0500    Diverticulosis     Enlarged prostate     Extremity pain     GERD (gastroesophageal reflux disease)     Hearing loss, right     Hemorrhoids     Hypertension     Neck pain     Polyp, sigmoid colon      Past Surgical History:   Procedure Laterality Date    COLONOSCOPY      EAR SURGERY      INNER EAR SURGERY      NASAL SINUS SURGERY      IN COLONOSCOPY FLX DX W/COLLJ SPEC WHEN PFRMD N/A 8/30/2016    Procedure: COLONOSCOPY;  Surgeon: Hannah Martin MD;  [facility];   Service: General    IN SHLDR ARTHROSCOP,SURG,W/ROTAT CUFF REPR Right 2/5/2019    Procedure: ARTHROSCOPY SHOULDER;  Surgeon: Nia Goncalves DO;  Location: [address] [place] [address];  Service: Orthopedics      Family History:     Family History   Problem Relation Age of Onset    Hypertension Brother     Hepatitis Mother     Heart disease Father         CARDIAC DISORDER       Social History:     Social History     Socioeconomic History    Marital status: /Civil Union     Spouse name: None    Number of children: 3    Years of education: None    Highest education level: None   Occupational History    Occupation: FULL TIME EMPLOYMENT   Tobacco Use    Smoking status: Never Smoker    Smokeless tobacco: Never Used   Vaping Use    Vaping Use: Never used   Substance and Sexual Activity    Alcohol use: Yes     Comment: occassional    Drug use: No    Sexual activity: Yes     Partners: Female   Other Topics Concern    None   Social History Narrative    Lives with wife,daughter, and 2 grandchildren    Retired        Advance directive on file     Denied domestic violence     Denied problem with Christianity beliefs regarding medical care     Social Determinants of Health     Financial Resource Strain:     Difficulty of Paying Living Expenses:    Food Insecurity:     Worried About Running Out of Food in the Last Year:     [address] in the Last Year:    Transportation Needs:     Lack of Transportation (Medical):      Lack of Transportation (Non-Medical):    Physical Activity:     Days of Exercise per Week:     Minutes of Exercise per Session:    Stress:     Feeling of Stress :    Social Connections:     Frequency of Communication with Friends and Family:     Frequency of Social Gatherings with Friends and Family:     Attends Voodoo Services:     Active Member of Clubs or Organizations:     Attends Club or Organization Meetings:     Marital Status:    Intimate Partner Violence:     Fear of Current or Ex-Partner:     Emotionally Abused:     Physically Abused:     Sexually Abused:       Medications and Allergies:     Current Outpatient Medications   Medication Sig Dispense Refill    Blood Glucose Monitoring Suppl (ONE TOUCH ULTRA 2) w/Device KIT Use daily 1 kit 0    gabapentin (NEURONTIN) 600 MG tablet take 1 tablet by mouth three times a day if needed for pain 270 tablet 1    glucose blood (ONE TOUCH ULTRA TEST) test strip Use 1 each daily 100 each 2    hydrochlorothiazide (HYDRODIURIL) 25 mg tablet Take 1 tablet (25 mg total) by mouth daily 90 tablet 1    losartan (COZAAR) 100 MG tablet Take 1 tablet (100 mg total) by mouth daily 90 tablet 1    meloxicam (MOBIC) 15 mg tablet Take 1 tablet (15 mg total) by mouth daily 30 tablet 2    metFORMIN (GLUCOPHAGE) 500 mg tablet take 1 tablet by mouth twice a day with meals 180 tablet 1    omeprazole (PriLOSEC) 40 MG capsule take 1 capsule by mouth EVERY MORNING BEFORE BREAKFAST 30 capsule 3    OneTouch Delica Lancets 75Q MISC Use 1 Device 2 (two) times a day 100 each 2    tamsulosin (FLOMAX) 0 4 mg take 1 capsule by mouth at bedtime 30 capsule 11    traMADol (ULTRAM) 50 mg tablet Take 1 tablet by mouth two to three times a day if needed for moderate pain 60 tablet 0    omega-3-acid ethyl esters (LOVAZA) 1 g capsule Take 2 capsules (2 g total) by mouth 2 (two) times a day 180 capsule 1    urea (CARMOL) 30 % cream Apply topically as needed for dry skin 60 g 2     No current facility-administered medications for this visit  Allergies   Allergen Reactions    Lipitor [Atorvastatin] Other (See Comments) and Myalgia     Other reaction(s): increased pain  arthralgia    Lyrica [Pregabalin] Other (See Comments)     Pt can't remmember reaction      Immunizations:     Immunization History   Administered Date(s) Administered    INFLUENZA 10/18/2013, 10/01/2015, 09/09/2020    Influenza Quadrivalent Preservative Free 3 years and older IM 09/15/2015, 11/01/2017    Influenza, high dose seasonal 0 7 mL 09/26/2018    Pneumococcal Conjugate 13-Valent 01/13/2016, 09/19/2019    Pneumococcal Polysaccharide PPV23 05/16/2018    SARS-CoV-2 / COVID-19 mRNA IM ([employer]amsen Hockey) 02/04/2021, 03/04/2021    Tdap 06/01/2016    influenza, trivalent, adjuvanted 09/19/2019      Health Maintenance:         Topic Date Due    Colorectal Cancer Screening  08/11/2021    Hepatitis C Screening  Completed         Topic Date Due    Influenza Vaccine (1) 09/01/2021      Medicare Health Risk Assessment:     /86 (BP Location: Left arm, Patient Position: Sitting, Cuff Size: Adult)   Pulse 66   Resp 18   Ht 5' 5" (1 651 m)   Wt 75 8 kg (167 lb)   SpO2 98%   BMI 27 79 kg/m²      Sherie Camilo is here for his Subsequent Wellness visit  Health Risk Assessment:   Patient rates overall health as excellent  Patient feels that their physical health rating is slightly better  Patient is very satisfied with their life  Eyesight was rated as same  Hearing was rated as slightly worse   Patient feels that their emotional and mental health rating is same  Patients states they are sometimes angry  Patient states they are never, rarely unusually tired/fatigued  Pain experienced in the last 7 days has been none  Patient states that he has experienced no weight loss or gain in last 6 months  Depression Screening:   PHQ-2 Score: 0      Fall Risk Screening: In the past year, patient has experienced: no history of falling in past year      Home Safety:  Patient does not have trouble with stairs inside or outside of their home  Patient has working smoke alarms and has no working carbon monoxide detector  Home safety hazards include: none  Nutrition:   Current diet is Regular  Medications:   Patient is currently taking over-the-counter supplements  OTC medications include: see medication list  Patient is able to manage medications  Activities of Daily Living (ADLs)/Instrumental Activities of Daily Living (IADLs):   Walk and transfer into and out of bed and chair?: Yes  Dress and groom yourself?: Yes    Bathe or shower yourself?: Yes    Feed yourself? Yes  Do your laundry/housekeeping?: Yes  Manage your money, pay your bills and track your expenses?: Yes  Make your own meals?: Yes    Do your own shopping?: Yes    Previous Hospitalizations:   Any hospitalizations or ED visits within the last 12 months?: No      Advance Care Planning:   Living will: Yes    Durable POA for healthcare:  Yes    Advanced directive: Yes      Cognitive Screening:   Provider or family/friend/caregiver concerned regarding cognition?: No    PREVENTIVE SCREENINGS      Cardiovascular Screening:    General: History Lipid Disorder and Screening Current      Diabetes Screening:     General: History Diabetes and Screening Current      Colorectal Cancer Screening:     General: Screening Current      Prostate Cancer Screening:    General: Screening Current      Osteoporosis Screening:    General: Screening Not Indicated      Abdominal Aortic Aneurysm (AAA) Screening:    Risk factors include: age between 73-69 yo        Lung Cancer Screening:     General: Screening Not Indicated      Hepatitis C Screening:    General: Screening Current    Screening, Brief Intervention, and Referral to Treatment (SBIRT)    Screening  Typical number of drinks in a day: 1  Typical number of drinks in a week: 1  Interpretation: Low risk drinking behavior  AUDIT-C Screenin) How often did you have a drink containing alcohol in the past year? monthly or less  2) How many drinks did you have on a typical day when you were drinking in the past year?  1 to 2  3) How often did you have 6 or more drinks on one occasion in the past year? never    AUDIT-C Score: 1  Interpretation: Score 0-3 (male): Negative screen for alcohol misuse    Single Item Drug Screening:  How often have you used an illegal drug (including marijuana) or a prescription medication for non-medical reasons in the past year? never    Single Item Drug Screen Score: 0  Interpretation: Negative screen for possible drug use disorder    Review of Current Opioid Use  Opioid Risk Tool (ORT) Score: 0  Opioid Risk Tool (ORT) Interpretation: Score 0-3: Low risk for opioid misuse      Bernardo Stratton MD

## 2021-09-01 NOTE — TELEPHONE ENCOUNTER
Dr Anna Marie Mcmanus, pharmacy called, Urea Cream is not covered  [facility] is not sure what is covered  Can you please prescribe something else?  Thanks

## 2021-09-01 NOTE — TELEPHONE ENCOUNTER
Pt notified of Dr Melonie Riley response  He will contact his insurance to find out what is covered

## 2021-09-01 NOTE — ASSESSMENT & PLAN NOTE
Lab Results   Component Value Date    HGBA1C 6 6 (H) 08/12/2021   stable HBA1C, discussed CGM-will look into it

## 2021-09-01 NOTE — PROGRESS NOTES
Assessment and Plan:    Problem List Items Addressed This Visit        Nervous and Auditory    Syringomyelia (HCC)     Stable on Tramadol, uds obtained, pain management signed         Relevant Medications    traMADol (ULTRAM) 50 mg tablet       Other    Hyperlipidemia - Primary     Lipids up a tad so will try Lovaza, if not covered rec otc fish oil 3000 mg/day         Relevant Medications    omega-3-acid ethyl esters (LOVAZA) 1 g capsule    Other Relevant Orders    Lipid panel      Other Visit Diagnoses     Medicare annual wellness visit, subsequent        Type 2 diabetes mellitus without complication, without long-term current use of insulin (HCC)        Relevant Medications    glucose blood (ONE TOUCH ULTRA TEST) test strip    Blood Glucose Monitoring Suppl (ONE TOUCH ULTRA 2) w/Device KIT    Other Relevant Orders    Comprehensive metabolic panel    Hemoglobin A1C    Dry skin        Relevant Medications    urea (CARMOL) 30 % cream                 Diagnoses and all orders for this visit:    Hyperlipidemia, unspecified hyperlipidemia type  -     omega-3-acid ethyl esters (LOVAZA) 1 g capsule; Take 2 capsules (2 g total) by mouth 2 (two) times a day  -     Lipid panel; Future    Medicare annual wellness visit, subsequent    Syringomyelia ([address] )  -     traMADol (ULTRAM) 50 mg tablet; Take 1 tablet by mouth two to three times a day if needed for moderate pain    Type 2 diabetes mellitus without complication, without long-term current use of insulin (HCC)  -     glucose blood (ONE TOUCH ULTRA TEST) test strip; Use 1 each daily  -     Blood Glucose Monitoring Suppl (ONE TOUCH ULTRA 2) w/Device KIT; Use daily  -     Comprehensive metabolic panel; Future  -     Hemoglobin A1C; Future    Dry skin  -     urea (CARMOL) 30 % cream; Apply topically as needed for dry skin              Subjective:      Patient ID: Laya Morel is a 73 y o  male      CC:    Chief Complaint   Patient presents with    Follow-up     6 month HPI:    F/u diabetes-sugar stable, lipids better since stopped med ( it was making him angry), neck stable, no cp, no sob, no headaches      The following portions of the patient's history were reviewed and updated as appropriate: allergies, current medications, past family history, past medical history, past social history, past surgical history and problem list         Review of Systems   Constitutional: Negative for activity change, appetite change and fatigue  Respiratory: Negative for shortness of breath  Cardiovascular: Negative for chest pain  Musculoskeletal: Positive for neck pain  Neurological: Negative for dizziness and headaches  Psychiatric/Behavioral: Negative for dysphoric mood  The patient is not nervous/anxious  Data to review:       Objective:    Vitals:    09/01/21 1011   BP: 128/86   BP Location: Left arm   Patient Position: Sitting   Cuff Size: Adult   Pulse: 66   Resp: 18   SpO2: 98%   Weight: 75 8 kg (167 lb)   Height: 5' 5" (1 651 m)        Physical Exam  Vitals reviewed  Constitutional:       Appearance: Normal appearance  Cardiovascular:      Rate and Rhythm: Normal rate and regular rhythm  Pulses: Normal pulses  Heart sounds: Normal heart sounds  Pulmonary:      Effort: Pulmonary effort is normal       Breath sounds: Normal breath sounds  Musculoskeletal:      Right lower leg: No edema  Left lower leg: No edema  Lymphadenopathy:      Cervical: No cervical adenopathy  Neurological:      Mental Status: He is alert     Psychiatric:         Mood and Affect: Mood normal

## 2021-09-04 LAB
LABORATORY COMMENT REPORT: ABNORMAL
TRAMADOL UR QL SCN: POSITIVE NG/ML

## 2021-09-13 ENCOUNTER — TELEPHONE (OUTPATIENT)
Dept: FAMILY MEDICINE CLINIC | Facility: CLINIC | Age: 71
End: 2021-09-13

## 2021-09-14 DIAGNOSIS — E78.5 HYPERLIPIDEMIA, UNSPECIFIED HYPERLIPIDEMIA TYPE: Primary | ICD-10-CM

## 2021-09-14 RX ORDER — ICOSAPENT ETHYL 1000 MG/1
1 CAPSULE ORAL 2 TIMES DAILY
Qty: 60 CAPSULE | Refills: 5 | Status: SHIPPED | OUTPATIENT
Start: 2021-09-14 | End: 2022-01-31

## 2021-09-26 DIAGNOSIS — N40.1 BENIGN LOCALIZED PROSTATIC HYPERPLASIA WITH LOWER URINARY TRACT SYMPTOMS (LUTS): ICD-10-CM

## 2021-09-27 RX ORDER — TAMSULOSIN HYDROCHLORIDE 0.4 MG/1
CAPSULE ORAL
Qty: 30 CAPSULE | Refills: 11 | Status: SHIPPED | OUTPATIENT
Start: 2021-09-27

## 2021-10-04 DIAGNOSIS — G95.0 SYRINGOMYELIA (HCC): ICD-10-CM

## 2021-10-04 RX ORDER — TRAMADOL HYDROCHLORIDE 50 MG/1
TABLET ORAL
Qty: 60 TABLET | Refills: 0 | Status: SHIPPED | OUTPATIENT
Start: 2021-10-04 | End: 2021-11-08 | Stop reason: SDUPTHER

## 2021-10-20 DIAGNOSIS — I10 ESSENTIAL HYPERTENSION: ICD-10-CM

## 2021-10-20 RX ORDER — LOSARTAN POTASSIUM 100 MG/1
TABLET ORAL
Qty: 90 TABLET | Refills: 1 | Status: SHIPPED | OUTPATIENT
Start: 2021-10-20

## 2021-10-24 DIAGNOSIS — I10 ESSENTIAL HYPERTENSION: ICD-10-CM

## 2021-10-25 RX ORDER — HYDROCHLOROTHIAZIDE 25 MG/1
TABLET ORAL
Qty: 90 TABLET | Refills: 1 | Status: SHIPPED | OUTPATIENT
Start: 2021-10-25 | End: 2022-06-02 | Stop reason: SDUPTHER

## 2021-11-08 DIAGNOSIS — G95.0 SYRINGOMYELIA (HCC): ICD-10-CM

## 2021-11-08 RX ORDER — TRAMADOL HYDROCHLORIDE 50 MG/1
TABLET ORAL
Qty: 60 TABLET | Refills: 0 | Status: SHIPPED | OUTPATIENT
Start: 2021-11-08 | End: 2021-12-06 | Stop reason: SDUPTHER

## 2021-11-30 DIAGNOSIS — G95.0 SYRINGOMYELIA (HCC): ICD-10-CM

## 2021-11-30 RX ORDER — GABAPENTIN 600 MG/1
600 TABLET ORAL 3 TIMES DAILY PRN
Qty: 270 TABLET | Refills: 1 | Status: SHIPPED | OUTPATIENT
Start: 2021-11-30 | End: 2022-06-07 | Stop reason: SDUPTHER

## 2021-12-06 DIAGNOSIS — G95.0 SYRINGOMYELIA (HCC): ICD-10-CM

## 2021-12-06 RX ORDER — TRAMADOL HYDROCHLORIDE 50 MG/1
TABLET ORAL
Qty: 60 TABLET | Refills: 0 | Status: SHIPPED | OUTPATIENT
Start: 2021-12-06 | End: 2022-01-07 | Stop reason: SDUPTHER

## 2021-12-24 DIAGNOSIS — K20.90 ESOPHAGITIS: ICD-10-CM

## 2021-12-24 RX ORDER — OMEPRAZOLE 40 MG/1
CAPSULE, DELAYED RELEASE ORAL
Qty: 30 CAPSULE | Refills: 3 | Status: SHIPPED | OUTPATIENT
Start: 2021-12-24 | End: 2022-04-24

## 2022-01-07 DIAGNOSIS — G95.0 SYRINGOMYELIA (HCC): ICD-10-CM

## 2022-01-07 RX ORDER — TRAMADOL HYDROCHLORIDE 50 MG/1
TABLET ORAL
Qty: 60 TABLET | Refills: 0 | Status: SHIPPED | OUTPATIENT
Start: 2022-01-07 | End: 2022-02-04 | Stop reason: SDUPTHER

## 2022-01-08 ENCOUNTER — APPOINTMENT (OUTPATIENT)
Dept: LAB | Facility: MEDICAL CENTER | Age: 72
End: 2022-01-08
Payer: MEDICARE

## 2022-01-08 DIAGNOSIS — E78.5 HYPERLIPIDEMIA, UNSPECIFIED HYPERLIPIDEMIA TYPE: ICD-10-CM

## 2022-01-08 DIAGNOSIS — E11.9 TYPE 2 DIABETES MELLITUS WITHOUT COMPLICATION, WITHOUT LONG-TERM CURRENT USE OF INSULIN (HCC): ICD-10-CM

## 2022-01-08 LAB
ALBUMIN SERPL BCP-MCNC: 3.9 G/DL (ref 3.5–5)
ALP SERPL-CCNC: 95 U/L (ref 46–116)
ALT SERPL W P-5'-P-CCNC: 21 U/L (ref 12–78)
ANION GAP SERPL CALCULATED.3IONS-SCNC: 3 MMOL/L (ref 4–13)
AST SERPL W P-5'-P-CCNC: 15 U/L (ref 5–45)
BILIRUB SERPL-MCNC: 0.64 MG/DL (ref 0.2–1)
BUN SERPL-MCNC: 19 MG/DL (ref 5–25)
CALCIUM SERPL-MCNC: 9.4 MG/DL (ref 8.3–10.1)
CHLORIDE SERPL-SCNC: 107 MMOL/L (ref 100–108)
CHOLEST SERPL-MCNC: 196 MG/DL
CO2 SERPL-SCNC: 29 MMOL/L (ref 21–32)
CREAT SERPL-MCNC: 1.09 MG/DL (ref 0.6–1.3)
EST. AVERAGE GLUCOSE BLD GHB EST-MCNC: 160 MG/DL
GFR SERPL CREATININE-BSD FRML MDRD: 68 ML/MIN/1.73SQ M
GLUCOSE P FAST SERPL-MCNC: 154 MG/DL (ref 65–99)
HBA1C MFR BLD: 7.2 %
HDLC SERPL-MCNC: 32 MG/DL
LDLC SERPL CALC-MCNC: 133 MG/DL (ref 0–100)
NONHDLC SERPL-MCNC: 164 MG/DL
POTASSIUM SERPL-SCNC: 4.5 MMOL/L (ref 3.5–5.3)
PROT SERPL-MCNC: 7.8 G/DL (ref 6.4–8.2)
SODIUM SERPL-SCNC: 139 MMOL/L (ref 136–145)
TRIGL SERPL-MCNC: 156 MG/DL

## 2022-01-08 PROCEDURE — 80061 LIPID PANEL: CPT

## 2022-01-08 PROCEDURE — 83036 HEMOGLOBIN GLYCOSYLATED A1C: CPT

## 2022-01-08 PROCEDURE — 36415 COLL VENOUS BLD VENIPUNCTURE: CPT

## 2022-01-08 PROCEDURE — 80053 COMPREHEN METABOLIC PANEL: CPT

## 2022-01-17 ENCOUNTER — TELEMEDICINE (OUTPATIENT)
Dept: FAMILY MEDICINE CLINIC | Facility: CLINIC | Age: 72
End: 2022-01-17
Payer: MEDICARE

## 2022-01-17 VITALS — HEIGHT: 65 IN | BODY MASS INDEX: 27.82 KG/M2 | WEIGHT: 167 LBS

## 2022-01-17 DIAGNOSIS — Z20.822 EXPOSURE TO COVID-19 VIRUS: Primary | ICD-10-CM

## 2022-01-17 DIAGNOSIS — E11.51 DIABETES MELLITUS WITH PERIPHERAL ARTERY DISEASE (HCC): ICD-10-CM

## 2022-01-17 PROCEDURE — 99441 PR PHYS/QHP TELEPHONE EVALUATION 5-10 MIN: CPT | Performed by: FAMILY MEDICINE

## 2022-01-17 NOTE — PROGRESS NOTES
COVID-19 Outpatient Progress Note    Assessment/Plan:    Problem List Items Addressed This Visit        Endocrine    Diabetes mellitus with peripheral artery disease ([facility] [address] )       Lab Results   Component Value Date    HGBA1C 7 2 (H) 01/08/2022   sugar up a little due to holidays,reinforce diet           Other Visit Diagnoses     Exposure to COVID-19 virus    -  Primary    no sx -will monitor for any sx over  next couple days, can come out of quarantine tomorrow , mask for another 5  days         Disposition:     Patient is fully vaccinated and I recommended self quarantine for 5 days followed by strict mask use for an additional 5 days  If patient were to develop symptoms, they should immediately self isolate and call our office for further guidance  After clarifying the patient's history, my suspicion for COVID-19 infection is very low  I have spent 5 minutes directly with the patient  Encounter provider Alan Raymond MD    Provider located at [address] [address] [phone]    Recent Visits  No visits were found meeting these conditions  Showing recent visits within past 7 days and meeting all other requirements  Today's Visits  Date Type Provider Dept   01/17/22 Telemedicine Alan Raymond MD [facility]   Showing today's visits and meeting all other requirements  Future Appointments  No visits were found meeting these conditions  Showing future appointments within next 150 days and meeting all other requirements     This virtual check-in was done via telephone and he agrees to proceed  Patient agrees to participate in a virtual check in via telephone or video visit instead of presenting to the office to address urgent/immediate medical needs  Patient is aware this is a billable service  After connecting through Telephone, the patient was identified by name and date of birth   Jaqueline Rodriguez was informed that this was a telemedicine visit and that the exam was being conducted confidentially over secure lines  My office door was closed  No one else was in the room  Lise Kim acknowledged consent and understanding of privacy and security of the telemedicine visit  I informed the patient that I have reviewed his record in Epic and presented the opportunity for him to ask any questions regarding the visit today  The patient agreed to participate  It was my intent to perform this visit via video technology but the patient was not able to do a video connection so the visit was completed via audio telephone only  Verification of patient location:  Patient is located in the following state in which I hold an active license: [place]    Subjective:   Lise Kim is a 73 y o  male who is concerned about COVID-19  Patient is currently asymptomatic  Patient denies fever, chills, fatigue, malaise, congestion, rhinorrhea, sore throat, anosmia, loss of taste, cough, shortness of breath, chest tightness, abdominal pain, nausea, vomiting, diarrhea, myalgias and headaches           COVID-19 vaccination status: Fully vaccinated (primary series)    Exposure:   Contact with a person who is under investigation (PUI) for or who is positive for COVID-19 within the last 14 days?: No    Hospitalized recently for fever and/or lower respiratory symptoms?: No      Currently a healthcare worker that is involved in direct patient care?: No      Works in a special setting where the risk of COVID-19 transmission may be high? (this may include long-term care, correctional and senior care facilities; homeless shelters; assisted-living facilities and group homes ): No      Resident in a special setting where the risk of COVID-19 transmission may be high? (this may include long-term care, correctional and senior care facilities; homeless shelters; assisted-living facilities and group homes ): No      Lab Results   Component Value Date    SARSCOV2 Negative 05/14/2021    SARSCOV2 Not Detected 06/24/2020     Past Medical History:   Diagnosis Date    Anxiety     Chronic pain disorder     right shoulder    Diabetes mellitus ([address] )     type 2, blood sugar 109 at 0500    Diverticulosis     Enlarged prostate     Extremity pain     GERD (gastroesophageal reflux disease)     Hearing loss, right     Hemorrhoids     Hypertension     Neck pain     Polyp, sigmoid colon      Past Surgical History:   Procedure Laterality Date    COLONOSCOPY      EAR SURGERY      INNER EAR SURGERY      NASAL SINUS SURGERY      [place] COLONOSCOPY FLX DX W/COLLJ SPEC WHEN PFRMD N/A 8/30/2016    Procedure: COLONOSCOPY;  Surgeon: Trinh Dotson MD;  Location: [facility];   Service: General    [place] SHLDR ARTHROSCOP,SURG,W/ROTAT CUFF REPR Right 2/5/2019    Procedure: ARTHROSCOPY SHOULDER;  Surgeon: Charisse Meza DO;  Location: [address] [place] [address];  Service: Orthopedics     Current Outpatient Medications   Medication Sig Dispense Refill    gabapentin (NEURONTIN) 600 MG tablet Take 1 tablet (600 mg total) by mouth 3 (three) times a day as needed (leg/neck pain) 270 tablet 1    hydrochlorothiazide (HYDRODIURIL) 25 mg tablet take 1 tablet by mouth once daily 90 tablet 1    Icosapent Ethyl 1 g CAPS Take 1 capsule (1 g total) by mouth 2 (two) times a day 60 capsule 5    losartan (COZAAR) 100 MG tablet take 1 tablet by mouth once daily 90 tablet 1    meloxicam (MOBIC) 15 mg tablet Take 1 tablet (15 mg total) by mouth daily 30 tablet 2    metFORMIN (GLUCOPHAGE) 500 mg tablet take 1 tablet by mouth twice a day with meals 180 tablet 1    omega-3-acid ethyl esters (LOVAZA) 1 g capsule Take 2 capsules (2 g total) by mouth 2 (two) times a day 180 capsule 1    omeprazole (PriLOSEC) 40 MG capsule take 1 capsule by mouth EVERY MORNING BEFORE BREAKFAST 30 capsule 3    tamsulosin (FLOMAX) 0 4 mg take 1 capsule by mouth at bedtime 30 capsule 11    traMADol (ULTRAM) 50 mg tablet Take 1 tablet by mouth two to three times a day if needed for moderate pain 60 tablet 0    urea (CARMOL) 30 % cream Apply topically as needed for dry skin 60 g 2    Blood Glucose Monitoring Suppl (ONE TOUCH ULTRA 2) w/Device KIT Use daily 1 kit 0    glucose blood (ONE TOUCH ULTRA TEST) test strip Use 1 each daily 100 each 2    OneTouch Delica Lancets 27D MISC Use 1 Device 2 (two) times a day 100 each 2     No current facility-administered medications for this visit  Allergies   Allergen Reactions    Lipitor [Atorvastatin] Other (See Comments) and Myalgia     Other reaction(s): increased pain  arthralgia    Lyrica [Pregabalin] Other (See Comments)     Pt can't remmember reaction       Review of Systems   Constitutional: Negative for chills, fatigue and fever  HENT: Negative for congestion, rhinorrhea and sore throat  Respiratory: Negative for cough, chest tightness and shortness of breath  Gastrointestinal: Negative for abdominal pain, diarrhea, nausea and vomiting  Musculoskeletal: Negative for myalgias  Neurological: Negative for headaches  Objective:    Vitals:    01/17/22 0945   Weight: 75 8 kg (167 lb)   Height: 5' 5" (1 651 m)       Physical Exam    VIRTUAL VISIT DISCLAIMER    Santo Luke verbally agrees to participate in [employer]  Pt is aware that [employer] could be limited without vital signs or the ability to perform a full hands-on physical Juvenal Quiñones understands he or the provider may request at any time to terminate the video visit and request the patient to seek care or treatment in person

## 2022-01-17 NOTE — ASSESSMENT & PLAN NOTE
Lab Results   Component Value Date    HGBA1C 7 2 (H) 01/08/2022   sugar up a little due to holidays,reinforce diet

## 2022-01-31 ENCOUNTER — OFFICE VISIT (OUTPATIENT)
Dept: FAMILY MEDICINE CLINIC | Facility: CLINIC | Age: 72
End: 2022-01-31
Payer: MEDICARE

## 2022-01-31 VITALS
TEMPERATURE: 97.9 F | HEIGHT: 65 IN | DIASTOLIC BLOOD PRESSURE: 78 MMHG | SYSTOLIC BLOOD PRESSURE: 138 MMHG | WEIGHT: 172 LBS | HEART RATE: 86 BPM | BODY MASS INDEX: 28.66 KG/M2

## 2022-01-31 DIAGNOSIS — N52.9 ERECTILE DYSFUNCTION, UNSPECIFIED ERECTILE DYSFUNCTION TYPE: ICD-10-CM

## 2022-01-31 DIAGNOSIS — E11.51 DIABETES MELLITUS WITH PERIPHERAL ARTERY DISEASE (HCC): Primary | ICD-10-CM

## 2022-01-31 DIAGNOSIS — F11.20 CONTINUOUS OPIOID DEPENDENCE (HCC): ICD-10-CM

## 2022-01-31 DIAGNOSIS — E11.9 TYPE 2 DIABETES MELLITUS WITHOUT COMPLICATION, WITHOUT LONG-TERM CURRENT USE OF INSULIN (HCC): ICD-10-CM

## 2022-01-31 DIAGNOSIS — E78.5 HYPERLIPIDEMIA, UNSPECIFIED HYPERLIPIDEMIA TYPE: ICD-10-CM

## 2022-01-31 DIAGNOSIS — I10 PRIMARY HYPERTENSION: ICD-10-CM

## 2022-01-31 DIAGNOSIS — H10.9 CONJUNCTIVITIS OF RIGHT EYE, UNSPECIFIED CONJUNCTIVITIS TYPE: Primary | ICD-10-CM

## 2022-01-31 DIAGNOSIS — G95.0 SYRINGOMYELIA (HCC): ICD-10-CM

## 2022-01-31 PROCEDURE — 99214 OFFICE O/P EST MOD 30 MIN: CPT | Performed by: FAMILY MEDICINE

## 2022-01-31 RX ORDER — SILDENAFIL 100 MG/1
100 TABLET, FILM COATED ORAL DAILY PRN
Qty: 10 TABLET | Refills: 5 | Status: SHIPPED | OUTPATIENT
Start: 2022-01-31

## 2022-01-31 RX ORDER — OMEGA-3-ACID ETHYL ESTERS 1 G/1
2 CAPSULE, LIQUID FILLED ORAL 2 TIMES DAILY
Qty: 360 CAPSULE | Refills: 1 | Status: SHIPPED | OUTPATIENT
Start: 2022-01-31 | End: 2022-02-01 | Stop reason: CLARIF

## 2022-01-31 RX ORDER — OLOPATADINE HYDROCHLORIDE 1 MG/ML
1 SOLUTION/ DROPS OPHTHALMIC 2 TIMES DAILY
Qty: 5 ML | Refills: 1 | Status: SHIPPED | OUTPATIENT
Start: 2022-01-31

## 2022-01-31 NOTE — PROGRESS NOTES
Assessment and Plan:    Problem List Items Addressed This Visit        Endocrine    Diabetes mellitus with peripheral artery disease ([facility] ) - Primary       Lab Results   Component Value Date    HGBA1C 7 2 (H) 01/08/2022   bs up a tad,increase Metformin to bid         Relevant Medications    metFORMIN (GLUCOPHAGE) 500 mg tablet    Other Relevant Orders    Comprehensive metabolic panel    Hemoglobin A1C       Cardiovascular and Mediastinum    Hypertension     BP stable            Nervous and Auditory    Syringomyelia (HCC)     Stable on Tramadol            Other    Continuous opioid dependence (HCC)     Stable on meds         Hyperlipidemia     Lovaza rx         Relevant Medications    omega-3-acid ethyl esters (LOVAZA) 1 g capsule    Other Relevant Orders    Lipid panel      Other Visit Diagnoses     Type 2 diabetes mellitus without complication, without long-term current use of insulin (HCC)        Relevant Medications    metFORMIN (GLUCOPHAGE) 500 mg tablet    Other Relevant Orders    Comprehensive metabolic panel    Hemoglobin A1C                 Diagnoses and all orders for this visit:    Diabetes mellitus with peripheral artery disease ([facility] )  -     Comprehensive metabolic panel; Future  -     Hemoglobin A1C; Future    Primary hypertension    Syringomyelia (HCC)    Hyperlipidemia, unspecified hyperlipidemia type  -     omega-3-acid ethyl esters (LOVAZA) 1 g capsule; Take 2 capsules (2 g total) by mouth 2 (two) times a day  -     Lipid panel; Future    Type 2 diabetes mellitus without complication, without long-term current use of insulin (HCC)  -     metFORMIN (GLUCOPHAGE) 500 mg tablet; Take 1 tablet (500 mg total) by mouth 2 (two) times a day with meals  -     Comprehensive metabolic panel; Future  -     Hemoglobin A1C; Future    Continuous opioid dependence (HCC)              Subjective:      Patient ID: Ruthann Sarabia is a 73 y o  male      CC:    Chief Complaint   Patient presents with    Follow-up     two week f/u     Eye Problem     Pt does have f/u for his eyes next month but did want to mention that he has had irritation to right eye since sep       HPI:    HPI    The following portions of the patient's history were reviewed and updated as appropriate: allergies, current medications, past family history, past medical history, past social history, past surgical history and problem list         Review of Systems      Data to review:       Objective:    Vitals:    01/31/22 1039   BP: 138/78   BP Location: Left arm   Patient Position: Sitting   Cuff Size: Adult   Pulse: 86   Temp: 97 9 °F (36 6 °C)   TempSrc: Temporal   Weight: 78 kg (172 lb)   Height: 5' 5" (1 651 m)        Physical Exam  Cardiovascular:      Pulses: no weak pulses          Dorsalis pedis pulses are 2+ on the right side and 2+ on the left side  Posterior tibial pulses are 2+ on the right side and 2+ on the left side  Feet:      Right foot:      Skin integrity: No ulcer, skin breakdown, erythema, warmth, callus or dry skin  Left foot:      Skin integrity: No ulcer, skin breakdown, erythema, warmth, callus or dry skin  Diabetic Foot Exam    Patient's shoes and socks removed  Right Foot/Ankle   Right Foot Inspection  Skin Exam: skin normal and skin intact  No dry skin, no warmth, no callus, no erythema, no maceration, no abnormal color, no pre-ulcer, no ulcer and no callus  Toe Exam: ROM and strength within normal limits  Sensory   Monofilament testing: intact    Vascular  Capillary refills: < 3 seconds  The right DP pulse is 2+  The right PT pulse is 2+  Right Toe  - Comprehensive Exam  Ecchymosis: none  Hammertoes: absent  Claw Toes: absent  Swelling: none   Tenderness: none         Left Foot/Ankle  Left Foot Inspection  Skin Exam: skin normal and skin intact  No dry skin, no warmth, no erythema, no maceration, normal color, no pre-ulcer, no ulcer and no callus  Toe Exam: ROM and strength within normal limits  Sensory   Monofilament testing: intact    Vascular  Capillary refills: < 3 seconds  The left DP pulse is 2+  The left PT pulse is 2+       Left Toe  - Comprehensive Exam  Ecchymosis: none  Hammertoes: absent  Claw toes: absent  Swelling: none   Tenderness: none           Assign Risk Category  No deformity present  No loss of protective sensation  No weak pulses  Risk: 0

## 2022-01-31 NOTE — ASSESSMENT & PLAN NOTE
Lab Results   Component Value Date    HGBA1C 7 2 (H) 01/08/2022   bs up a tad,increase Metformin to bid

## 2022-02-01 ENCOUNTER — TELEPHONE (OUTPATIENT)
Dept: FAMILY MEDICINE CLINIC | Facility: CLINIC | Age: 72
End: 2022-02-01

## 2022-02-01 DIAGNOSIS — E78.5 HYPERLIPIDEMIA, UNSPECIFIED HYPERLIPIDEMIA TYPE: Primary | ICD-10-CM

## 2022-02-01 RX ORDER — FENOFIBRATE 145 MG/1
145 TABLET, COATED ORAL DAILY
Qty: 30 TABLET | Refills: 5 | Status: SHIPPED | OUTPATIENT
Start: 2022-02-01

## 2022-02-01 NOTE — TELEPHONE ENCOUNTER
MF, Pharmacy that pts Rx for Taylor 3 Ethyl is not covered by insurance, Pt needs to try Fenofibrate of any mg or Gemfibrozil before the Omega will be covered

## 2022-02-04 DIAGNOSIS — G95.0 SYRINGOMYELIA (HCC): ICD-10-CM

## 2022-02-04 RX ORDER — TRAMADOL HYDROCHLORIDE 50 MG/1
TABLET ORAL
Qty: 60 TABLET | Refills: 0 | Status: SHIPPED | OUTPATIENT
Start: 2022-02-04 | End: 2022-03-08 | Stop reason: SDUPTHER

## 2022-03-08 DIAGNOSIS — G95.0 SYRINGOMYELIA (HCC): ICD-10-CM

## 2022-03-08 RX ORDER — TRAMADOL HYDROCHLORIDE 50 MG/1
TABLET ORAL
Qty: 60 TABLET | Refills: 0 | Status: SHIPPED | OUTPATIENT
Start: 2022-03-08 | End: 2022-04-06 | Stop reason: SDUPTHER

## 2022-04-06 DIAGNOSIS — G95.0 SYRINGOMYELIA (HCC): ICD-10-CM

## 2022-04-06 RX ORDER — TRAMADOL HYDROCHLORIDE 50 MG/1
TABLET ORAL
Qty: 60 TABLET | Refills: 0 | Status: SHIPPED | OUTPATIENT
Start: 2022-04-06 | End: 2022-05-06 | Stop reason: SDUPTHER

## 2022-04-24 DIAGNOSIS — K20.90 ESOPHAGITIS: ICD-10-CM

## 2022-04-24 RX ORDER — OMEPRAZOLE 40 MG/1
CAPSULE, DELAYED RELEASE ORAL
Qty: 30 CAPSULE | Refills: 3 | Status: SHIPPED | OUTPATIENT
Start: 2022-04-24

## 2022-05-06 ENCOUNTER — TELEPHONE (OUTPATIENT)
Dept: FAMILY MEDICINE CLINIC | Facility: CLINIC | Age: 72
End: 2022-05-06

## 2022-05-06 DIAGNOSIS — G95.0 SYRINGOMYELIA (HCC): ICD-10-CM

## 2022-05-06 RX ORDER — TRAMADOL HYDROCHLORIDE 50 MG/1
TABLET ORAL
Qty: 60 TABLET | Refills: 0 | Status: SHIPPED | OUTPATIENT
Start: 2022-05-06 | End: 2022-06-07 | Stop reason: SDUPTHER

## 2022-05-13 ENCOUNTER — APPOINTMENT (OUTPATIENT)
Dept: LAB | Facility: MEDICAL CENTER | Age: 72
End: 2022-05-13
Payer: MEDICARE

## 2022-05-13 DIAGNOSIS — E11.9 TYPE 2 DIABETES MELLITUS WITHOUT COMPLICATION, WITHOUT LONG-TERM CURRENT USE OF INSULIN (HCC): ICD-10-CM

## 2022-05-13 DIAGNOSIS — E78.5 HYPERLIPIDEMIA, UNSPECIFIED HYPERLIPIDEMIA TYPE: ICD-10-CM

## 2022-05-13 DIAGNOSIS — E11.51 DIABETES MELLITUS WITH PERIPHERAL ARTERY DISEASE (HCC): ICD-10-CM

## 2022-05-13 LAB
ALBUMIN SERPL BCP-MCNC: 3.8 G/DL (ref 3.5–5)
ALP SERPL-CCNC: 65 U/L (ref 46–116)
ALT SERPL W P-5'-P-CCNC: 27 U/L (ref 12–78)
ANION GAP SERPL CALCULATED.3IONS-SCNC: 6 MMOL/L (ref 4–13)
AST SERPL W P-5'-P-CCNC: 17 U/L (ref 5–45)
BILIRUB SERPL-MCNC: 0.5 MG/DL (ref 0.2–1)
BUN SERPL-MCNC: 20 MG/DL (ref 5–25)
CALCIUM SERPL-MCNC: 9.4 MG/DL (ref 8.3–10.1)
CHLORIDE SERPL-SCNC: 106 MMOL/L (ref 100–108)
CHOLEST SERPL-MCNC: 186 MG/DL
CO2 SERPL-SCNC: 26 MMOL/L (ref 21–32)
CREAT SERPL-MCNC: 1.17 MG/DL (ref 0.6–1.3)
EST. AVERAGE GLUCOSE BLD GHB EST-MCNC: 151 MG/DL
GFR SERPL CREATININE-BSD FRML MDRD: 62 ML/MIN/1.73SQ M
GLUCOSE P FAST SERPL-MCNC: 139 MG/DL (ref 65–99)
HBA1C MFR BLD: 6.9 %
HDLC SERPL-MCNC: 33 MG/DL
LDLC SERPL CALC-MCNC: 133 MG/DL (ref 0–100)
NONHDLC SERPL-MCNC: 153 MG/DL
POTASSIUM SERPL-SCNC: 4.2 MMOL/L (ref 3.5–5.3)
PROT SERPL-MCNC: 7.6 G/DL (ref 6.4–8.2)
SODIUM SERPL-SCNC: 138 MMOL/L (ref 136–145)
TRIGL SERPL-MCNC: 98 MG/DL

## 2022-05-13 PROCEDURE — 80061 LIPID PANEL: CPT

## 2022-05-13 PROCEDURE — 83036 HEMOGLOBIN GLYCOSYLATED A1C: CPT

## 2022-05-13 PROCEDURE — 80053 COMPREHEN METABOLIC PANEL: CPT

## 2022-05-13 PROCEDURE — 36415 COLL VENOUS BLD VENIPUNCTURE: CPT

## 2022-06-02 ENCOUNTER — OFFICE VISIT (OUTPATIENT)
Dept: FAMILY MEDICINE CLINIC | Facility: CLINIC | Age: 72
End: 2022-06-02
Payer: MEDICARE

## 2022-06-02 VITALS
RESPIRATION RATE: 16 BRPM | WEIGHT: 166.8 LBS | TEMPERATURE: 97.3 F | BODY MASS INDEX: 27.79 KG/M2 | DIASTOLIC BLOOD PRESSURE: 84 MMHG | HEART RATE: 70 BPM | SYSTOLIC BLOOD PRESSURE: 134 MMHG | HEIGHT: 65 IN

## 2022-06-02 DIAGNOSIS — G89.4 CHRONIC PAIN DISORDER: ICD-10-CM

## 2022-06-02 DIAGNOSIS — G95.0 SYRINGOMYELIA (HCC): Primary | ICD-10-CM

## 2022-06-02 DIAGNOSIS — I10 ESSENTIAL HYPERTENSION: ICD-10-CM

## 2022-06-02 DIAGNOSIS — M51.34 DDD (DEGENERATIVE DISC DISEASE), THORACIC: ICD-10-CM

## 2022-06-02 DIAGNOSIS — R07.9 CHEST PAIN, UNSPECIFIED TYPE: ICD-10-CM

## 2022-06-02 PROCEDURE — 99215 OFFICE O/P EST HI 40 MIN: CPT | Performed by: FAMILY MEDICINE

## 2022-06-02 PROCEDURE — 93000 ELECTROCARDIOGRAM COMPLETE: CPT | Performed by: FAMILY MEDICINE

## 2022-06-02 RX ORDER — NALOXONE HYDROCHLORIDE 4 MG/.1ML
1 SPRAY NASAL ONCE
Qty: 1 EACH | Refills: 1 | Status: SHIPPED | OUTPATIENT
Start: 2022-06-02 | End: 2022-06-02

## 2022-06-02 RX ORDER — HYDROCHLOROTHIAZIDE 25 MG/1
25 TABLET ORAL DAILY
Qty: 90 TABLET | Refills: 1 | Status: SHIPPED | OUTPATIENT
Start: 2022-06-02

## 2022-06-02 NOTE — PROGRESS NOTES
Assessment/Plan     Problem List Items Addressed This Visit        Cardiovascular and Mediastinum    Essential hypertension     BP stable           Relevant Medications    hydrochlorothiazide (HYDRODIURIL) 25 mg tablet       Nervous and Auditory    Syringomyelia (HCC) - Primary     Stable on meds, discussed re-imaging but no new neuro sx, will monitor  For now           Relevant Medications    naloxone (NARCAN) 4 mg/0 1 mL nasal spray    Other Relevant Orders    [facility] All Prescribed Meds    Amphetamines, Methamphetamines    Butalbital    Phenobarbital    Secobarbital    Temazepam    Alprazolam    Clonazepam    Diazepam    Lorazepam    Oxazepam    Gabapentin    Pregabalin    Cocaine    Heroin    Buprenorphine    Levorphanol    Meperidine    Naltrexone    Fentanyl    Methadone    Oxycodone    Oxymorphone    Tapentadol    Tramadol    Codeine, Hydrocodone, Hydropmorphone, Morphine    Bath Salts    Kratom    Spice    Methylphenidate    Phentermine    Validity Oxidant    Validity Creatinine    Validity pH    Validity Specific       Musculoskeletal and Integument    DDD (degenerative disc disease), thoracic     Stable on meds, discussed re-imaging but no new neuro sx, will monitor  For now           Relevant Medications    naloxone (NARCAN) 4 mg/0 1 mL nasal spray    Other Relevant Orders    [facility] All Prescribed Meds    Amphetamines, Methamphetamines    Butalbital    Phenobarbital    Secobarbital    Temazepam    Alprazolam    Clonazepam    Diazepam    Lorazepam    Oxazepam    Gabapentin    Pregabalin    Cocaine    Heroin    Buprenorphine    Levorphanol    Meperidine    Naltrexone    Fentanyl    Methadone    Oxycodone    Oxymorphone    Tapentadol    Tramadol    Codeine, Hydrocodone, Hydropmorphone, Morphine    Bath Salts    Kratom    Spice    Methylphenidate    Phentermine    Validity Oxidant    Validity Creatinine    Validity pH    Validity Specific       Other    Chest pain     Check EKG           Relevant Orders    POCT ECG    Chronic pain disorder    Relevant Medications    naloxone (NARCAN) 4 mg/0 1 mL nasal spray    Other Relevant Orders    [facility] All Prescribed Meds    Amphetamines, Methamphetamines    Butalbital    Phenobarbital    Secobarbital    Temazepam    Alprazolam    Clonazepam    Diazepam    Lorazepam    Oxazepam    Gabapentin    Pregabalin    Cocaine    Heroin    Buprenorphine    Levorphanol    Meperidine    Naltrexone    Fentanyl    Methadone    Oxycodone    Oxymorphone    Tapentadol    Tramadol    Codeine, Hydrocodone, Hydropmorphone, Morphine    Bath Salts    Kratom    Spice    Methylphenidate    Phentermine    Validity Oxidant    Validity Creatinine    Validity pH    Validity Specific         Treatment Plan: Continue Tramadol , discussed aquatherapy    Treatment Goals: Decrease pain, increase functional capacity    Opiate risks  There are risks associated with opioid medications, including dependence, addiction and tolerance  The patient understands and agrees to use these medications only as prescribed  Potential side effects of the medications include, but are not limited to, constipation, drowsiness, addiction, impaired judgment and risk of fatal overdose if not taken as prescribed  The patient was warned against driving while taking sedation medications  Sharing medications is a felony  At this point in time, the patient is showing no signs of addiction, abuse, diversion or suicidal ideation  Opioid agreement  Pain management agreement was reviewed with patient and signed/updated during visit      Drug screen  Drug screen collected during today's visit      PDMP review  [place] or [place]  reviewed  No red flags were identified; safe to proceed with prescription      I have spent 25 minutes directly with the patient           Subjective     Opioid Management:   Type of visit: Initial    dxed with may thoracic and cervical spine and syringomyelia of thoracic spine , had throw from [place] when he was 10, declined surgery because no neuro sx, feels well on Tramadol    Current pain description: 5-6/10 on good day, 9-10/10 on bad day, afternoon is worse, no real change with weather    Functional status: Able to activities of daily living, pt is retired    Goals of care: Decrease  Pain, increase  Functional activity    Social Support System  Patient receives support from their: wife, daughter and extended family    Screening Tools/Assessments:    PHQ-2/9:  PHQ-2 score: 0    Brief Pain Inventory (BPI):  1) Throughout our lives, most of us have had pain from time to time (such as minor headaches, sprains, and toothaches)  Have you had pain other than these everyday kinds of pain today? Yes  2) Where is your pain located? lower back  3) Rate your pain at its worst in the last 24 hours: 9  4) Rate your pain at its least in the last 24 hours: 9  5) Rate your average level of pain: 9  6) Rate your pain right now: 7  7) What treatments or medications are you receiving for your pain? Tramadol  8) In the past 24 hours, how much relief have pain treatments or medication provided?  80%  9) During the past 24 hours, pain has interfered with your:     A) General activity: 8     B) Mood: 8     C) Walking ability: 8     D) Normal work (work outside the home & housework): 6     E) Relations with other people: 6     F) Sleep: 4     G) Enjoyment of life: 3    Opioid agreement:  Active Opioid agreement on file?: Yes    Opioid agreement signed date: 9/1/2021  Opioid agreement expiration date: 9/1/2022    Naloxone:  Currently prescribed Naloxone (Narcan): Yes      Other treatments tried/failed:   Rest, heat, cold compresses, prescription NSAIDs and narcotic analgesics    Narcotic analgesics tried/failed: Tramadol    Prior imaging:  Last MRI thoracic  Spine 2014 when dxed    Prior referrals:  Neurosurgery    HPI  Pain Medications             gabapentin (NEURONTIN) 600 MG tablet Take 1 tablet (600 mg total) by mouth 3 (three) times a day as needed (leg/neck pain)    traMADol (ULTRAM) 50 mg tablet Take 1 tablet by mouth two to three times a day if needed for moderate pain         Outpatient Morphine Milligram Equivalents Per Day     6/2/22 and after Unknown    Order Name Dose Route Frequency Maximum MME/Day     traMADol (ULTRAM) 50 mg tablet     Unknown    Total Potential Morphine Milligram Equivalents Per Day Unknown    An error was encountered while attempting to calculate the morphine milligram equivalents per day  Calculation Information        traMADol (ULTRAM) 50 mg tablet    Not enough information to calculate morphine milligram equivalents per day  PDMP Review       Value Time User    PDMP Reviewed  Yes 5/6/2022 11:56 AM Tom Hernandez MD         Review of Systems   Constitutional: Negative for activity change, appetite change and fatigue  Respiratory: Negative for shortness of breath  Cardiovascular: Positive for chest pain  Intermittent mid sternal   Musculoskeletal: Positive for back pain  Neurological: Negative for dizziness, numbness and headaches  Objective     /84 (BP Location: Right arm, Patient Position: Sitting, Cuff Size: Adult)   Pulse 70   Temp (!) 97 3 °F (36 3 °C) (Temporal)   Resp 16   Ht 5' 5" (1 651 m)   Wt 75 7 kg (166 lb 12 8 oz)   BMI 27 76 kg/m²     Physical Exam  Vitals reviewed  Constitutional:       Appearance: Normal appearance  Neck:      Vascular: No carotid bruit  Cardiovascular:      Rate and Rhythm: Normal rate and regular rhythm  Pulses: Normal pulses  Heart sounds: Normal heart sounds  Pulmonary:      Effort: Pulmonary effort is normal       Breath sounds: Normal breath sounds  Musculoskeletal:         General: Tenderness present  Thoracic back: Tenderness present  Decreased range of motion  Lymphadenopathy:      Cervical: No cervical adenopathy  Neurological:      Mental Status: He is alert           Tom Hernandez MD

## 2022-06-02 NOTE — PATIENT INSTRUCTIONS
With chest discomfort rec [facility] Back Exercises   WHAT YOU NEED TO KNOW:   Lower back exercises help heal and strengthen your back muscles to prevent another injury  Ask your healthcare provider if you need to see a physical therapist for more advanced exercises  DISCHARGE INSTRUCTIONS:   Return to the emergency department if:   You have severe pain that prevents you from moving  Contact your healthcare provider if:   Your pain becomes worse  You have new pain  You have questions or concerns about your condition or care  Do lower back exercises safely:   Do the exercises on a mat or firm surface  (not on a bed) to support your spine and prevent low back pain  Move slowly and smoothly  Avoid fast or jerky motions  Breathe normally  Do not hold your breath  Stop if you feel pain  It is normal to feel some discomfort at first  Regular exercise will help decrease your discomfort over time  Lower back exercises: Your healthcare provider may recommend that you do back exercises 10 to 30 minutes each day  He may also recommend that you do exercises 1 to 3 times each day  Ask your healthcare provider which exercises are best for you and how often to do them  Ankle pumps:  Lie on your back  Move your foot up (with your toes pointing toward your head)  Then, move your foot down (with your toes pointing away from you)  Repeat this exercise 10 times on each side  Heel slides:  Lie on your back  Slowly bend one leg and then straighten it  Next, bend the other leg and then straighten it  Repeat 10 times on each side  Pelvic tilt:  Lie on your back with your knees bent and feet flat on the floor  Place your arms in a relaxed position beside your body  Tighten the muscles of your abdomen and flatten your back against the floor  Hold for 5 seconds  Repeat 5 times  Back stretch:  Lie on your back with your hands behind your head   Bend your knees and turn the lower half of your body to one side  Hold this position for 10 seconds  Repeat 3 times on each side  Straight leg raises:  Lie on your back with one leg straight  Bend the other knee  Tighten your abdomen and then slowly lift the straight leg up about 6 to 12 inches off the floor  Hold for 1 to 5 seconds  Lower your leg slowly  Repeat 10 times on each leg  Knee-to-chest:  Lie on your back with your knees bent and feet flat on the floor  Pull one of your knees toward your chest and hold it there for 5 seconds  Return your leg to the starting position  Lift the other knee toward your chest and hold for 5 seconds  Do this 5 times on each side  Cat and camel:  Place your hands and knees on the floor  Arch your back upward toward the ceiling and lower your head  Round out your spine as much as you can  Hold for 5 seconds  Lift your head upward and push your chest downward toward the floor  Hold for 5 seconds  Do 3 sets or as directed  Wall squats:  Stand with your back against a wall  Tighten the muscles of your abdomen  Slowly lower your body until your knees are bent at a 45 degree angle  Hold this position for 5 seconds  Slowly move back up to a standing position  Repeat 10 times  Curl up:  Lie on your back with your knees bent and feet flat on the floor  Place your hands, palms down, underneath the curve in your lower back  Next, with your elbows on the floor, lift your shoulders and chest 2 to 3 inches  Keep your head in line with your shoulders  Hold this position for 5 seconds  When you can do this exercise without pain for 10 to 15 seconds, you may add a rotation  While your shoulders and chest are lifted off the ground, turn slightly to the left and hold  Repeat on the other side  Bird dog:  Place your hands and knees on the floor  Keep your wrists directly below your shoulders and your knees directly below your hips  Pull your belly button in toward your spine   Do not flatten or arch your back  Tighten your abdominal muscles  Raise one arm straight out so that it is aligned with your head  Next, raise the leg opposite your arm  Hold this position for 15 seconds  Lower your arm and leg slowly and change sides  Do 5 sets  © Copyright [employer] 2022 Information is for End User's use only and may not be sold, redistributed or otherwise used for commercial purposes  All illustrations and images included in CareNotes® are the copyrighted property of A D A M [employer] Inc  or Rafi Bueno   The above information is an  only  It is not intended as medical advice for individual conditions or treatments  Talk to your doctor, nurse or pharmacist before following any medical regimen to see if it is safe and effective for you  Core Strengthening Exercises   WHAT YOU NEED TO KNOW:   Your core includes the muscles of your lower back, hip, pelvis, and abdomen  Core strengthening exercises help heal and strengthen these muscles  This helps prevent another injury, and keeps your pelvis, spine, and hips in the correct position  DISCHARGE INSTRUCTIONS:   Contact your healthcare provider if:   You have sharp or worsening pain during exercise or at rest     You have questions or concerns about your shoulder exercises  Safety tips:  Talk to your healthcare provider before you start an exercise program  A physical therapist can teach you how to do core strengthening exercises safely  Do the exercises on a mat or firm surface  A firm surface will support your spine and prevent low back pain  Do not do these exercises on a bed  Move slowly and smoothly  Avoid fast or jerky motions  Stop if you feel pain  Core exercises should not be painful  Stop if you feel pain  Breathe normally during core exercises  Do not hold your breath  This may cause an increase in blood pressure and prevent muscle strengthening   Your healthcare provider will tell you when to inhale and exhale during the exercise  Begin all of your exercises with abdominal bracing  Abdominal bracing helps warm up your core muscles  You can also practice abdominal bracing throughout the day  Lie on your back with your knees bent and feet flat on the floor  Place your arms in a relaxed position beside your body  Tighten your abdominal muscles  Pull your belly button in and up toward your spine  Hold for 5 seconds  Relax your muscles  Repeat 10 times  Core strengthening exercises: Your healthcare provider will tell you how often to do these exercises  The provider will also tell you how many repetitions of each exercise you should do  Hold each exercise for 5 seconds or as directed  As you get stronger, increase your hold to 10 to 15 seconds  You can do some of these exercises on a stability ball, or with a weight  Ask your healthcare provider how to use a stability ball or weight for these exercises:  Bridging:  Lie on your back with your knees bent and feet flat on the floor  Rest your arms at your side  Tighten your buttocks, and then lift your hips 1 inch off the floor  Hold for 5 seconds  When you can do this exercise without pain for 10 seconds, increase the distance you lift your hips  A good goal is to be able to lift your hips so that your shoulders, hips, and knees are in a straight line  Dead bug:  Lie on your back with your knees bent and feet flat on the floor  Place your arms in a relaxed position beside your body  Begin with abdominal bracing  Next, raise one leg, keeping your knee bent  Hold for 5 seconds  Repeat with the other leg  When you can do this exercise without pain for 10 to 15 seconds, you may raise one straight leg and hold  Repeat with the other leg  Quadruped:  Place your hands and knees on the floor  Keep your wrists directly below your shoulders and your knees directly below your hips  Pull your belly button in toward your spine   Do not flatten or arch your back  Daria Blaire your abdominal muscles below your belly button  Hold for 5 seconds  When you can do this exercise without pain for 10 to 15 seconds, you may extend one arm and hold  Repeat on the other side  Side bridge exercises:      Standing side bridge:  Stand next to a wall and extend one arm toward the wall  Place your palm flat on the wall with your fingers pointing upward  Begin with abdominal bracing  Next, without moving your feet, slowly bend your arm to 90 degrees  Hold for 5 seconds  Repeat on the other side  When you can do this exercise without pain for 10 to 15 seconds, you may do the bent leg side bridge on the floor  Bent leg side bridge:  Lie on one side with your legs, hips, and shoulders in a straight line  Prop yourself up onto your forearm so your elbow is directly below your shoulder  Bend your knees back to 90 degrees  Begin with abdominal bracing  Next, lift your hips and balance yourself on your forearm and knees  Hold for 5 seconds  Repeat on the other side  When you can do this exercise without pain for 10 to 15 seconds, you may do the straight leg side bridge on the floor  Straight leg side bridge:  Lie on one side with your legs, hips, and shoulders in a straight line  Prop yourself up onto your forearm so your elbow is directly below your shoulder  Begin with abdominal bracing  Lift your hips off the floor and balance yourself on your forearm and the outside of your flexed foot  Do not let your ankle bend sideways  Hold for 5 seconds  Repeat on the other side  When you can do this exercise without pain for 10 to 15 seconds, ask your healthcare provider for more advanced exercises  Superman:  Lie on your stomach  Extend your arms forward on the floor  Tighten your abdominal muscles and lift your right hand and left leg off the floor  Hold this position  Slowly return to the starting position   Tighten your abdominal muscles and lift your left hand and right leg off the floor  Hold this position  Slowly return to the starting position  Clam:  Lie on your side with your knees bent  Put your bottom arm under your head to keep your neck in line  Put your top hand on your hip to keep your pelvis from moving  Put your heels together, and keep them together during this exercise  Slowly raise your top knee toward the ceiling  Then lower your leg so your knees are together  Repeat this exercise 10 times  Then switch sides and do the exercise 10 times with the other leg  Curl up:  Lie on your back with your knees bent and feet flat on the floor  Place your hands, palms down, underneath your lower back  Next, with your elbows on the floor, lift your shoulders and chest 2 to 3 inches off the floor  Keep your head in line with your shoulders  Hold this position  Slowly return to the starting position  Straight leg raises:  Lie on your back with one leg straight  Bend the other knee and place your foot flat on the floor  Tighten your abdominal muscles  Keep your leg straight and slowly lift it straight up 6 to 12 inches off the floor  Hold this position  Lower your leg slowly  Do as many repetitions as directed on this side  Repeat with the other leg  Plank:  Lie on your stomach  Bend your elbows and place your forearms flat on the floor  Lift your chest, stomach, and knees off the floor  Make sure your elbows are below your shoulders  Your body should be in a straight line  Do not let your hips or lower back sink to the ground  Squeeze your abdominal muscles together and hold for 15 seconds  To make this exercise harder, hold for 30 seconds or lift 1 leg at a time  Bicycles:  Lie on your back  Bend both knees and bring them toward your chest  Your calves should be parallel to the floor  Place the palms of your hands on the back of your head  Straighten your right leg and keep it lifted 2 inches off the floor   Raise your head and shoulders off the floor and twist towards your left  Keep your head and shoulders lifted  Bend your right knee while you straighten your left leg  Keep your left leg 2 inches off the floor  Twist your head and chest towards the left leg  Continue to straighten 1 leg at a time and twist        Follow up with your doctor as directed:  Write down your questions so you remember to ask them during your visits  © Copyright [employer] 2022 Information is for End User's use only and may not be sold, redistributed or otherwise used for commercial purposes  All illustrations and images included in CareNotes® are the copyrighted property of A [employer] M [employer] Inc  or [employer]   The above information is an  only  It is not intended as medical advice for individual conditions or treatments  Talk to your doctor, nurse or pharmacist before following any medical regimen to see if it is safe and effective for you  Goals of care:  Maximize your health and quality of life by: Increasing your level of function and activity  Decreasing the negative effects of pain on your life  Minimizing the risks and side effects of medications and ensuring safe use of opioid medication     Ways for you to help meet your goals:  Maintain a healthy lifestyle  This includes proper nutrition, regular physical activity as able, try for 8 hours of sleep per night, use stress reduction strategies, avoid triggers  Risks and side effects of opioid use:  Prescription opioids carry serious risks of addiction and  overdose, especially with prolonged use  An opioid overdose,  often marked by slowed breathing, can cause sudden death  The  use of prescription opioids can have a number of side effects as  well, even when taken as directed:   Tolerance--meaning you might need to take more of a medication for the same pain relief  Physical dependence--meaning you have symptoms of withdrawal when a medication is stopped  Increased sensitivity to pain  Constipation  Nausea, vomiting, dry mouth  Sleepiness and dizziness   Confusion  Depression  Low levels of testosterone that can result in lower sex drive, energy, and strength  Itching and sweating    If you are prescribed opioids for pain:  Never take opioids in greater amounts or more often than prescribed  Help prevent misuse and abuse  - Never sell or share prescription opioids         - Never use another persons prescription opioids  Store prescription opioids in a secure place and out of reach of others (this may include visitors, children, friends, and family)  Safely dispose of unused prescription opioids: Find your community drug take-back program or your pharmacy mail-back program, or flush them down the toilet, following guidance from the Food and Drug Administration (www fda gov/Drugs/ResourcesForYou)  Visit www cdc gov/drugoverdose to learn about the risks of opioid abuse and overdose  If you believe you may be struggling with addiction, tell your health care provider and ask for guidance or call [address] at 0-875-778-YGUD

## 2022-06-07 DIAGNOSIS — G95.0 SYRINGOMYELIA (HCC): ICD-10-CM

## 2022-06-07 LAB
6MAM UR QL CFM: NEGATIVE NG/ML
7AMINOCLONAZEPAM UR QL CFM: NEGATIVE NG/ML
A-OH ALPRAZ UR QL CFM: NEGATIVE NG/ML
ACCEPTABLE CREAT UR QL: NORMAL MG/DL
ACCEPTIBLE SP GR UR QL: NORMAL
AMPHET UR QL CFM: NEGATIVE NG/ML
BUPRENORPHINE UR QL CFM: NEGATIVE NG/ML
BUTALBITAL UR QL CFM: NEGATIVE NG/ML
BZE UR QL CFM: NEGATIVE NG/ML
CODEINE UR QL CFM: NEGATIVE NG/ML
EDDP UR QL CFM: NEGATIVE NG/ML
EUTYLONE UR QL: NEGATIVE NG/ML
FENTANYL UR QL CFM: NEGATIVE NG/ML
GLIADIN IGG SER IA-ACNC: NEGATIVE NG/ML
HYDROCODONE UR QL CFM: NEGATIVE NG/ML
HYDROMORPHONE UR QL CFM: NEGATIVE NG/ML
LORAZEPAM UR QL CFM: NEGATIVE NG/ML
ME-PHENIDATE UR QL CFM: NEGATIVE NG/ML
MEPERIDINE UR QL CFM: NEGATIVE NG/ML
METHADONE UR QL CFM: NEGATIVE NG/ML
METHAMPHET UR QL CFM: NEGATIVE NG/ML
MORPHINE UR QL CFM: NEGATIVE NG/ML
NALTREXONE UR QL CFM: NEGATIVE NG/ML
NITRITE UR QL: NORMAL UG/ML
NORBUPRENORPHINE UR QL CFM: NEGATIVE NG/ML
NORDIAZEPAM UR QL CFM: NEGATIVE NG/ML
NORFENTANYL UR QL CFM: NEGATIVE NG/ML
NORHYDROCODONE UR QL CFM: NEGATIVE NG/ML
NORMEPERIDINE UR QL CFM: NEGATIVE NG/ML
NOROXYCODONE UR QL CFM: NEGATIVE NG/ML
OXAZEPAM UR QL CFM: NEGATIVE NG/ML
OXYCODONE UR QL CFM: NEGATIVE NG/ML
OXYMORPHONE UR QL CFM: NEGATIVE NG/ML
OXYMORPHONE UR QL CFM: NEGATIVE NG/ML
PHENOBARB UR QL CFM: NEGATIVE NG/ML
RESULT ALL_PRESCRIBED MEDS AND SPECIAL INSTRUCTIONS: NORMAL
SECOBARBITAL UR QL CFM: NEGATIVE NG/ML
SL AMB 3-METHYL-FENTANYL QUANTIFICATION: NORMAL NG/ML
SL AMB 4-ANPP QUANTIFICATION: NORMAL NG/ML
SL AMB 4-FIBF QUANTIFICATION: NORMAL NG/ML
SL AMB 5F-ADB-M7 METABOLITE QUANTIFICATION: NEGATIVE NG/ML
SL AMB 7-OH-MITRAGYNINE (KRATOM ALKALOID) QUANTIFICATION: NEGATIVE NG/ML
SL AMB AB-FUBINACA-M3 METABOLITE QUANTIFICATION: NEGATIVE NG/ML
SL AMB ACETYL FENTANYL QUANTIFICATION: NORMAL NG/ML
SL AMB ACETYL NORFENTANYL QUANTIFICATION: NORMAL NG/ML
SL AMB ACRYL FENTANYL QUANTIFICATION: NORMAL NG/ML
SL AMB BUTRYL FENTANYL QUANTIFICATION: NORMAL NG/ML
SL AMB CARFENTANIL QUANTIFICATION: NORMAL NG/ML
SL AMB CYCLOPROPYL FENTANYL QUANTIFICATION: NORMAL NG/ML
SL AMB DEXTRORPHAN (DEXTROMETHORPHAN METABOLITE) QUANT: NEGATIVE NG/ML
SL AMB FURANYL FENTANYL QUANTIFICATION: NORMAL NG/ML
SL AMB GABAPENTIN QUANTIFICATION: NORMAL
SL AMB JWH018 METABOLITE QUANTIFICATION: NEGATIVE NG/ML
SL AMB JWH073 METABOLITE QUANTIFICATION: NEGATIVE NG/ML
SL AMB MDMB-FUBINACA-M1 METABOLITE QUANTIFICATION: NEGATIVE NG/ML
SL AMB METHOXYACETYL FENTANYL QUANTIFICATION: NORMAL NG/ML
SL AMB METHYLONE QUANTIFICATION: NEGATIVE NG/ML
SL AMB N-DESMETHYL U-47700 QUANTIFICATION: NORMAL NG/ML
SL AMB N-DESMETHYL-TRAMADOL QUANTIFICATION: NORMAL NG/ML
SL AMB PHENTERMINE QUANTIFICATION: NEGATIVE NG/ML
SL AMB PREGABALIN QUANTIFICATION: NEGATIVE
SL AMB RCS4 METABOLITE QUANTIFICATION: NEGATIVE NG/ML
SL AMB RITALINIC ACID QUANTIFICATION: NEGATIVE NG/ML
SL AMB U-47700 QUANTIFICATION: NORMAL NG/ML
SMOOTH MUSCLE AB TITR SER IF: NEGATIVE NG/ML
SPECIMEN DRAWN SERPL: NEGATIVE NG/ML
SPECIMEN PH ACCEPTABLE UR: NORMAL
TAPENTADOL UR QL CFM: NEGATIVE NG/ML
TEMAZEPAM UR QL CFM: NEGATIVE NG/ML
TEMAZEPAM UR QL CFM: NEGATIVE NG/ML
TRAMADOL UR QL CFM: NORMAL NG/ML
URATE/CREAT 24H UR: NORMAL NG/ML

## 2022-06-07 RX ORDER — TRAMADOL HYDROCHLORIDE 50 MG/1
TABLET ORAL
Qty: 60 TABLET | Refills: 0 | Status: SHIPPED | OUTPATIENT
Start: 2022-06-07 | End: 2022-07-07 | Stop reason: SDUPTHER

## 2022-06-07 RX ORDER — GABAPENTIN 600 MG/1
600 TABLET ORAL 3 TIMES DAILY PRN
Qty: 270 TABLET | Refills: 1 | Status: SHIPPED | OUTPATIENT
Start: 2022-06-07

## 2022-07-07 DIAGNOSIS — G95.0 SYRINGOMYELIA (HCC): ICD-10-CM

## 2022-07-07 RX ORDER — TRAMADOL HYDROCHLORIDE 50 MG/1
TABLET ORAL
Qty: 60 TABLET | Refills: 0 | Status: SHIPPED | OUTPATIENT
Start: 2022-07-07 | End: 2022-08-04 | Stop reason: SDUPTHER

## 2022-07-27 DIAGNOSIS — E11.9 TYPE 2 DIABETES MELLITUS WITHOUT COMPLICATION, WITHOUT LONG-TERM CURRENT USE OF INSULIN (HCC): ICD-10-CM

## 2022-08-04 DIAGNOSIS — G95.0 SYRINGOMYELIA (HCC): ICD-10-CM

## 2022-08-04 RX ORDER — TRAMADOL HYDROCHLORIDE 50 MG/1
TABLET ORAL
Qty: 60 TABLET | Refills: 0 | Status: SHIPPED | OUTPATIENT
Start: 2022-08-04 | End: 2022-09-06 | Stop reason: SDUPTHER

## 2022-08-19 ENCOUNTER — OFFICE VISIT (OUTPATIENT)
Dept: SLEEP CENTER | Facility: CLINIC | Age: 72
End: 2022-08-19
Payer: MEDICARE

## 2022-08-19 VITALS
SYSTOLIC BLOOD PRESSURE: 165 MMHG | DIASTOLIC BLOOD PRESSURE: 81 MMHG | BODY MASS INDEX: 27.66 KG/M2 | HEIGHT: 65 IN | HEART RATE: 65 BPM | WEIGHT: 166 LBS

## 2022-08-19 DIAGNOSIS — K21.00 GASTROESOPHAGEAL REFLUX DISEASE WITH ESOPHAGITIS WITHOUT HEMORRHAGE: ICD-10-CM

## 2022-08-19 DIAGNOSIS — J34.2 DEVIATED NASAL SEPTUM: ICD-10-CM

## 2022-08-19 DIAGNOSIS — I10 ESSENTIAL HYPERTENSION: ICD-10-CM

## 2022-08-19 DIAGNOSIS — E66.3 OVERWEIGHT (BMI 25.0-29.9): ICD-10-CM

## 2022-08-19 DIAGNOSIS — E11.51 DIABETES MELLITUS WITH PERIPHERAL ARTERY DISEASE (HCC): ICD-10-CM

## 2022-08-19 DIAGNOSIS — J31.0 CHRONIC RHINITIS: ICD-10-CM

## 2022-08-19 DIAGNOSIS — G47.33 OSA (OBSTRUCTIVE SLEEP APNEA): Primary | ICD-10-CM

## 2022-08-19 DIAGNOSIS — G47.19 EXCESSIVE DAYTIME SLEEPINESS: ICD-10-CM

## 2022-08-19 DIAGNOSIS — R68.2 DRY MOUTH: ICD-10-CM

## 2022-08-19 PROCEDURE — 99214 OFFICE O/P EST MOD 30 MIN: CPT | Performed by: INTERNAL MEDICINE

## 2022-08-19 NOTE — PROGRESS NOTES
Review of Systems      Genitourinary need to urinate more than twice a night and difficulty with erection   Cardiology ankle/leg swelling   Gastrointestinal frequent heartburn/acid reflux   Neurology none   Constitutional excessive sweating at night   Integumentary none   Psychiatry none   Musculoskeletal back pain   Pulmonary shortness of breath with activity   ENT none   Endocrine excessive thirst and frequent urination   Hematological none

## 2022-08-19 NOTE — PATIENT INSTRUCTIONS
Strategies to improve dry mouth were discussed  Specifically, adequate treatment of nasal allergies, adjusting heat and humidity settings on PAP machine, using Biotene mouthwash &/or Xylimelt  Nursing Support:  When: Monday through Friday 7A-5PM except holidays  Where: Our direct line is [phone]  If you are having a true emergency please call 911  In the event that the line is busy or it is after hours please leave a voice message and we will return your call  Please speak clearly, leaving your full name, birth date, best number to reach you and the reason for your call  Medication refills: We will need the name of the medication, the dosage, the ordering provider, whether you get a 30 or 90 day refill, and the pharmacy name and address  Medications will be ordered by the provider only  Nurses cannot call in prescriptions  Please allow 7 days for medication refills  Physician requested updates: If your provider requested that you call with an update after starting medication, please be ready to provide us the medication and dosage, what time you take your medication, the time you attempt to fall asleep, time you fall asleep, when you wake up, and what time you get out of bed  Sleep Study Results: We will contact you with sleep study results and/or next steps after the physician has reviewed your testing

## 2022-08-19 NOTE — PROGRESS NOTES
Follow-Up Note - Sleep Center   Spenser Cobos  81 y o  male  ZDI:[id number]  FXP:[id number]  DOS:8/19/2022    CC: I saw this patient for follow-up in clinic today for Sleep disordered breathing, Coexisting Sleep and Medical Problems  He is using a dream Station version 2 machine that he got in June of 2021  Results of prior studies in 2021: The diagnostic study confirmed   obstructive sleep apnea:  AHI 16 6/hour considerably  higher during stage REM  Intermittent snoring of moderate intensity was noted  Minimum oxygen saturation 81 %  and 10 4 minutes of total sleep time was spent with saturations less than 90% ]  During the subsequent therapeutic study, sleep disordered breathing was adequately remediated with PAP at 10cm H2O  PFSH, Problem List, Medications & Allergies were reviewed in EMR  Interval changes: none reported  He  has a past medical history of Anxiety, Chronic pain disorder, Diabetes mellitus ([address] ), Diverticulosis, Enlarged prostate, Extremity pain, GERD (gastroesophageal reflux disease), Hearing loss, right, Hemorrhoids, Hypertension, Neck pain, and Polyp, sigmoid colon  He has a current medication list which includes the following prescription(s): one touch ultra 2, fenofibrate, gabapentin, glucose blood, hydrochlorothiazide, losartan, metformin, olopatadine, omeprazole, onetouch delica lancets 71T, sildenafil, tamsulosin, tramadol, urea, and naloxone  PHYSIOLOGICAL DATA REVIEW AND INTERPRETATION:    using PAP > 4 hours/night 87%  Estimated URBANO 3 9/hour with pressure of 10cm H2O @90th/95th percentile; Patient has not been using ozone based devices to sanitize the machine  SUBJECTIVE: Regarding use of PAP, Michaela Lennox reports:   · significant adverse effects: dry mouth/throat; has not noticed any fibres or foreign material in air line       · He is benefiting from use: sleeping better   Sleep Routine: Michaela Lennox reports getting 8 hrs sleep  ; he has no difficulty initiating or maintaining sleep   He arises spontaneously and feels more refreshed since on Rx  Pauly Villavicencio reports episodic  Excessive Daytime Sleepiness, feels like napping & does when has the opportunity  He rated himself at Total score: 9 /24 on the [place] Sleepiness Scale  Habits: reports that he has never smoked  He has never used smokeless tobacco ,  reports current alcohol use ,  reports no history of drug use , Caffeine use:limited ; Exercise routine: sometimes   ROS: reviewed & as attached  Significant for weight has been stable  He is reporting no nasal symptoms  He has some dyspnea on effort  He reported no cardiac symptoms  He reports acid reflux  Back pain is not disturbing sleep  Carey Burow EXAM: /81 (BP Location: Left arm, Patient Position: Sitting, Cuff Size: Adult)   Pulse 65   Ht 5' 5" (1 651 m)   Wt 75 3 kg (166 lb)   BMI 27 62 kg/m²     Wt Readings from Last 3 Encounters:   08/19/22 75 3 kg (166 lb)   06/02/22 75 7 kg (166 lb 12 8 oz)   03/15/22 76 7 kg (169 lb)      Patient is well groomed; well appearing  CNS: Alert, orientated, clear & coherent speech  Psych: cooperativeand in no distress  Mental state:appears normal   H&N: EOMI; NC/AT:no facial pressure marks, no rashes  Skin/Extrem: col & hydration normal; no edema  Resp: Respiratory effort is normal  Physical findings otherwise essentially unchanged from previous  IMPRESSION: Problem List Items & Comorbidities Addressed this Visit    1  ARPAN (obstructive sleep apnea)  PAP DME Resupply/Reorder   2  Excessive daytime sleepiness     3  Dry mouth     4  Deviated nasal septum     5  Chronic rhinitis     6  Gastroesophageal reflux disease with esophagitis without hemorrhage     7  Essential hypertension     8  Overweight (BMI 25 0-29 9)     9  Diabetes mellitus with peripheral artery disease ([facility] [address] )         PLAN:  1  I reviewed results of prior studies and physiologic data with the patient     2  I discussed treatment options with risks and benefits  3  Treatment with  PAP is medically necessary and Isela Springer is agreable to continue use  4  Care of equipment, methods to improve comfort using PAP and importance of compliance with therapy were discussed  5  Pressure setting: continue 10 cmH2O     6  Rx provided to replace  supplies and Care coordinated with DME provider  7  He has diabetes and on several medications that causes dry mouth  Strategies to improve dry mouth were discussed  Specifically, adequate treatment of nasal allergies, adjusting heat and humidity settings on PAP machine, using Biotene mouthwash &/or Xylimelt  8  Discussed strategies for weight reduction  9  Follow-up is advised in 1 year or sooner if needed to monitor progress, compliance and to adjust therapy  Thank you for allowing me to participate in the care of this patient  Sincerely,     Authenticated electronically on 84/25/40   Board Certified Specialist     Portions of the record may have been created with voice recognition software  Occasional wrong word or "sound a like" substitutions may have occurred due to the inherent limitations of voice recognition software  There may also be notations and random deletions of words or characters from malfunctioning software  Read the chart carefully and recognize, using context, where substitutions/deletions have occurred

## 2022-08-22 ENCOUNTER — TELEPHONE (OUTPATIENT)
Dept: SLEEP CENTER | Facility: CLINIC | Age: 72
End: 2022-08-22

## 2022-08-22 DIAGNOSIS — K20.90 ESOPHAGITIS: ICD-10-CM

## 2022-08-22 RX ORDER — OMEPRAZOLE 40 MG/1
CAPSULE, DELAYED RELEASE ORAL
Qty: 30 CAPSULE | Refills: 3 | Status: SHIPPED | OUTPATIENT
Start: 2022-08-22

## 2022-08-29 ENCOUNTER — RA CDI HCC (OUTPATIENT)
Dept: OTHER | Facility: HOSPITAL | Age: 72
End: 2022-08-29

## 2022-08-29 NOTE — PROGRESS NOTES
[address]  coding opportunities       Chart reviewed, no opportunity found: CHART REVIEWED, NO OPPORTUNITY FOUND        Patients Insurance     Medicare Insurance: Medicare

## 2022-09-02 ENCOUNTER — OFFICE VISIT (OUTPATIENT)
Dept: FAMILY MEDICINE CLINIC | Facility: CLINIC | Age: 72
End: 2022-09-02
Payer: MEDICARE

## 2022-09-02 VITALS
BODY MASS INDEX: 27.49 KG/M2 | HEIGHT: 65 IN | WEIGHT: 165 LBS | SYSTOLIC BLOOD PRESSURE: 132 MMHG | TEMPERATURE: 96.1 F | DIASTOLIC BLOOD PRESSURE: 82 MMHG | OXYGEN SATURATION: 99 % | HEART RATE: 88 BPM

## 2022-09-02 DIAGNOSIS — E11.9 TYPE 2 DIABETES MELLITUS WITHOUT COMPLICATION, WITHOUT LONG-TERM CURRENT USE OF INSULIN (HCC): ICD-10-CM

## 2022-09-02 DIAGNOSIS — G95.0 SYRINGOMYELIA (HCC): ICD-10-CM

## 2022-09-02 DIAGNOSIS — Z00.00 MEDICARE ANNUAL WELLNESS VISIT, SUBSEQUENT: ICD-10-CM

## 2022-09-02 DIAGNOSIS — N52.9 ERECTILE DYSFUNCTION, UNSPECIFIED ERECTILE DYSFUNCTION TYPE: ICD-10-CM

## 2022-09-02 DIAGNOSIS — I10 ESSENTIAL HYPERTENSION: ICD-10-CM

## 2022-09-02 DIAGNOSIS — Z12.5 SCREENING FOR PROSTATE CANCER: ICD-10-CM

## 2022-09-02 DIAGNOSIS — E78.5 HYPERLIPIDEMIA, UNSPECIFIED HYPERLIPIDEMIA TYPE: ICD-10-CM

## 2022-09-02 PROCEDURE — 99214 OFFICE O/P EST MOD 30 MIN: CPT | Performed by: FAMILY MEDICINE

## 2022-09-02 PROCEDURE — G0439 PPPS, SUBSEQ VISIT: HCPCS | Performed by: FAMILY MEDICINE

## 2022-09-02 RX ORDER — GABAPENTIN 600 MG/1
600 TABLET ORAL 3 TIMES DAILY PRN
Qty: 270 TABLET | Refills: 1 | Status: SHIPPED | OUTPATIENT
Start: 2022-09-02

## 2022-09-02 RX ORDER — LOSARTAN POTASSIUM 100 MG/1
100 TABLET ORAL DAILY
Qty: 90 TABLET | Refills: 1 | Status: SHIPPED | OUTPATIENT
Start: 2022-09-02

## 2022-09-02 RX ORDER — SILDENAFIL 100 MG/1
100 TABLET, FILM COATED ORAL DAILY PRN
Qty: 10 TABLET | Refills: 5 | Status: SHIPPED | OUTPATIENT
Start: 2022-09-02

## 2022-09-02 NOTE — PATIENT INSTRUCTIONS
Lab end of September, refill meds  Medicare Preventive Visit Patient Instructions  Thank you for completing your Welcome to Medicare Visit or Medicare Annual Wellness Visit today  Your next wellness visit will be due in one year (9/3/2023)  The screening/preventive services that you may require over the next 5-10 years are detailed below  Some tests may not apply to you based off risk factors and/or age  Screening tests ordered at today's visit but not completed yet may show as past due  Also, please note that scanned in results may not display below  Preventive Screenings:  Service Recommendations Previous Testing/Comments   Colorectal Cancer Screening  Colonoscopy    Fecal Occult Blood Test (FOBT)/Fecal Immunochemical Test (FIT)  Fecal DNA/Cologuard Test  Flexible Sigmoidoscopy Age: 39-70 years old   Colonoscopy: every 10 years (May be performed more frequently if at higher risk)  OR  FOBT/FIT: every 1 year  OR  Cologuard: every 3 years  OR  Sigmoidoscopy: every 5 years  Screening may be recommended earlier than age 39 if at higher risk for colorectal cancer  Also, an individualized decision between you and your healthcare provider will decide whether screening between the ages of 74-80 would be appropriate   Colonoscopy: 01/13/2020  FOBT/FIT: Not on file  Cologuard: 08/10/2021  Sigmoidoscopy: Not on file    Screening Current     Prostate Cancer Screening Individualized decision between patient and health care provider in men between ages of 53-78   Medicare will cover every 12 months beginning on the day after your 50th birthday PSA: 2 1 ng/mL           Hepatitis C Screening Once for adults born between 1945 and 1965  More frequently in patients at high risk for Hepatitis C Hep C Antibody: 08/01/2019    Screening Current   Diabetes Screening 1-2 times per year if you're at risk for diabetes or have pre-diabetes Fasting glucose: 139 mg/dL (5/13/2022)  A1C: 6 9 % (5/13/2022)  Screening Not Indicated  History Diabetes   Cholesterol Screening Once every 5 years if you don't have a lipid disorder  May order more often based on risk factors  Lipid panel: 05/13/2022  Screening Not Indicated  History Lipid Disorder      Other Preventive Screenings Covered by Medicare:  Abdominal Aortic Aneurysm (AAA) Screening: covered once if your at risk  You're considered to be at risk if you have a family history of AAA or a male between the age of 73-68 who smoking at least 100 cigarettes in your lifetime  Lung Cancer Screening: covers low dose CT scan once per year if you meet all of the following conditions: (1) Age 50-69; (2) No signs or symptoms of lung cancer; (3) Current smoker or have quit smoking within the last 15 years; (4) You have a tobacco smoking history of at least 20 pack years (packs per day x number of years you smoked); (5) You get a written order from a healthcare provider  Glaucoma Screening: covered annually if you're considered high risk: (1) You have diabetes OR (2) Family history of glaucoma OR (3)  aged 48 and older OR (3)  American aged 72 and older  Osteoporosis Screening: covered every 2 years if you meet one of the following conditions: (1) Have a vertebral abnormality; (2) On glucocorticoid therapy for more than 3 months; (3) Have primary hyperparathyroidism; (4) On osteoporosis medications and need to assess response to drug therapy  HIV Screening: covered annually if you're between the age of 12-76  Also covered annually if you are younger than 13 and older than 72 with risk factors for HIV infection  For pregnant patients, it is covered up to 3 times per pregnancy      Immunizations:  Immunization Recommendations   Influenza Vaccine Annual influenza vaccination during flu season is recommended for all persons aged >= 6 months who do not have contraindications   Pneumococcal Vaccine   * Pneumococcal conjugate vaccine = PCV13 (Prevnar 13), PCV15 (Vaxneuvance), PCV20 (Prevnar 20)  * Pneumococcal polysaccharide vaccine = PPSV23 (Pneumovax) Adults 2364 years old: 1-3 doses may be recommended based on certain risk factors  Adults 72 years old: 1-2 doses may be recommended based off what pneumonia vaccine you previously received   Hepatitis B Vaccine 3 dose series if at intermediate or high risk (ex: diabetes, end stage renal disease, liver disease)   Tetanus (Td) Vaccine - COST NOT COVERED BY MEDICARE PART B Following completion of primary series, a booster dose should be given every 10 years to maintain immunity against tetanus  Td may also be given as tetanus wound prophylaxis  Tdap Vaccine - COST NOT COVERED BY MEDICARE PART B Recommended at least once for all adults  For pregnant patients, recommended with each pregnancy  Shingles Vaccine (Shingrix) - COST NOT COVERED BY MEDICARE PART B  2 shot series recommended in those aged 48 and above     Health Maintenance Due:      Topic Date Due    Colorectal Cancer Screening  08/11/2021    Hepatitis C Screening  Completed     Immunizations Due:      Topic Date Due    COVID-19 Vaccine (3 - Booster for Moderna series) 08/04/2021    Influenza Vaccine (1) 09/01/2022     Advance Directives   What are advance directives? Advance directives are legal documents that state your wishes and plans for medical care  These plans are made ahead of time in case you lose your ability to make decisions for yourself  Advance directives can apply to any medical decision, such as the treatments you want, and if you want to donate organs  What are the types of advance directives? There are many types of advance directives, and each state has rules about how to use them  You may choose a combination of any of the following:  Living will: This is a written record of the treatment you want  You can also choose which treatments you do not want, which to limit, and which to stop at a certain time  This includes surgery, medicine, IV fluid, and tube feedings  Durable power of  for healthcare [facility]): This is a written record that states who you want to make healthcare choices for you when you are unable to make them for yourself  This person, called a proxy, is usually a family member or a friend  You may choose more than 1 proxy  Do not resuscitate (DNR) order:  A DNR order is used in case your heart stops beating or you stop breathing  It is a request not to have certain forms of treatment, such as CPR  A DNR order may be included in other types of advance directives  Medical directive: This covers the care that you want if you are in a coma, near death, or unable to make decisions for yourself  You can list the treatments you want for each condition  Treatment may include pain medicine, surgery, blood transfusions, dialysis, IV or tube feedings, and a ventilator (breathing machine)  Values history: This document has questions about your views, beliefs, and how you feel and think about life  This information can help others choose the care that you would choose  Why are advance directives important? An advance directive helps you control your care  Although spoken wishes may be used, it is better to have your wishes written down  Spoken wishes can be misunderstood, or not followed  Treatments may be given even if you do not want them  An advance directive may make it easier for your family to make difficult choices about your care  Weight Management   Why it is important to manage your weight:  Being overweight increases your risk of health conditions such as heart disease, high blood pressure, type 2 diabetes, and certain types of cancer  It can also increase your risk for osteoarthritis, sleep apnea, and other respiratory problems  Aim for a slow, steady weight loss  Even a small amount of weight loss can lower your risk of health problems    How to lose weight safely:  A safe and healthy way to lose weight is to eat fewer calories and get regular exercise  You can lose up about 1 pound a week by decreasing the number of calories you eat by 500 calories each day  Healthy meal plan for weight management:  A healthy meal plan includes a variety of foods, contains fewer calories, and helps you stay healthy  A healthy meal plan includes the following:  Eat whole-grain foods more often  A healthy meal plan should contain fiber  Fiber is the part of grains, fruits, and vegetables that is not broken down by your body  Whole-grain foods are healthy and provide extra fiber in your diet  Some examples of whole-grain foods are whole-wheat breads and pastas, oatmeal, brown rice, and bulgur  Eat a variety of vegetables every day  Include dark, leafy greens such as spinach, kale, micah greens, and mustard greens  Eat yellow and orange vegetables such as carrots, sweet potatoes, and winter squash  Eat a variety of fruits every day  Choose fresh or canned fruit (canned in its own juice or light syrup) instead of juice  Fruit juice has very little or no fiber  Eat low-fat dairy foods  Drink fat-free (skim) milk or 1% milk  Eat fat-free yogurt and low-fat cottage cheese  Try low-fat cheeses such as mozzarella and other reduced-fat cheeses  Choose meat and other protein foods that are low in fat  Choose beans or other legumes such as split peas or lentils  Choose fish, skinless poultry (chicken or turkey), or lean cuts of red meat (beef or pork)  Before you cook meat or poultry, cut off any visible fat  Use less fat and oil  Try baking foods instead of frying them  Add less fat, such as margarine, sour cream, regular salad dressing and mayonnaise to foods  Eat fewer high-fat foods  Some examples of high-fat foods include french fries, doughnuts, ice cream, and cakes  Eat fewer sweets  Limit foods and drinks that are high in sugar  This includes candy, cookies, regular soda, and sweetened drinks    Exercise:  Exercise at least 30 minutes per day on most days of the week  Some examples of exercise include walking, biking, dancing, and swimming  You can also fit in more physical activity by taking the stairs instead of the elevator or parking farther away from stores  Ask your healthcare provider about the best exercise plan for you  Narcotic (Opioid) Safety    Use narcotics safely:  Take prescribed narcotics exactly as directed  Do not give narcotics to others or take narcotics that belong to someone else  Do not mix narcotics without medicines or alcohol  Do not drive or operate heavy machinery after you take the narcotic  Monitor for side effects and notify your healthcare provider if you experienced side effects such as nausea, sleepiness, itching, or trouble thinking clearly  Manage constipation:    Constipation is the most common side effect of narcotic medicine  Constipation is when you have hard, dry bowel movements, or you go longer than usual between bowel movements  Tell your healthcare provider about all changes in your bowel movements while you are taking narcotics  He or she may recommend laxative medicine to help you have a bowel movement  He or she may also change the kind of narcotic you are taking, or change when you take it  The following are more ways you can prevent or relieve constipation:    Drink liquids as directed  You may need to drink extra liquids to help soften and move your bowels  Ask how much liquid to drink each day and which liquids are best for you  Eat high-fiber foods  This may help decrease constipation by adding bulk to your bowel movements  High-fiber foods include fruits, vegetables, whole-grain breads and cereals, and beans  Your healthcare provider or dietitian can help you create a high-fiber meal plan  Your provider may also recommend a fiber supplement if you cannot get enough fiber from food  Exercise regularly  Regular physical activity can help stimulate your intestines   Walking is a good exercise to prevent or relieve constipation  Ask which exercises are best for you  Schedule a time each day to have a bowel movement  This may help train your body to have regular bowel movements  Bend forward while you are on the toilet to help move the bowel movement out  Sit on the toilet for at least 10 minutes, even if you do not have a bowel movement  Store narcotics safely:   Store narcotics where others cannot easily get them  Keep them in a locked cabinet or secure area  Do not  keep them in a purse or other bag you carry with you  A person may be looking for something else and find the narcotics  Make sure narcotics are stored out of the reach of children  A child can easily overdose on narcotics  Narcotics may look like candy to a small child  The best way to dispose of narcotics: The laws vary by country and area  In the [place], the best way is to return the narcotics through a take-back program  This program is offered by the [employer])  The following are options for using the program:  Take the narcotics to a TAIWO collection site  The site is often a law enforcement center  Call your local law enforcement center for scheduled take-back days in your area  You will be given information on where to go if the collection site is in a different location  Take the narcotics to an approved pharmacy or hospital   A pharmacy or hospital may be set up as a collection site  You will need to ask if it is a TAIWO collection site if you were not directed there  A pharmacy or doctor's office may not be able to take back narcotics unless it is a TAIWO site  Use a mail-back system  This means you are given containers to put the narcotics into  You will then mail them in the containers  Use a take-back drop box  This is a place to leave the narcotics at any time  People and animals will not be able to get into the box   Your local law enforcement agency can tell you where to find a drop box in your area     Other ways to manage pain:   Ask your healthcare provider about non-narcotic medicines to control pain  Nonprescription medicines include NSAIDs (such as ibuprofen) and acetaminophen  Prescription medicines include muscle relaxers, antidepressants, and steroids  Pain may be managed without any medicines  Some ways to relieve pain include massage, aromatherapy, or meditation  Physical or occupational therapy may also help  For more information:   Drug Enforcement Administration  [address] [facility] [place]  Phone: 9- 217 - [phone]  Web Address: [facility] [email or web address]_[email or web address]    [address] [facility] , [address] [place] [address]  Phone: 6- 011 - [phone]  Web Address: http://[employer]pl[employer]/     © Copyright [employer] 2018 Information is for End User's use only and may not be sold, redistributed or otherwise used for commercial purposes  All illustrations and images included in CareNotes® are the copyrighted property of [employer]  or [address] [place], [place] [address]stevan Bueno   Weight Management   AMBULATORY CARE:   Why it is important to manage your weight:  Being overweight increases your risk of health conditions such as heart disease, high blood pressure, type 2 diabetes, and certain types of cancer  It can also increase your risk for osteoarthritis, sleep apnea, and other respiratory problems  Aim for a slow, steady weight loss  Even a small amount of weight loss can lower your risk of health problems  Risks of being overweight:  Extra weight can cause many health problems, including the following:  Diabetes (high blood sugar level)    High blood pressure or high cholesterol    Heart disease    Stroke    Gallbladder or liver disease    Cancer of the colon, breast, prostate, liver, or kidney    Sleep apnea    Arthritis or gout    Screening  is done to check for health conditions before you have signs or symptoms   If you are 28to 79years old, your blood sugar level may be checked every 3 years for signs of prediabetes or diabetes  Your healthcare provider will check your blood pressure at each visit  High blood pressure can lead to a stroke or other problems  Your provider may check for signs of heart disease, cancer, or other health problems  How to lose weight safely:  A safe and healthy way to lose weight is to eat fewer calories and get regular exercise  You can lose up about 1 pound a week by decreasing the number of calories you eat by 500 calories each day  You can decrease calories by eating smaller portion sizes or by cutting out high-calorie foods  Read labels to find out how many calories are in the foods you eat  You can also burn calories with exercise such as walking, swimming, or biking  You will be more likely to keep weight off if you make these changes part of your lifestyle  Exercise at least 30 minutes per day on most days of the week  You can also fit in more physical activity by taking the stairs instead of the elevator or parking farther away from stores  Ask your healthcare provider about the best exercise plan for you  Healthy meal plan for weight management:  A healthy meal plan includes a variety of foods, contains fewer calories, and helps you stay healthy  A healthy meal plan includes the following:     Eat whole-grain foods more often  A healthy meal plan should contain fiber  Fiber is the part of grains, fruits, and vegetables that is not broken down by your body  Whole-grain foods are healthy and provide extra fiber in your diet  Some examples of whole-grain foods are whole-wheat breads and pastas, oatmeal, brown rice, and bulgur  Eat a variety of vegetables every day  Include dark, leafy greens such as spinach, kale, micah greens, and mustard greens  Eat yellow and orange vegetables such as carrots, sweet potatoes, and winter squash  Eat a variety of fruits every day    Choose fresh or canned fruit (canned in its own juice or light syrup) instead of juice  Fruit juice has very little or no fiber  Eat low-fat dairy foods  Drink fat-free (skim) milk or 1% milk  Eat fat-free yogurt and low-fat cottage cheese  Try low-fat cheeses such as mozzarella and other reduced-fat cheeses  Choose meat and other protein foods that are low in fat  Choose beans or other legumes such as split peas or lentils  Choose fish, skinless poultry (chicken or turkey), or lean cuts of red meat (beef or pork)  Before you cook meat or poultry, cut off any visible fat  Use less fat and oil  Try baking foods instead of frying them  Add less fat, such as margarine, sour cream, regular salad dressing and mayonnaise to foods  Eat fewer high-fat foods  Some examples of high-fat foods include french fries, doughnuts, ice cream, and cakes  Eat fewer sweets  Limit foods and drinks that are high in sugar  This includes candy, cookies, regular soda, and sweetened drinks  Ways to decrease calories:   Eat smaller portions  Use a small plate with smaller servings  Do not eat second helpings  When you eat at a restaurant, ask for a box and place half of your meal in the box before you eat  Share an entrée with someone else  Replace high-calorie snacks with healthy, low-calorie snacks  Choose fresh fruit, vegetables, fat-free rice cakes, or air-popped popcorn instead of potato chips, nuts, or chocolate  Choose water or calorie-free drinks instead of soda or sweetened drinks  Do not shop for groceries when you are hungry  You may be more likely to make unhealthy food choices  Take a grocery list of healthy foods and shop after you have eaten  Eat regular meals  Do not skip meals  Skipping meals can lead to overeating later in the day  This can make it harder for you to lose weight   Eat a healthy snack in place of a meal if you do not have time to eat a regular meal  Talk with a dietitian to help you create a meal plan and schedule that is right for you  Other things to consider as you try to lose weight:   Be aware of situations that may give you the urge to overeat, such as eating while watching television  Find ways to avoid these situations  For example, read a book, go for a walk, or do crafts  Meet with a weight loss support group or friends who are also trying to lose weight  This may help you stay motivated to continue working on your weight loss goals  © Copyright [employer] 2022 Information is for End User's use only and may not be sold, redistributed or otherwise used for commercial purposes  All illustrations and images included in CareNotes® are the copyrighted property of [employer]  or [facility] Juan Diego Bueno   The above information is an  only  It is not intended as medical advice for individual conditions or treatments  Talk to your doctor, nurse or pharmacist before following any medical regimen to see if it is safe and effective for you

## 2022-09-02 NOTE — PROGRESS NOTES
Assessment and Plan:     Problem List Items Addressed This Visit        Cardiovascular and Mediastinum    Essential hypertension     BP stable         Relevant Medications    losartan (COZAAR) 100 MG tablet    Other Relevant Orders    CBC and differential       Nervous and Auditory    Syringomyelia (HCC)     Pain stable         Relevant Medications    gabapentin (NEURONTIN) 600 MG tablet       Other    Erectile dysfunction    Relevant Medications    sildenafil (Viagra) 100 mg tablet    Hyperlipidemia     Await lab         Relevant Orders    Lipid panel    TSH, 3rd generation      Other Visit Diagnoses     BMI 27 0-27 9,adult    -  Primary    Medicare annual wellness visit, subsequent        Type 2 diabetes mellitus without complication, without long-term current use of insulin (HCC)        Relevant Orders    Comprehensive metabolic panel    Hemoglobin A1C    TSH, 3rd generation    CBC and differential    Screening for prostate cancer        Relevant Orders    PSA, Total Screen           Preventive health issues were discussed with patient, and age appropriate screening tests were ordered as noted in patient's After Visit Summary  Personalized health advice and appropriate referrals for health education or preventive services given if needed, as noted in patient's After Visit Summary  History of Present Illness:     Patient presents for a Medicare Wellness Visit    F/u lipids,sugar, chronic  Pain due to synringomyelia, no cp, no sob, no headaches     Patient Care Team:  Rey Enciso MD as PCP - General (Family Medicine)  MD Tosha Tyler MD as Endoscopist     Review of Systems:     Review of Systems   Constitutional: Negative for activity change, appetite change, fatigue and unexpected weight change  Respiratory: Negative for shortness of breath  Cardiovascular: Negative for chest pain  Musculoskeletal: Positive for back pain and neck pain     Neurological: Negative for dizziness and headaches  Problem List:     Patient Active Problem List   Diagnosis    Diabetes mellitus with peripheral artery disease (HCC)    Chronic pain disorder    DDD (degenerative disc disease), thoracic    Enlarged prostate with lower urinary tract symptoms (LUTS)    Hearing loss, right    Hyperlipidemia    Essential hypertension    Syringomyelia (HCC)    Pain of right upper extremity    Erectile dysfunction    Tear of right rotator cuff    Elevated PSA    Gastroesophageal reflux disease with esophagitis    ARPAN (obstructive sleep apnea)    Continuous opioid dependence ([facility] [address] )    Chest pain      Past Medical and Surgical History:     Past Medical History:   Diagnosis Date    Anxiety     Chronic pain disorder     right shoulder    Diabetes mellitus ([address] )     type 2, blood sugar 109 at 0500    Diverticulosis     Enlarged prostate     Extremity pain     GERD (gastroesophageal reflux disease)     Hearing loss, right     Hemorrhoids     Hypertension     Neck pain     Polyp, sigmoid colon      Past Surgical History:   Procedure Laterality Date    COLONOSCOPY      EAR SURGERY      INNER EAR SURGERY      NASAL SINUS SURGERY      [place] COLONOSCOPY FLX DX W/COLLJ SPEC WHEN PFRMD N/A 8/30/2016    Procedure: COLONOSCOPY;  Surgeon: Binta Thapa MD;  [facility];   Service: General    [place] SHLDR ARTHROSCOP,SURG,W/ROTAT CUFF REPR Right 2/5/2019    Procedure: ARTHROSCOPY SHOULDER;  Surgeon: Dena Saldana DO;  Location: [facility] MAIN OR;  Service: Orthopedics      Family History:     Family History   Problem Relation Age of Onset    Hypertension Brother     Hepatitis Mother     Heart disease Father         CARDIAC DISORDER       Social History:     Social History     Socioeconomic History    Marital status: /Civil Union     Spouse name: None    Number of children: 3    Years of education: None    Highest education level: None   Occupational History    Occupation: FULL TIME EMPLOYMENT Tobacco Use    Smoking status: Never Smoker    Smokeless tobacco: Never Used   Vaping Use    Vaping Use: Never used   Substance and Sexual Activity    Alcohol use: Yes     Comment: occassional    Drug use: No    Sexual activity: Yes     Partners: Female   Other Topics Concern    None   Social History Narrative    Lives with wife,daughter, and 2 grandchildren    Retired        Advance directive on file     Denied domestic violence     Denied problem with Scientologist beliefs regarding medical care     Social Determinants of Health     Financial Resource Strain: Not on file   Food Insecurity: Not on file   Transportation Needs: Not on file   Physical Activity: Not on file   Stress: Not on file   Social Connections: Not on file   Intimate Partner Violence: Not on file   Housing Stability: Not on file      Medications and Allergies:     Current Outpatient Medications   Medication Sig Dispense Refill    Blood Glucose Monitoring Suppl (ONE TOUCH ULTRA 2) w/Device KIT Use daily 1 kit 0    fenofibrate (TRICOR) 145 mg tablet Take 1 tablet (145 mg total) by mouth daily 30 tablet 5    gabapentin (NEURONTIN) 600 MG tablet Take 1 tablet (600 mg total) by mouth 3 (three) times a day as needed (leg/neck pain) 270 tablet 1    glucose blood (ONE TOUCH ULTRA TEST) test strip Use 1 each daily 100 each 2    hydrochlorothiazide (HYDRODIURIL) 25 mg tablet Take 1 tablet (25 mg total) by mouth daily 90 tablet 1    losartan (COZAAR) 100 MG tablet Take 1 tablet (100 mg total) by mouth daily 90 tablet 1    metFORMIN (GLUCOPHAGE) 500 mg tablet Take 1 tablet (500 mg total) by mouth 2 (two) times a day with meals 180 tablet 1    olopatadine (PATANOL) 0 1 % ophthalmic solution Administer 1 drop to the right eye 2 (two) times a day 5 mL 1    omeprazole (PriLOSEC) 40 MG capsule take 1 capsule by mouth EVERY MORNING BEFORE BREAKFAST 30 capsule 3    OneTouch Delica Lancets 52Y MISC Use 1 Device 2 (two) times a day 100 each 2    sildenafil (Viagra) 100 mg tablet Take 1 tablet (100 mg total) by mouth daily as needed for erectile dysfunction 10 tablet 5    tamsulosin (FLOMAX) 0 4 mg take 1 capsule by mouth at bedtime 30 capsule 11    traMADol (ULTRAM) 50 mg tablet Take 1 tablet by mouth two to three times a day if needed for moderate pain 60 tablet 0    urea (CARMOL) 30 % cream Apply topically as needed for dry skin 60 g 2    naloxone (NARCAN) 4 mg/0 1 mL nasal spray 0 1 mL (4 mg total) into each nostril once for 1 dose If the desired response is not obtained after 2-3 minutes, administer an additional dose using a new spray 1 each 1     No current facility-administered medications for this visit  Allergies   Allergen Reactions    Lipitor [Atorvastatin] Other (See Comments) and Myalgia     Other reaction(s): increased pain  arthralgia    Lyrica [Pregabalin] Other (See Comments)     Pt can't remmember reaction      Immunizations:     Immunization History   Administered Date(s) Administered    COVID-19 MODERNA VACC 0 5 ML IM 02/04/2021, 03/04/2021    INFLUENZA 10/18/2013, 10/01/2015, 09/09/2020, 09/21/2021    Influenza Quadrivalent Preservative Free 3 years and older IM 09/15/2015, 11/01/2017    Influenza, high dose seasonal 0 7 mL 09/26/2018    Pneumococcal Conjugate 13-Valent 01/13/2016, 09/19/2019    Pneumococcal Polysaccharide PPV23 05/16/2018    Tdap 06/01/2016    influenza, trivalent, adjuvanted 09/19/2019      Health Maintenance:         Topic Date Due    Colorectal Cancer Screening  08/11/2021    Hepatitis C Screening  Completed         Topic Date Due    COVID-19 Vaccine (3 - Booster for [place] series) 08/04/2021    Influenza Vaccine (1) 09/01/2022      Medicare Screening Tests and Risk Assessments:     Deandre Bedolla is here for his Subsequent Wellness visit  Health Risk Assessment:   Patient rates overall health as good  Patient feels that their physical health rating is much better   Patient is satisfied with their life  Clarke Andres was rated as slightly worse  Hearing was rated as same  Patient feels that their emotional and mental health rating is same  Patients states they are sometimes angry  Patient states they are never, rarely unusually tired/fatigued  Pain experienced in the last 7 days has been some  Patient's pain rating has been 3/10  Patient states that he has experienced no weight loss or gain in last 6 months  Depression Screening:   PHQ-2 Score: 0      Fall Risk Screening: In the past year, patient has experienced: no history of falling in past year      Home Safety:  Patient does not have trouble with stairs inside or outside of their home  Patient has working smoke alarms and has no working carbon monoxide detector  Home safety hazards include: none  Nutrition:   Current diet is Regular  Medications:   Patient is not currently taking any over-the-counter supplements  Patient is able to manage medications  Activities of Daily Living (ADLs)/Instrumental Activities of Daily Living (IADLs):   Walk and transfer into and out of bed and chair?: Yes  Dress and groom yourself?: Yes    Bathe or shower yourself?: Yes    Feed yourself?  Yes  Do your laundry/housekeeping?: Yes  Manage your money, pay your bills and track your expenses?: Yes  Make your own meals?: Yes    Do your own shopping?: Yes    Previous Hospitalizations:   Any hospitalizations or ED visits within the last 12 months?: No      Cognitive Screening:   Provider or family/friend/caregiver concerned regarding cognition?: No    PREVENTIVE SCREENINGS      Cardiovascular Screening:    General: History Lipid Disorder and Screening Current      Diabetes Screening:     General: History Diabetes and Screening Current      Colorectal Cancer Screening:     General: Screening Current      Prostate Cancer Screening:    General: Risks and Benefits Discussed    Due for: PSA      Osteoporosis Screening:    General: Screening Not Indicated      Abdominal Aortic Aneurysm (AAA) Screening:    Risk factors include: age between 73-69 yo        Lung Cancer Screening:     General: Screening Not Indicated      Hepatitis C Screening:    General: Screening Current    Screening, Brief Intervention, and Referral to Treatment (SBIRT)    Screening  Typical number of drinks in a day: 0  Typical number of drinks in a week: 0  Interpretation: Low risk drinking behavior  Review of Current Opioid Use    Opioid Risk Tool (ORT) Interpretation: Complete Opioid Risk Tool (ORT)    No exam data present     Physical Exam:     /82 (BP Location: Left arm, Patient Position: Sitting, Cuff Size: Standard)   Pulse 88   Temp (!) 96 1 °F (35 6 °C) (Tympanic)   Ht 5' 4 5" (1 638 m)   Wt 74 8 kg (165 lb)   SpO2 99%   BMI 27 88 kg/m²     Physical Exam  Vitals reviewed  Constitutional:       Appearance: Normal appearance  Cardiovascular:      Rate and Rhythm: Normal rate and regular rhythm  Pulses: Normal pulses  Heart sounds: Normal heart sounds  Pulmonary:      Effort: Pulmonary effort is normal       Breath sounds: Normal breath sounds  Musculoskeletal:      Right lower leg: No edema  Left lower leg: No edema  Lymphadenopathy:      Cervical: No cervical adenopathy  Neurological:      Mental Status: He is alert            Nikki Palmer MD

## 2022-09-02 NOTE — PROGRESS NOTES
BMI Counseling: Body mass index is 27 88 kg/m²  The BMI is above normal  Nutrition recommendations include reducing portion sizes, decreasing overall calorie intake, 3-5 servings of fruits/vegetables daily, reducing fast food intake and consuming healthier snacks  Exercise recommendations include exercising 3-5 times per week

## 2022-09-06 DIAGNOSIS — G95.0 SYRINGOMYELIA (HCC): ICD-10-CM

## 2022-09-06 RX ORDER — TRAMADOL HYDROCHLORIDE 50 MG/1
TABLET ORAL
Qty: 60 TABLET | Refills: 0 | Status: SHIPPED | OUTPATIENT
Start: 2022-09-06 | End: 2022-10-06 | Stop reason: SDUPTHER

## 2022-09-28 ENCOUNTER — APPOINTMENT (OUTPATIENT)
Dept: LAB | Facility: MEDICAL CENTER | Age: 72
End: 2022-09-28
Payer: MEDICARE

## 2022-09-28 DIAGNOSIS — I10 ESSENTIAL HYPERTENSION: ICD-10-CM

## 2022-09-28 DIAGNOSIS — Z12.5 SCREENING FOR PROSTATE CANCER: ICD-10-CM

## 2022-09-28 DIAGNOSIS — E78.5 HYPERLIPIDEMIA, UNSPECIFIED HYPERLIPIDEMIA TYPE: ICD-10-CM

## 2022-09-28 DIAGNOSIS — E11.9 TYPE 2 DIABETES MELLITUS WITHOUT COMPLICATION, WITHOUT LONG-TERM CURRENT USE OF INSULIN (HCC): ICD-10-CM

## 2022-09-28 LAB
ALBUMIN SERPL BCP-MCNC: 3.9 G/DL (ref 3.5–5)
ALP SERPL-CCNC: 77 U/L (ref 46–116)
ALT SERPL W P-5'-P-CCNC: 31 U/L (ref 12–78)
ANION GAP SERPL CALCULATED.3IONS-SCNC: 2 MMOL/L (ref 4–13)
AST SERPL W P-5'-P-CCNC: 11 U/L (ref 5–45)
BASOPHILS # BLD AUTO: 0.04 THOUSANDS/ΜL (ref 0–0.1)
BASOPHILS NFR BLD AUTO: 1 % (ref 0–1)
BILIRUB SERPL-MCNC: 0.53 MG/DL (ref 0.2–1)
BUN SERPL-MCNC: 23 MG/DL (ref 5–25)
CALCIUM SERPL-MCNC: 9.7 MG/DL (ref 8.3–10.1)
CHLORIDE SERPL-SCNC: 105 MMOL/L (ref 96–108)
CHOLEST SERPL-MCNC: 184 MG/DL
CO2 SERPL-SCNC: 29 MMOL/L (ref 21–32)
CREAT SERPL-MCNC: 1.13 MG/DL (ref 0.6–1.3)
EOSINOPHIL # BLD AUTO: 0.08 THOUSAND/ΜL (ref 0–0.61)
EOSINOPHIL NFR BLD AUTO: 1 % (ref 0–6)
ERYTHROCYTE [DISTWIDTH] IN BLOOD BY AUTOMATED COUNT: 13.4 % (ref 11.6–15.1)
EST. AVERAGE GLUCOSE BLD GHB EST-MCNC: 160 MG/DL
GFR SERPL CREATININE-BSD FRML MDRD: 65 ML/MIN/1.73SQ M
GLUCOSE P FAST SERPL-MCNC: 169 MG/DL (ref 65–99)
HBA1C MFR BLD: 7.2 %
HCT VFR BLD AUTO: 44.7 % (ref 36.5–49.3)
HDLC SERPL-MCNC: 33 MG/DL
HGB BLD-MCNC: 14.4 G/DL (ref 12–17)
IMM GRANULOCYTES # BLD AUTO: 0.02 THOUSAND/UL (ref 0–0.2)
IMM GRANULOCYTES NFR BLD AUTO: 0 % (ref 0–2)
LDLC SERPL CALC-MCNC: 123 MG/DL (ref 0–100)
LYMPHOCYTES # BLD AUTO: 2.51 THOUSANDS/ΜL (ref 0.6–4.47)
LYMPHOCYTES NFR BLD AUTO: 40 % (ref 14–44)
MCH RBC QN AUTO: 28.2 PG (ref 26.8–34.3)
MCHC RBC AUTO-ENTMCNC: 32.2 G/DL (ref 31.4–37.4)
MCV RBC AUTO: 88 FL (ref 82–98)
MONOCYTES # BLD AUTO: 0.44 THOUSAND/ΜL (ref 0.17–1.22)
MONOCYTES NFR BLD AUTO: 7 % (ref 4–12)
NEUTROPHILS # BLD AUTO: 3.25 THOUSANDS/ΜL (ref 1.85–7.62)
NEUTS SEG NFR BLD AUTO: 51 % (ref 43–75)
NONHDLC SERPL-MCNC: 151 MG/DL
NRBC BLD AUTO-RTO: 0 /100 WBCS
PLATELET # BLD AUTO: 253 THOUSANDS/UL (ref 149–390)
PMV BLD AUTO: 10.7 FL (ref 8.9–12.7)
POTASSIUM SERPL-SCNC: 4.8 MMOL/L (ref 3.5–5.3)
PROT SERPL-MCNC: 8 G/DL (ref 6.4–8.4)
PSA SERPL-MCNC: 3.1 NG/ML (ref 0–4)
RBC # BLD AUTO: 5.11 MILLION/UL (ref 3.88–5.62)
SODIUM SERPL-SCNC: 136 MMOL/L (ref 135–147)
TRIGL SERPL-MCNC: 138 MG/DL
TSH SERPL DL<=0.05 MIU/L-ACNC: 1.4 UIU/ML (ref 0.45–4.5)
WBC # BLD AUTO: 6.34 THOUSAND/UL (ref 4.31–10.16)

## 2022-09-28 PROCEDURE — 36415 COLL VENOUS BLD VENIPUNCTURE: CPT

## 2022-09-28 PROCEDURE — 85025 COMPLETE CBC W/AUTO DIFF WBC: CPT

## 2022-09-28 PROCEDURE — G0103 PSA SCREENING: HCPCS

## 2022-09-28 PROCEDURE — 83036 HEMOGLOBIN GLYCOSYLATED A1C: CPT

## 2022-09-28 PROCEDURE — 84443 ASSAY THYROID STIM HORMONE: CPT

## 2022-09-28 PROCEDURE — 80053 COMPREHEN METABOLIC PANEL: CPT

## 2022-09-28 PROCEDURE — 80061 LIPID PANEL: CPT

## 2022-10-06 DIAGNOSIS — G95.0 SYRINGOMYELIA (HCC): ICD-10-CM

## 2022-10-06 RX ORDER — TRAMADOL HYDROCHLORIDE 50 MG/1
TABLET ORAL
Qty: 60 TABLET | Refills: 0 | Status: SHIPPED | OUTPATIENT
Start: 2022-10-06

## 2022-10-06 NOTE — TELEPHONE ENCOUNTER
Last OV with Office: 9/2/2022   Last visit with PCP : 9/2/2022      Next visit with the Office :1/9/2023   Next visit with PCP : Visit date not found    No pending labs

## 2022-10-10 ENCOUNTER — TELEPHONE (OUTPATIENT)
Dept: FAMILY MEDICINE CLINIC | Facility: CLINIC | Age: 72
End: 2022-10-10

## 2022-10-10 NOTE — TELEPHONE ENCOUNTER
Left detailed message advising [facility] pharmacy that Tramadol refill was sent to pharmacy on 10/6/22

## 2022-10-14 LAB
LEFT EYE DIABETIC RETINOPATHY: NORMAL
RIGHT EYE DIABETIC RETINOPATHY: NORMAL

## 2022-10-26 DIAGNOSIS — N40.1 BENIGN LOCALIZED PROSTATIC HYPERPLASIA WITH LOWER URINARY TRACT SYMPTOMS (LUTS): ICD-10-CM

## 2022-10-26 RX ORDER — TAMSULOSIN HYDROCHLORIDE 0.4 MG/1
CAPSULE ORAL
Qty: 30 CAPSULE | Refills: 11 | Status: SHIPPED | OUTPATIENT
Start: 2022-10-26 | End: 2022-11-01

## 2022-11-01 ENCOUNTER — OFFICE VISIT (OUTPATIENT)
Dept: UROLOGY | Facility: CLINIC | Age: 72
End: 2022-11-01

## 2022-11-01 VITALS
HEART RATE: 80 BPM | BODY MASS INDEX: 27.82 KG/M2 | DIASTOLIC BLOOD PRESSURE: 80 MMHG | SYSTOLIC BLOOD PRESSURE: 130 MMHG | WEIGHT: 167 LBS | HEIGHT: 65 IN

## 2022-11-01 DIAGNOSIS — N40.1 BENIGN LOCALIZED PROSTATIC HYPERPLASIA WITH LOWER URINARY TRACT SYMPTOMS (LUTS): Primary | ICD-10-CM

## 2022-11-01 DIAGNOSIS — N40.1 BENIGN PROSTATIC HYPERPLASIA WITH LOWER URINARY TRACT SYMPTOMS, SYMPTOM DETAILS UNSPECIFIED: ICD-10-CM

## 2022-11-01 DIAGNOSIS — N52.8 OTHER MALE ERECTILE DYSFUNCTION: ICD-10-CM

## 2022-11-01 DIAGNOSIS — R97.20 ELEVATED PSA: ICD-10-CM

## 2022-11-01 LAB
BACTERIA UR QL AUTO: ABNORMAL /HPF
BILIRUB UR QL STRIP: NEGATIVE
CLARITY UR: CLEAR
COLOR UR: ABNORMAL
GLUCOSE UR STRIP-MCNC: ABNORMAL MG/DL
HGB UR QL STRIP.AUTO: NEGATIVE
KETONES UR STRIP-MCNC: NEGATIVE MG/DL
LEUKOCYTE ESTERASE UR QL STRIP: ABNORMAL
NITRITE UR QL STRIP: NEGATIVE
NON-SQ EPI CELLS URNS QL MICRO: ABNORMAL /HPF
PH UR STRIP.AUTO: 7 [PH]
POST-VOID RESIDUAL VOLUME, ML POC: 33 ML
PROT UR STRIP-MCNC: ABNORMAL MG/DL
RBC #/AREA URNS AUTO: ABNORMAL /HPF
SL AMB  POCT GLUCOSE, UA: ABNORMAL
SL AMB LEUKOCYTE ESTERASE,UA: ABNORMAL
SL AMB POCT BILIRUBIN,UA: ABNORMAL
SL AMB POCT BLOOD,UA: ABNORMAL
SL AMB POCT CLARITY,UA: CLEAR
SL AMB POCT COLOR,UA: YELLOW
SL AMB POCT KETONES,UA: ABNORMAL
SL AMB POCT NITRITE,UA: ABNORMAL
SL AMB POCT PH,UA: 7
SL AMB POCT SPECIFIC GRAVITY,UA: 1.01
SL AMB POCT URINE PROTEIN: ABNORMAL
SL AMB POCT UROBILINOGEN: 0.2
SP GR UR STRIP.AUTO: 1.02 (ref 1–1.03)
UROBILINOGEN UR STRIP-ACNC: <2 MG/DL
WBC #/AREA URNS AUTO: ABNORMAL /HPF

## 2022-11-01 RX ORDER — TAMSULOSIN HYDROCHLORIDE 0.4 MG/1
0.8 CAPSULE ORAL
Qty: 60 CAPSULE | Refills: 12 | Status: ON HOLD | OUTPATIENT
Start: 2022-11-01

## 2022-11-01 NOTE — PROGRESS NOTES
Assessment and plan:     Enlarged prostate with lower urinary tract symptoms (LUTS)  · AUA 22  · PVR 33ml  · Send urine micro and culture  · Increase Flomax to 0 8 mg daily  · Schedule ultrasound kidney bladder  · Follow-up 3 months recheck    Elevated PSA  · PSA 3 1  · PSA 1 year    Erectile dysfunction  · Take sildenafil 100 mg p r n  · Recommend not taking this within 4 hours of the tamsulosin  · Take sildenafil on an empty stomach at least 2 hours after meal  · Recommended using [facility] for best pricing of medication      úJnior Long    History of Present Illness     Bipin Gregg is a 81 y o  male patient of Dr Kiana Tang who presents for follow-up visit  Last seen by our practice in May 2020 for BPH and prostate cancer screening  Prior history of elevated PSA as high as 3 5 in 2018  His baseline PSA is in the 2s  Component PSA, Total   Latest Ref Rng & Units 0 0 - 4 0 ng/mL   12/28/2018 3 5   4/25/2019 2 3   6/4/2020 2 1   9/28/2022 3 1     BPH is managed with Flomax daily  He denies any complications  He feels there is room for improvement with his urinary symptoms  Will increase his flomax per his request to see if his symptoms improve  He drinks mostly water, occasional wine  He also reports erectile dysfunction  He was prescribe sildenafil 100 mg by his PCP that he takes intermittently  He feels this is effective        Laboratory     Lab Results   Component Value Date    BUN 23 09/28/2022    CREATININE 1 13 09/28/2022       No components found for: GFR    Lab Results   Component Value Date    CALCIUM 9 7 09/28/2022     05/23/2016    K 4 8 09/28/2022    CO2 29 09/28/2022     09/28/2022       Lab Results   Component Value Date    WBC 6 34 09/28/2022    HGB 14 4 09/28/2022    HCT 44 7 09/28/2022    MCV 88 09/28/2022     09/28/2022       Lab Results   Component Value Date    PSA 3 1 09/28/2022    PSA 2 1 06/04/2020    PSA 2 3 04/25/2019       Recent Results (from the past 1 hour(s))   POCT urine dip    Collection Time: 11/01/22  8:26 AM   Result Value Ref Range    LEUKOCYTE ESTERASE,UA mod     NITRITE,UA neg     SL AMB POCT UROBILINOGEN 0 2     POCT URINE PROTEIN neg      PH,UA 7 0     BLOOD,UA small     SPECIFIC GRAVITY,UA 1 010     KETONES,UA neg     BILIRUBIN,UA neg     GLUCOSE,  mg/dl      COLOR,UA yellow     CLARITY,UA clear    POCT Measure PVR    Collection Time: 11/01/22  8:26 AM   Result Value Ref Range    POST-VOID RESIDUAL VOLUME, ML POC 33 mL       @RESULT(URINEMICROSCOPIC)@    @RESULT(URINECULTURE)@    Radiology       Review of Systems     Review of Systems   Constitutional: Negative for activity change, appetite change, chills, fatigue, fever and unexpected weight change  HENT: Negative for facial swelling  Eyes: Negative for discharge  Respiratory: Negative  Negative for cough and shortness of breath  Cardiovascular: Negative for chest pain and leg swelling  Gastrointestinal: Positive for constipation  Negative for abdominal distention, abdominal pain, diarrhea, nausea and vomiting  Endocrine: Negative  Genitourinary: Positive for frequency and urgency  Negative for decreased urine volume, difficulty urinating, dysuria, enuresis, flank pain, genital sores and hematuria  Feeling of incomplete bladder emptying, intermittent stream   Musculoskeletal: Negative for back pain and myalgias  Skin: Negative for pallor and rash  Allergic/Immunologic: Negative  Negative for immunocompromised state  Neurological: Negative for facial asymmetry and speech difficulty  Psychiatric/Behavioral: Negative for agitation and confusion  AUA SYMPTOM SCORE    Flowsheet Row Most Recent Value   AUA SYMPTOM SCORE    How often have you had a sensation of not emptying your bladder completely after you finished urinating? 4   How often have you had to urinate again less than two hours after you finished urinating?  4   How often have you found you stopped and started again several times when you urinate? 3   How often have you found it difficult to postpone urination? 4   How often have you had a weak urinary stream? 3   How often have you had to push or strain to begin urination? 2   How many times did you most typically get up to urinate from the time you went to bed at night until the time you got up in the morning? 2   Quality of Life: If you were to spend the rest of your life with your urinary condition just the way it is now, how would you feel about that? 2   AUA SYMPTOM SCORE 22                Allergies     Allergies   Allergen Reactions   • Lipitor [Atorvastatin] Other (See Comments) and Myalgia     Other reaction(s): increased pain  arthralgia   • Lyrica [Pregabalin] Other (See Comments)     Pt can't remmember reaction       Physical Exam     Physical Exam  Vitals reviewed  Constitutional:       General: He is not in acute distress  Appearance: Normal appearance  He is normal weight  He is not ill-appearing, toxic-appearing or diaphoretic  HENT:      Head: Normocephalic and atraumatic  Eyes:      General: No scleral icterus  Cardiovascular:      Rate and Rhythm: Normal rate  Pulmonary:      Effort: Pulmonary effort is normal  No respiratory distress  Abdominal:      General: Abdomen is flat  There is no distension  Palpations: Abdomen is soft  Tenderness: There is no abdominal tenderness  There is no right CVA tenderness, left CVA tenderness, guarding or rebound  Genitourinary:     Penis: Uncircumcised  No phimosis, paraphimosis, hypospadias, erythema, tenderness, discharge, swelling or lesions  Testes:         Right: Mass, tenderness or swelling not present  Right testis is descended  Left: Mass, tenderness or swelling not present  Left testis is descended  Comments: Prostate smooth nontender without appreciable nodules or indurations, uniformly firm  Musculoskeletal:         General: No swelling  Cervical back: Normal range of motion  Skin:     General: Skin is warm and dry  Coloration: Skin is not jaundiced or pale  Findings: No rash  Neurological:      General: No focal deficit present  Mental Status: He is alert and oriented to person, place, and time  Gait: Gait normal    Psychiatric:         Mood and Affect: Mood normal          Behavior: Behavior normal          Thought Content:  Thought content normal          Judgment: Judgment normal          Vital Signs     Vitals:    11/01/22 0817   BP: 130/80   Pulse: 80   Weight: 75 8 kg (167 lb)   Height: 5' 4 5" (1 638 m)       Current Medications       Current Outpatient Medications:   •  Blood Glucose Monitoring Suppl (ONE TOUCH ULTRA 2) w/Device KIT, Use daily, Disp: 1 kit, Rfl: 0  •  fenofibrate (TRICOR) 145 mg tablet, Take 1 tablet (145 mg total) by mouth daily, Disp: 30 tablet, Rfl: 5  •  gabapentin (NEURONTIN) 600 MG tablet, Take 1 tablet (600 mg total) by mouth 3 (three) times a day as needed (leg/neck pain), Disp: 270 tablet, Rfl: 1  •  glucose blood (ONE TOUCH ULTRA TEST) test strip, Use 1 each daily, Disp: 100 each, Rfl: 2  •  hydrochlorothiazide (HYDRODIURIL) 25 mg tablet, Take 1 tablet (25 mg total) by mouth daily, Disp: 90 tablet, Rfl: 1  •  losartan (COZAAR) 100 MG tablet, Take 1 tablet (100 mg total) by mouth daily, Disp: 90 tablet, Rfl: 1  •  metFORMIN (GLUCOPHAGE) 500 mg tablet, Take 1 tablet (500 mg total) by mouth 2 (two) times a day with meals, Disp: 180 tablet, Rfl: 1  •  olopatadine (PATANOL) 0 1 % ophthalmic solution, Administer 1 drop to the right eye 2 (two) times a day, Disp: 5 mL, Rfl: 1  •  omeprazole (PriLOSEC) 40 MG capsule, take 1 capsule by mouth EVERY MORNING BEFORE BREAKFAST, Disp: 30 capsule, Rfl: 3  •  OneTouch Delica Lancets 16H MISC, Use 1 Device 2 (two) times a day, Disp: 100 each, Rfl: 2  •  sildenafil (Viagra) 100 mg tablet, Take 1 tablet (100 mg total) by mouth daily as needed for erectile dysfunction, Disp: 10 tablet, Rfl: 5  •  tamsulosin (FLOMAX) 0 4 mg, Take 2 capsules (0 8 mg total) by mouth daily with dinner Please make sure there is a 4 hour difference between the tamsulosin and sildenafil, Disp: 60 capsule, Rfl: 12  •  traMADol (ULTRAM) 50 mg tablet, Take 1 tablet by mouth two to three times a day if needed for moderate pain, Disp: 60 tablet, Rfl: 0  •  naloxone (NARCAN) 4 mg/0 1 mL nasal spray, 0 1 mL (4 mg total) into each nostril once for 1 dose If the desired response is not obtained after 2-3 minutes, administer an additional dose using a new spray, Disp: 1 each, Rfl: 1  •  urea (CARMOL) 30 % cream, Apply topically as needed for dry skin, Disp: 60 g, Rfl: 2    Active Problems     Patient Active Problem List   Diagnosis   • Diabetes mellitus with peripheral artery disease (HCC)   • Chronic pain disorder   • DDD (degenerative disc disease), thoracic   • Enlarged prostate with lower urinary tract symptoms (LUTS)   • Hearing loss, right   • Hyperlipidemia   • Essential hypertension   • Syringomyelia (HCC)   • Pain of right upper extremity   • Erectile dysfunction   • Tear of right rotator cuff   • Elevated PSA   • Gastroesophageal reflux disease with esophagitis   • ARPAN (obstructive sleep apnea)   • Continuous opioid dependence ([facility] [address] )   • Chest pain       Past Medical History     Past Medical History:   Diagnosis Date   • Anxiety    • Chronic pain disorder     right shoulder   • Diabetes mellitus ([address] )     type 2, blood sugar 109 at 0500   • Diverticulosis    • Enlarged prostate    • Extremity pain    • GERD (gastroesophageal reflux disease)    • Hearing loss, right    • Hemorrhoids    • Hypertension    • Neck pain    • Polyp, sigmoid colon        Surgical History     Past Surgical History:   Procedure Laterality Date   • COLONOSCOPY     • EAR SURGERY     • INNER EAR SURGERY     • NASAL SINUS SURGERY     • [place] COLONOSCOPY FLX DX W/COLLJ SPEC WHEN PFRMD N/A 8/30/2016    Procedure: COLONOSCOPY; Surgeon: Princess Shanell MD;  Location: [facility]; Service: General   • [place] SHLDR ARTHROSCOP,SURG,W/ROTAT CUFF REPR Right 2/5/2019    Procedure: ARTHROSCOPY SHOULDER;  Surgeon: Maryam Sharma DO;  Location: [address] [place] [address] OR;  Service: Orthopedics       Family History     Family History   Problem Relation Age of Onset   • Hypertension Brother    • Hepatitis Mother    • Heart disease Father         CARDIAC DISORDER        Social History     Social History     Social History     Tobacco Use   Smoking Status Never Smoker   Smokeless Tobacco Never Used       Past Surgical History:   Procedure Laterality Date   • COLONOSCOPY     • EAR SURGERY     • INNER EAR SURGERY     • NASAL SINUS SURGERY     • [place] COLONOSCOPY FLX DX W/COLLJ SPEC WHEN PFRMD N/A 8/30/2016    Procedure: COLONOSCOPY;  Surgeon: Princess Shanell MD;  Location: [facility]; Service: General   • [place] SHLDR ARTHROSCOP,SURG,W/ROTAT CUFF REPR Right 2/5/2019    Procedure: ARTHROSCOPY SHOULDER;  Surgeon: Maryam Sharma DO;  Location: [address] [place] [address];  Service: Orthopedics         The following portions of the patient's history were reviewed and updated as appropriate: allergies, current medications, past family history, past medical history, past social history, past surgical history and problem list    Please note :  Voice dictation software has been used to create this document  There may be inadvertent transcription errors      [id number] [address]

## 2022-11-01 NOTE — ASSESSMENT & PLAN NOTE
· Take sildenafil 100 mg p r n    · Recommend not taking this within 4 hours of the tamsulosin  · Take sildenafil on an empty stomach at least 2 hours after meal  · Recommended using [facility] for best pricing of medication

## 2022-11-01 NOTE — PATIENT INSTRUCTIONS
BLADDER HEALTH    WHAT IS CONSIDERED NORMAL? The average bladder can hold about 2 cups of urine before it needs to be emptied  The normal range of voiding urine is 6 to 8 times during a 24 hour period  As we get older, our bladder capacity can get smaller and we may need to pass urine more frequently but usually not more than every 2 hours  Urine should flow easily without discomfort in a good, steady stream until the bladder is empty  No pushing or straining is necessary to empty the bladder  An urge is a signal that you feel as the bladder stretches to fill with urine  Urges can be felt even if the bladder is not full  Urges are not commands to go to the toilet, merely a signal and can be controlled  WHAT ARE GOOD BLADDER HABITS? Take your time when emptying your bladder  Don’t strain or push to empty your bladder  Make sure you empty your bladder completely each time you pass urine  Do not rush the process  Consistently ignoring the urge to go (waiting more than 4 hours between toileting) or urinating too infrequently may be convenient but not healthy for your bladder  Avoid going to the toilet “just in case” or more often than every 2 hours  It is usually not necessary to go when you feel the first urge  Try to go only when your bladder is full  Urgency and frequency of urination can be improved by retraining the bladder and spacing your fluid intake throughout the day  Practice good toilet habits  Don’t let your bladder control your life  TIPS TO MAINTAIN GOOD BLADDER HABITS  Maintain a good fluid intake  Depending on your body size and environment, drink 6 -8 cups (8 oz each) of fluid per day unless otherwise advised by your doctor  Not enough fluid creates a foul odor and dark color of the urine  Limit the amount of caffeine (coffee, cola, chocolate or tea) and citrus foods that you consume as these foods can be associated with increased sensation of urinary urgency and frequency  Limit the amount of alcohol you drink  Alcohol increases urine production and makes it difficult for the brain to coordinate bladder control  Avoid constipation by maintaining a balanced diet of dietary fiber  Cigarette smoking is also irritating to the bladder surface and is associated with bladder cancer  In addition, the coughing associated with smoking may lead to increased incontinent episodes because of the increased pressure  HOW DIET CAN AFFECT YOUR BLADDER  Although there is no particular "diet" that can cure bladder control, there are certain dietary suggestions you can use to help control the problem  There are 2 points to consider when evaluating how your habits and diet may affect your bladder:    Foods and fluids can irritate the bladder  Some foods and beverages are thought to contribute to bladder leakage and irritability  However their effect on the bladder is not completely understood and you may want to see if eliminating one or all of these items improves your bladder control  If you are unable to give them up completely, it is recommended that you use the following items in moderation:  Acidic beverages and foods (orange juice, grapefruit juice, lemonade etc)  Alcoholic beverages  Vinegar  Coffee (regular and decaf)  Tea (regular and decaf)  Caffeinated beverages  Carbonated beverages          Drinking enough and the right kinds of fluids  Many people with bladder control issues decrease their intake of liquids in hope that they will need to urinate less frequently or have less urinary leakage  You should not restrict fluids to control your bladder  While a decrease in liquid intake does result in a decrease in the volume of urine, the smaller amount of urine may be more highly concentrated  Highly concentrated, dark yellow urine is irritating to the bladder surface and may actually cause you to go to the bathroom more frequently        It also encourages the growth of bacteria, which may lead to infections resulting in incontinence  Substitutions for Bladder Irritants: water is always the best beverage choice  Grape and apple juice are thirst quenchers are good selections and are not as irritating to the bladder  Low acid fruits:  Pears, apricots, papaya, watermelon  For coffee drinkers: KAVA®, Postum®, Tommy®, Kaffree Nubia®  For tea drinkers:  non-citrus or herbal and sun brewed tea  Erectile Dysfunction   WHAT YOU NEED TO KNOW:   What is erectile dysfunction (ED)?  ED, or impotence, is when you cannot get or keep an erection for sexual activity  What causes ED? Conditions that lead to nerve problems, such as a spinal cord injury, diabetes, or stroke    Hormone imbalances, such as low testosterone, high prolactin, or a thyroid disorder    Medical conditions that decrease blood flow, such as high blood pressure and arteriosclerosis    Multiple sclerosis or Parkinson disease    Medicines, such as those used to treat depression and high blood pressure    Injury to the testicles from radiation therapy to the testicles    What increases my risk for ED? Obesity    Diabetes that is not controlled or heart disease    Smoking, or drug or alcohol abuse    An enlarged prostate    Increasing age    Spine or groin surgeries    Stress, depression, relationship problems, and anxiety    How is ED diagnosed? Your healthcare provider will ask about your symptoms, medical history, and medicines  He or she will examine your abdomen, penis, and testicles  A rectal exam may also be done to check for an enlarged prostate  Blood and urine tests are done to check for medical conditions that may have caused your ED  You may also need tests to check your blood flow and nerve function  How is ED treated? Treatment depends on the cause of your ED  You may need any of the following:  ED medicines  help you have an erection  These medicines are taken before you have sex   Follow instructions on how to use these medicines  You may have a life-threatening reaction if you mix ED medicines with medicines that contain nitrates  Medicines with nitrates include nitroglycerin and other heart medicines  Testosterone  may be given to increase the levels in your blood and improve your ED  You may need to use a skin cream or wear a patch  Testosterone is also given as an injection  Penis injections  may be done to help improve your blood flow  A vacuum device  is a tube that is placed over the penis  A hand pump is connected to the tube and acts as a vacuum  This may help increase blood flow to the penis  Therapy  may be needed to treat emotional or relationship problems that may be causing your ED  Surgery  may be recommended if other treatments do not work  Surgery includes a penile implant or prosthesis  Surgery may also be done to improve blood flow  Ask for more information about surgeries for ED  How can I decrease my risk for ED? Do not smoke  Smoking can increase your risk for ED  Nicotine and other chemicals in cigarettes and cigars can also cause lung damage  Ask your healthcare provider for information if you currently smoke and need help to quit  E-cigarettes or smokeless tobacco still contain nicotine  Control your blood sugar levels if you have diabetes  Over time, high blood sugar levels can increase your risk for ED  Limit alcohol  Men should limit alcohol to 2 drinks a day  A drink of alcohol is 12 ounces of beer, 5 ounces of wine, or 1½ ounces of liquor  Manage your medical conditions  Eat a variety of healthy foods and stay physically active  Take your medicines as directed  They can help control conditions that may cause ED  Manage stress  Learn ways to relax, such as deep breathing, meditation, and listening to music  Do not use illegal drugs  They increase your risk for ED  When should I call my doctor?    You have chest pain, dizziness, or nausea after you take ED medicines or during or after sex  You have an erection for more than 4 hours after you take your ED medicine  You see blood in your urine  You have changes in your vision, headaches, or back pain after you take ED medicine  You have a painful erection  You have questions or concerns about your condition or care  CARE AGREEMENT:   You have the right to help plan your care  Learn about your health condition and how it may be treated  Discuss treatment options with your healthcare providers to decide what care you want to receive  You always have the right to refuse treatment  The above information is an  only  It is not intended as medical advice for individual conditions or treatments  Talk to your doctor, nurse or pharmacist before following any medical regimen to see if it is safe and effective for you  © Copyright [employer] 2022 Information is for End User's use only and may not be sold, redistributed or otherwise used for commercial purposes   All illustrations and images included in CareNotes® are the copyrighted property of [employer]  or [address] [place] [address]

## 2022-11-03 ENCOUNTER — TELEPHONE (OUTPATIENT)
Dept: UROLOGY | Facility: CLINIC | Age: 72
End: 2022-11-03

## 2022-11-03 LAB — BACTERIA UR CULT: NORMAL

## 2022-11-03 NOTE — TELEPHONE ENCOUNTER
----- Message from [address] [place] [address] sent at 11/3/2022 12:07 PM EDT -----  Please let patient know his urine testing did not show any signs of blood or infection

## 2022-11-04 DIAGNOSIS — G95.0 SYRINGOMYELIA (HCC): ICD-10-CM

## 2022-11-04 RX ORDER — TRAMADOL HYDROCHLORIDE 50 MG/1
TABLET ORAL
Qty: 60 TABLET | Refills: 0 | Status: ON HOLD | OUTPATIENT
Start: 2022-11-04

## 2022-11-13 ENCOUNTER — HOSPITAL ENCOUNTER (OUTPATIENT)
Facility: HOSPITAL | Age: 72
Setting detail: OBSERVATION
Discharge: HOME | End: 2022-11-14
Attending: STUDENT IN AN ORGANIZED HEALTH CARE EDUCATION/TRAINING PROGRAM | Admitting: INTERNAL MEDICINE

## 2022-11-13 ENCOUNTER — APPOINTMENT (EMERGENCY)
Dept: RADIOLOGY | Facility: HOSPITAL | Age: 72
End: 2022-11-13

## 2022-11-13 DIAGNOSIS — R07.9 CHEST PAIN, UNSPECIFIED TYPE: Primary | ICD-10-CM

## 2022-11-13 DIAGNOSIS — I10 ESSENTIAL HYPERTENSION: ICD-10-CM

## 2022-11-13 LAB
2HR DELTA HS TROPONIN: 2 NG/L
ANION GAP SERPL CALCULATED.3IONS-SCNC: 6 MMOL/L (ref 4–13)
ATRIAL RATE: 53 BPM
ATRIAL RATE: 60 BPM
BASOPHILS # BLD AUTO: 0.03 THOUSANDS/ÂΜL (ref 0–0.1)
BASOPHILS NFR BLD AUTO: 1 % (ref 0–1)
BUN SERPL-MCNC: 22 MG/DL (ref 5–25)
CALCIUM SERPL-MCNC: 9.3 MG/DL (ref 8.3–10.1)
CARDIAC TROPONIN I PNL SERPL HS: 3 NG/L
CARDIAC TROPONIN I PNL SERPL HS: 5 NG/L
CHLORIDE SERPL-SCNC: 106 MMOL/L (ref 96–108)
CO2 SERPL-SCNC: 28 MMOL/L (ref 21–32)
CREAT SERPL-MCNC: 0.94 MG/DL (ref 0.6–1.3)
EOSINOPHIL # BLD AUTO: 0.09 THOUSAND/ÂΜL (ref 0–0.61)
EOSINOPHIL NFR BLD AUTO: 2 % (ref 0–6)
ERYTHROCYTE [DISTWIDTH] IN BLOOD BY AUTOMATED COUNT: 12.8 % (ref 11.6–15.1)
GFR SERPL CREATININE-BSD FRML MDRD: 81 ML/MIN/1.73SQ M
GLUCOSE SERPL-MCNC: 138 MG/DL (ref 65–140)
HCT VFR BLD AUTO: 40.7 % (ref 36.5–49.3)
HGB BLD-MCNC: 13.6 G/DL (ref 12–17)
IMM GRANULOCYTES # BLD AUTO: 0.01 THOUSAND/UL (ref 0–0.2)
IMM GRANULOCYTES NFR BLD AUTO: 0 % (ref 0–2)
LYMPHOCYTES # BLD AUTO: 2.21 THOUSANDS/ÂΜL (ref 0.6–4.47)
LYMPHOCYTES NFR BLD AUTO: 37 % (ref 14–44)
MCH RBC QN AUTO: 28.4 PG (ref 26.8–34.3)
MCHC RBC AUTO-ENTMCNC: 33.4 G/DL (ref 31.4–37.4)
MCV RBC AUTO: 85 FL (ref 82–98)
MONOCYTES # BLD AUTO: 0.43 THOUSAND/ÂΜL (ref 0.17–1.22)
MONOCYTES NFR BLD AUTO: 7 % (ref 4–12)
NEUTROPHILS # BLD AUTO: 3.29 THOUSANDS/ÂΜL (ref 1.85–7.62)
NEUTS SEG NFR BLD AUTO: 53 % (ref 43–75)
NRBC BLD AUTO-RTO: 0 /100 WBCS
P AXIS: 54 DEGREES
P AXIS: 65 DEGREES
PLATELET # BLD AUTO: 248 THOUSANDS/UL (ref 149–390)
PMV BLD AUTO: 10.4 FL (ref 8.9–12.7)
POTASSIUM SERPL-SCNC: 4.2 MMOL/L (ref 3.5–5.3)
PR INTERVAL: 210 MS
PR INTERVAL: 216 MS
QRS AXIS: -17 DEGREES
QRS AXIS: 3 DEGREES
QRSD INTERVAL: 72 MS
QRSD INTERVAL: 84 MS
QT INTERVAL: 396 MS
QT INTERVAL: 444 MS
QTC INTERVAL: 396 MS
QTC INTERVAL: 416 MS
RBC # BLD AUTO: 4.79 MILLION/UL (ref 3.88–5.62)
SODIUM SERPL-SCNC: 140 MMOL/L (ref 135–147)
T WAVE AXIS: 16 DEGREES
T WAVE AXIS: 3 DEGREES
VENTRICULAR RATE: 53 BPM
VENTRICULAR RATE: 60 BPM
WBC # BLD AUTO: 6.06 THOUSAND/UL (ref 4.31–10.16)

## 2022-11-13 RX ORDER — ASPIRIN 81 MG/1
324 TABLET, CHEWABLE ORAL ONCE
Status: COMPLETED | OUTPATIENT
Start: 2022-11-13 | End: 2022-11-13

## 2022-11-13 RX ORDER — NITROGLYCERIN 0.4 MG/1
0.4 TABLET SUBLINGUAL
Status: DISCONTINUED | OUTPATIENT
Start: 2022-11-13 | End: 2022-11-14 | Stop reason: HOSPADM

## 2022-11-13 RX ORDER — FENOFIBRATE 145 MG/1
145 TABLET, COATED ORAL DAILY
Status: DISCONTINUED | OUTPATIENT
Start: 2022-11-14 | End: 2022-11-14 | Stop reason: HOSPADM

## 2022-11-13 RX ORDER — TRAMADOL HYDROCHLORIDE 50 MG/1
50 TABLET ORAL EVERY 6 HOURS PRN
Status: DISCONTINUED | OUTPATIENT
Start: 2022-11-13 | End: 2022-11-14 | Stop reason: HOSPADM

## 2022-11-13 RX ORDER — LOSARTAN POTASSIUM 50 MG/1
100 TABLET ORAL DAILY
Status: DISCONTINUED | OUTPATIENT
Start: 2022-11-14 | End: 2022-11-14 | Stop reason: HOSPADM

## 2022-11-13 RX ORDER — HYDROCHLOROTHIAZIDE 25 MG/1
25 TABLET ORAL ONCE
Status: COMPLETED | OUTPATIENT
Start: 2022-11-13 | End: 2022-11-13

## 2022-11-13 RX ORDER — ASPIRIN 81 MG/1
81 TABLET, CHEWABLE ORAL DAILY
Status: DISCONTINUED | OUTPATIENT
Start: 2022-11-14 | End: 2022-11-14 | Stop reason: HOSPADM

## 2022-11-13 RX ORDER — PANTOPRAZOLE SODIUM 40 MG/1
40 TABLET, DELAYED RELEASE ORAL
Refills: 3 | Status: DISCONTINUED | OUTPATIENT
Start: 2022-11-14 | End: 2022-11-14 | Stop reason: HOSPADM

## 2022-11-13 RX ORDER — LOSARTAN POTASSIUM 50 MG/1
100 TABLET ORAL ONCE
Status: COMPLETED | OUTPATIENT
Start: 2022-11-13 | End: 2022-11-13

## 2022-11-13 RX ORDER — ACETAMINOPHEN 325 MG/1
650 TABLET ORAL EVERY 6 HOURS PRN
Status: DISCONTINUED | OUTPATIENT
Start: 2022-11-13 | End: 2022-11-14 | Stop reason: HOSPADM

## 2022-11-13 RX ORDER — TAMSULOSIN HYDROCHLORIDE 0.4 MG/1
0.8 CAPSULE ORAL
Status: DISCONTINUED | OUTPATIENT
Start: 2022-11-13 | End: 2022-11-14 | Stop reason: HOSPADM

## 2022-11-13 RX ORDER — GABAPENTIN 300 MG/1
600 CAPSULE ORAL 3 TIMES DAILY PRN
Status: DISCONTINUED | OUTPATIENT
Start: 2022-11-13 | End: 2022-11-14 | Stop reason: HOSPADM

## 2022-11-13 RX ORDER — MAGNESIUM HYDROXIDE/ALUMINUM HYDROXICE/SIMETHICONE 120; 1200; 1200 MG/30ML; MG/30ML; MG/30ML
30 SUSPENSION ORAL EVERY 6 HOURS PRN
Status: DISCONTINUED | OUTPATIENT
Start: 2022-11-13 | End: 2022-11-14 | Stop reason: HOSPADM

## 2022-11-13 RX ORDER — ENOXAPARIN SODIUM 100 MG/ML
40 INJECTION SUBCUTANEOUS DAILY
Status: DISCONTINUED | OUTPATIENT
Start: 2022-11-14 | End: 2022-11-14 | Stop reason: HOSPADM

## 2022-11-13 RX ADMIN — LOSARTAN POTASSIUM 100 MG: 50 TABLET, FILM COATED ORAL at 13:29

## 2022-11-13 RX ADMIN — ASPIRIN 81 MG CHEWABLE TABLET 324 MG: 81 TABLET CHEWABLE at 12:37

## 2022-11-13 RX ADMIN — HYDROCHLOROTHIAZIDE 25 MG: 25 TABLET ORAL at 13:28

## 2022-11-13 NOTE — ED PROVIDER NOTES
History  Chief Complaint   Patient presents with   • Chest Pain     L sided chest pain and L arm numbness/tingling  Pain is also in patients neck  Started about an hour ago  Denies SOB  79-year-old male with PMH significant for hypertension, hypercholesterolemia, and diabetes here for evaluation of left-sided chest pain and left arm tingling  Patient reports that the pain began about 2 hours ago  The pain resolved on its own, but the tingling persists and radiates into the neck  He describes the pain as in the left lateral chest   He states he continues to have a heaviness in the chest that he feels makes it slightly hard for him to breathe  He was driving when the symptoms began  Has had intermittent episodes of similar symptoms for the last 5-6 days, but states this 1 lasted longer so he came in for evaluation  Denies any prior cardiac history, has not had any previous cardiac testing including no stress test that he is aware of  He denies any fevers, nausea/vomiting, diaphoresis, or other complaints  Prior to Admission Medications   Prescriptions Last Dose Informant Patient Reported? Taking?    Blood Glucose Monitoring Suppl (ONE TOUCH ULTRA 2) w/Device KIT  Self No No   Sig: Use daily   OneTouch Delica Lancets 67X MISC  Self No No   Sig: Use 1 Device 2 (two) times a day   fenofibrate (TRICOR) 145 mg tablet  Self No Yes   Sig: Take 1 tablet (145 mg total) by mouth daily   gabapentin (NEURONTIN) 600 MG tablet   No Yes   Sig: Take 1 tablet (600 mg total) by mouth 3 (three) times a day as needed (leg/neck pain)   glucose blood (ONE TOUCH ULTRA TEST) test strip  Self No No   Sig: Use 1 each daily   hydrochlorothiazide (HYDRODIURIL) 25 mg tablet   No Yes   Sig: Take 1 tablet (25 mg total) by mouth daily   losartan (COZAAR) 100 MG tablet   No Yes   Sig: Take 1 tablet (100 mg total) by mouth daily   metFORMIN (GLUCOPHAGE) 500 mg tablet   No Yes   Sig: Take 1 tablet (500 mg total) by mouth 2 (two) times a day with meals   naloxone (NARCAN) 4 mg/0 1 mL nasal spray   No No   Si 1 mL (4 mg total) into each nostril once for 1 dose If the desired response is not obtained after 2-3 minutes, administer an additional dose using a new spray   olopatadine (PATANOL) 0 1 % ophthalmic solution  Self No Yes   Sig: Administer 1 drop to the right eye 2 (two) times a day   omeprazole (PriLOSEC) 40 MG capsule   No Yes   Sig: take 1 capsule by mouth EVERY MORNING BEFORE BREAKFAST   sildenafil (Viagra) 100 mg tablet Past Week at Unknown time  No Yes   Sig: Take 1 tablet (100 mg total) by mouth daily as needed for erectile dysfunction   tamsulosin (FLOMAX) 0 4 mg   No Yes   Sig: Take 2 capsules (0 8 mg total) by mouth daily with dinner Please make sure there is a 4 hour difference between the tamsulosin and sildenafil   traMADol (ULTRAM) 50 mg tablet   No Yes   Sig: Take 1 tablet by mouth two to three times a day if needed for moderate pain   urea (CARMOL) 30 % cream Not Taking at Unknown time Self No No   Sig: Apply topically as needed for dry skin   Patient not taking: Reported on 2022      Facility-Administered Medications: None       Past Medical History:   Diagnosis Date   • Anxiety    • Chronic pain disorder     right shoulder   • Diabetes mellitus (HCC)     type 2, blood sugar 109 at 0500   • Diverticulosis    • Enlarged prostate    • Extremity pain    • GERD (gastroesophageal reflux disease)    • Hearing loss, right    • Hemorrhoids    • Hypertension    • Neck pain    • Polyp, sigmoid colon        Past Surgical History:   Procedure Laterality Date   • COLONOSCOPY     • EAR SURGERY     • INNER EAR SURGERY     • NASAL SINUS SURGERY     • RI COLONOSCOPY FLX DX W/COLLJ SPEC WHEN PFRMD N/A 2016    Procedure: COLONOSCOPY;  Surgeon: Matt Bertrand MD;  Location: [facility];   Service: General   • RI SHLDR ARTHROSCOP,SURG,W/ROTAT CUFF REPR Right 2019    Procedure: ARTHROSCOPY SHOULDER;  Surgeon: Danika Ellis DO;  Location: [facility] OR;  Service: Orthopedics       Family History   Problem Relation Age of Onset   • Hypertension Brother    • Hepatitis Mother    • Heart disease Father         CARDIAC DISORDER      I have reviewed and agree with the history as documented  E-Cigarette/Vaping   • E-Cigarette Use Never User      E-Cigarette/Vaping Substances   • Nicotine No    • THC No    • CBD No    • Flavoring No    • Other No    • Unknown No      Social History     Tobacco Use   • Smoking status: Never Smoker   • Smokeless tobacco: Never Used   Vaping Use   • Vaping Use: Never used   Substance Use Topics   • Alcohol use: Yes     Comment: occassional   • Drug use: No       Review of Systems   Constitutional: Negative for chills and fever  HENT: Negative for ear pain and sore throat  Eyes: Negative for pain and visual disturbance  Respiratory: Negative for cough and shortness of breath  Cardiovascular: Positive for chest pain  Negative for palpitations and leg swelling  Gastrointestinal: Negative for abdominal pain, nausea and vomiting  Genitourinary: Negative for dysuria  Musculoskeletal: Positive for neck pain  Negative for arthralgias and back pain  Skin: Negative for color change and rash  Neurological: Positive for numbness  Negative for seizures and syncope  Physical Exam  Physical Exam  Vitals and nursing note reviewed  Constitutional:       General: He is not in acute distress  Appearance: He is not ill-appearing or diaphoretic  HENT:      Head: Normocephalic and atraumatic  Eyes:      Conjunctiva/sclera: Conjunctivae normal    Cardiovascular:      Rate and Rhythm: Normal rate and regular rhythm  Pulmonary:      Effort: Pulmonary effort is normal  No respiratory distress  Breath sounds: Normal breath sounds  Abdominal:      General: There is no distension  Palpations: Abdomen is soft  Tenderness: There is no abdominal tenderness     Musculoskeletal: General: No deformity or signs of injury  Right lower leg: No edema  Left lower leg: No edema  Skin:     General: Skin is warm and dry  Neurological:      Mental Status: He is alert and oriented to person, place, and time  Sensory: No sensory deficit  Motor: No weakness  Vital Signs  ED Triage Vitals [11/13/22 1043]   Temperature Pulse Respirations Blood Pressure SpO2   98 2 °F (36 8 °C) 59 16 (!) 184/81 97 %      Temp Source Heart Rate Source Patient Position - Orthostatic VS BP Location FiO2 (%)   Oral Monitor Sitting Right arm --      Pain Score       8           Vitals:    11/13/22 1043   BP: (!) 184/81   Pulse: 59   Patient Position - Orthostatic VS: Sitting         Visual Acuity      ED Medications  Medications - No data to display    Diagnostic Studies  Results Reviewed     Procedure Component Value Units Date/Time    CBC and differential [[id number]]     Lab Status: No result Specimen: Blood     Basic metabolic panel [[id number]]     Lab Status: No result Specimen: Blood     HS Troponin 0hr (reflex protocol) [[id number]]     Lab Status: No result Specimen: Blood                  XR chest 2 views    (Results Pending)              Procedures  Procedures         ED Course  ED Course as of 11/13/22 1614   Sun Nov 13, 2022   1252 BP persistently elevated  Patient reports that he did not take his home blood pressure medications, losartan & hydrochlorothiazide, today  Will give dose of these and re-evaluate  His symptoms are overall improved, so low suspicion this is related to his current presentation  1303 hs TnI 0hr: 3   1449 2hr EKG unremarkable, unchanged from presentation  MDM  Number of Diagnoses or Management Options  Diagnosis management comments: Well-appearing male with now resolved chest pain  Given age and multiple risk factors, somewhat higher risk, HEART score 5 without troponins    Will obtain labs and chest x-ray for evaluation  Will give aspirin, hold on nitro pending any additional chest pain  Given heart score, anticipate may warrant admission, will revisit with patient once labs result  Disposition  Final diagnoses:   None     ED Disposition     None      Follow-up Information    None         Patient's Medications   Discharge Prescriptions    No medications on file       No discharge procedures on file      PDMP Review       Value Time User    PDMP Reviewed  Yes 11/4/2022 10:43 AM Erasto Hdez MD          ED Provider  Electronically Signed by           Corie Galeazzi, MD  11/13/22 5674

## 2022-11-13 NOTE — H&P
Unit/Bed#: ED 18 Encounter: [id number]    Chief complaint:  Chest pain    History of Present Illness     HPI:  Daniel Wolf is a 81 y o  male who came to the emergency room for evaluation of chest pain  He still says that he was in his usual state of health till about 1 month ago  Since that time he was had intermittent chest pain  This is in the left precordium  It is associated with a numb feeling in the left arm  The patient described alternately as sharp feeling or as a squeezing  Was not associated with shortness of breath or diaphoresis  It is not clearly exercise related  It does not radiate to the neck or back  The patient does have multiple risk factors for coronary artery disease including age, gender, hypertension, diabetes, and hypercholesterolemia  He is therefore placed in observation for further evaluation  Initial EKG and troponin are normal     The patient's past medical history is positive for hypertension, type 2 diabetes, hypercholesterolemia  He has BPH  He has mention of syringomyelia on his chart but this was not confirmed by his most recent available MRI of the cervical spine  He does have foraminal stenosis at C5-C6 on the right  He has a history of chronic back pain  He has a history of colon polyps  The patient denies previous heart problems  He has no history of stroke, COPD, peptic ulcer disease, or kidney disease      The patient's medications at the time of admission include:  Fenofibrate 145 mg daily  Gabapentin 600 mg 3 times daily   Hydrochlorothiazide 25 mg daily  Losartan 100 mg daily  Metformin 500 mg twice daily  Patanol 0 1% 1 drop right eye twice daily  Omeprazole 40 mg daily  Sildenafil 100 mg as needed  Tamsulosin 0 4 mg 2 tablets at bedtime  Tramadol 50 mg 3 times daily as needed    The patient is allergic to Lipitor and Lyrica    Family History:   Family History   Problem Relation Age of Onset   • Hypertension Brother    • Hepatitis Mother    • Heart disease Father         CARDIAC DISORDER      Social history reveals the patient is  and lives with his wife  He does not smoke, drink, or use illicit drugs  Review of systems is taken in detail including 12 systems  In addition to those issues discussed above, the patient does have chronic back pain for which she is taking tramadol  Otherwise, systemic review was negative  The patient has had his influenza vaccine this year  Objective   Vitals: Blood pressure (!) 189/85, pulse 61, temperature 98 2 °F (36 8 °C), temperature source Oral, resp  rate 18, weight 76 kg (167 lb 8 8 oz), SpO2 98 %  Physical Exam   The patient is a well-developed, well-nourished man who appears in no distress  Head is atraumatic and normocephalic  ENT examination is within normal limits  Eyes show the pupils to be equal, round, and reactive to light  Extraocular movements are intact  Neck is supple  Carotids are full without bruits  There is no lymphadenopathy or goiter  Lungs are clear to auscultation and percussion  There is no wheezing, rales, or rhonchi  Cardiac exam reveals a regular rhythm  I heard no murmur, gallop, or rub  The abdomen is soft with active bowel sounds  There is no mass, tenderness, organomegaly  Extremities show no clubbing, cyanosis, or edema  There is no calf tenderness  Neurologic examination reveals the patient to be alert and oriented  No focal sign is present      Lab Results:   Results for orders placed or performed during the hospital encounter of 11/13/22   CBC and differential   Result Value Ref Range    WBC 6 06 4 31 - 10 16 Thousand/uL    RBC 4 79 3 88 - 5 62 Million/uL    Hemoglobin 13 6 12 0 - 17 0 g/dL    Hematocrit 40 7 36 5 - 49 3 %    MCV 85 82 - 98 fL    MCH 28 4 26 8 - 34 3 pg    MCHC 33 4 31 4 - 37 4 g/dL    RDW 12 8 11 6 - 15 1 %    MPV 10 4 8 9 - 12 7 fL    Platelets 343 873 - 877 Thousands/uL    nRBC 0 /100 WBCs    Neutrophils Relative 53 43 - 75 %    Immat GRANS % 0 0 - 2 %    Lymphocytes Relative 37 14 - 44 %    Monocytes Relative 7 4 - 12 %    Eosinophils Relative 2 0 - 6 %    Basophils Relative 1 0 - 1 %    Neutrophils Absolute 3 29 1 85 - 7 62 Thousands/µL    Immature Grans Absolute 0 01 0 00 - 0 20 Thousand/uL    Lymphocytes Absolute 2 21 0 60 - 4 47 Thousands/µL    Monocytes Absolute 0 43 0 17 - 1 22 Thousand/µL    Eosinophils Absolute 0 09 0 00 - 0 61 Thousand/µL    Basophils Absolute 0 03 0 00 - 0 10 Thousands/µL   Basic metabolic panel   Result Value Ref Range    Sodium 140 135 - 147 mmol/L    Potassium 4 2 3 5 - 5 3 mmol/L    Chloride 106 96 - 108 mmol/L    CO2 28 21 - 32 mmol/L    ANION GAP 6 4 - 13 mmol/L    BUN 22 5 - 25 mg/dL    Creatinine 0 94 0 60 - 1 30 mg/dL    Glucose 138 65 - 140 mg/dL    Calcium 9 3 8 3 - 10 1 mg/dL    eGFR 81 ml/min/1 73sq m   HS Troponin 0hr (reflex protocol)   Result Value Ref Range    hs TnI 0hr 3 "Refer to ACS Flowchart"- see link ng/L   HS Troponin I 2hr   Result Value Ref Range    hs TnI 2hr 5 "Refer to ACS Flowchart"- see link ng/L    Delta 2hr hsTnI 2 <20 ng/L   ECG 12 lead   Result Value Ref Range    Ventricular Rate 60 BPM    Atrial Rate 60 BPM    AR Interval 210 ms    QRSD Interval 84 ms    QT Interval 396 ms    QTC Interval 396 ms    P [place] 54 degrees    QRS Axis -17 degrees    T Wave Axis 3 degrees   ECG 12 lead   Result Value Ref Range    Ventricular Rate 53 BPM    Atrial Rate 53 BPM    AR Interval 216 ms    QRSD Interval 72 ms    QT Interval 444 ms    QTC Interval 416 ms    P Axis 65 degrees    QRS Axis 3 degrees    T Wave Axis 16 degrees       Assessment/Plan     Assessment:  1  Intermittent chest pain, rule out myocardial ischemia  2  Hypertension  3  Type 2 diabetes  4  Hypercholesterolemia  5  Chronic back pain  6  Foraminal stenosis, right C5-C6     Plan:  The patient will be admitted to the hospital and placed on observation  Serial troponins and EKGs will be obtained  He will be started on aspirin  Cardiology evaluation and stress testing is planned  The patient's chronic medical issues have been well controlled in his usual therapies will be continued  Metformin and hydrochlorothiazide will be held until tomorrow  I discussed resuscitative measures with the patient  He would like all available modalities employed in his behalf in the event of a cardiac arrest   He is assigned to code blue level 1           Code Status: Level 1 - Full Code

## 2022-11-14 ENCOUNTER — APPOINTMENT (OUTPATIENT)
Dept: NON INVASIVE DIAGNOSTICS | Facility: HOSPITAL | Age: 72
End: 2022-11-14
Attending: INTERNAL MEDICINE

## 2022-11-14 ENCOUNTER — APPOINTMENT (OUTPATIENT)
Dept: ULTRASOUND IMAGING | Facility: HOSPITAL | Age: 72
End: 2022-11-14

## 2022-11-14 VITALS
SYSTOLIC BLOOD PRESSURE: 142 MMHG | OXYGEN SATURATION: 96 % | DIASTOLIC BLOOD PRESSURE: 88 MMHG | TEMPERATURE: 98.2 F | HEART RATE: 66 BPM | WEIGHT: 167.55 LBS | BODY MASS INDEX: 28.32 KG/M2 | RESPIRATION RATE: 19 BRPM

## 2022-11-14 PROBLEM — R07.9 CHEST PAIN: Status: RESOLVED | Noted: 2022-06-02 | Resolved: 2022-11-14

## 2022-11-14 LAB
ATRIAL RATE: 62 BPM
AV REGURGITATION PRESSURE HALF TIME: 888 MS
BASELINE ST DEPRESSION: 0 MM
D DIMER PPP FEU-MCNC: 0.31 UG/ML FEU
E WAVE DECELERATION TIME: 229 MS
GLUCOSE SERPL-MCNC: 179 MG/DL (ref 65–140)
MAX HR PERCENT: 89 %
MAX HR: 134 BPM
MV E'TISSUE VEL-SEP: 8 CM/S
MV PEAK A VEL: 1.05 M/S
MV PEAK E VEL: 79 CM/S
MV STENOSIS PRESSURE HALF TIME: 66 MS
MV VALVE AREA P 1/2 METHOD: 3.33 CM2
P AXIS: 45 DEGREES
PR INTERVAL: 200 MS
QRS AXIS: -10 DEGREES
QRSD INTERVAL: 72 MS
QT INTERVAL: 408 MS
QTC INTERVAL: 414 MS
RATE PRESSURE PRODUCT: NORMAL
SL CV AV DECELERATION TIME RETROGRADE: 3061 MS
SL CV AV PEAK GRADIENT RETROGRADE: 23 MMHG
SL CV STRESS RECOVERY BP: NORMAL MMHG
SL CV STRESS RECOVERY HR: 89 BPM
SL CV STRESS RECOVERY O2 SAT: 99 %
SL CV STRESS STAGE REACHED: 3
STRESS ANGINA INDEX: 0
STRESS BASELINE BP: NORMAL MMHG
STRESS BASELINE HR: 77 BPM
STRESS DUKE TREADMILL SCORE: 7
STRESS O2 SAT REST: 98 %
STRESS PEAK HR: 134 BPM
STRESS POST ESTIMATED WORKLOAD: 8 METS
STRESS POST EXERCISE DUR MIN: 6 MIN
STRESS POST EXERCISE DUR SEC: 41 SEC
STRESS POST O2 SAT PEAK: 98 %
STRESS POST PEAK BP: 196 MMHG
STRESS ST DEPRESSION: 0 MM
STRESS ST ELEVATION: 0 MM
T WAVE AXIS: 28 DEGREES
TR MAX PG: 17 MMHG
TR PEAK VELOCITY: 2.1 M/S
TRICUSPID VALVE PEAK REGURGITATION VELOCITY: 2.08 M/S
VENTRICULAR RATE: 62 BPM

## 2022-11-14 RX ORDER — INSULIN LISPRO 100 [IU]/ML
1-6 INJECTION, SOLUTION INTRAVENOUS; SUBCUTANEOUS
Status: DISCONTINUED | OUTPATIENT
Start: 2022-11-14 | End: 2022-11-14 | Stop reason: HOSPADM

## 2022-11-14 RX ORDER — AMLODIPINE BESYLATE 5 MG/1
5 TABLET ORAL DAILY
Qty: 30 TABLET | Refills: 0 | Status: SHIPPED | OUTPATIENT
Start: 2022-11-14

## 2022-11-14 RX ADMIN — FENOFIBRATE 145 MG: 145 TABLET, FILM COATED ORAL at 08:39

## 2022-11-14 RX ADMIN — LOSARTAN POTASSIUM 100 MG: 50 TABLET, FILM COATED ORAL at 08:39

## 2022-11-14 RX ADMIN — ENOXAPARIN SODIUM 40 MG: 40 INJECTION SUBCUTANEOUS at 08:39

## 2022-11-14 RX ADMIN — PANTOPRAZOLE SODIUM 40 MG: 40 TABLET, DELAYED RELEASE ORAL at 05:51

## 2022-11-14 RX ADMIN — ASPIRIN 81 MG CHEWABLE TABLET 81 MG: 81 TABLET CHEWABLE at 08:39

## 2022-11-14 RX ADMIN — INSULIN LISPRO 1 UNITS: 100 INJECTION, SOLUTION INTRAVENOUS; SUBCUTANEOUS at 11:58

## 2022-11-14 NOTE — ASSESSMENT & PLAN NOTE
Lab Results   Component Value Date    HGBA1C 7 2 (H) 09/28/2022       Recent Labs     11/14/22  1152   POCGLU 179*       Blood Sugar Average: Last 72 hrs:  (P) 179   Resume metformin at discharge

## 2022-11-14 NOTE — DISCHARGE INSTRUCTIONS
Please follow-up your ultrasound kidney and bladder which is pending at the time of discharge with your primary care provider outpatient

## 2022-11-14 NOTE — PLAN OF CARE
Problem: PAIN - ADULT  Goal: Verbalizes/displays adequate comfort level or baseline comfort level  Description: Interventions:  - Encourage patient to monitor pain and request assistance  - Assess pain using appropriate pain scale  - Administer analgesics based on type and severity of pain and evaluate response  - Implement non-pharmacological measures as appropriate and evaluate response  - Consider cultural and social influences on pain and pain management  - Notify physician/advanced practitioner if interventions unsuccessful or patient reports new pain  Outcome: Progressing     Problem: SAFETY ADULT  Goal: Patient will remain free of falls  Description: INTERVENTIONS:  - Educate patient/family on patient safety including physical limitations  - Instruct patient to call for assistance with activity   - Consult OT/PT to assist with strengthening/mobility   - Keep Call bell within reach  - Keep bed low and locked with side rails adjusted as appropriate  - Keep care items and personal belongings within reach  - Initiate and maintain comfort rounds  - Make Fall Risk Sign visible to staff  - Offer Toileting every N/A Hours, in advance of need  - Initiate/Maintain BED alarm  - Obtain necessary fall risk management equipment: CALL BELL  - Apply yellow socks and bracelet for high fall risk patients  - Consider moving patient to room near nurses station  Outcome: Progressing  Goal: Maintain or return to baseline ADL function  Description: INTERVENTIONS:  -  Assess patient's ability to carry out ADLs; assess patient's baseline for ADL function and identify physical deficits which impact ability to perform ADLs (bathing, care of mouth/teeth, toileting, grooming, dressing, etc )  - Assess/evaluate cause of self-care deficits   - Assess range of motion  - Assess patient's mobility; develop plan if impaired  - Assess patient's need for assistive devices and provide as appropriate  - Encourage maximum independence but intervene and supervise when necessary  - Involve family in performance of ADLs  - Assess for home care needs following discharge   - Consider OT consult to assist with ADL evaluation and planning for discharge  - Provide patient education as appropriate  Outcome: Progressing     Problem: CARDIOVASCULAR - ADULT  Goal: Maintains optimal cardiac output and hemodynamic stability  Description: INTERVENTIONS:  - Monitor I/O, vital signs and rhythm  - Monitor for S/S and trends of decreased cardiac output  - Administer and titrate ordered vasoactive medications to optimize hemodynamic stability  - Assess quality of pulses, skin color and temperature  - Assess for signs of decreased coronary artery perfusion  - Instruct patient to report change in severity of symptoms  Outcome: Progressing  Goal: Absence of cardiac dysrhythmias or at baseline rhythm  Description: INTERVENTIONS:  - Continuous cardiac monitoring, vital signs, obtain 12 lead EKG if ordered  - Administer antiarrhythmic and heart rate control medications as ordered  - Monitor electrolytes and administer replacement therapy as ordered  Outcome: Progressing

## 2022-11-14 NOTE — PLAN OF CARE
Problem: PAIN - ADULT  Goal: Verbalizes/displays adequate comfort level or baseline comfort level  Description: Interventions:  - Encourage patient to monitor pain and request assistance  - Assess pain using appropriate pain scale  - Administer analgesics based on type and severity of pain and evaluate response  - Implement non-pharmacological measures as appropriate and evaluate response  - Consider cultural and social influences on pain and pain management  - Notify physician/advanced practitioner if interventions unsuccessful or patient reports new pain  11/14/2022 0311 by Fina Lam RN  Outcome: Progressing  11/14/2022 0311 by Fina Lam RN  Outcome: Progressing     Problem: SAFETY ADULT  Goal: Patient will remain free of falls  Description: INTERVENTIONS:  - Educate patient/family on patient safety including physical limitations  - Instruct patient to call for assistance with activity   - Consult OT/PT to assist with strengthening/mobility   - Keep Call bell within reach  - Keep bed low and locked with side rails adjusted as appropriate  - Keep care items and personal belongings within reach  - Initiate and maintain comfort rounds  - Make Fall Risk Sign visible to staff  - Offer Toileting every  Hours, in advance of need  - Initiate/Maintain alarm  - Obtain necessary fall risk management equipment:   Apply yellow socks and bracelet for high fall risk patients  - Consider moving patient to room near nurses station  11/14/2022 0311 by Fina Lam RN  Outcome: Progressing  11/14/2022 0311 by Fina Lam RN  Outcome: Progressing  Goal: Maintain or return to baseline ADL function  Description: INTERVENTIONS:  -  Assess patient's ability to carry out ADLs; assess patient's baseline for ADL function and identify physical deficits which impact ability to perform ADLs (bathing, care of mouth/teeth, toileting, grooming, dressing, etc )  - Assess/evaluate cause of self-care deficits   - Assess range of motion  - Assess patient's mobility; develop plan if impaired  - Assess patient's need for assistive devices and provide as appropriate  - Encourage maximum independence but intervene and supervise when necessary  - Involve family in performance of ADLs  - Assess for home care needs following discharge   - Consider OT consult to assist with ADL evaluation and planning for discharge  - Provide patient education as appropriate  11/14/2022 0311 by Bjorn Ortiz RN  Outcome: Progressing  11/14/2022 0311 by Bjorn Ortiz RN  Outcome: Progressing     Problem: CARDIOVASCULAR - ADULT  Goal: Maintains optimal cardiac output and hemodynamic stability  Description: INTERVENTIONS:  - Monitor I/O, vital signs and rhythm  - Monitor for S/S and trends of decreased cardiac output  - Administer and titrate ordered vasoactive medications to optimize hemodynamic stability  - Assess quality of pulses, skin color and temperature  - Assess for signs of decreased coronary artery perfusion  - Instruct patient to report change in severity of symptoms  11/14/2022 0311 by Bjorn Ortiz RN  Outcome: Progressing  11/14/2022 0311 by Bjorn Ortiz RN  Outcome: Progressing  Goal: Absence of cardiac dysrhythmias or at baseline rhythm  Description: INTERVENTIONS:  - Continuous cardiac monitoring, vital signs, obtain 12 lead EKG if ordered  - Administer antiarrhythmic and heart rate control medications as ordered  - Monitor electrolytes and administer replacement therapy as ordered  11/14/2022 0311 by Bjorn Ortiz RN  Outcome: Progressing  11/14/2022 0311 by Bjorn Ortiz RN  Outcome: Progressing

## 2022-11-14 NOTE — ASSESSMENT & PLAN NOTE
· Patient presented left sided chest pain with radiation to the left arm   · Seen in consultation with Cardiology, negative serial troponin and EKG without ischemic changes  · Echo stress test negative  · D-dimer negative

## 2022-11-14 NOTE — DISCHARGE SUMMARY
[address] [facility]  Discharge- Candido Reason Depari 1950, 81 y o  male MRN: [id number]  Unit/Bed#: E5 -01 Encounter: [id number]  Primary Care Provider: Bernardo Dillard MD   Date and time admitted to hospital: 11/13/2022 11:21 AM    * Chest pain  Assessment & Plan  · Patient presented left sided chest pain with radiation to the left arm   · Seen in consultation with Cardiology, negative serial troponin and EKG without ischemic changes  · Echo stress test negative  · D-dimer negative      Essential hypertension  Assessment & Plan  · Continue losartan 100 mg daily and hydrochlorothiazide per home regimen   · Adding amlodipine 5 mg daily    Hyperlipidemia  Assessment & Plan  · Continue Tricor      Enlarged prostate with lower urinary tract symptoms (LUTS)  Assessment & Plan  · Follows with Urology outpatient, continue Flomax      Type 2 diabetes mellitus without complication, without long-term current use of insulin ([facility]  Assessment & Plan  Lab Results   Component Value Date    HGBA1C 7 2 (H) 09/28/2022       Recent Labs     11/14/22  1152   POCGLU 179*       Blood Sugar Average: Last 72 hrs:  (P) 179   Resume metformin at discharge      Medical Problems             Resolved Problems  Date Reviewed: 11/14/2022   None               Discharging Physician / Practitioner: Rebel Parker  PCP: Bernardo Dillard MD  Admission Date:   Admission Orders (From admission, onward)     Ordered        11/13/22 1556  Place in Observation  Once                      Discharge Date: 11/14/22    Consultations During Hospital Stay:  · Cardiology    Procedures Performed:   · Echo stress test:  Interpretation Summary       1  Negative exercise stress test for symptoms of angina pectoris and negative for ECG changes of ischemia following exercise up to 89 % of patient's maximal age predicted heart rate  2  Negative stress echocardiogram for changes of ischemia following exercise     3  Borderline increased left ventricular wall thickness, normal left ventricular systolic function, grade 1 diastolic dysfunction and minor valve abnormalities as described below  Resting ejection fraction was determined as around 55%  4  Average exercise capacity, limited due to shortness of breath and fatigue (Total exercise time:  6 minutes 41 seconds; Workload: 8 0 METS)  5  Elevated resting blood pressure with normal blood pressure response to exercise  6  Normal resting ECG  No significant changes and no significant arrhythmia noted during stress test       Significant Findings / Test Results:    D-dimer negative  Chest x-ray:  FINDINGS:     Cardiomediastinal silhouette appears unremarkable      The lungs are clear  No pneumothorax or pleural effusion      Osseous structures appear within normal limits for patient age  Bullous changes medial left upper chest     IMPRESSION:     No acute cardiopulmonary disease      Findings are stable    Incidental Findings:   · none    Test Results Pending at Discharge (will require follow up): · Follow-up ultrasound kidney and bladder which is pending at the time of discharge     Outpatient Tests Requested:  · PCP    Complications:      Reason for Admission:  Chest pain    Hospital Course:   Daniel Wolf is a 81 y o  male patient who originally presented to the hospital on 11/13/2022 due to left-sided chest pain  Patient was seen in consultation with Cardiology  His serum troponins were negative and EKG is without ischemic changes  He underwent echo stress test which was negative for symptoms of angina and negative for ECG changes of ischemia  He will need to continue on losartan and hydrochlorothiazide per home regimen  We are adding amlodipine 5 mg daily as well  His D-dimer was negative  Can consider outpatient trial of low-dose 2 5 mg rosuvastatin 3 times weekly and then up titrating to weekly  Continue Tricor    He should follow up with his PCP within 1 week for post hospital follow-up    Patient was due for an outpatient ultrasound kidney and bladder which was obtained during hospitalization  Report was pending at discharge and we will need follow-up as primary urologist as well as his PCP outpatient to review findings  Patient was educated that he needs follow-up results of the outpatient  Please see above list of diagnoses and related plan for additional information  Condition at Discharge: stable    Discharge Day Visit / Exam:   Subjective:    Patient states he has not had chest pain since here but has persistent left sided chest discomfort  No lightheadednes or dizziness  No calf pain or swelling    Vitals: Blood Pressure: 142/88 (11/14/22 0750)  Pulse: 66 (11/14/22 0750)  Temperature: 98 2 °F (36 8 °C) (11/13/22 2338)  Temp Source: Oral (11/13/22 1043)  Respirations: 19 (11/13/22 2338)  Weight - Scale: 76 kg (167 lb 8 8 oz) (11/13/22 1043)  SpO2: 96 % (11/14/22 0750)  Exam:   Physical Exam  Vitals and nursing note reviewed  Constitutional:       General: He is not in acute distress  Appearance: He is not ill-appearing, toxic-appearing or diaphoretic  Cardiovascular:      Rate and Rhythm: Normal rate and regular rhythm  Pulmonary:      Effort: Pulmonary effort is normal       Breath sounds: Normal breath sounds  Abdominal:      General: Bowel sounds are normal       Palpations: Abdomen is soft  Musculoskeletal:      Right lower leg: No edema  Left lower leg: No edema  Skin:     General: Skin is warm  Neurological:      Mental Status: He is alert  Mental status is at baseline  Discharge instructions/Information to patient and family:   See after visit summary for information provided to patient and family  Provisions for Follow-Up Care:  See after visit summary for information related to follow-up care and any pertinent home health orders         Disposition:   Home    Planned Readmission: none     Discharge Statement:  I spent 50 minutes discharging the patient  This time was spent on the day of discharge  I had direct contact with the patient on the day of discharge  Greater than 50% of the total time was spent examining patient, answering all patient questions, arranging and discussing plan of care with patient as well as directly providing post-discharge instructions  Additional time then spent on discharge activities  Discharge Medications:  See after visit summary for reconciled discharge medications provided to patient and/or family        **Please Note: This note may have been constructed using a voice recognition system**

## 2022-11-14 NOTE — ASSESSMENT & PLAN NOTE
· Patient presented left sided chest pain with radiation to the left arm   · Also with SMITH   · Stable on room air   · Awaiting cardiology evaluation  · Troponin negative x2  · EKG acute ischemic changes  · Stress echo was ordered on admission    · Continue aspirin 81 mg daily   · Check lipid panel  · Will check d-dimer today, if elevated will proceed with CTA chest   · Stable on room air

## 2022-11-14 NOTE — ASSESSMENT & PLAN NOTE
Lab Results   Component Value Date    HGBA1C 7 2 (H) 09/28/2022       No results for input(s): POCGLU in the last 72 hours      Blood Sugar Average: Last 72 hrs:   holding metformin  Sliding scale insulin, Accu-Cheks while hospitalized

## 2022-11-14 NOTE — CONSULTS
Consult - Cardiology   Soraya Nolen 81 y o  male MRN: [id number]  Unit/Bed#: E5 -01 Encounter: [id number]        Reason For Consult:  Chest discomfort                 Assessment:  Chest discomfort   HS troponin 3, 5 with no ischemic EKG change  Hypertension   O/p Rx:  Cozaar 100 mg daily, HCTZ 25 mg daily  Dyslipidemia   Hx statin (Lipitor) related myalgias   O/p Rx:  Tricor 145 mg daily   Lipids 9/28/2022 cholesterol 184, triglycerides 138, HDL 33,   Diabetes    Other  BPH, LUTS  Possible syringomyelia, degenerative spinal disc disease, chronic pain disorder-opioid dependence  ARPAN  Anxiety     Discussion / Plan:  #  admitted to hospital with complaints of intermittent recurrent chest discomfort of uncertain etiology with symptoms which are mostly atypical for angina, with no objective evidence of ischemia, and possibly noncardiac - the labile hypertension is a plausible consideration  #  patient with history of hypertension presents with accelerated values with report that he has recently, a few times per week,  forgotten to take his antihypertensives  · Exercise stress echo was ordered and is pending for later this morning ~~> further recommendations pending review noting that specificity may be hindered by suboptimal heart rate response/exercise tolerance  · Blood pressure currently controlled on pre-hospital regimen of losartan and hydrochlorothiazide school continue same following trend for additional titration need  · With CAD equivalent disease (diabetes mellitus) most recent lipid levels are above therapeutic goal   Treatment apparently hindered by reports of prior intolerance to Lipitor ~~> if not previously attempted would consider an outpatient trial of rosuvastatin beginning at a low dose of 2 5 mg thrice weekly titrating upward to every other day, then daily, then possibly 5 mg daily with further titration guided by tolerance and lipid levels    Alternatively PCSK9 inhibitor could be considered  History Of Present Illness:  Sho Mahajan is 11year-old without prior care by cardiologist   His medical history is as highlighted above particularly noting diabetes treated with an oral agent, hypertension, and dyslipidemia with history of statin intolerance  At his baseline the patient is rather active noting that he is capable of gardening and other housework of at least moderate intensity  Beginning approximately 2-3 weeks ago the patient started to experience some intermittent feelings of chest discomfort  States these were occurring 2-3 times per week  Episode seemed random with almost all of them occurring at rest   With these he would get a vague sensation of some pressure or stabbing in the left chest   At times he would feel some dysesthesias of his left hand  He denied any associated feelings of cardiac ectopy, other radiation, nausea, diaphoresis, or other accompanying features  Symptom duration with somewhat variable typically lasting 5-15 minutes  He denied any decreased effort tolerance or symptoms provoked by physical activity  Yesterday while driving to [place] the patient again had some feelings of chest discomfort as discussed above  With this episode he had some vague radiation up his left neck  As they are ready in the car his wife encouraged him to, instead of going to [place], come to the emergency department for evaluation  He tells me the symptoms went on for a few hours later resolving seemingly independent of any treatment  It is noted that on arrival the patient had accelerated blood pressure with him admitting to me that he does, a few times per week, forgets to take his antihypertensive medications  Serial troponin levels have been normal with ECGs showing no ischemic change        Past Medical History:        Past Medical History:   Diagnosis Date   • Anxiety    • Chronic pain disorder     right shoulder   • Diabetes mellitus ([facility] [address] )     type 2, blood sugar 109 at 0500   • Diverticulosis    • Extremity pain    • GERD (gastroesophageal reflux disease)    • Hearing loss, right    • Hemorrhoids    • Neck pain    • Polyp, sigmoid colon       Past Surgical History:   Procedure Laterality Date   • COLONOSCOPY     • EAR SURGERY     • INNER EAR SURGERY     • NASAL SINUS SURGERY     • PA COLONOSCOPY FLX DX W/COLLJ SPEC WHEN PFRMD N/A 8/30/2016    Procedure: COLONOSCOPY;  Surgeon: Rosy Ramachandran MD;  Location: [facility]; Service: General   • PA SHLDR ARTHROSCOP,SURG,W/ROTAT CUFF REPR Right 2/5/2019    Procedure: ARTHROSCOPY SHOULDER;  Surgeon: Gilmer Sidhu DO;  Location:  MAIN OR;  Service: Orthopedics        Allergy:        Allergies   Allergen Reactions   • Lipitor [Atorvastatin] Other (See Comments) and Myalgia     Other reaction(s): increased pain  arthralgia   • Lyrica [Pregabalin] Other (See Comments)     Pt can't remmember reaction       Medications:       Prior to Admission medications    Medication Sig Start Date End Date Taking?  Authorizing Provider   gabapentin (NEURONTIN) 600 MG tablet Take 1 tablet (600 mg total) by mouth 3 (three) times a day as needed (leg/neck pain) 9/2/22  Yes Barclay Osler, MD   hydrochlorothiazide (HYDRODIURIL) 25 mg tablet Take 1 tablet (25 mg total) by mouth daily 6/2/22  Yes Barclay Osler, MD   losartan (COZAAR) 100 MG tablet Take 1 tablet (100 mg total) by mouth daily 9/2/22  Yes Barclay Osler, MD   metFORMIN (GLUCOPHAGE) 500 mg tablet Take 1 tablet (500 mg total) by mouth 2 (two) times a day with meals 7/27/22  Yes Barclay Osler, MD   olopatadine (PATANOL) 0 1 % ophthalmic solution Administer 1 drop to the right eye 2 (two) times a day 1/31/22  Yes Barclay Osler, MD   omeprazole (PriLOSEC) 40 MG capsule take 1 capsule by mouth EVERY MORNING BEFORE BREAKFAST 8/22/22  Yes Michael Brink MD   sildenafil (Viagra) 100 mg tablet Take 1 tablet (100 mg total) by mouth daily as needed for erectile dysfunction 9/2/22  Yes Barclay Osler, MD tamsulosin (FLOMAX) 0 4 mg Take 2 capsules (0 8 mg total) by mouth daily with dinner Please make sure there is a 4 hour difference between the tamsulosin and sildenafil 11/1/22  Yes CLUADY Lion   traMADol (ULTRAM) 50 mg tablet Take 1 tablet by mouth two to three times a day if needed for moderate pain 11/4/22  Yes Tessa Robb MD   Blood Glucose Monitoring Suppl (ONE TOUCH ULTRA 2) w/Device KIT Use daily 9/1/21   Tessa Robb MD   fenofibrate (TRICOR) 145 mg tablet Take 1 tablet (145 mg total) by mouth daily 2/1/22   Tessa Robb MD   glucose blood (ONE TOUCH ULTRA TEST) test strip Use 1 each daily 9/1/21   Tessa Robb MD   naloxone [facility]) 4 mg/0 1 mL nasal spray 0 1 mL (4 mg total) into each nostril once for 1 dose If the desired response is not obtained after 2-3 minutes, administer an additional dose using a new spray 6/2/22 6/2/22  Tessa Robb MD   OneTouch Delica Lancets 65Y MISC Use 1 Device 2 (two) times a day 6/14/21   Tessa Robb MD   urea (CARMOL) 30 % cream Apply topically as needed for dry skin  Patient not taking: Reported on 11/13/2022 9/1/21   Tessa Robb MD       Family History:     Family History   Problem Relation Age of Onset   • Hypertension Brother    • Hepatitis Mother    • Heart disease Father         CARDIAC DISORDER         Social History:       Social History     Socioeconomic History   • Marital status: /Civil Union     Spouse name: Not on file   • Number of children: 3   • Years of education: Not on file   • Highest education level: Not on file   Occupational History   • Occupation: FULL TIME EMPLOYMENT   Tobacco Use   • Smoking status: Never Smoker   • Smokeless tobacco: Never Used   Vaping Use   • Vaping Use: Never used   Substance and Sexual Activity   • Alcohol use: Yes     Comment: occassional   • Drug use: No   • Sexual activity: Yes     Partners: Female   Other Topics Concern   • Not on file   Social History Narrative    Lives with wife,daughter, and 2 grandchildren    Retired        Advance directive on file     Denied domestic violence     Denied problem with Quaker beliefs regarding medical care     Social Determinants of Health     Financial Resource Strain: Not on file   Food Insecurity: Not on file   Transportation Needs: Not on file   Physical Activity: Not on file   Stress: Not on file   Social Connections: Not on file   Intimate Partner Violence: Not on file   Housing Stability: Not on file       ROS:  Symptoms per HPI  Chronic back pain and other arthralgias  The remainder of the review of systems is negative    Exam:  General:  Alert, normally conversant, comfortable appearing  Head: Normocephalic, atraumatic  Eyes:  EOMI  Pupils - equal, round, reactive to accomodation  No icterus  Normal Conjunctiva  Oropharynx: Moist without lesion  Neck:  No gross bruit, JVD, thyromegaly, or lymphadenopathy  Heart:  Regular with controlled rate  No rub nor pathologic murmur  Lungs:  Clear without rales/rhonchi/wheeze  Abdomen:  Soft and nontender with normal bowel sounds  No organomegaly or mass  Lower Limbs:  No edema  Pulses[de-identified]  RLE - DP:  2+                 LLE - DP:  2+  Musculoskeletal: Independent movement of limbs observed, Formal ROM and strength eval not performed  Neurologic:    Oriented to: person, place, situation  Cranial Nerves: grossly intact - vision, smell, taste, and hearing were not tested       Motor function: grossly normal, symmetric   Sensation: Was not tested    Vitals:    11/13/22 1918 11/13/22 2025 11/13/22 2338 11/14/22 0750   BP: 154/85 142/78 139/74 142/88   BP Location: Right arm      Pulse: 66 61 68 66   Resp:   19    Temp:  98 2 °F (36 8 °C) 98 2 °F (36 8 °C)    TempSrc:       SpO2: 95% 94% 97% 96%   Weight:               DATA:      -----------    ECG:                 -----------------------------------------------------------------------------------------------------------------------------------------------  Weights: Wt Readings from Last 20 Encounters:   11/13/22 76 kg (167 lb 8 8 oz)   11/01/22 75 8 kg (167 lb)   09/02/22 74 8 kg (165 lb)   08/19/22 75 3 kg (166 lb)   06/02/22 75 7 kg (166 lb 12 8 oz)   03/15/22 76 7 kg (169 lb)   01/31/22 78 kg (172 lb)   01/17/22 75 8 kg (167 lb)   09/01/21 75 8 kg (167 lb)   08/10/21 76 2 kg (168 lb)   08/06/21 76 2 kg (168 lb)   08/03/21 74 8 kg (165 lb)   04/02/21 74 4 kg (164 lb)   03/01/21 74 4 kg (164 lb)   08/27/20 73 5 kg (162 lb)   07/01/20 73 5 kg (162 lb)   05/27/20 73 5 kg (162 lb)   01/24/20 75 1 kg (165 lb 9 6 oz)   01/13/20 73 5 kg (162 lb)   01/03/20 74 8 kg (165 lb)   , Body mass index is 28 32 kg/m²           Lab Studies:               Results from last 7 days   Lab Units 11/13/22  1227   WBC Thousand/uL 6 06   HEMOGLOBIN g/dL 13 6   HEMATOCRIT % 40 7   PLATELETS Thousands/uL 248   ,   Results from last 7 days   Lab Units 11/13/22  1227   POTASSIUM mmol/L 4 2   CHLORIDE mmol/L 106   CO2 mmol/L 28   BUN mg/dL 22   CREATININE mg/dL 0 94   CALCIUM mg/dL 9 3

## 2022-11-14 NOTE — PROGRESS NOTES
[facility]  Progress Note Ishaan Reveal 1950, 81 y o  male MRN: [id number]  Unit/Bed#: E5 -01 Encounter: [id number]  Primary Care Provider: Laura Raman MD   Date and time admitted to hospital: 11/13/2022 11:21 AM    * Chest pain  Assessment & Plan  · Patient presented left sided chest pain with radiation to the left arm   · Also with SMITH   · Awaiting cardiology evaluation  · Troponin negative x2  · EKG without acute ischemic changes  · Stress echo was ordered on admission    · Continue aspirin 81 mg daily   · Check lipid panel  · continue tricor   · D-dimer negative   · Stable on room air     Essential hypertension  Assessment & Plan  · Continue losartan 100 mg daily  · Holding hydrochlorothiazide    Hyperlipidemia  Assessment & Plan  · Continue Tricor  · Update lipid panel    Enlarged prostate with lower urinary tract symptoms (LUTS)  Assessment & Plan  · Follows with Urology outpatient, continue Flomax      Type 2 diabetes mellitus without complication, without long-term current use of insulin (HCC)  Assessment & Plan  Lab Results   Component Value Date    HGBA1C 7 2 (H) 09/28/2022       No results for input(s): POCGLU in the last 72 hours  Blood Sugar Average: Last 72 hrs:   holding metformin  Sliding scale insulin, Accu-Cheks while hospitalized        VTE Pharmacologic Prophylaxis:   Moderate Risk (Score 3-4) - Pharmacological DVT Prophylaxis Ordered: enoxaparin (Lovenox)  Patient Centered Rounds: I performed bedside rounds with nursing staff today  Discussions with Specialists or Other Care Team Provider:     Education and Discussions with Family / Patient: Updated  (wife) at bedside  Time Spent for Care: 20 minutes  More than 50% of total time spent on counseling and coordination of care as described above      Current Length of Stay: 0 day(s)  Current Patient Status: Observation   Certification Statement: The patient will continue to require additional inpatient hospital stay due to chest pain   Discharge Plan: later today versus tomorrow     Code Status: Level 1 - Full Code    Subjective:   Patient states he has not had chest pain since here but has persistent left sided chest discomfort  Also with SMITH  No lightheadednes or dizziness  No calf pain or swelling  Objective:     Vitals:   Temp (24hrs), Av 2 °F (36 8 °C), Min:98 2 °F (36 8 °C), Max:98 2 °F (36 8 °C)    Temp:  [98 2 °F (36 8 °C)] 98 2 °F (36 8 °C)  HR:  [59-76] 66  Resp:  [16-19] 19  BP: (139-191)/(74-90) 142/88  SpO2:  [94 %-98 %] 96 %  Body mass index is 28 32 kg/m²  Input and Output Summary (last 24 hours):   No intake or output data in the 24 hours ending 22 0948    Physical Exam:   Physical Exam  Vitals and nursing note reviewed  Constitutional:       General: He is not in acute distress  Appearance: He is not toxic-appearing or diaphoretic  Cardiovascular:      Rate and Rhythm: Normal rate and regular rhythm  Pulmonary:      Effort: Pulmonary effort is normal  No respiratory distress  Breath sounds: Normal breath sounds  No wheezing or rales  Chest:      Chest wall: Tenderness (left sided ) present  Abdominal:      General: Bowel sounds are normal       Palpations: Abdomen is soft  Tenderness: There is no abdominal tenderness  Musculoskeletal:         General: No tenderness  Right lower leg: No edema  Left lower leg: No edema  Skin:     General: Skin is warm  Neurological:      Mental Status: He is alert  Mental status is at baseline     Psychiatric:         Mood and Affect: Mood normal           Additional Data:     Labs:  Results from last 7 days   Lab Units 22  1227   WBC Thousand/uL 6 06   HEMOGLOBIN g/dL 13 6   HEMATOCRIT % 40 7   PLATELETS Thousands/uL 248   NEUTROS PCT % 53   LYMPHS PCT % 37   MONOS PCT % 7   EOS PCT % 2     Results from last 7 days   Lab Units 22  1227   SODIUM mmol/L 140   POTASSIUM mmol/L 4  2   CHLORIDE mmol/L 106   CO2 mmol/L 28   BUN mg/dL 22   CREATININE mg/dL 0 94   ANION GAP mmol/L 6   CALCIUM mg/dL 9 3   GLUCOSE RANDOM mg/dL 138                       Lines/Drains:  Invasive Devices  Report    Peripheral Intravenous Line  Duration           Peripheral IV 11/13/22 Right Forearm <1 day                      Imaging: Reviewed radiology reports from this admission including: chest xray    Recent Cultures (last 7 days):         Last 24 Hours Medication List:   Current Facility-Administered Medications   Medication Dose Route Frequency Provider Last Rate   • acetaminophen  650 mg Oral Q6H PRN Jenni Poon MD     • aluminum-magnesium hydroxide-simethicone  30 mL Oral Q6H PRN Jenni Poon MD     • aspirin  81 mg Oral Daily Jenni Poon MD     • enoxaparin  40 mg Subcutaneous Daily Jenni Poon MD     • fenofibrate  145 mg Oral Daily Jenni Poon MD     • gabapentin  600 mg Oral TID PRN Jenni Poon MD     • insulin lispro  1-6 Units Subcutaneous TID AC Annie Alarcon PA-C     • insulin lispro  1-6 Units Subcutaneous HS Annie Alarcon PA-C     • losartan  100 mg Oral Daily Jenni Poon MD     • nitroglycerin  0 4 mg Sublingual Q5 Min PRN Jenni Poon MD     • pantoprazole  40 mg Oral Early Morning Jenni Poon MD     • tamsulosin  0 8 mg Oral Daily With Cesar Maddox MD     • traMADol  50 mg Oral Q6H PRN Jenni Poon MD          Today, Patient Was Seen By: Rowan Spencer    **Please Note: This note may have been constructed using a voice recognition system  **

## 2022-11-15 ENCOUNTER — TRANSITIONAL CARE MANAGEMENT (OUTPATIENT)
Dept: FAMILY MEDICINE CLINIC | Facility: CLINIC | Age: 72
End: 2022-11-15

## 2022-11-16 LAB
ARRHY DURING EX: NORMAL
CHEST PAIN STATEMENT: NORMAL
MAX DIASTOLIC BP: 92 MMHG
MAX HEART RATE: 134 BPM
MAX PREDICTED HEART RATE: 150 BPM
MAX. SYSTOLIC BP: 196 MMHG
PROTOCOL NAME: NORMAL
TARGET HR FORMULA: NORMAL
TEST INDICATION: NORMAL
TIME IN EXERCISE PHASE: NORMAL

## 2022-11-17 ENCOUNTER — OFFICE VISIT (OUTPATIENT)
Dept: FAMILY MEDICINE CLINIC | Facility: CLINIC | Age: 72
End: 2022-11-17

## 2022-11-17 VITALS
HEART RATE: 66 BPM | SYSTOLIC BLOOD PRESSURE: 150 MMHG | WEIGHT: 168 LBS | BODY MASS INDEX: 27.99 KG/M2 | DIASTOLIC BLOOD PRESSURE: 76 MMHG | HEIGHT: 65 IN

## 2022-11-17 DIAGNOSIS — I10 ESSENTIAL HYPERTENSION: Primary | ICD-10-CM

## 2022-11-17 DIAGNOSIS — E11.9 TYPE 2 DIABETES MELLITUS WITHOUT COMPLICATION, WITHOUT LONG-TERM CURRENT USE OF INSULIN (HCC): ICD-10-CM

## 2022-11-17 DIAGNOSIS — M79.602 PAIN OF LEFT UPPER EXTREMITY: ICD-10-CM

## 2022-11-17 RX ORDER — VALSARTAN 160 MG/1
160 TABLET ORAL DAILY
Qty: 90 TABLET | Refills: 1 | Status: SHIPPED | OUTPATIENT
Start: 2022-11-17

## 2022-11-17 RX ORDER — LANCETS 30 GAUGE
1 EACH MISCELLANEOUS 2 TIMES DAILY
Qty: 100 EACH | Refills: 2 | Status: SHIPPED | OUTPATIENT
Start: 2022-11-17

## 2022-11-17 NOTE — PROGRESS NOTES
Assessment & Plan     1  Essential hypertension  -     valsartan (DIOVAN) 160 mg tablet; Take 1 tablet (160 mg total) by mouth daily    2  Type 2 diabetes mellitus without complication, without long-term current use of insulin (HCC)  -     OneTouch Delica Lancets 08X MISC; Use 1 Device 2 (two) times a day  -     glucose blood (ONE TOUCH ULTRA TEST) test strip; Use 1 each daily    3  Pain of left upper extremity  -     XR spine cervical complete 4 or 5 vw non injury; Future; Expected date: 11/17/2022  -     XR shoulder 2+ vw left; Future; Expected date: 11/17/2022       Subjective     Transitional Care Management Review:   Jono Verma is a 81 y o  male here for TCM follow up  During the TCM phone call patient stated:  TCM Call     Date and time call was made  11/15/2022 10:41 AM    Hospital care reviewed  Records reviewed    Patient was hospitialized at  [facility] - CLOSED    Date of Admission  11/13/22    Date of discharge  11/14/22    Diagnosis  Chest Pain    Disposition  Home    Current Symptoms  None      TCM Call     Post hospital issues  None    Should patient be enrolled in anticoag monitoring? No    Scheduled for follow up? Yes    I have advised the patient to call PCP with any new or worsening symptoms  [id number] [address] or [employer] other    Support System  [place]-based    The type of support provided  Emotional; Financial    Do you have social support  Yes, as much as I need    Are you recieving any outpatient services  No    Are you recieving home care services  No    Are you using any community resources  No    Current waiver services  No    Have you fallen in the last 12 months  No    Interperter language line needed  No        Hospital follow up of chest  Pain, had stress test  That was normal, had  Pain l arm and l jaw, was using  Leaf blower  For  2 hours    Review of Systems   Constitutional: Negative for activity change, appetite change and fatigue  Respiratory: Negative for shortness of breath  Cardiovascular: Negative for chest pain  Musculoskeletal: Positive for arthralgias  Negative for myalgias and neck pain  L arm pain   Neurological: Negative for dizziness and headaches  Objective     /76 (BP Location: Right arm, Patient Position: Sitting, Cuff Size: Standard)   Pulse 66   Ht 5' 4 5" (1 638 m)   Wt 76 2 kg (168 lb)   BMI 28 39 kg/m²      Physical Exam  Vitals reviewed  Constitutional:       Appearance: Normal appearance  Cardiovascular:      Rate and Rhythm: Normal rate and regular rhythm  Pulses: Normal pulses  Heart sounds: Normal heart sounds  Pulmonary:      Effort: Pulmonary effort is normal       Breath sounds: Normal breath sounds  Musculoskeletal:         General: Tenderness present  Comments: l shoulder pain   Neurological:      Mental Status: He is alert     Psychiatric:         Mood and Affect: Mood normal        Iesha Boone MD

## 2022-11-18 ENCOUNTER — APPOINTMENT (OUTPATIENT)
Dept: RADIOLOGY | Facility: MEDICAL CENTER | Age: 72
End: 2022-11-18

## 2022-11-18 DIAGNOSIS — M79.602 PAIN OF LEFT UPPER EXTREMITY: ICD-10-CM

## 2022-11-28 DIAGNOSIS — I10 ESSENTIAL HYPERTENSION: ICD-10-CM

## 2022-11-28 RX ORDER — HYDROCHLOROTHIAZIDE 25 MG/1
25 TABLET ORAL DAILY
Qty: 90 TABLET | Refills: 1 | Status: SHIPPED | OUTPATIENT
Start: 2022-11-28

## 2022-12-01 ENCOUNTER — TELEPHONE (OUTPATIENT)
Dept: SLEEP CENTER | Facility: CLINIC | Age: 72
End: 2022-12-01

## 2022-12-01 NOTE — TELEPHONE ENCOUNTER
Returned call from patient's wife Michelle Kolb  She states patient has been unable to get CPAP supplies from [address]  She called [employer] at the beginning of November and was told they cannot ship supplies as they are waiting for a new script from [facility]  [facility] has not been contacted by [facility] regarding this  Rx for supplies was sent to [address] via [email or web address] 8/22/22  Susan Vaughan I will send email to [facility] management to resolve and have someone reach out to her  Wife states she was not very happy with [email or web address]'s rep that she spoke to  States the rep was yawning and falling asleep during the call  Email sent to [facility] management for resolution

## 2022-12-05 ENCOUNTER — OFFICE VISIT (OUTPATIENT)
Dept: FAMILY MEDICINE CLINIC | Facility: CLINIC | Age: 72
End: 2022-12-05

## 2022-12-05 VITALS
DIASTOLIC BLOOD PRESSURE: 90 MMHG | HEART RATE: 113 BPM | BODY MASS INDEX: 28.17 KG/M2 | TEMPERATURE: 102.9 F | WEIGHT: 165 LBS | OXYGEN SATURATION: 96 % | SYSTOLIC BLOOD PRESSURE: 160 MMHG | HEIGHT: 64 IN | RESPIRATION RATE: 18 BRPM

## 2022-12-05 DIAGNOSIS — B34.9 VIRAL INFECTION, UNSPECIFIED: Primary | ICD-10-CM

## 2022-12-05 DIAGNOSIS — R50.9 FEVER AND CHILLS: ICD-10-CM

## 2022-12-05 DIAGNOSIS — Z12.11 COLON CANCER SCREENING: ICD-10-CM

## 2022-12-05 DIAGNOSIS — I10 ESSENTIAL HYPERTENSION: ICD-10-CM

## 2022-12-05 PROBLEM — J06.9 VIRAL UPPER RESPIRATORY TRACT INFECTION: Status: ACTIVE | Noted: 2022-12-05

## 2022-12-05 RX ORDER — COVID-19 ANTIGEN TEST
KIT MISCELLANEOUS
COMMUNITY
Start: 2022-11-18

## 2022-12-05 RX ORDER — DEXTROMETHORPHAN HYDROBROMIDE AND PROMETHAZINE HYDROCHLORIDE 15; 6.25 MG/5ML; MG/5ML
5 SOLUTION ORAL 4 TIMES DAILY PRN
Qty: 118 ML | Refills: 1 | Status: SHIPPED | OUTPATIENT
Start: 2022-12-05

## 2022-12-05 NOTE — PROGRESS NOTES
Name: Kendell Gentile      : 1950      MRN: [id number]  Encounter Provider: Michael Mercer PA-C  Encounter Date: 2022   Encounter department: [facility] PRIMARY [facility]    Assessment & Plan     1  Viral infection, unspecified  Assessment & Plan:  At home covid test negative yesterday  Tested in office for covid/flu  Will contact patient with results  Continue tylenol to reduce fever, plenty of fluids, given phenergan for cough  Orders:  -     Covid/Flu- Office Collect  -     Promethazine-DM (PHENERGAN-DM) 6 25-15 mg/5 mL oral syrup; Take 5 mL by mouth 4 (four) times a day as needed for cough    2  Fever and chills  Assessment & Plan:  Continue tylenol to reduce fever  3  Essential hypertension  Assessment & Plan:  /90 today but patient did not taking his medication  Recommended taking medication once he gets home        4  Colon cancer screening  -     Cologuard         Subjective      HPI  Review of Systems    Current Outpatient Medications on File Prior to Visit   Medication Sig   • amLODIPine (NORVASC) 5 mg tablet Take 1 tablet (5 mg total) by mouth daily   • Blood Glucose Monitoring Suppl (ONE TOUCH ULTRA 2) w/Device KIT Use daily   • fenofibrate (TRICOR) 145 mg tablet Take 1 tablet (145 mg total) by mouth daily   • Flowflex COVID-19 Ag Home Test KIT Use as directed   • gabapentin (NEURONTIN) 600 MG tablet Take 1 tablet (600 mg total) by mouth 3 (three) times a day as needed (leg/neck pain)   • glucose blood (ONE TOUCH ULTRA TEST) test strip Use 1 each daily   • hydrochlorothiazide (HYDRODIURIL) 25 mg tablet Take 1 tablet (25 mg total) by mouth daily   • metFORMIN (GLUCOPHAGE) 500 mg tablet Take 1 tablet (500 mg total) by mouth 2 (two) times a day with meals   • olopatadine (PATANOL) 0 1 % ophthalmic solution Administer 1 drop to the right eye 2 (two) times a day   • omeprazole (PriLOSEC) 40 MG capsule take 1 capsule by mouth EVERY MORNING BEFORE BREAKFAST   • OneTouch Delica Lancets 30G MISC Use 1 Device 2 (two) times a day   • sildenafil (Viagra) 100 mg tablet Take 1 tablet (100 mg total) by mouth daily as needed for erectile dysfunction   • tamsulosin (FLOMAX) 0 4 mg Take 2 capsules (0 8 mg total) by mouth daily with dinner Please make sure there is a 4 hour difference between the tamsulosin and sildenafil   • traMADol (ULTRAM) 50 mg tablet Take 1 tablet by mouth two to three times a day if needed for moderate pain   • urea (CARMOL) 30 % cream Apply topically as needed for dry skin   • valsartan (DIOVAN) 160 mg tablet Take 1 tablet (160 mg total) by mouth daily   • naloxone (NARCAN) 4 mg/0 1 mL nasal spray 0 1 mL (4 mg total) into each nostril once for 1 dose If the desired response is not obtained after 2-3 minutes, administer an additional dose using a new spray       Objective     /90 (BP Location: Right arm, Patient Position: Sitting, Cuff Size: Standard) Comment: no medication today  Pulse (!) 113   Temp (!) 102 9 °F (39 4 °C) (Tympanic)   Resp 18   Ht 5' 4" (1 626 m)   Wt 74 8 kg (165 lb)   SpO2 96%   BMI 28 32 kg/m²     Physical Exam  Gretel Gautam PA-C

## 2022-12-05 NOTE — ASSESSMENT & PLAN NOTE
/90 today but patient did not taking his medication  Recommended taking medication once he gets home

## 2022-12-05 NOTE — ASSESSMENT & PLAN NOTE
At home covid test negative yesterday  Tested in office for covid/flu  Will contact patient with results  Continue tylenol to reduce fever, plenty of fluids, given phenergan for cough

## 2022-12-05 NOTE — PROGRESS NOTES
Name: Jaqueline Rodriguez      : 1950      MRN: [id number]  Encounter Provider: Marti Bonilla PA-C  Encounter Date: 2022   Encounter department: [facility]IMARY [facility]    Assessment & Plan     1  Viral infection, unspecified  Assessment & Plan:  At home covid test negative yesterday  Tested in office for covid/flu  Will contact patient with results  Continue tylenol to reduce fever, plenty of fluids, given phenergan for cough  Orders:  -     Covid/Flu- Office Collect  -     Promethazine-DM (PHENERGAN-DM) 6 25-15 mg/5 mL oral syrup; Take 5 mL by mouth 4 (four) times a day as needed for cough    2  Fever and chills  Assessment & Plan:  Continue tylenol to reduce fever  3  Essential hypertension  Assessment & Plan:  /90 today but patient did not taking his medication  Recommended taking medication once he gets home  4  Colon cancer screening  -     Cologuard        Depression Screening and Follow-up Plan: Patient was screened for depression during today's encounter  They screened negative with a PHQ-2 score of 0  Subjective      PT with wife today  Had fevers since yesterday morning at 101 3, symptom onset Saturday with a scratchy throat  Now has a cough, continues to have a fever, congestion, rhinorrhea  Been taking tylenol for fever but did not take the tylenol today, initially taking 1000 mg every 6 hours  No sick contacts  Taking coricidin HBP products for cough but reports that it has not been helping  He did not take his BP meds this morning leading to an elevated BP  Cough has been causing stomach pain  Took an at home covid test that was negative yesterday    Review of Systems   Constitutional: Positive for fatigue and fever  Negative for appetite change  HENT: Positive for congestion, postnasal drip, rhinorrhea and sore throat  Negative for ear pain  Eyes: Negative  Respiratory: Positive for cough and shortness of breath  Cardiovascular: Negative  Gastrointestinal: Positive for abdominal pain  Negative for diarrhea, nausea and vomiting  Genitourinary: Negative  Musculoskeletal: Positive for back pain and myalgias  Skin: Negative          Current Outpatient Medications on File Prior to Visit   Medication Sig   • amLODIPine (NORVASC) 5 mg tablet Take 1 tablet (5 mg total) by mouth daily   • Blood Glucose Monitoring Suppl (ONE TOUCH ULTRA 2) w/Device KIT Use daily   • fenofibrate (TRICOR) 145 mg tablet Take 1 tablet (145 mg total) by mouth daily   • Flowflex COVID-19 Ag Home Test KIT Use as directed   • gabapentin (NEURONTIN) 600 MG tablet Take 1 tablet (600 mg total) by mouth 3 (three) times a day as needed (leg/neck pain)   • glucose blood (ONE TOUCH ULTRA TEST) test strip Use 1 each daily   • hydrochlorothiazide (HYDRODIURIL) 25 mg tablet Take 1 tablet (25 mg total) by mouth daily   • metFORMIN (GLUCOPHAGE) 500 mg tablet Take 1 tablet (500 mg total) by mouth 2 (two) times a day with meals   • olopatadine (PATANOL) 0 1 % ophthalmic solution Administer 1 drop to the right eye 2 (two) times a day   • omeprazole (PriLOSEC) 40 MG capsule take 1 capsule by mouth EVERY MORNING BEFORE BREAKFAST   • OneTouch Delica Lancets 69Q MISC Use 1 Device 2 (two) times a day   • sildenafil (Viagra) 100 mg tablet Take 1 tablet (100 mg total) by mouth daily as needed for erectile dysfunction   • tamsulosin (FLOMAX) 0 4 mg Take 2 capsules (0 8 mg total) by mouth daily with dinner Please make sure there is a 4 hour difference between the tamsulosin and sildenafil   • traMADol (ULTRAM) 50 mg tablet Take 1 tablet by mouth two to three times a day if needed for moderate pain   • urea (CARMOL) 30 % cream Apply topically as needed for dry skin   • valsartan (DIOVAN) 160 mg tablet Take 1 tablet (160 mg total) by mouth daily   • naloxone (NARCAN) 4 mg/0 1 mL nasal spray 0 1 mL (4 mg total) into each nostril once for 1 dose If the desired response is not obtained after 2-3 minutes, administer an additional dose using a new spray       Objective     /90 (BP Location: Right arm, Patient Position: Sitting, Cuff Size: Standard) Comment: no medication today  Pulse (!) 113   Temp (!) 102 9 °F (39 4 °C) (Tympanic)   Resp 18   Ht 5' 4" (1 626 m)   Wt 74 8 kg (165 lb)   SpO2 96%   BMI 28 32 kg/m²     Physical Exam  Vitals and nursing note reviewed  Constitutional:       General: He is not in acute distress  Appearance: He is well-developed and well-nourished  He is ill-appearing  He is not diaphoretic  HENT:      Head: Normocephalic and atraumatic  Right Ear: Ear canal and external ear normal  No drainage or tenderness  Tympanic membrane is scarred  Left Ear: Ear canal and external ear normal  No drainage or tenderness  Tympanic membrane is scarred  Nose: Congestion and rhinorrhea present  Mouth/Throat:      Mouth: Mucous membranes are moist       Pharynx: Posterior oropharyngeal erythema present  No pharyngeal swelling, oropharyngeal exudate or uvula swelling  Tonsils: No tonsillar exudate  Comments: Pharyngeal erythema secondary to PND  Eyes:      General: No scleral icterus  Right eye: No discharge  Left eye: No discharge  Conjunctiva/sclera: Conjunctivae normal    Neck:      Vascular: No carotid bruit  Cardiovascular:      Rate and Rhythm: Regular rhythm  Tachycardia present  Pulses: Normal pulses  Heart sounds: Normal heart sounds  No murmur heard  No friction rub  No gallop  Pulmonary:      Effort: Pulmonary effort is normal  No respiratory distress  Breath sounds: Normal breath sounds  No stridor  No wheezing, rhonchi or rales  Musculoskeletal:      Right lower leg: No edema  Left lower leg: No edema  Skin:     General: Skin is warm and dry  Neurological:      Mental Status: He is alert and oriented to person, place, and time     Psychiatric:         Mood and Affect: Mood and affect and mood normal          Behavior: Behavior normal          Judgment: Judgment normal        Robert Louise PA-C

## 2022-12-06 DIAGNOSIS — G95.0 SYRINGOMYELIA (HCC): ICD-10-CM

## 2022-12-06 DIAGNOSIS — J11.1 FLU: Primary | ICD-10-CM

## 2022-12-06 LAB
FLUAV RNA RESP QL NAA+PROBE: POSITIVE
FLUBV RNA RESP QL NAA+PROBE: NEGATIVE
SARS-COV-2 RNA RESP QL NAA+PROBE: NEGATIVE

## 2022-12-06 RX ORDER — OSELTAMIVIR PHOSPHATE 75 MG/1
75 CAPSULE ORAL EVERY 12 HOURS SCHEDULED
Qty: 10 CAPSULE | Refills: 0 | Status: SHIPPED | OUTPATIENT
Start: 2022-12-06 | End: 2022-12-11

## 2022-12-06 RX ORDER — TRAMADOL HYDROCHLORIDE 50 MG/1
TABLET ORAL
Qty: 60 TABLET | Refills: 0 | Status: SHIPPED | OUTPATIENT
Start: 2022-12-06

## 2022-12-06 NOTE — RESULT ENCOUNTER NOTE
Please call the patient let him know he is positive for the flu and I did call in Tamiflu for him to start taking as directed

## 2022-12-28 DIAGNOSIS — K20.90 ESOPHAGITIS: ICD-10-CM

## 2022-12-28 RX ORDER — OMEPRAZOLE 40 MG/1
CAPSULE, DELAYED RELEASE ORAL
Qty: 30 CAPSULE | Refills: 3 | Status: SHIPPED | OUTPATIENT
Start: 2022-12-28

## 2023-01-05 DIAGNOSIS — G95.0 SYRINGOMYELIA (HCC): ICD-10-CM

## 2023-01-05 RX ORDER — TRAMADOL HYDROCHLORIDE 50 MG/1
TABLET ORAL
Qty: 60 TABLET | Refills: 0 | Status: SHIPPED | OUTPATIENT
Start: 2023-01-05

## 2023-01-09 ENCOUNTER — OFFICE VISIT (OUTPATIENT)
Dept: FAMILY MEDICINE CLINIC | Facility: CLINIC | Age: 73
End: 2023-01-09

## 2023-01-09 VITALS
HEIGHT: 64 IN | BODY MASS INDEX: 28.85 KG/M2 | DIASTOLIC BLOOD PRESSURE: 68 MMHG | TEMPERATURE: 97.4 F | OXYGEN SATURATION: 96 % | SYSTOLIC BLOOD PRESSURE: 130 MMHG | WEIGHT: 169 LBS | HEART RATE: 72 BPM

## 2023-01-09 DIAGNOSIS — I10 ESSENTIAL HYPERTENSION: ICD-10-CM

## 2023-01-09 DIAGNOSIS — E78.5 HYPERLIPIDEMIA, UNSPECIFIED HYPERLIPIDEMIA TYPE: ICD-10-CM

## 2023-01-09 DIAGNOSIS — G95.0 SYRINGOMYELIA (HCC): ICD-10-CM

## 2023-01-09 DIAGNOSIS — E11.9 TYPE 2 DIABETES MELLITUS WITHOUT COMPLICATION, WITHOUT LONG-TERM CURRENT USE OF INSULIN (HCC): Primary | ICD-10-CM

## 2023-01-09 DIAGNOSIS — F11.20 CONTINUOUS OPIOID DEPENDENCE (HCC): ICD-10-CM

## 2023-01-09 PROBLEM — B34.9 VIRAL INFECTION, UNSPECIFIED: Status: RESOLVED | Noted: 2022-12-05 | Resolved: 2023-01-09

## 2023-01-09 PROBLEM — J06.9 VIRAL UPPER RESPIRATORY TRACT INFECTION: Status: RESOLVED | Noted: 2022-12-05 | Resolved: 2023-01-09

## 2023-01-09 PROBLEM — R50.9 FEVER AND CHILLS: Status: RESOLVED | Noted: 2022-12-05 | Resolved: 2023-01-09

## 2023-01-09 RX ORDER — GABAPENTIN 600 MG/1
600 TABLET ORAL 3 TIMES DAILY PRN
Qty: 270 TABLET | Refills: 1 | Status: SHIPPED | OUTPATIENT
Start: 2023-01-09

## 2023-01-09 NOTE — PROGRESS NOTES
Assessment/Plan:    Syringomyelia (HCC)  Stable on Tramadol and Meurontin    Type 2 diabetes mellitus without complication, without long-term current use of insulin (HCC)    Lab Results   Component Value Date    HGBA1C 7 2 (H) 09/28/2022   await lab    Essential hypertension  BP stable    Hyperlipidemia  await lab    Continuous opioid dependence ([address] )  Stable on meds       Diagnoses and all orders for this visit:    Type 2 diabetes mellitus without complication, without long-term current use of insulin (HCC)  -     Comprehensive metabolic panel; Future  -     Hemoglobin A1C; Future    Syringomyelia (HCC)  -     gabapentin (NEURONTIN) 600 MG tablet; Take 1 tablet (600 mg total) by mouth 3 (three) times a day as needed (leg/neck pain)    Hyperlipidemia, unspecified hyperlipidemia type  -     Lipid panel; Future    Continuous opioid dependence ([address] )    Essential hypertension        Subjective:      Patient ID: Spenser Cobos is a 81 y o  male  F/u chronic pain, lipids, diabetes, feels well, no cp, no sob, stable back pain      The following portions of the patient's history were reviewed and updated as appropriate: allergies, current medications, past family history, past medical history, past social history, past surgical history and problem list     Review of Systems   Constitutional: Positive for unexpected weight change  Negative for activity change, appetite change and fatigue  4 lb  gain   Respiratory: Negative for shortness of breath  Cardiovascular: Negative for chest pain  Musculoskeletal: Positive for back pain  Neurological: Negative for dizziness and headaches  Objective:      /68 (BP Location: Left arm, Patient Position: Sitting, Cuff Size: Standard)   Pulse 72   Temp (!) 97 4 °F (36 3 °C) (Tympanic)   Ht 5' 4" (1 626 m)   Wt 76 7 kg (169 lb)   SpO2 96%   BMI 29 01 kg/m²          Physical Exam  Vitals reviewed  Constitutional:       Appearance: Normal appearance  Cardiovascular:      Rate and Rhythm: Normal rate and regular rhythm  Pulses: Normal pulses  Heart sounds: Normal heart sounds  Pulmonary:      Effort: Pulmonary effort is normal       Breath sounds: Normal breath sounds  Musculoskeletal:      Right lower leg: No edema  Left lower leg: No edema  Lymphadenopathy:      Cervical: No cervical adenopathy  Neurological:      Mental Status: He is alert     Psychiatric:         Mood and Affect: Mood normal

## 2023-01-10 ENCOUNTER — TELEPHONE (OUTPATIENT)
Dept: ADMINISTRATIVE | Facility: OTHER | Age: 73
End: 2023-01-10

## 2023-01-10 NOTE — TELEPHONE ENCOUNTER
Upon review of the In Basket request we were able to locate, review, and update the patient chart as requested for Diabetic Eye Exam     Any additional questions or concerns should be emailed to the Practice Liaisons via the appropriate education email address, please do not reply via In Basket      Thank you  Arnoldo Bob

## 2023-01-10 NOTE — TELEPHONE ENCOUNTER
----- Message from Jourdan Gay MA sent at 1/9/2023  3:38 PM EST -----  Regarding: care gap request  01/09/23 3:38 PM    Hello, our patient attached above has had Diabetic Eye Exam completed/performed  Please assist in updating the patient chart by making an External outreach to [facility] for [address] located in [facility]   The date of service is 2022    Thank you,  Jourdan Gay  [facility] [address]

## 2023-01-20 DIAGNOSIS — E11.9 TYPE 2 DIABETES MELLITUS WITHOUT COMPLICATION, WITHOUT LONG-TERM CURRENT USE OF INSULIN (HCC): ICD-10-CM

## 2023-02-01 ENCOUNTER — APPOINTMENT (OUTPATIENT)
Dept: LAB | Facility: MEDICAL CENTER | Age: 73
End: 2023-02-01

## 2023-02-01 ENCOUNTER — TELEPHONE (OUTPATIENT)
Dept: UROLOGY | Facility: CLINIC | Age: 73
End: 2023-02-01

## 2023-02-01 ENCOUNTER — OFFICE VISIT (OUTPATIENT)
Dept: UROLOGY | Facility: CLINIC | Age: 73
End: 2023-02-01

## 2023-02-01 VITALS
HEIGHT: 66 IN | WEIGHT: 168 LBS | SYSTOLIC BLOOD PRESSURE: 132 MMHG | BODY MASS INDEX: 27 KG/M2 | OXYGEN SATURATION: 98 % | DIASTOLIC BLOOD PRESSURE: 74 MMHG | HEART RATE: 71 BPM

## 2023-02-01 DIAGNOSIS — R97.20 ELEVATED PSA: ICD-10-CM

## 2023-02-01 DIAGNOSIS — E78.5 HYPERLIPIDEMIA, UNSPECIFIED HYPERLIPIDEMIA TYPE: ICD-10-CM

## 2023-02-01 DIAGNOSIS — N40.1 BENIGN PROSTATIC HYPERPLASIA WITH LOWER URINARY TRACT SYMPTOMS, SYMPTOM DETAILS UNSPECIFIED: ICD-10-CM

## 2023-02-01 DIAGNOSIS — N40.1 BENIGN LOCALIZED PROSTATIC HYPERPLASIA WITH LOWER URINARY TRACT SYMPTOMS (LUTS): Primary | ICD-10-CM

## 2023-02-01 DIAGNOSIS — E11.9 TYPE 2 DIABETES MELLITUS WITHOUT COMPLICATION, WITHOUT LONG-TERM CURRENT USE OF INSULIN (HCC): ICD-10-CM

## 2023-02-01 DIAGNOSIS — N52.8 OTHER MALE ERECTILE DYSFUNCTION: ICD-10-CM

## 2023-02-01 LAB
ALBUMIN SERPL BCP-MCNC: 3.6 G/DL (ref 3.5–5)
ALP SERPL-CCNC: 74 U/L (ref 46–116)
ALT SERPL W P-5'-P-CCNC: 27 U/L (ref 12–78)
ANION GAP SERPL CALCULATED.3IONS-SCNC: 3 MMOL/L (ref 4–13)
AST SERPL W P-5'-P-CCNC: 11 U/L (ref 5–45)
BILIRUB SERPL-MCNC: 0.47 MG/DL (ref 0.2–1)
BUN SERPL-MCNC: 22 MG/DL (ref 5–25)
CALCIUM SERPL-MCNC: 9.1 MG/DL (ref 8.3–10.1)
CHLORIDE SERPL-SCNC: 105 MMOL/L (ref 96–108)
CHOLEST SERPL-MCNC: 186 MG/DL
CO2 SERPL-SCNC: 29 MMOL/L (ref 21–32)
CREAT SERPL-MCNC: 1.05 MG/DL (ref 0.6–1.3)
EST. AVERAGE GLUCOSE BLD GHB EST-MCNC: 200 MG/DL
GFR SERPL CREATININE-BSD FRML MDRD: 71 ML/MIN/1.73SQ M
GLUCOSE P FAST SERPL-MCNC: 206 MG/DL (ref 65–99)
HBA1C MFR BLD: 8.6 %
HDLC SERPL-MCNC: 32 MG/DL
LDLC SERPL CALC-MCNC: 117 MG/DL (ref 0–100)
NONHDLC SERPL-MCNC: 154 MG/DL
POTASSIUM SERPL-SCNC: 4.3 MMOL/L (ref 3.5–5.3)
PROT SERPL-MCNC: 7.6 G/DL (ref 6.4–8.4)
SL AMB  POCT GLUCOSE, UA: NORMAL
SL AMB LEUKOCYTE ESTERASE,UA: NORMAL
SL AMB POCT BILIRUBIN,UA: NORMAL
SL AMB POCT BLOOD,UA: NORMAL
SL AMB POCT CLARITY,UA: CLEAR
SL AMB POCT COLOR,UA: YELLOW
SL AMB POCT KETONES,UA: NORMAL
SL AMB POCT NITRITE,UA: NORMAL
SL AMB POCT PH,UA: 6.5
SL AMB POCT SPECIFIC GRAVITY,UA: 1.01
SL AMB POCT URINE PROTEIN: NORMAL
SL AMB POCT UROBILINOGEN: 0.2
SODIUM SERPL-SCNC: 137 MMOL/L (ref 135–147)
TRIGL SERPL-MCNC: 184 MG/DL

## 2023-02-01 NOTE — PATIENT INSTRUCTIONS
BLADDER HEALTH    WHAT IS CONSIDERED NORMAL? The average bladder can hold about 2 cups of urine before it needs to be emptied  The normal range of voiding urine is 6 to 8 times during a 24 hour period  As we get older, our bladder capacity can get smaller and we may need to pass urine more frequently but usually not more than every 2 hours  Urine should flow easily without discomfort in a good, steady stream until the bladder is empty  No pushing or straining is necessary to empty the bladder  An urge is a signal that you feel as the bladder stretches to fill with urine  Urges can be felt even if the bladder is not full  Urges are not commands to go to the toilet, merely a signal and can be controlled  WHAT ARE GOOD BLADDER HABITS? Take your time when emptying your bladder  Don’t strain or push to empty your bladder  Make sure you empty your bladder completely each time you pass urine  Do not rush the process  Consistently ignoring the urge to go (waiting more than 4 hours between toileting) or urinating too infrequently may be convenient but not healthy for your bladder  Avoid going to the toilet “just in case” or more often than every 2 hours  It is usually not necessary to go when you feel the first urge  Try to go only when your bladder is full  Urgency and frequency of urination can be improved by retraining the bladder and spacing your fluid intake throughout the day  Practice good toilet habits  Don’t let your bladder control your life  TIPS TO MAINTAIN GOOD BLADDER HABITS  Maintain a good fluid intake  Depending on your body size and environment, drink 6 -8 cups (8 oz each) of fluid per day unless otherwise advised by your doctor  Not enough fluid creates a foul odor and dark color of the urine  Limit the amount of caffeine (coffee, cola, chocolate or tea) and citrus foods that you consume as these foods can be associated with increased sensation of urinary urgency and frequency  Limit the amount of alcohol you drink  Alcohol increases urine production and makes it difficult for the brain to coordinate bladder control  Avoid constipation by maintaining a balanced diet of dietary fiber  Cigarette smoking is also irritating to the bladder surface and is associated with bladder cancer  In addition, the coughing associated with smoking may lead to increased incontinent episodes because of the increased pressure  HOW DIET CAN AFFECT YOUR BLADDER  Although there is no particular "diet" that can cure bladder control, there are certain dietary suggestions you can use to help control the problem  There are 2 points to consider when evaluating how your habits and diet may affect your bladder:    Foods and fluids can irritate the bladder  Some foods and beverages are thought to contribute to bladder leakage and irritability  However their effect on the bladder is not completely understood and you may want to see if eliminating one or all of these items improves your bladder control  If you are unable to give them up completely, it is recommended that you use the following items in moderation:  Acidic beverages and foods (orange juice, grapefruit juice, lemonade etc)  Alcoholic beverages  Vinegar  Coffee (regular and decaf)  Tea (regular and decaf)  Caffeinated beverages  Carbonated beverages          Drinking enough and the right kinds of fluids  Many people with bladder control issues decrease their intake of liquids in hope that they will need to urinate less frequently or have less urinary leakage  You should not restrict fluids to control your bladder  While a decrease in liquid intake does result in a decrease in the volume of urine, the smaller amount of urine may be more highly concentrated  Highly concentrated, dark yellow urine is irritating to the bladder surface and may actually cause you to go to the bathroom more frequently        It also encourages the growth of bacteria, which may lead to infections resulting in incontinence  Substitutions for Bladder Irritants: water is always the best beverage choice  Grape and apple juice are thirst quenchers are good selections and are not as irritating to the bladder    Low acid fruits:  Pears, apricots, papaya, watermelon  For coffee drinkers: KAVA®, Postum®, Tommy®, Kaffree Nubia®  For tea drinkers:  non-citrus or herbal and sun brewed tea

## 2023-02-01 NOTE — ASSESSMENT & PLAN NOTE
• AUA 9 was 22  • continue Flomax to 0 8 mg daily  • ultrasound kidney bladder normal  • Follow-up 1 year

## 2023-02-01 NOTE — ASSESSMENT & PLAN NOTE
• Take sildenafil 100 mg p r n    • Recommend not taking this within 4 hours of the tamsulosin  • Take sildenafil on an empty stomach at least 2 hours after meal  • Recommended using [facility] for best pricing of medication

## 2023-02-01 NOTE — PROGRESS NOTES
Assessment and plan:     Elevated PSA  · PSA 3 1, stable  · Check PSA 1 year    Enlarged prostate with lower urinary tract symptoms (LUTS)  • AUA 9 was 22  • continue Flomax to 0 8 mg daily  • ultrasound kidney bladder normal  • Follow-up 1 year    Erectile dysfunction  • Take sildenafil 100 mg p r n  • Recommend not taking this within 4 hours of the tamsulosin  • Take sildenafil on an empty stomach at least 2 hours after meal  • Recommended using [facility] for best pricing of medication      CLAUDY Jordan    History of Present Illness     Noni Riddle is a 81 y o  male patient of Dr Lise Medina who presents for follow-up visit  Last seen by our practice in May 2020 for BPH and prostate cancer screening  Prior history of elevated PSA as high as 3 5 in 2018  His baseline PSA is in the 2-3s  His PSA has been stable  Component PSA, Total   Latest Ref Rng & Units 0 0 - 4 0 ng/mL   12/28/2018 3 5   4/25/2019 2 3   6/4/2020 2 1   9/28/2022 3 1     BPH is managed with Flomax daily  He denies any complications  He feels there is room for improvement with his urinary symptoms  Will increase his flomax per his request to see if his symptoms improve  He drinks mostly water, occasional wine      He also reports erectile dysfunction  He was prescribe sildenafil 100 mg by his PCP that he takes intermittently  He feels this is effective    Laboratory     Lab Results   Component Value Date    BUN 22 11/13/2022    CREATININE 0 94 11/13/2022       No components found for: GFR    Lab Results   Component Value Date    CALCIUM 9 3 11/13/2022     05/23/2016    K 4 2 11/13/2022    CO2 28 11/13/2022     11/13/2022       Lab Results   Component Value Date    WBC 6 06 11/13/2022    HGB 13 6 11/13/2022    HCT 40 7 11/13/2022    MCV 85 11/13/2022     11/13/2022       Lab Results   Component Value Date    PSA 3 1 09/28/2022    PSA 2 1 06/04/2020    PSA 2 3 04/25/2019       Recent Results (from the past 1 hour(s))   POCT urine dip    Collection Time: 02/01/23  9:04 AM   Result Value Ref Range    LEUKOCYTE ESTERASE,UA n     NITRITE,UA n     SL AMB POCT UROBILINOGEN 0 2     POCT URINE PROTEIN n      PH,UA 6 5     BLOOD,UA trace     SPECIFIC GRAVITY,UA 1 015     KETONES,UA n     BILIRUBIN,UA n     GLUCOSE, UA n      COLOR,UA yellow     CLARITY,UA clear        @RESULT(URINEMICROSCOPIC)@    @RESULT(URINECULTURE)@    Radiology     RENAL ULTRASOUND 11/14/2022     INDICATION:   retention      COMPARISON: Renal ultrasound September 7, 2017      TECHNIQUE:   Ultrasound of the retroperitoneum was performed with a curvilinear transducer utilizing volumetric sweeps and still imaging techniques       FINDINGS:     KIDNEYS:  Symmetric and normal size  Right kidney:  11 0 x 5 3 x 5 6 cm  Volume 168 9 mL  Left kidney:  11 6 x 5 2 x 5 1 cm  Volume 161 3 mL     Right kidney  Normal echogenicity and contour  No solid mass is identified  Simple cyst measures 5 1 x 4 4 x 4 6 cm  No hydronephrosis  No shadowing calculi  No perinephric fluid collections      Left kidney  Normal echogenicity and contour  No mass is identified  No hydronephrosis  No shadowing calculi  No perinephric fluid collections      URETERS:  Nonvisualized      BLADDER:   Normally distended  No focal thickening or mass lesions  Bilateral ureteral jets detected         OTHER: Prostate measures 4 6 x 4 3 x 4 5 cm, yielding a volume of 46 1 mL      IMPRESSION:     No hydronephrosis  Mild prostatomegaly  Review of Systems     Review of Systems   Constitutional: Negative for activity change, appetite change, chills, fatigue, fever and unexpected weight change  HENT: Negative for facial swelling  Eyes: Negative for discharge  Respiratory: Negative  Negative for cough and shortness of breath  Cardiovascular: Negative for chest pain and leg swelling  Gastrointestinal: Negative    Negative for abdominal distention, abdominal pain, constipation, diarrhea, nausea and vomiting  Endocrine: Negative  Genitourinary: Negative  Negative for decreased urine volume, difficulty urinating, dysuria, enuresis, flank pain, frequency, genital sores, hematuria and urgency  Musculoskeletal: Negative for back pain and myalgias  Skin: Negative for pallor and rash  Allergic/Immunologic: Negative  Negative for immunocompromised state  Neurological: Negative for facial asymmetry and speech difficulty  Psychiatric/Behavioral: Negative for agitation and confusion  AUA SYMPTOM SCORE    Flowsheet Row Most Recent Value   AUA SYMPTOM SCORE    How often have you had a sensation of not emptying your bladder completely after you finished urinating? 1   How often have you had to urinate again less than two hours after you finished urinating? 2   How often have you found you stopped and started again several times when you urinate? 1   How often have you found it difficult to postpone urination? 1   How often have you had a weak urinary stream? 1   How often have you had to push or strain to begin urination? 1   How many times did you most typically get up to urinate from the time you went to bed at night until the time you got up in the morning? 2   Quality of Life: If you were to spend the rest of your life with your urinary condition just the way it is now, how would you feel about that? 1   AUA SYMPTOM SCORE 9                Allergies     Allergies   Allergen Reactions   • Lipitor [Atorvastatin] Other (See Comments) and Myalgia     Other reaction(s): increased pain  arthralgia   • Lyrica [Pregabalin] Other (See Comments)     Pt can't remmember reaction       Physical Exam     Physical Exam  Vitals reviewed  Constitutional:       General: He is not in acute distress  Appearance: Normal appearance  He is normal weight  He is not ill-appearing, toxic-appearing or diaphoretic  HENT:      Head: Normocephalic and atraumatic     Eyes:      General: No scleral icterus  Cardiovascular:      Rate and Rhythm: Normal rate  Pulmonary:      Effort: Pulmonary effort is normal  No respiratory distress  Abdominal:      General: Abdomen is flat  There is no distension  Musculoskeletal:         General: No swelling  Cervical back: Normal range of motion  Skin:     General: Skin is warm and dry  Coloration: Skin is not jaundiced or pale  Findings: No rash  Neurological:      General: No focal deficit present  Mental Status: He is alert and oriented to person, place, and time  Gait: Gait normal    Psychiatric:         Mood and Affect: Mood normal          Behavior: Behavior normal          Thought Content:  Thought content normal          Judgment: Judgment normal          Vital Signs     Vitals:    02/01/23 0901   BP: 132/74   BP Location: Left arm   Patient Position: Sitting   Pulse: 71   SpO2: 98%   Weight: 76 2 kg (168 lb)   Height: 5' 6" (1 676 m)       Current Medications       Current Outpatient Medications:   •  Blood Glucose Monitoring Suppl (ONE TOUCH ULTRA 2) w/Device KIT, Use daily, Disp: 1 kit, Rfl: 0  •  gabapentin (NEURONTIN) 600 MG tablet, Take 1 tablet (600 mg total) by mouth 3 (three) times a day as needed (leg/neck pain), Disp: 270 tablet, Rfl: 1  •  glucose blood (ONE TOUCH ULTRA TEST) test strip, Use 1 each daily, Disp: 100 each, Rfl: 2  •  hydrochlorothiazide (HYDRODIURIL) 25 mg tablet, Take 1 tablet (25 mg total) by mouth daily, Disp: 90 tablet, Rfl: 1  •  metFORMIN (GLUCOPHAGE) 500 mg tablet, Take 1 tablet (500 mg total) by mouth 2 (two) times a day with meals, Disp: 180 tablet, Rfl: 1  •  olopatadine (PATANOL) 0 1 % ophthalmic solution, Administer 1 drop to the right eye 2 (two) times a day, Disp: 5 mL, Rfl: 1  •  omeprazole (PriLOSEC) 40 MG capsule, take 1 capsule by mouth EVERY MORNING BEFORE BREAKFAST, Disp: 30 capsule, Rfl: 3  •  OneTouch Delica Lancets 70W MISC, Use 1 Device 2 (two) times a day, Disp: 100 each, Rfl: 2  • sildenafil (Viagra) 100 mg tablet, Take 1 tablet (100 mg total) by mouth daily as needed for erectile dysfunction, Disp: 10 tablet, Rfl: 5  •  tamsulosin (FLOMAX) 0 4 mg, Take 2 capsules (0 8 mg total) by mouth daily with dinner Please make sure there is a 4 hour difference between the tamsulosin and sildenafil, Disp: 60 capsule, Rfl: 12  •  traMADol (ULTRAM) 50 mg tablet, Take 1 tablet by mouth two to three times a day if needed for moderate pain, Disp: 60 tablet, Rfl: 0  •  urea (CARMOL) 30 % cream, Apply topically as needed for dry skin, Disp: 60 g, Rfl: 2  •  valsartan (DIOVAN) 160 mg tablet, Take 1 tablet (160 mg total) by mouth daily, Disp: 90 tablet, Rfl: 1  •  amLODIPine (NORVASC) 5 mg tablet, Take 1 tablet (5 mg total) by mouth daily (Patient not taking: Reported on 1/9/2023), Disp: 30 tablet, Rfl: 0  •  fenofibrate (TRICOR) 145 mg tablet, Take 1 tablet (145 mg total) by mouth daily (Patient not taking: Reported on 1/9/2023), Disp: 30 tablet, Rfl: 5  •  Flowflex COVID-19 Ag Home Test KIT, Use as directed (Patient not taking: Reported on 1/9/2023), Disp: , Rfl:   •  naloxone (NARCAN) 4 mg/0 1 mL nasal spray, 0 1 mL (4 mg total) into each nostril once for 1 dose If the desired response is not obtained after 2-3 minutes, administer an additional dose using a new spray, Disp: 1 each, Rfl: 1  •  Promethazine-DM (PHENERGAN-DM) 6 25-15 mg/5 mL oral syrup, Take 5 mL by mouth 4 (four) times a day as needed for cough (Patient not taking: Reported on 2/1/2023), Disp: 118 mL, Rfl: 1    Active Problems     Patient Active Problem List   Diagnosis   • Type 2 diabetes mellitus without complication, without long-term current use of insulin (HCC)   • Chronic pain disorder   • DDD (degenerative disc disease), thoracic   • Enlarged prostate with lower urinary tract symptoms (LUTS)   • Hearing loss, right   • Hyperlipidemia   • Essential hypertension   • Syringomyelia (HCC)   • Pain of right upper extremity   • Erectile dysfunction   • Tear of right rotator cuff   • Elevated PSA   • Gastroesophageal reflux disease with esophagitis   • ARPAN (obstructive sleep apnea)   • Continuous opioid dependence (HCC)       Past Medical History     Past Medical History:   Diagnosis Date   • Anxiety    • Chronic pain disorder     right shoulder   • Diabetes mellitus ([address] )     type 2, blood sugar 109 at 0500   • Diverticulosis    • Extremity pain    • GERD (gastroesophageal reflux disease)    • Hearing loss, right    • Hemorrhoids    • Neck pain    • Polyp, sigmoid colon        Surgical History     Past Surgical History:   Procedure Laterality Date   • COLONOSCOPY     • EAR SURGERY     • INNER EAR SURGERY     • NASAL SINUS SURGERY     • DC COLONOSCOPY FLX DX W/COLLJ SPEC WHEN PFRMD N/A 8/30/2016    Procedure: COLONOSCOPY;  Surgeon: Joel Tolliver MD;  Location: [facility]; Service: General   • DC SURGICAL ARTHROSCOPY SHOULDER W/ROTATOR CUFF RPR Right 2/5/2019    Procedure: ARTHROSCOPY SHOULDER;  Surgeon: Srinath Bruno DO;  Location: [address] [place] [address] MAIN OR;  Service: Orthopedics       Family History     Family History   Problem Relation Age of Onset   • Hypertension Brother    • Hepatitis Mother    • Heart disease Father         CARDIAC DISORDER        Social History     Social History     Social History     Tobacco Use   Smoking Status Never   Smokeless Tobacco Never       Past Surgical History:   Procedure Laterality Date   • COLONOSCOPY     • EAR SURGERY     • INNER EAR SURGERY     • NASAL SINUS SURGERY     • DC COLONOSCOPY FLX DX W/COLLJ SPEC WHEN PFRMD N/A 8/30/2016    Procedure: COLONOSCOPY;  Surgeon: Joel Tolliver MD;  Location: [facility];   Service: General   • DC SURGICAL ARTHROSCOPY SHOULDER W/ROTATOR CUFF RPR Right 2/5/2019    Procedure: ARTHROSCOPY SHOULDER;  Surgeon: Srinath Bruno DO;  Location:  MAIN OR;  Service: Orthopedics         The following portions of the patient's history were reviewed and updated as appropriate: allergies, current medications, past family history, past medical history, past social history, past surgical history and problem list    Please note :  Voice dictation software has been used to create this document  There may be inadvertent transcription errors      [id number]  [address] Check

## 2023-02-06 DIAGNOSIS — G95.0 SYRINGOMYELIA (HCC): ICD-10-CM

## 2023-02-06 RX ORDER — TRAMADOL HYDROCHLORIDE 50 MG/1
TABLET ORAL
Qty: 60 TABLET | Refills: 0 | Status: SHIPPED | OUTPATIENT
Start: 2023-02-06

## 2023-02-14 LAB
LEFT EYE DIABETIC RETINOPATHY: NORMAL
RIGHT EYE DIABETIC RETINOPATHY: NORMAL

## 2023-03-08 DIAGNOSIS — G95.0 SYRINGOMYELIA (HCC): ICD-10-CM

## 2023-03-08 RX ORDER — TRAMADOL HYDROCHLORIDE 50 MG/1
TABLET ORAL
Qty: 60 TABLET | Refills: 0 | Status: SHIPPED | OUTPATIENT
Start: 2023-03-08

## 2023-04-06 DIAGNOSIS — G95.0 SYRINGOMYELIA (HCC): ICD-10-CM

## 2023-04-06 RX ORDER — TRAMADOL HYDROCHLORIDE 50 MG/1
TABLET ORAL
Qty: 60 TABLET | Refills: 0 | Status: SHIPPED | OUTPATIENT
Start: 2023-04-06

## 2023-04-12 LAB
LEFT EYE DIABETIC RETINOPATHY: NORMAL
RIGHT EYE DIABETIC RETINOPATHY: NORMAL

## 2023-04-27 DIAGNOSIS — K20.90 ESOPHAGITIS: ICD-10-CM

## 2023-04-27 RX ORDER — OMEPRAZOLE 40 MG/1
CAPSULE, DELAYED RELEASE ORAL
Qty: 30 CAPSULE | Refills: 3 | Status: SHIPPED | OUTPATIENT
Start: 2023-04-27

## 2023-05-09 DIAGNOSIS — G95.0 SYRINGOMYELIA (HCC): ICD-10-CM

## 2023-05-10 DIAGNOSIS — I10 ESSENTIAL HYPERTENSION: ICD-10-CM

## 2023-05-10 RX ORDER — TRAMADOL HYDROCHLORIDE 50 MG/1
TABLET ORAL
Qty: 60 TABLET | Refills: 0 | Status: SHIPPED | OUTPATIENT
Start: 2023-05-10

## 2023-05-10 RX ORDER — VALSARTAN 160 MG/1
160 TABLET ORAL DAILY
Qty: 90 TABLET | Refills: 1 | Status: SHIPPED | OUTPATIENT
Start: 2023-05-10

## 2023-05-15 ENCOUNTER — RA CDI HCC (OUTPATIENT)
Dept: OTHER | Facility: HOSPITAL | Age: 73
End: 2023-05-15

## 2023-05-15 NOTE — PROGRESS NOTES
[address]  coding opportunities        [id number]  Chart Reviewed number of suggestions sent to Provider: 1     Patients Insurance     Medicare Insurance: [employer]

## 2023-05-19 ENCOUNTER — OFFICE VISIT (OUTPATIENT)
Dept: FAMILY MEDICINE CLINIC | Facility: CLINIC | Age: 73
End: 2023-05-19

## 2023-05-19 VITALS
HEART RATE: 87 BPM | WEIGHT: 165 LBS | BODY MASS INDEX: 26.52 KG/M2 | OXYGEN SATURATION: 99 % | DIASTOLIC BLOOD PRESSURE: 86 MMHG | HEIGHT: 66 IN | SYSTOLIC BLOOD PRESSURE: 132 MMHG | RESPIRATION RATE: 18 BRPM

## 2023-05-19 DIAGNOSIS — F40.240 CLAUSTROPHOBIA: Primary | ICD-10-CM

## 2023-05-19 DIAGNOSIS — M51.34 DDD (DEGENERATIVE DISC DISEASE), THORACIC: ICD-10-CM

## 2023-05-19 DIAGNOSIS — E11.9 TYPE 2 DIABETES MELLITUS WITHOUT COMPLICATION, WITHOUT LONG-TERM CURRENT USE OF INSULIN (HCC): ICD-10-CM

## 2023-05-19 DIAGNOSIS — F11.20 CONTINUOUS OPIOID DEPENDENCE (HCC): ICD-10-CM

## 2023-05-19 DIAGNOSIS — I10 ESSENTIAL HYPERTENSION: ICD-10-CM

## 2023-05-19 DIAGNOSIS — E11.9 TYPE 2 DIABETES MELLITUS WITHOUT COMPLICATION, WITHOUT LONG-TERM CURRENT USE OF INSULIN (HCC): Primary | ICD-10-CM

## 2023-05-19 DIAGNOSIS — G95.0 SYRINGOMYELIA (HCC): ICD-10-CM

## 2023-05-19 RX ORDER — ALPRAZOLAM 0.25 MG/1
TABLET ORAL
Qty: 4 TABLET | Refills: 0 | Status: SHIPPED | OUTPATIENT
Start: 2023-05-19

## 2023-05-19 NOTE — PROGRESS NOTES
Name: Genesis Edward      : 1950      MRN: [id number]  Encounter Provider: Annmarie Douglas MD  Encounter Date: 2023   Encounter department: [facility] PRIMARY CARE    Assessment & Plan     1  Type 2 diabetes mellitus without complication, without long-term current use of insulin (HCC)  -     Comprehensive metabolic panel; Future; Expected date: 2023  -     Lipid Panel with Direct LDL reflex; Future; Expected date: 2023  -     TSH, 3rd generation with Free T4 reflex; Future; Expected date: 2023  -     Hemoglobin A1C; Future; Expected date: 2023  -     Albumin / creatinine urine ratio; Future; Expected date: 2023    2  Essential hypertension  -     CBC and Platelet; Future; Expected date: 2023  -     TSH, 3rd generation with Free T4 reflex; Future; Expected date: 2023    3  DDD (degenerative disc disease), thoracic  Assessment & Plan:  Stable on meds      4  Syringomyelia ([facility] [address] )  Assessment & Plan:  Stable on meds      5  Continuous opioid dependence ([facility] [address] )  Assessment & Plan:  Stable on meds        Depression Screening and Follow-up Plan: Patient was screened for depression during today's encounter  They screened negative with a PHQ-2 score of 0  Subjective      Here for  F/u diabetes, lipids, due  For lab ,no cp, no sob, pain stable on meds, would  Like  Xanax  For  Bus  Trip to [address] [place] [address] since gets  Anxious  sometimes    Review of Systems   Constitutional: Negative for activity change, appetite change and fatigue  Respiratory: Negative for shortness of breath  Cardiovascular: Negative for chest pain  Musculoskeletal: Positive for back pain  Neurological: Negative for dizziness and headaches  Hematological: Negative for adenopathy         Current Outpatient Medications on File Prior to Visit   Medication Sig   • Blood Glucose Monitoring Suppl (ONE TOUCH ULTRA 2) w/Device KIT Use daily   • gabapentin (NEURONTIN) 600 MG tablet Take 1 tablet (600 mg total) by mouth 3 (three) times a day as needed (leg/neck pain)   • glucose blood (ONE TOUCH ULTRA TEST) test strip Use 1 each daily   • hydrochlorothiazide (HYDRODIURIL) 25 mg tablet Take 1 tablet (25 mg total) by mouth daily   • metFORMIN (GLUCOPHAGE) 500 mg tablet Take 1 tablet (500 mg total) by mouth 2 (two) times a day with meals   • omeprazole (PriLOSEC) 40 MG capsule take 1 capsule by mouth EVERY MORNING BEFORE BREAKFAST   • OneTouch Delica Lancets 53J MISC Use 1 Device 2 (two) times a day   • sildenafil (Viagra) 100 mg tablet Take 1 tablet (100 mg total) by mouth daily as needed for erectile dysfunction   • tamsulosin (FLOMAX) 0 4 mg Take 2 capsules (0 8 mg total) by mouth daily with dinner Please make sure there is a 4 hour difference between the tamsulosin and sildenafil   • traMADol (ULTRAM) 50 mg tablet Take 1 tablet by mouth two to three times a day if needed for moderate pain   • valsartan (DIOVAN) 160 mg tablet Take 1 tablet (160 mg total) by mouth daily   • Flowflex COVID-19 Ag Home Test KIT Use as directed (Patient not taking: Reported on 1/9/2023)   • naloxone (NARCAN) 4 mg/0 1 mL nasal spray 0 1 mL (4 mg total) into each nostril once for 1 dose If the desired response is not obtained after 2-3 minutes, administer an additional dose using a new spray   • olopatadine (PATANOL) 0 1 % ophthalmic solution Administer 1 drop to the right eye 2 (two) times a day (Patient not taking: Reported on 5/19/2023)   • [DISCONTINUED] amLODIPine (NORVASC) 5 mg tablet Take 1 tablet (5 mg total) by mouth daily (Patient not taking: Reported on 1/9/2023)   • [DISCONTINUED] fenofibrate (TRICOR) 145 mg tablet Take 1 tablet (145 mg total) by mouth daily (Patient not taking: Reported on 1/9/2023)   • [DISCONTINUED] Promethazine-DM (PHENERGAN-DM) 6 25-15 mg/5 mL oral syrup Take 5 mL by mouth 4 (four) times a day as needed for cough (Patient not taking: Reported on 2/1/2023)   • [DISCONTINUED] urea (CARMOL) 30 "% cream Apply topically as needed for dry skin (Patient not taking: Reported on 5/19/2023)       Objective     /86 (BP Location: Right arm, Patient Position: Sitting, Cuff Size: Standard)   Pulse 87   Resp 18   Ht 5' 6\" (1 676 m)   Wt 74 8 kg (165 lb)   SpO2 99%   BMI 26 63 kg/m²     Physical Exam  Vitals reviewed  Constitutional:       Appearance: Normal appearance  Neck:      Vascular: No carotid bruit  Cardiovascular:      Rate and Rhythm: Normal rate and regular rhythm  Pulses: Normal pulses  Heart sounds: Normal heart sounds  Pulmonary:      Effort: Pulmonary effort is normal       Breath sounds: Normal breath sounds  Musculoskeletal:      Right lower leg: No edema  Left lower leg: No edema  Lymphadenopathy:      Cervical: No cervical adenopathy  Neurological:      Mental Status: He is alert     Psychiatric:         Mood and Affect: Mood normal        Mg Jacobs MD  "

## 2023-05-19 NOTE — PATIENT INSTRUCTIONS
Await  lab, okay on meds  Type 2 Diabetes Management for Adults   AMBULATORY CARE:   Type 2 diabetes  is a disease that affects how your body uses glucose (sugar)  Either your body cannot make enough insulin, or it cannot use the insulin correctly  It is important to keep diabetes controlled to prevent damage to your heart, blood vessels, and other organs  Management will help you feel well and enjoy your daily activities  Your diabetes care team providers can help you make a plan to fit diabetes care into your schedule  Your plan can change over time to fit your needs and your family's needs  Have someone call your local emergency number (911 in the [address]) if:   You cannot be woken  You have signs of diabetic ketoacidosis:     confusion, fatigue    vomiting    rapid heartbeat    fruity smelling breath    extreme thirst    dry mouth and skin    You have any of the following signs of a heart attack:      Squeezing, pressure, or pain in your chest    You may  also have any of the following:     Discomfort or pain in your back, neck, jaw, stomach, or arm    Shortness of breath    Nausea or vomiting    Lightheadedness or a sudden cold sweat    You have any of the following signs of a stroke:      Numbness or drooping on one side of your face     Weakness in an arm or leg    Confusion or difficulty speaking    Dizziness, a severe headache, or vision loss    Call your doctor or diabetes care team provider if:   You have a sore or wound that will not heal     You have a change in the amount you urinate  Your blood sugar levels are higher than your target goals  You often have lower blood sugar levels than your target goals  Your skin is red, dry, warm, or swollen  You have trouble coping with diabetes, or you feel anxious or depressed  You have questions or concerns about your condition or care  What you need to know about high blood sugar levels:  High blood sugar levels may not cause any symptoms  You may feel more thirsty or urinate more often than usual  Over time, high blood sugar levels can damage your nerves, blood vessels, tissues, and organs  The following can increase your blood sugar levels:  Large meals or large amounts of carbohydrates at one time    Less physical activity    Stress    Illness    A lower dose of diabetes medicine or insulin, or a late dose    What you need to know about low blood sugar levels:  Symptoms include feeling shaky, dizzy, irritable, or confused  You can prevent symptoms by keeping your blood sugar levels from going too low  Treat a low blood sugar level right away:      Drink 4 ounces of juice or have 1 tube of glucose gel  Check your blood sugar level again 10 to 15 minutes later  When the level goes back to normal, eat a meal or snack to prevent another decrease  Keep glucose gel, raisins, or hard candy with you at all times to treat a low blood sugar level  Your blood sugar level can get too low if you take diabetes medicine or insulin and do not eat enough food  If you use insulin, check your blood sugar level before you exercise  If your blood sugar level is below 100 mg/dL, eat 4 crackers or 2 ounces of raisins, or drink 4 ounces of juice  Check your level every 30 minutes if you exercise longer than 1 hour  You may need a snack during or after exercise  What you can do to manage your blood sugar levels:   Check your blood sugar levels as directed and as needed  Several items are available to use to check your levels  You may need to check by testing a drop of blood in a glucose monitor  You may instead be given a continuous glucose monitoring (CGM) device  The device is worn at all times  The CGM checks your blood sugar level every 5 minutes  It sends results to an electronic device such as a smart phone  A CGM can be used with or without an insulin pump   You and your diabetes care team providers will decide on the best method for you  The goal for blood sugar levels before meals  is between 80 and 130 mg/dL and 2 hours after eating  is lower than 180 mg/dL  Make healthy food choices  Work with a dietitian to develop a meal plan that works for you and your schedule  A dietitian can help you learn how to eat the right amount of carbohydrates during your meals and snacks  Carbohydrates can raise your blood sugar level if you eat too many at one time  Examples of foods that contain carbohydrates are breads, cereals, rice, pasta, and sweets  Eat high-fiber foods as directed  Fiber helps improve blood sugar levels  Fiber also lowers your risk for heart disease and other problems diabetes can cause  Examples of high-fiber foods include vegetables, whole-grain bread, and beans such as moran beans  Your dietitian can tell you how much fiber to have each day  Get regular physical activity  Physical activity can help you get to your target blood sugar level goal and manage your weight  Get at least 150 minutes of moderate to vigorous aerobic physical activity each week  Do not miss more than 2 days in a row  Do not sit longer than 30 minutes at a time  Your healthcare provider can help you create an activity plan  The plan can include the best activities for you and can help you build your strength and endurance  Maintain a healthy weight  Ask your team what a healthy weight is for you  A healthy weight can help you control diabetes and prevent heart disease  Ask your team to help you create a weight loss plan, if needed  Weight loss can help make a difference in managing diabetes  Your team will help you set a weight-loss goal, such as 10 to 15 pounds, or 5% of your extra weight  Together you and your team can set manageable weight loss goals  Take your diabetes medicine or insulin as directed  You may need diabetes medicine, insulin, or both to help control your blood sugar levels   Your healthcare provider will teach you how and when to take your diabetes medicine or insulin  You will also be taught about side effects oral diabetes medicine can cause  Insulin may be injected or given through a pump or pen  You and your providers will decide on the best method for you: An insulin pump  is an implanted device that gives your insulin 24 hours a day  An insulin pump prevents the need for multiple insulin injections in a day  An insulin pen  is a device prefilled with the right amount of insulin  You and your family members will be taught how to draw up and give insulin  if this is the best method for you  Your providers will also teach you how to dispose of needles and syringes  You will learn how much insulin you need  and when to give it  You will be taught when not to give insulin  You will also be taught what to do if your blood sugar level drops too low  This may happen if you take insulin and do not eat the right amount of carbohydrates  More ways to manage type 2 diabetes:   Wear medical alert identification  Wear medical alert jewelry or carry a card that says you have diabetes  Ask your provider where to get these items  Do not smoke  Nicotine and other chemicals in cigarettes and cigars can cause lung and blood vessel damage  It also makes it more difficult to manage your diabetes  Ask your provider for information if you currently smoke and need help to quit  Do not use e-cigarettes or smokeless tobacco in place of cigarettes or to help you quit  They still contain nicotine  Check your feet each day for cuts, scratches, calluses, or other wounds  Look for redness and swelling, and feel for warmth  Wear shoes that fit well  Check your shoes for rocks or other objects that can hurt your feet  Do not walk barefoot or wear shoes without socks  Wear cotton socks to help keep your feet dry  Ask about vaccines you may need    You have a higher risk for serious illness if you get the flu, pneumonia, COVID-19, or hepatitis  Ask your provider if you should get vaccines to prevent these or other diseases, and when to get the vaccines  Talk to your provider if you become stressed about diabetes care  Sometimes being able to fit diabetes care into your life can cause increased stress  The stress can cause you not to take care of yourself properly  Your care team providers can help by offering tips about self-care  Your providers may suggest you talk to a mental health provider who can listen and offer help with self-care issues  Have your A1c checked as directed  Your provider may check your A1c every 3 months, or 2 times each year if your diabetes is controlled  An A1c test shows the average amount of sugar in your blood over the past 2 to 3 months  Your provider will tell you what your A1c level should be  Have screening tests as directed  Your provider may recommend screening for complications of diabetes and other conditions that may develop  Some screenings may begin right away and some may happen within the first 5 years of diagnosis:    Examples of diabetes complications  include kidney problems, high cholesterol, high blood pressure, blood vessel problems, eye problems, and sleep apnea  You may be screened for a low vitamin B level  if you take oral diabetes medicine for a long time  Women of childbearing years may be screened  for polycystic ovarian syndrome (PCOS)  Follow up with your doctor or diabetes care team providers as directed: You may need to have blood tests done before your follow-up visit  The test results will show if changes need to be made in your treatment or self-care  Talk to your provider if you cannot afford your medicine  Write down your questions so you remember to ask them during your visits    © Copyright [employer] 2022 Information is for End User's use only and may not be sold, redistributed or otherwise used for commercial purposes  The above information is an  only  It is not intended as medical advice for individual conditions or treatments  Talk to your doctor, nurse or pharmacist before following any medical regimen to see if it is safe and effective for you  Basic Carbohydrate Counting   AMBULATORY CARE:   Carbohydrate counting  is a way to plan your meals by counting the amount of carbohydrate in foods  Carbohydrates are the sugars, starches, and fiber found in fruit, grains, vegetables, and milk products  Carbohydrates increase your blood sugar levels  Carbohydrate counting can help you eat the right amount of carbohydrate to keep your blood sugar levels under control  What you need to know about planning meals using carbohydrate counting:  A dietitian or healthcare provider will help you develop a healthy meal plan that works best for you  You will be taught how much carbohydrate to eat or drink for each meal and snack  Your meal plan will be based on your age, weight, usual food intake, and physical activity level  If you have diabetes, it will also include your blood sugar levels and diabetes medicine  Once you know how much carbohydrate you should eat, you can decide what type of food you want to eat  You will need to know what foods contain carbohydrate and how much they contain  Keep track of the amount of carbohydrate in meals and snacks in order to follow your meal plan  Do not avoid carbohydrates or skip meals  Your blood sugar may fall too low if you do not eat enough carbohydrate or you skip meals  Foods that contain carbohydrate:   Breads:  Each serving of food listed below contains about 15 g of carbohydrate   1 slice of bread (1 ounce) or 1 flour or corn tortilla (6 inch)    ½ of a hamburger bun or ¼ of a large bagel (about 1 ounce)    1 pancake (about 4 inches across and ¼ inch thick)    Cereals and grains:  Serving sizes of ready-to-eat cereals vary   Look at the serving size and the total carbohydrate amount listed on the food label  Each serving of food listed below contains about 15 g of carbohydrate   ¾ cup of dry, unsweetened, ready-to-eat cereal or ¼ cup of low-fat granola     ½ cup of oatmeal or other cooked cereal     ? cup of cooked rice or pasta    Starchy vegetables and beans:  Each serving of food listed below contains about 15 g of carbohydrate   ½ cup of corn, green peas, sweet potatoes, or mashed potatoes    ¼ of a large baked potato    ½ cup of beans, lentils, and peas (garbanzo, moran, kidney, white, split, black-eyed)    Crackers and snacks:  Each serving of food listed below contains about 15 g of carbohydrate   3 zenaida cracker squares or 8 animal crackers     6 saltine-type crackers    3 cups of popcorn or ¾ ounce of pretzels, potato chips, or tortilla chips    Fruit:  Each serving of food listed below contains about 15 g of carbohydrate   1 small (4 ounce) piece of fresh fruit or ¾ to 1 cup of fresh fruit    ½ cup of canned or frozen fruit, packed in natural juice    ½ cup (4 ounces) of unsweetened fruit juice    2 tablespoons of dried fruit    Desserts or sugary foods:  Each serving of food listed below contains about 15 g of carbohydrate   2-inch square unfrosted cake or brownie     2 small cookies    ½ cup of ice cream, frozen yogurt, or nondairy frozen yogurt    ¼ cup of sherbet or sorbet    1 tablespoon of regular syrup, jam, or jelly    2 tablespoons of light syrup    Milk and yogurt:  Foods from the milk group contain about 12 g of carbohydrate per serving  1 cup of fat-free or low-fat milk    1 cup of soy milk    ? cup of fat-free, yogurt sweetened with artificial sweetener    Non-starchy vegetables:  Each serving contains about 5 g of carbohydrate   Three servings of non-starch vegetables count as 1 carbohydrate serving  ½ cup of cooked vegetables or 1 cup of raw vegetables   This includes beets, broccoli, cabbage, cauliflower, cucumber, mushrooms, tomatoes, and zucchini    ½ cup of vegetable juice    How to use carbohydrate counting to plan meals:   Count carbohydrate amounts using serving sizes:      Pasta dinner example: You plan to have pasta, tossed salad, and an 8-ounce glass of milk  Your healthcare provider tells you that you may have 4 carbohydrate servings for dinner  One carbohydrate serving of pasta is ? cup  One cup of pasta will equal 3 carbohydrate servings  An 8-ounce glass of milk will count as 1 carbohydrate serving  These amounts of food would equal 4 carbohydrate servings  One cup of tossed salad does not count toward your carbohydrate servings  Count carbohydrate amounts using food labels:  Find the total amount of carbohydrate in a packaged food by reading the food label  Food labels tell you the serving size of the food and the total carbohydrate amount in each serving  Find the serving size on the food label and then decide how many servings you will eat  Multiply the number of servings you plan to eat by the carbohydrate amount per serving  Granola bar snack example: Your meal plan allows you to have 2 carbohydrate servings (30 grams) of carbohydrate for a snack  You plan to eat 1 package of granola bars, which contains 2 bars  According to the food label, the serving size of food in this package is 1 bar  Each serving (1 bar) contains 25 grams of carbohydrate  The total amount of carbohydrate in this package of granola bars would be 50 g  Based on your meal plan, you should eat only 1 bar  Follow up with your doctor as directed:  Write down your questions so you remember to ask them during your visits  © Copyright Aiyana Jamil 2022 Information is for End User's use only and may not be sold, redistributed or otherwise used for commercial purposes  The above information is an  only  It is not intended as medical advice for individual conditions or treatments   Talk to your doctor, nurse or pharmacist before following any medical regimen to see if it is safe and effective for you

## 2023-05-22 DIAGNOSIS — I10 ESSENTIAL HYPERTENSION: ICD-10-CM

## 2023-05-22 RX ORDER — HYDROCHLOROTHIAZIDE 25 MG/1
25 TABLET ORAL DAILY
Qty: 90 TABLET | Refills: 1 | Status: SHIPPED | OUTPATIENT
Start: 2023-05-22

## 2023-06-05 DIAGNOSIS — G95.0 SYRINGOMYELIA (HCC): ICD-10-CM

## 2023-06-05 RX ORDER — TRAMADOL HYDROCHLORIDE 50 MG/1
TABLET ORAL
Qty: 60 TABLET | Refills: 0 | Status: SHIPPED | OUTPATIENT
Start: 2023-06-05

## 2023-06-09 ENCOUNTER — APPOINTMENT (OUTPATIENT)
Dept: LAB | Facility: MEDICAL CENTER | Age: 73
End: 2023-06-09
Payer: MEDICARE

## 2023-06-09 DIAGNOSIS — I10 ESSENTIAL HYPERTENSION: ICD-10-CM

## 2023-06-09 DIAGNOSIS — E11.9 TYPE 2 DIABETES MELLITUS WITHOUT COMPLICATION, WITHOUT LONG-TERM CURRENT USE OF INSULIN (HCC): ICD-10-CM

## 2023-06-09 LAB
ALBUMIN SERPL BCP-MCNC: 4.2 G/DL (ref 3.5–5)
ALP SERPL-CCNC: 83 U/L (ref 46–116)
ALT SERPL W P-5'-P-CCNC: 28 U/L (ref 12–78)
ANION GAP SERPL CALCULATED.3IONS-SCNC: 4 MMOL/L (ref 4–13)
AST SERPL W P-5'-P-CCNC: 18 U/L (ref 5–45)
BILIRUB SERPL-MCNC: 0.68 MG/DL (ref 0.2–1)
BUN SERPL-MCNC: 20 MG/DL (ref 5–25)
CALCIUM SERPL-MCNC: 9.9 MG/DL (ref 8.3–10.1)
CHLORIDE SERPL-SCNC: 105 MMOL/L (ref 96–108)
CHOLEST SERPL-MCNC: 210 MG/DL
CO2 SERPL-SCNC: 29 MMOL/L (ref 21–32)
CREAT SERPL-MCNC: 1.14 MG/DL (ref 0.6–1.3)
CREAT UR-MCNC: 50.2 MG/DL
ERYTHROCYTE [DISTWIDTH] IN BLOOD BY AUTOMATED COUNT: 13.4 % (ref 11.6–15.1)
EST. AVERAGE GLUCOSE BLD GHB EST-MCNC: 163 MG/DL
GFR SERPL CREATININE-BSD FRML MDRD: 64 ML/MIN/1.73SQ M
GLUCOSE P FAST SERPL-MCNC: 157 MG/DL (ref 65–99)
HBA1C MFR BLD: 7.3 %
HCT VFR BLD AUTO: 44.5 % (ref 36.5–49.3)
HDLC SERPL-MCNC: 35 MG/DL
HGB BLD-MCNC: 14.9 G/DL (ref 12–17)
LDLC SERPL CALC-MCNC: 145 MG/DL (ref 0–100)
MCH RBC QN AUTO: 28.5 PG (ref 26.8–34.3)
MCHC RBC AUTO-ENTMCNC: 33.5 G/DL (ref 31.4–37.4)
MCV RBC AUTO: 85 FL (ref 82–98)
MICROALBUMIN UR-MCNC: 24.1 MG/L (ref 0–20)
MICROALBUMIN/CREAT 24H UR: 48 MG/G CREATININE (ref 0–30)
PLATELET # BLD AUTO: 241 THOUSANDS/UL (ref 149–390)
PMV BLD AUTO: 10.6 FL (ref 8.9–12.7)
POTASSIUM SERPL-SCNC: 4.1 MMOL/L (ref 3.5–5.3)
PROT SERPL-MCNC: 8 G/DL (ref 6.4–8.4)
RBC # BLD AUTO: 5.23 MILLION/UL (ref 3.88–5.62)
SODIUM SERPL-SCNC: 138 MMOL/L (ref 135–147)
TRIGL SERPL-MCNC: 148 MG/DL
TSH SERPL DL<=0.05 MIU/L-ACNC: 1.73 UIU/ML (ref 0.45–4.5)
WBC # BLD AUTO: 6.81 THOUSAND/UL (ref 4.31–10.16)

## 2023-06-09 PROCEDURE — 80053 COMPREHEN METABOLIC PANEL: CPT

## 2023-06-09 PROCEDURE — 82043 UR ALBUMIN QUANTITATIVE: CPT

## 2023-06-09 PROCEDURE — 36415 COLL VENOUS BLD VENIPUNCTURE: CPT

## 2023-06-09 PROCEDURE — 84443 ASSAY THYROID STIM HORMONE: CPT

## 2023-06-09 PROCEDURE — 82570 ASSAY OF URINE CREATININE: CPT

## 2023-06-09 PROCEDURE — 80061 LIPID PANEL: CPT

## 2023-06-09 PROCEDURE — 85027 COMPLETE CBC AUTOMATED: CPT

## 2023-06-09 PROCEDURE — 83036 HEMOGLOBIN GLYCOSYLATED A1C: CPT

## 2023-07-06 DIAGNOSIS — G95.0 SYRINGOMYELIA (HCC): ICD-10-CM

## 2023-07-06 RX ORDER — TRAMADOL HYDROCHLORIDE 50 MG/1
TABLET ORAL
Qty: 60 TABLET | Refills: 0 | Status: SHIPPED | OUTPATIENT
Start: 2023-07-06

## 2023-07-06 NOTE — TELEPHONE ENCOUNTER
Yes, good morning. My name is Papo Fraser. My date of birth is [birth year]. My phone number is [phone]. I am and I'm calling because I need a refill on my medication called tramadol is totally what supply is 50 milligram. And I'm with on the [place] and [place]. again. My name is Papo Fraser. My name did. I bought this [phone] My phone number [phone]. [place] the [place] prescription called Tramadol. Thank you. Appreciate it.  Bye, bye.

## 2023-08-04 DIAGNOSIS — G95.0 SYRINGOMYELIA (HCC): ICD-10-CM

## 2023-08-04 RX ORDER — TRAMADOL HYDROCHLORIDE 50 MG/1
TABLET ORAL
Qty: 60 TABLET | Refills: 0 | Status: SHIPPED | OUTPATIENT
Start: 2023-08-04

## 2023-08-04 NOTE — TELEPHONE ENCOUNTER
Yes, good morning. My name is Angelica Johnson. My date of birth is [birth year]. My phone number is [phone]. I am in need of my prescription called Tramadol is 50 milligram. I am with [place] in return. And what about [address] Again, my name is Hiren Castillo. I am in need of my prescription called Tramadol. If you have any questions, please call me back. My date of birth is at [birth year]. Thank you.  Bye, bye.

## 2023-08-16 DIAGNOSIS — N40.1 BENIGN LOCALIZED PROSTATIC HYPERPLASIA WITH LOWER URINARY TRACT SYMPTOMS (LUTS): ICD-10-CM

## 2023-08-16 RX ORDER — TAMSULOSIN HYDROCHLORIDE 0.4 MG/1
CAPSULE ORAL
Qty: 180 CAPSULE | Refills: 1 | Status: SHIPPED | OUTPATIENT
Start: 2023-08-16

## 2023-08-21 DIAGNOSIS — K20.90 ESOPHAGITIS: ICD-10-CM

## 2023-08-21 RX ORDER — OMEPRAZOLE 40 MG/1
CAPSULE, DELAYED RELEASE ORAL
Qty: 30 CAPSULE | Refills: 2 | Status: SHIPPED | OUTPATIENT
Start: 2023-08-21

## 2023-08-21 NOTE — TELEPHONE ENCOUNTER
Please call patient to schedule office follow-up. He is overdue for EGD. I gave him a 3-month supply of omeprazole to hold him over.

## 2023-08-23 ENCOUNTER — OFFICE VISIT (OUTPATIENT)
Dept: FAMILY MEDICINE CLINIC | Facility: CLINIC | Age: 73
End: 2023-08-23
Payer: MEDICARE

## 2023-08-23 VITALS
WEIGHT: 167 LBS | HEART RATE: 67 BPM | OXYGEN SATURATION: 97 % | BODY MASS INDEX: 26.84 KG/M2 | SYSTOLIC BLOOD PRESSURE: 110 MMHG | DIASTOLIC BLOOD PRESSURE: 60 MMHG | HEIGHT: 66 IN

## 2023-08-23 DIAGNOSIS — I10 ESSENTIAL HYPERTENSION: ICD-10-CM

## 2023-08-23 DIAGNOSIS — G89.4 CHRONIC PAIN DISORDER: ICD-10-CM

## 2023-08-23 DIAGNOSIS — E78.5 HYPERLIPIDEMIA, UNSPECIFIED HYPERLIPIDEMIA TYPE: ICD-10-CM

## 2023-08-23 DIAGNOSIS — E11.9 TYPE 2 DIABETES MELLITUS WITHOUT COMPLICATION, WITHOUT LONG-TERM CURRENT USE OF INSULIN (HCC): Primary | ICD-10-CM

## 2023-08-23 DIAGNOSIS — F11.20 CONTINUOUS OPIOID DEPENDENCE (HCC): ICD-10-CM

## 2023-08-23 DIAGNOSIS — Z12.5 SCREENING FOR PROSTATE CANCER: ICD-10-CM

## 2023-08-23 DIAGNOSIS — R53.83 OTHER FATIGUE: ICD-10-CM

## 2023-08-23 DIAGNOSIS — N52.9 ERECTILE DYSFUNCTION, UNSPECIFIED ERECTILE DYSFUNCTION TYPE: ICD-10-CM

## 2023-08-23 DIAGNOSIS — G95.0 SYRINGOMYELIA (HCC): ICD-10-CM

## 2023-08-23 PROCEDURE — 99214 OFFICE O/P EST MOD 30 MIN: CPT | Performed by: FAMILY MEDICINE

## 2023-08-23 RX ORDER — SILDENAFIL 100 MG/1
100 TABLET, FILM COATED ORAL DAILY PRN
Qty: 10 TABLET | Refills: 5 | Status: SHIPPED | OUTPATIENT
Start: 2023-08-23

## 2023-08-23 NOTE — PROGRESS NOTES
Name: Leonie Dozier      : 1950      MRN: [id number]  Encounter Provider: Niki Frederick MD  Encounter Date: 2023   Encounter department: [facility]    Assessment & Plan     1. Type 2 diabetes mellitus without complication, without long-term current use of insulin (HCC)  -     Comprehensive metabolic panel; Future; Expected date: 10/09/2023  -     Hemoglobin A1C; Future; Expected date: 10/09/2023    2. Erectile dysfunction, unspecified erectile dysfunction type  -     sildenafil (Viagra) 100 mg tablet; Take 1 tablet (100 mg total) by mouth daily as needed for erectile dysfunction    3. Hyperlipidemia, unspecified hyperlipidemia type  -     Lipid panel; Future; Expected date: 10/09/2023    4. Essential hypertension  Assessment & Plan:  BP stable      5. Syringomyelia (HCC)  Assessment & Plan:  stable on Tramadol, needs pain management  Contract and uds    Orders:  -     [facility] Prescribed Meds and Special Instructions  -     Amphetamines, Methamphetamines  -     Butalbital  -     Phenobarbital  -     Secobarbital  -     Alprazolam  -     Clonazepam  -     Diazepam, Temazepam, Oxazepam  -     Lorazepam  -     Gabapentin  -     Pregabalin  -     Cocaine  -     Heroin  -     Buprenorphine  -     Levorphanol  -     Meperidine  -     Naltrexone  -     Fentanyl  -     Methadone  -     Oxycodone, Oxymorphone  -     Tapentadol  -     THC  -     Tramadol  -     Codeine, Hydrocodone, Hydropmorphone, Morphine  -     Bath Salts  -     Ethyl Glucuronide/Ethyl Sulfate  -     Kratom  -     Spice  -     Methylphenidate  -     Phentermine  -     Validity Oxidant  -     Validity Creatinine  -     Validity pH  -     Validity Specific    6.  Continuous opioid dependence ([address])  Assessment & Plan:  stable on Tramadol, needs pain management  Contract and uds    Orders:  -     [facility] Prescribed Meds and Special Instructions  -     Amphetamines, Methamphetamines  -     Butalbital  - Phenobarbital  -     Secobarbital  -     Alprazolam  -     Clonazepam  -     Diazepam, Temazepam, Oxazepam  -     Lorazepam  -     Gabapentin  -     Pregabalin  -     Cocaine  -     Heroin  -     Buprenorphine  -     Levorphanol  -     Meperidine  -     Naltrexone  -     Fentanyl  -     Methadone  -     Oxycodone, Oxymorphone  -     Tapentadol  -     THC  -     Tramadol  -     Codeine, Hydrocodone, Hydropmorphone, Morphine  -     Bath Salts  -     Ethyl Glucuronide/Ethyl Sulfate  -     Kratom  -     Spice  -     Methylphenidate  -     Phentermine  -     Validity Oxidant  -     Validity Creatinine  -     Validity pH  -     Validity Specific    7. Chronic pain disorder  Assessment & Plan:  stable on Tramadol, needs pain management  Contract and uds    Orders:  -     [facility] Prescribed Meds and Special Instructions  -     Amphetamines, Methamphetamines  -     Butalbital  -     Phenobarbital  -     Secobarbital  -     Alprazolam  -     Clonazepam  -     Diazepam, Temazepam, Oxazepam  -     Lorazepam  -     Gabapentin  -     Pregabalin  -     Cocaine  -     Heroin  -     Buprenorphine  -     Levorphanol  -     Meperidine  -     Naltrexone  -     Fentanyl  -     Methadone  -     Oxycodone, Oxymorphone  -     Tapentadol  -     THC  -     Tramadol  -     Codeine, Hydrocodone, Hydropmorphone, Morphine  -     Bath Salts  -     Ethyl Glucuronide/Ethyl Sulfate  -     Kratom  -     Spice  -     Methylphenidate  -     Phentermine  -     Validity Oxidant  -     Validity Creatinine  -     Validity pH  -     Validity Specific    8. Other fatigue  -     Testosterone, free, total; Future; Expected date: 10/09/2023    9. Screening for prostate cancer  -     PSA, Total Screen; Future; Expected date: 10/09/2023         Subjective      Here  For  F/u  Diabetes, chronic  Pain, lipids, htn, feels well    Review of Systems   Constitutional: Negative for activity change, appetite change and fatigue.    Respiratory: Negative for shortness of breath. Cardiovascular: Negative for chest pain. Musculoskeletal: Positive for arthralgias and back pain. Neurological: Negative for dizziness, light-headedness and headaches.        Current Outpatient Medications on File Prior to Visit   Medication Sig   • ALPRAZolam (XANAX) 0.25 mg tablet 1 po bid  Prn claustrophobia   • Blood Glucose Monitoring Suppl (ONE TOUCH ULTRA 2) w/Device KIT Use daily   • Flowflex COVID-19 Ag Home Test KIT Use as directed   • gabapentin (NEURONTIN) 600 MG tablet Take 1 tablet (600 mg total) by mouth 3 (three) times a day as needed (leg/neck pain)   • glucose blood (ONE TOUCH ULTRA TEST) test strip Use 1 each daily   • hydrochlorothiazide (HYDRODIURIL) 25 mg tablet Take 1 tablet (25 mg total) by mouth daily   • metFORMIN (GLUCOPHAGE) 500 mg tablet Take 1 tablet (500 mg total) by mouth 2 (two) times a day with meals   • omeprazole (PriLOSEC) 40 MG capsule take 1 capsule by mouth EVERY MORNING BEFORE BREAKFAST   • OneTouch Delica Lancets 88C MISC Use 1 Device 2 (two) times a day   • tamsulosin (FLOMAX) 0.4 mg take 2 capsules by mouth daily with dinner PLEASE MAKE SURE THERE IS A 4 HOURS DIFFERENCE BETWEEN THE TAMSULOSIN AND SILDENAFIL   • traMADol (ULTRAM) 50 mg tablet Take 1 tablet by mouth two to three times a day if needed for moderate pain   • valsartan (DIOVAN) 160 mg tablet Take 1 tablet (160 mg total) by mouth daily   • [DISCONTINUED] sildenafil (Viagra) 100 mg tablet Take 1 tablet (100 mg total) by mouth daily as needed for erectile dysfunction   • naloxone (NARCAN) 4 mg/0.1 mL nasal spray 0.1 mL (4 mg total) into each nostril once for 1 dose If the desired response is not obtained after 2-3 minutes, administer an additional dose using a new spray   • [DISCONTINUED] olopatadine (PATANOL) 0.1 % ophthalmic solution Administer 1 drop to the right eye 2 (two) times a day (Patient not taking: Reported on 5/19/2023)       Objective     /60 (BP Location: Left arm, Patient Position: Sitting, Cuff Size: Standard)   Pulse 67   Ht 5' 6" (1.676 m)   Wt 75.8 kg (167 lb)   SpO2 97%   BMI 26.95 kg/m²   Diabetic Foot Exam    Patient's shoes and socks removed. Right Foot/Ankle   Right Foot Inspection  Skin Exam: skin normal and skin intact. No dry skin, no warmth, no callus, no erythema, no maceration, no abnormal color, no pre-ulcer, no ulcer and no callus. Toe Exam: ROM and strength within normal limits. Sensory   Monofilament testing: intact    Vascular  Capillary refills: < 3 seconds  The right DP pulse is 2+. The right PT pulse is 2+. Left Foot/Ankle  Left Foot Inspection  Skin Exam: skin normal and skin intact. No dry skin, no warmth, no erythema, no maceration, normal color, no pre-ulcer, no ulcer and no callus. Toe Exam: ROM and strength within normal limits. Sensory   Monofilament testing: intact    Vascular  Capillary refills: < 3 seconds  The left DP pulse is 2+. The left PT pulse is 2+. Assign Risk Category  No deformity present  No loss of protective sensation  No weak pulses  Risk: 0    Physical Exam  Vitals reviewed. Constitutional:       Appearance: Normal appearance. Neck:      Vascular: No carotid bruit. Cardiovascular:      Rate and Rhythm: Normal rate and regular rhythm. Pulses: Normal pulses. no weak pulses          Dorsalis pedis pulses are 2+ on the right side and 2+ on the left side. Posterior tibial pulses are 2+ on the right side and 2+ on the left side. Heart sounds: Normal heart sounds. Pulmonary:      Effort: Pulmonary effort is normal.      Breath sounds: Normal breath sounds. Musculoskeletal:         General: No tenderness. Right lower leg: No edema. Left lower leg: No edema. Feet:      Right foot:      Skin integrity: No ulcer, skin breakdown, erythema, warmth, callus or dry skin. Left foot:      Skin integrity: No ulcer, skin breakdown, erythema, warmth, callus or dry skin. Lymphadenopathy:      Cervical: No cervical adenopathy. Neurological:      Mental Status: He is alert.        Rosanna Wyatt MD

## 2023-08-25 ENCOUNTER — OFFICE VISIT (OUTPATIENT)
Dept: SLEEP CENTER | Facility: CLINIC | Age: 73
End: 2023-08-25
Payer: MEDICARE

## 2023-08-25 VITALS
HEIGHT: 66 IN | WEIGHT: 167 LBS | SYSTOLIC BLOOD PRESSURE: 178 MMHG | DIASTOLIC BLOOD PRESSURE: 86 MMHG | HEART RATE: 61 BPM | BODY MASS INDEX: 26.84 KG/M2

## 2023-08-25 DIAGNOSIS — E66.3 OVERWEIGHT (BMI 25.0-29.9): ICD-10-CM

## 2023-08-25 DIAGNOSIS — K21.00 GASTROESOPHAGEAL REFLUX DISEASE WITH ESOPHAGITIS WITHOUT HEMORRHAGE: ICD-10-CM

## 2023-08-25 DIAGNOSIS — J31.0 CHRONIC RHINITIS: ICD-10-CM

## 2023-08-25 DIAGNOSIS — I10 ESSENTIAL HYPERTENSION: ICD-10-CM

## 2023-08-25 DIAGNOSIS — G47.19 EXCESSIVE DAYTIME SLEEPINESS: ICD-10-CM

## 2023-08-25 DIAGNOSIS — J34.2 DEVIATED NASAL SEPTUM: ICD-10-CM

## 2023-08-25 DIAGNOSIS — G47.33 OSA (OBSTRUCTIVE SLEEP APNEA): Primary | ICD-10-CM

## 2023-08-25 DIAGNOSIS — R68.2 DRY MOUTH: ICD-10-CM

## 2023-08-25 PROCEDURE — 99214 OFFICE O/P EST MOD 30 MIN: CPT | Performed by: INTERNAL MEDICINE

## 2023-08-25 NOTE — PROGRESS NOTES
Follow-Up Note - Sleep Center   Mayito Damon Depari  72 y.o. male  SVH:[birth year]  DTJ:[id number]  DOS:8/25/2023    CC: I saw this patient for follow-up in clinic today for Sleep disordered breathing, Coexisting Sleep and Medical Problems. Interval changes: None reported. Results of prior studies in 2021: The diagnostic study confirmed   obstructive sleep apnea:  AHI 16.6/hour considerably  higher during stage REM. Intermittent snoring of moderate intensity was noted. Minimum oxygen saturation 81 %  and 10.4 minutes of total sleep time was spent with saturations less than 90%.] During the subsequent therapeutic study, sleep disordered breathing was adequately remediated with PAP at 10cm H2O. PFSH, Problem List, Medications & Allergies were reviewed in EMR. He  has a past medical history of Anxiety, Chronic pain disorder, Diabetes mellitus ([address]), Diverticulosis, Extremity pain, GERD (gastroesophageal reflux disease), Hearing loss, right, Hemorrhoids, Neck pain, and Polyp, sigmoid colon. He has a current medication list which includes the following prescription(s): alprazolam, one touch ultra 2, flowflex covid-19 ag home test, gabapentin, glucose blood, hydrochlorothiazide, metformin, omeprazole, onetouch delica lancets 88C, sildenafil, tamsulosin, tramadol, valsartan, and naloxone. PHYSIOLOGICAL DATA REVIEW : Using PAP > 4 hours/night 100%. Estimated URBANO 3.7/hour with pressure of 10cm [email or web address] percentile; patient has not been using non FDA approved devices to sanitize the machine. INTERPRETATION: Compliance is excellent; Pressure setting is:adequate; ;   SUBJECTIVE: With respect to use of PAP, Mayito Damon  is experiencing significant adverse effects:dry mouth/throat. He derives benefit. Is satisfied with sleep and daytime function. Sleep Routine: Mayito Damon reports getting 8 hrs sleep; he has no difficulty initiating or maintaining sleep . He arises spontaneously and feels refreshed. Mayito Damon denies excessive daytime sleepiness,. He rated [himself] at Total score: 6 /24 on the Fackler Sleepiness Scale. Other issues: None reported. Habits:[ reports that he has never smoked. He has never used smokeless tobacco.], [ reports current alcohol use.], [ reports no history of drug use.], Caffeine use:limited; Exercise routine: regular. ROS: Significant for weight has been stable. Is not reporting any nasal symptoms. Acid reflux is controlled. He is on several medications  that can cause dry mouth including medications for back pain. EXAM: BP (!) 178/86 (BP Location: Left arm, Patient Position: Sitting, Cuff Size: Adult)   Pulse 61   Ht 5' 6" (1.676 m)   Wt 75.8 kg (167 lb)   BMI 26.95 kg/m²     Wt Readings from Last 3 Encounters:   08/25/23 75.8 kg (167 lb)   08/23/23 75.8 kg (167 lb)   05/19/23 74.8 kg (165 lb)      Patient is well groomed; well appearing. CNS: Alert, orientated, speech clear & coherent  Psych: cooperative and in no distress. Mental state: Appears normal.  H&N: EOMI; NC/AT: No facial pressure marks, no rashes. Skin/Extrem: col & hydration normal; no edema  Resp: Respiratory effort is normal  Physical findings otherwise essentially unchanged from previous. IMPRESSION: Problem List Items & Comorbidities Addressed this Visit    1. ARPAN (obstructive sleep apnea)  PAP DME Resupply/Reorder      2. Excessive daytime sleepiness        3. Dry mouth        4. Deviated nasal septum        5. Chronic rhinitis        6. Gastroesophageal reflux disease with esophagitis without hemorrhage        7. Essential hypertension        8. Overweight (BMI 25.0-29. 9)            PLAN:  1. I reviewed results of prior studies and physiologic data with the patient. 2. I discussed treatment options with risks and benefits. 3. Treatment with  PAP is medically necessary and Gail Ruffin is agreable to continue use.    4. Care of equipment, methods to improve comfort using PAP and importance of compliance with therapy were discussed. 5. Pressure setting: continue 10 cmH2O.    6. Rx provided to replace supplies and Care coordinated with DME provider. 7. Strategies to improve dry mouth were discussed. Specifically, adequate treatment of nasal allergies, adjusting heat and humidity settings on PAP machine, using Biotene mouthwash &/or Xylimelt. 8. Discussed strategies for weight reduction. 9. Follow-up is advised in 1 year or sooner if needed to monitor progress, compliance and to adjust therapy. Thank you for allowing me to participate in the care of this patient. Sincerely,     Authenticated electronically on 65/71/24   Board Certified Specialist     Portions of the record may have been created with voice recognition software. Occasional wrong word or "sound a like" substitutions may have occurred due to the inherent limitations of voice recognition software. There may also be notations and random deletions of words or characters from malfunctioning software. Read the chart carefully and recognize, using context, where substitutions/deletions have occurred.

## 2023-08-25 NOTE — PATIENT INSTRUCTIONS
Strategies to improve dry mouth were discussed. Specifically, adequate treatment of nasal allergies, adjusting heat and humidity settings on PAP machine, using Biotene mouthwash &/or Xylimelt. Nursing Support:  When: Monday through Friday 7A-5PM except holidays  Where: Our direct line is [phone]. If you are having a true emergency please call 911. In the event that the line is busy or it is after hours please leave a voice message and we will return your call. Please speak clearly, leaving your full name, birth date, best number to reach you and the reason for your call. Medication refills: We will need the name of the medication, the dosage, the ordering provider, whether you get a 30 or 90 day refill, and the pharmacy name and address. Medications will be ordered by the provider only. Nurses cannot call in prescriptions. Please allow 7 days for medication refills. Physician requested updates: If your provider requested that you call with an update after starting medication, please be ready to provide us the medication and dosage, what time you take your medication, the time you attempt to fall asleep, time you fall asleep, when you wake up, and what time you get out of bed. Sleep Study Results: We will contact you with sleep study results and/or next steps after the physician has reviewed your testing.

## 2023-08-26 LAB
6MAM UR QL CFM: NEGATIVE NG/ML
7AMINOCLONAZEPAM UR QL CFM: NEGATIVE NG/ML
A-OH ALPRAZ UR QL CFM: ABNORMAL NG/ML
ACCEPTABLE CREAT UR QL: NORMAL MG/DL
ACCEPTIBLE SP GR UR QL: NORMAL
AMPHET UR QL CFM: NEGATIVE NG/ML
BUPRENORPHINE UR QL CFM: NEGATIVE NG/ML
BUTALBITAL UR QL CFM: NEGATIVE NG/ML
BZE UR QL CFM: NEGATIVE NG/ML
CODEINE UR QL CFM: NEGATIVE NG/ML
EDDP UR QL CFM: NEGATIVE NG/ML
ETHYL GLUCURONIDE UR QL CFM: NEGATIVE NG/ML
ETHYL SULFATE UR QL SCN: NEGATIVE NG/ML
EUTYLONE UR QL: NEGATIVE NG/ML
FENTANYL UR QL CFM: NEGATIVE NG/ML
GLIADIN IGG SER IA-ACNC: NEGATIVE NG/ML
HYDROCODONE UR QL CFM: NEGATIVE NG/ML
HYDROMORPHONE UR QL CFM: NEGATIVE NG/ML
LORAZEPAM UR QL CFM: NEGATIVE NG/ML
ME-PHENIDATE UR QL CFM: NEGATIVE NG/ML
MEPERIDINE UR QL CFM: NEGATIVE NG/ML
METHADONE UR QL CFM: NEGATIVE NG/ML
METHAMPHET UR QL CFM: NEGATIVE NG/ML
MORPHINE UR QL CFM: NEGATIVE NG/ML
NALTREXONE UR QL CFM: NEGATIVE NG/ML
NITRITE UR QL: NORMAL UG/ML
NORBUPRENORPHINE UR QL CFM: NEGATIVE NG/ML
NORDIAZEPAM UR QL CFM: NEGATIVE NG/ML
NORFENTANYL UR QL CFM: NEGATIVE NG/ML
NORHYDROCODONE UR QL CFM: NEGATIVE NG/ML
NORMEPERIDINE UR QL CFM: NEGATIVE NG/ML
NOROXYCODONE UR QL CFM: NEGATIVE NG/ML
OXAZEPAM UR QL CFM: NEGATIVE NG/ML
OXYCODONE UR QL CFM: NEGATIVE NG/ML
OXYMORPHONE UR QL CFM: NEGATIVE NG/ML
PARA-FLUOROFENTANYL QUANTIFICATION: NORMAL NG/ML
PHENOBARB UR QL CFM: NEGATIVE NG/ML
RESULT ALL_PRESCRIBED MEDS AND SPECIAL INSTRUCTIONS: NORMAL
SECOBARBITAL UR QL CFM: NEGATIVE NG/ML
SL AMB 4-ANPP QUANTIFICATION: NORMAL NG/ML
SL AMB 5F-ADB-M7 METABOLITE QUANTIFICATION: NEGATIVE NG/ML
SL AMB 7-OH-MITRAGYNINE (KRATOM ALKALOID) QUANTIFICATION: NEGATIVE NG/ML
SL AMB AB-FUBINACA-M3 METABOLITE QUANTIFICATION: NEGATIVE NG/ML
SL AMB ACETYL FENTANYL QUANTIFICATION: NORMAL NG/ML
SL AMB ACETYL NORFENTANYL QUANTIFICATION: NORMAL NG/ML
SL AMB ACRYL FENTANYL QUANTIFICATION: NORMAL NG/ML
SL AMB CARFENTANIL QUANTIFICATION: NORMAL NG/ML
SL AMB CTHC (MARIJUANA METABOLITE) QUANTIFICATION: NEGATIVE NG/ML
SL AMB DEXTRORPHAN (DEXTROMETHORPHAN METABOLITE) QUANT: NEGATIVE NG/ML
SL AMB GABAPENTIN QUANTIFICATION: NORMAL
SL AMB JWH018 METABOLITE QUANTIFICATION: NEGATIVE NG/ML
SL AMB JWH073 METABOLITE QUANTIFICATION: NEGATIVE NG/ML
SL AMB MDMB-FUBINACA-M1 METABOLITE QUANTIFICATION: NEGATIVE NG/ML
SL AMB METHYLONE QUANTIFICATION: NEGATIVE NG/ML
SL AMB N-DESMETHYL-TRAMADOL QUANTIFICATION: NORMAL NG/ML
SL AMB PHENTERMINE QUANTIFICATION: NEGATIVE NG/ML
SL AMB PREGABALIN QUANTIFICATION: NEGATIVE
SL AMB RCS4 METABOLITE QUANTIFICATION: NEGATIVE NG/ML
SL AMB RITALINIC ACID QUANTIFICATION: NEGATIVE NG/ML
SMOOTH MUSCLE AB TITR SER IF: NEGATIVE NG/ML
SPECIMEN DRAWN SERPL: NEGATIVE NG/ML
SPECIMEN PH ACCEPTABLE UR: NORMAL
TAPENTADOL UR QL CFM: NEGATIVE NG/ML
TEMAZEPAM UR QL CFM: NEGATIVE NG/ML
TRAMADOL UR QL CFM: NORMAL NG/ML
URATE/CREAT 24H UR: NORMAL NG/ML

## 2023-08-28 ENCOUNTER — TELEPHONE (OUTPATIENT)
Dept: SLEEP CENTER | Facility: CLINIC | Age: 73
End: 2023-08-28

## 2023-08-30 LAB

## 2023-09-04 DIAGNOSIS — G95.0 SYRINGOMYELIA (HCC): ICD-10-CM

## 2023-09-04 NOTE — TELEPHONE ENCOUNTER
Medication Refill Request      Name traMADol Maria Eugenia Aviles)   Dose/Frequency 50 mg tablet  Quantity 60  Verified pharmacy   [x]  Verified ordering Provider   [x]  Does patient have enough for the next 3 days?  Yes [x] No []

## 2023-09-07 RX ORDER — TRAMADOL HYDROCHLORIDE 50 MG/1
TABLET ORAL
Qty: 60 TABLET | Refills: 0 | Status: SHIPPED | OUTPATIENT
Start: 2023-09-07

## 2023-09-07 NOTE — TELEPHONE ENCOUNTER
An on call employee cued this up for CARLOS Gonsalez to fill if approved by another Provider. Last filled 8/4.

## 2023-10-04 DIAGNOSIS — G95.0 SYRINGOMYELIA (HCC): ICD-10-CM

## 2023-10-04 RX ORDER — GABAPENTIN 600 MG/1
600 TABLET ORAL 3 TIMES DAILY PRN
Qty: 270 TABLET | Refills: 1 | Status: SHIPPED | OUTPATIENT
Start: 2023-10-04

## 2023-10-04 RX ORDER — TRAMADOL HYDROCHLORIDE 50 MG/1
TABLET ORAL
Qty: 60 TABLET | Refills: 0 | Status: SHIPPED | OUTPATIENT
Start: 2023-10-04

## 2023-10-11 LAB
LEFT EYE DIABETIC RETINOPATHY: NORMAL
RIGHT EYE DIABETIC RETINOPATHY: NORMAL

## 2023-10-14 ENCOUNTER — LAB (OUTPATIENT)
Dept: LAB | Facility: MEDICAL CENTER | Age: 73
End: 2023-10-14
Payer: MEDICARE

## 2023-10-14 DIAGNOSIS — Z12.5 SCREENING FOR PROSTATE CANCER: ICD-10-CM

## 2023-10-14 DIAGNOSIS — R53.83 OTHER FATIGUE: ICD-10-CM

## 2023-10-14 DIAGNOSIS — E78.5 HYPERLIPIDEMIA, UNSPECIFIED HYPERLIPIDEMIA TYPE: ICD-10-CM

## 2023-10-14 DIAGNOSIS — E11.9 TYPE 2 DIABETES MELLITUS WITHOUT COMPLICATION, WITHOUT LONG-TERM CURRENT USE OF INSULIN (HCC): ICD-10-CM

## 2023-10-14 LAB
ALBUMIN SERPL BCP-MCNC: 4.4 G/DL (ref 3.5–5)
ALP SERPL-CCNC: 74 U/L (ref 34–104)
ALT SERPL W P-5'-P-CCNC: 19 U/L (ref 7–52)
ANION GAP SERPL CALCULATED.3IONS-SCNC: 10 MMOL/L
AST SERPL W P-5'-P-CCNC: 16 U/L (ref 13–39)
BILIRUB SERPL-MCNC: 0.73 MG/DL (ref 0.2–1)
BUN SERPL-MCNC: 22 MG/DL (ref 5–25)
CALCIUM SERPL-MCNC: 10.3 MG/DL (ref 8.4–10.2)
CHLORIDE SERPL-SCNC: 99 MMOL/L (ref 96–108)
CHOLEST SERPL-MCNC: 198 MG/DL
CO2 SERPL-SCNC: 30 MMOL/L (ref 21–32)
CREAT SERPL-MCNC: 1.05 MG/DL (ref 0.6–1.3)
EST. AVERAGE GLUCOSE BLD GHB EST-MCNC: 192 MG/DL
GFR SERPL CREATININE-BSD FRML MDRD: 71 ML/MIN/1.73SQ M
GLUCOSE P FAST SERPL-MCNC: 169 MG/DL (ref 65–99)
HBA1C MFR BLD: 8.3 %
HDLC SERPL-MCNC: 35 MG/DL
LDLC SERPL CALC-MCNC: 127 MG/DL (ref 0–100)
NONHDLC SERPL-MCNC: 163 MG/DL
POTASSIUM SERPL-SCNC: 4.6 MMOL/L (ref 3.5–5.3)
PROT SERPL-MCNC: 7.8 G/DL (ref 6.4–8.4)
PSA SERPL-MCNC: 3.22 NG/ML (ref 0–4)
SODIUM SERPL-SCNC: 139 MMOL/L (ref 135–147)
TRIGL SERPL-MCNC: 180 MG/DL

## 2023-10-14 PROCEDURE — 80061 LIPID PANEL: CPT

## 2023-10-14 PROCEDURE — G0103 PSA SCREENING: HCPCS

## 2023-10-14 PROCEDURE — 80053 COMPREHEN METABOLIC PANEL: CPT

## 2023-10-14 PROCEDURE — 84402 ASSAY OF FREE TESTOSTERONE: CPT

## 2023-10-14 PROCEDURE — 84403 ASSAY OF TOTAL TESTOSTERONE: CPT

## 2023-10-14 PROCEDURE — 83036 HEMOGLOBIN GLYCOSYLATED A1C: CPT

## 2023-10-14 PROCEDURE — 36415 COLL VENOUS BLD VENIPUNCTURE: CPT

## 2023-10-16 LAB
TESTOST FREE SERPL-MCNC: 6.2 PG/ML (ref 6.6–18.1)
TESTOST SERPL-MCNC: 506 NG/DL (ref 264–916)

## 2023-10-16 NOTE — RESULT ENCOUNTER NOTE
Tests were not normal, and should follow at  your scheduled Office visit. Please call about this, if [employer] message is not available or not read by patient.

## 2023-10-31 ENCOUNTER — RA CDI HCC (OUTPATIENT)
Dept: OTHER | Facility: HOSPITAL | Age: 73
End: 2023-10-31

## 2023-10-31 NOTE — PROGRESS NOTES
[address] [facility] coding opportunities          Chart Reviewed number of suggestions sent to Provider: 2     Patients Insurance   E11.65 and E11.36  Medicare Insurance: Medicare

## 2023-11-01 ENCOUNTER — OFFICE VISIT (OUTPATIENT)
Dept: FAMILY MEDICINE CLINIC | Facility: CLINIC | Age: 73
End: 2023-11-01
Payer: MEDICARE

## 2023-11-01 VITALS
OXYGEN SATURATION: 96 % | BODY MASS INDEX: 26.72 KG/M2 | HEIGHT: 66 IN | HEART RATE: 68 BPM | SYSTOLIC BLOOD PRESSURE: 140 MMHG | TEMPERATURE: 97.6 F | WEIGHT: 166.25 LBS | DIASTOLIC BLOOD PRESSURE: 82 MMHG

## 2023-11-01 DIAGNOSIS — E78.5 HYPERLIPIDEMIA, UNSPECIFIED HYPERLIPIDEMIA TYPE: ICD-10-CM

## 2023-11-01 DIAGNOSIS — Z00.00 MEDICARE ANNUAL WELLNESS VISIT, SUBSEQUENT: Primary | ICD-10-CM

## 2023-11-01 DIAGNOSIS — G95.0 SYRINGOMYELIA (HCC): ICD-10-CM

## 2023-11-01 DIAGNOSIS — R53.83 OTHER FATIGUE: ICD-10-CM

## 2023-11-01 DIAGNOSIS — Z79.891 LONG TERM (CURRENT) USE OF OPIATE ANALGESIC: ICD-10-CM

## 2023-11-01 DIAGNOSIS — F11.20 CONTINUOUS OPIOID DEPENDENCE (HCC): ICD-10-CM

## 2023-11-01 DIAGNOSIS — I10 ESSENTIAL HYPERTENSION: ICD-10-CM

## 2023-11-01 DIAGNOSIS — E11.9 TYPE 2 DIABETES MELLITUS WITHOUT COMPLICATION, WITHOUT LONG-TERM CURRENT USE OF INSULIN (HCC): ICD-10-CM

## 2023-11-01 PROCEDURE — G0439 PPPS, SUBSEQ VISIT: HCPCS | Performed by: FAMILY MEDICINE

## 2023-11-01 PROCEDURE — 99214 OFFICE O/P EST MOD 30 MIN: CPT | Performed by: FAMILY MEDICINE

## 2023-11-01 RX ORDER — HYDROCHLOROTHIAZIDE 25 MG/1
25 TABLET ORAL DAILY
Qty: 90 TABLET | Refills: 1 | Status: SHIPPED | OUTPATIENT
Start: 2023-11-01

## 2023-11-01 RX ORDER — VALSARTAN 160 MG/1
160 TABLET ORAL DAILY
Qty: 90 TABLET | Refills: 1 | Status: SHIPPED | OUTPATIENT
Start: 2023-11-01

## 2023-11-01 RX ORDER — TRAMADOL HYDROCHLORIDE 50 MG/1
TABLET ORAL
Qty: 60 TABLET | Refills: 0 | Status: SHIPPED | OUTPATIENT
Start: 2023-11-01

## 2023-11-01 NOTE — PROGRESS NOTES
Assessment and Plan:     Problem List Items Addressed This Visit          Endocrine    Type 2 diabetes mellitus without complication, without long-term current use of insulin ([address])       Lab Results   Component Value Date    HGBA1C 8.3 (H) 10/14/2023   Noncompliant with diet         Relevant Medications    metFORMIN (GLUCOPHAGE) 500 mg tablet    Other Relevant Orders    Albumin / creatinine urine ratio    Comprehensive metabolic panel    Hemoglobin A1C       Cardiovascular and Mediastinum    Essential hypertension     Stable  on meds         Relevant Medications    valsartan (DIOVAN) 160 mg tablet    hydrochlorothiazide (HYDRODIURIL) 25 mg tablet       Nervous and Auditory    Syringomyelia (HCC)    Relevant Medications    traMADol (ULTRAM) 50 mg tablet       Other    Continuous opioid dependence (HCC)    Relevant Medications    traMADol (ULTRAM) 50 mg tablet    Hyperlipidemia     Lipids stable         Relevant Orders    Lipid panel     Other Visit Diagnoses       Medicare annual wellness visit, subsequent    -  Primary    BMI 26.0-26.9,adult        Other fatigue        Relevant Orders    Vitamin B12    TSH, 3rd generation    Long term (current) use of opiate analgesic        Relevant Orders    Vitamin B12              Depression Screening and Follow-up Plan: Patient was screened for depression during today's encounter. They screened negative with a PHQ-2 score of 0. Preventive health issues were discussed with patient, and age appropriate screening tests were ordered as noted in patient's After Visit Summary. Personalized health advice and appropriate referrals for health education or preventive services given if needed, as noted in patient's After Visit Summary. History of Present Illness:     Patient presents for a Medicare Wellness Visit    Awv.  F/u diabetes, more tired,no cp , no sob, pain in back stable, arm fell asleep yesterday  because  of  how  he  was  sleeping          Patient Care Team:  [facility] Dany Lee MD as PCP - General (Family Medicine)  MD Tran Morfin MD as Endoscopist     Review of Systems:     Review of Systems   Constitutional:  Positive for fatigue. Negative for activity change and appetite change. Respiratory:  Negative for shortness of breath. Cardiovascular:  Negative for chest pain. Musculoskeletal:  Positive for back pain. Neurological:  Negative for dizziness, light-headedness and headaches. Hematological:  Negative for adenopathy. Problem List:     Patient Active Problem List   Diagnosis    Type 2 diabetes mellitus without complication, without long-term current use of insulin (HCC)    Chronic pain disorder    DDD (degenerative disc disease), thoracic    Enlarged prostate with lower urinary tract symptoms (LUTS)    Hearing loss, right    Hyperlipidemia    Essential hypertension    Syringomyelia (HCC)    Pain of right upper extremity    Erectile dysfunction    Tear of right rotator cuff    Elevated PSA    Gastroesophageal reflux disease with esophagitis    ARPAN (obstructive sleep apnea)    Continuous opioid dependence ([address])      Past Medical and Surgical History:     Past Medical History:   Diagnosis Date    Anxiety     Chronic pain disorder     right shoulder    Diabetes mellitus (HCC)     type 2, blood sugar 109 at 0500    Diverticulosis     Extremity pain     GERD (gastroesophageal reflux disease)     Hearing loss, right     Hemorrhoids     Neck pain     Polyp, sigmoid colon      Past Surgical History:   Procedure Laterality Date    COLONOSCOPY      EAR SURGERY      INNER EAR SURGERY      NASAL SINUS SURGERY      [place] COLONOSCOPY FLX DX W/COLLJ SPEC WHEN PFRMD N/A 8/30/2016    Procedure: COLONOSCOPY;  Surgeon: Tran Phoenix MD;  Location: [facility];   Service: General    [place] SURGICAL ARTHROSCOPY SHOULDER W/ROTATOR CUFF RPR Right 2/5/2019    Procedure: ARTHROSCOPY SHOULDER;  Surgeon: Sangita Callahan DO;  Location:  MAIN OR;  Service: Orthopedics      Family History:     Family History   Problem Relation Age of Onset    Hypertension Brother     Hepatitis Mother     Heart disease Father         CARDIAC DISORDER       Social History:     Social History     Socioeconomic History    Marital status: /Civil Union     Spouse name: None    Number of children: 3    Years of education: None    Highest education level: None   Occupational History    Occupation: FULL TIME EMPLOYMENT   Tobacco Use    Smoking status: Never    Smokeless tobacco: Never   Vaping Use    Vaping Use: Never used   Substance and Sexual Activity    Alcohol use: Yes     Comment: occassional    Drug use: No    Sexual activity: Yes     Partners: Female   Other Topics Concern    None   Social History Narrative    Lives with wife,daughter, and 2 grandchildren    Retired        Advance directive on file     Denied domestic violence     Denied problem with Yarsani beliefs regarding medical care     Social Determinants of Health     Financial Resource Strain: Low Risk  (11/1/2023)    Overall Financial Resource Strain (CARDIA)     Difficulty of Paying Living Expenses: Not hard at all   Food Insecurity: Not on file   Transportation Needs: No Transportation Needs (11/1/2023)    PRAPARE - Transportation     Lack of Transportation (Medical): No     Lack of Transportation (Non-Medical):  No   Physical Activity: Not on file   Stress: Not on file   Social Connections: Not on file   Intimate Partner Violence: Not on file   Housing Stability: Not on file      Medications and Allergies:     Current Outpatient Medications   Medication Sig Dispense Refill    Blood Glucose Monitoring Suppl (ONE TOUCH ULTRA 2) w/Device KIT Use daily 1 kit 0    gabapentin (NEURONTIN) 600 MG tablet Take 1 tablet (600 mg total) by mouth 3 (three) times a day as needed (leg/neck pain) (Patient taking differently: Take 600 mg by mouth 2 (two) times a day) 270 tablet 1    glucose blood (ONE TOUCH ULTRA TEST) test strip Use 1 each daily 100 each 2    hydrochlorothiazide (HYDRODIURIL) 25 mg tablet Take 1 tablet (25 mg total) by mouth daily 90 tablet 1    metFORMIN (GLUCOPHAGE) 500 mg tablet Take 2 tablets (1,000 mg total) by mouth 2 (two) times a day with meals 360 tablet 1    omeprazole (PriLOSEC) 40 MG capsule take 1 capsule by mouth EVERY MORNING BEFORE BREAKFAST 30 capsule 2    OneTouch Delica Lancets 70D MISC Use 1 Device 2 (two) times a day 100 each 2    sildenafil (Viagra) 100 mg tablet Take 1 tablet (100 mg total) by mouth daily as needed for erectile dysfunction 10 tablet 5    tamsulosin (FLOMAX) 0.4 mg take 2 capsules by mouth daily with dinner PLEASE MAKE SURE THERE IS A 4 HOURS DIFFERENCE BETWEEN THE TAMSULOSIN AND SILDENAFIL 180 capsule 1    traMADol (ULTRAM) 50 mg tablet Take 1 tablet by mouth two to three times a day if needed for moderate pain 60 tablet 0    valsartan (DIOVAN) 160 mg tablet Take 1 tablet (160 mg total) by mouth daily 90 tablet 1    naloxone (NARCAN) 4 mg/0.1 mL nasal spray 0.1 mL (4 mg total) into each nostril once for 1 dose If the desired response is not obtained after 2-3 minutes, administer an additional dose using a new spray 1 each 1     No current facility-administered medications for this visit.      Allergies   Allergen Reactions    Lipitor [Atorvastatin] Other (See Comments) and Myalgia     Other reaction(s): increased pain  arthralgia    Lyrica [Pregabalin] Other (See Comments)     Pt can't remmember reaction      Immunizations:     Immunization History   Administered Date(s) Administered    COVID-19 MODERNA VACC 0.5 ML IM 02/04/2021, 03/04/2021    INFLUENZA 10/18/2013, 10/01/2015, 09/20/2017, 09/27/2018, 09/10/2019, 09/09/2020, 09/21/2021, 09/29/2022, 10/02/2023    Influenza Quadrivalent Preservative Free 3 years and older IM 09/15/2015, 11/01/2017    Influenza, high dose seasonal 0.7 mL 09/26/2018    Pneumococcal Conjugate 13-Valent 01/13/2016, 09/19/2019    Pneumococcal Polysaccharide PPV23 05/16/2018    Tdap 06/01/2016    influenza, trivalent, adjuvanted 09/19/2019      Health Maintenance:         Topic Date Due    Colorectal Cancer Screening  08/11/2021    Hepatitis C Screening  Completed         Topic Date Due    COVID-19 Vaccine (3 - Signa Holy series) 04/29/2021      Medicare Screening Tests and Risk Assessments:     Jaclyn Pugh is here for his Subsequent Wellness visit. Health Risk Assessment:   Patient rates overall health as very good. Patient feels that their physical health rating is same. Patient is satisfied with their life. Eyesight was rated as same. Hearing was rated as same. Patient feels that their emotional and mental health rating is same. Patients states they are often angry. Patient states they are always unusually tired/fatigued. Pain experienced in the last 7 days has been none. Patient states that he has experienced no weight loss or gain in last 6 months. More tired  lately    Depression Screening:   PHQ-2 Score: 0      Fall Risk Screening: In the past year, patient has experienced: no history of falling in past year      Home Safety:  Patient does not have trouble with stairs inside or outside of their home. Patient has working smoke alarms and has working carbon monoxide detector. Home safety hazards include: none. Nutrition:   Current diet is Regular. Medications:   Patient is currently taking over-the-counter supplements. OTC medications include: see medication list. Patient is able to manage medications. Activities of Daily Living (ADLs)/Instrumental Activities of Daily Living (IADLs):   Walk and transfer into and out of bed and chair?: Yes  Dress and groom yourself?: Yes    Bathe or shower yourself?: Yes    Feed yourself?  Yes  Do your laundry/housekeeping?: Yes  Manage your money, pay your bills and track your expenses?: Yes  Make your own meals?: Yes    Do your own shopping?: Yes    Previous Hospitalizations:   Any hospitalizations or ED visits within the last 12 months?: No      Advance Care Planning:   Living will: No    Durable POA for healthcare: No    Advanced directive: No    Five wishes given: No      Comments: Has paperwork at home, wife would  be POA    Cognitive Screening:   Provider or family/friend/caregiver concerned regarding cognition?: No    PREVENTIVE SCREENINGS      Cardiovascular Screening:    General: History Lipid Disorder and Screening Current      Diabetes Screening:     General: History Diabetes and Screening Current      Colorectal Cancer Screening:     General: Screening Current      Prostate Cancer Screening:    General: Screening Current      Osteoporosis Screening:    General: Screening Not Indicated      Abdominal Aortic Aneurysm (AAA) Screening:    Risk factors include: age between 70-77 yo        Lung Cancer Screening:     General: Screening Not Indicated      Hepatitis C Screening:    General: Screening Current    Screening, Brief Intervention, and Referral to Treatment (SBIRT)    Screening  Typical number of drinks in a day: 0  Typical number of drinks in a week: 0  Interpretation: Low risk drinking behavior. Single Item Drug Screening:  How often have you used an illegal drug (including marijuana) or a prescription medication for non-medical reasons in the past year? never    Single Item Drug Screen Score: 0  Interpretation: Negative screen for possible drug use disorder    Review of Current Opioid Use    Opioid Risk Tool (ORT) Interpretation: Complete Opioid Risk Tool (ORT)    No results found. Physical Exam:     /82 (BP Location: Right arm, Patient Position: Sitting, Cuff Size: Standard)   Pulse 68   Temp 97.6 °F (36.4 °C) (Temporal)   Ht 5' 6" (1.676 m)   Wt 75.4 kg (166 lb 4 oz)   SpO2 96%   BMI 26.83 kg/m²     Physical Exam  Vitals reviewed. Constitutional:       Appearance: Normal appearance. Neck:      Vascular: No carotid bruit. Cardiovascular:      Rate and Rhythm: Normal rate and regular rhythm. Pulses: Normal pulses. Heart sounds: Normal heart sounds. Pulmonary:      Effort: Pulmonary effort is normal.      Breath sounds: Normal breath sounds. Musculoskeletal:         General: No tenderness. Right lower leg: No edema. Left lower leg: No edema. Lymphadenopathy:      Cervical: No cervical adenopathy. Neurological:      Mental Status: He is alert. Psychiatric:         Mood and Affect: Mood normal.          Patricio Frias MD  BMI Counseling: Body mass index is 26.83 kg/m². The BMI is above normal. Nutrition recommendations include reducing portion sizes, decreasing overall calorie intake, 3-5 servings of fruits/vegetables daily, reducing fast food intake, and consuming healthier snacks. Exercise recommendations include exercising 3-5 times per week.

## 2023-11-01 NOTE — PATIENT INSTRUCTIONS
Weight Management   AMBULATORY CARE:   Why it is important to manage your weight:  Being overweight increases your risk of health conditions such as heart disease, high blood pressure, type 2 diabetes, and certain types of cancer. It can also increase your risk for osteoarthritis, sleep apnea, and other respiratory problems. Aim for a slow, steady weight loss. Even a small amount of weight loss can lower your risk of health problems. Risks of being overweight:  Extra weight can cause many health problems, including the following:  Diabetes (high blood sugar level)    High blood pressure or high cholesterol    Heart disease    Stroke    Gallbladder or liver disease    Cancer of the colon, breast, prostate, liver, or kidney    Sleep apnea    Arthritis or gout    Screening  is done to check for health conditions before you have signs or symptoms. If you are 28to 79years old, your blood sugar level may be checked every 3 years for signs of prediabetes or diabetes. Your healthcare provider will check your blood pressure at each visit. High blood pressure can lead to a stroke or other problems. Your provider may check for signs of heart disease, cancer, or other health problems. How to lose weight safely:  A safe and healthy way to lose weight is to eat fewer calories and get regular exercise. You can lose up about 1 pound a week by decreasing the number of calories you eat by 500 calories each day. You can decrease calories by eating smaller portion sizes or by cutting out high-calorie foods. Read labels to find out how many calories are in the foods you eat. You can also burn calories with exercise such as walking, swimming, or biking. You will be more likely to keep weight off if you make these changes part of your lifestyle. Exercise at least 30 minutes per day on most days of the week.  You can also fit in more physical activity by taking the stairs instead of the elevator or parking farther away from stores. Ask your healthcare provider about the best exercise plan for you. Healthy meal plan for weight management:  A healthy meal plan includes a variety of foods, contains fewer calories, and helps you stay healthy. A healthy meal plan includes the following:     Eat whole-grain foods more often. A healthy meal plan should contain fiber. Fiber is the part of grains, fruits, and vegetables that is not broken down by your body. Whole-grain foods are healthy and provide extra fiber in your diet. Some examples of whole-grain foods are whole-wheat breads and pastas, oatmeal, brown rice, and bulgur. Eat a variety of vegetables every day. Include dark, leafy greens such as spinach, kale, micah greens, and mustard greens. Eat yellow and orange vegetables such as carrots, sweet potatoes, and winter squash. Eat a variety of fruits every day. Choose fresh or canned fruit (canned in its own juice or light syrup) instead of juice. Fruit juice has very little or no fiber. Eat low-fat dairy foods. Drink fat-free (skim) milk or 1% milk. Eat fat-free yogurt and low-fat cottage cheese. Try low-fat cheeses such as mozzarella and other reduced-fat cheeses. Choose meat and other protein foods that are low in fat. Choose beans or other legumes such as split peas or lentils. Choose fish, skinless poultry (chicken or turkey), or lean cuts of red meat (beef or pork). Before you cook meat or poultry, cut off any visible fat. Use less fat and oil. Try baking foods instead of frying them. Add less fat, such as margarine, sour cream, regular salad dressing and mayonnaise to foods. Eat fewer high-fat foods. Some examples of high-fat foods include french fries, doughnuts, ice cream, and cakes. Eat fewer sweets. Limit foods and drinks that are high in sugar. This includes candy, cookies, regular soda, and sweetened drinks. Ways to decrease calories:   Eat smaller portions.      Use a small plate with smaller servings. Do not eat second helpings. When you eat at a restaurant, ask for a box and place half of your meal in the box before you eat. Share an entrée with someone else. Replace high-calorie snacks with healthy, low-calorie snacks. Choose fresh fruit, vegetables, fat-free rice cakes, or air-popped popcorn instead of potato chips, nuts, or chocolate. Choose water or calorie-free drinks instead of soda or sweetened drinks. Do not shop for groceries when you are hungry. You may be more likely to make unhealthy food choices. Take a grocery list of healthy foods and shop after you have eaten. Eat regular meals. Do not skip meals. Skipping meals can lead to overeating later in the day. This can make it harder for you to lose weight. Eat a healthy snack in place of a meal if you do not have time to eat a regular meal. Talk with a dietitian to help you create a meal plan and schedule that is right for you. Other things to consider as you try to lose weight:   Be aware of situations that may give you the urge to overeat, such as eating while watching television. Find ways to avoid these situations. For example, read a book, go for a walk, or do crafts. Meet with a weight loss support group or friends who are also trying to lose weight. This may help you stay motivated to continue working on your weight loss goals. © Copyright Lana Conrad 2023 Information is for End User's use only and may not be sold, redistributed or otherwise used for commercial purposes. The above information is an  only. It is not intended as medical advice for individual conditions or treatments. Talk to your doctor, nurse or pharmacist before following any medical regimen to see if it is safe and effective for you. Type 2 Diabetes Management for Adults   AMBULATORY CARE:   Type 2 diabetes  is a disease that affects how your body uses glucose (sugar).  Either your body cannot make enough insulin, or it cannot use the insulin correctly. It is important to keep diabetes controlled to prevent damage to your heart, blood vessels, and other organs. Management will help you feel well and enjoy your daily activities. Your diabetes care team providers can help you make a plan to fit diabetes care into your schedule. Your plan can change over time to fit your needs and your family's needs. Have someone call your local emergency number (911 in the [address]) if:   You cannot be woken. You have signs of diabetic ketoacidosis:     confusion, fatigue    vomiting    rapid heartbeat    fruity smelling breath    extreme thirst    dry mouth and skin    You have any of the following signs of a heart attack:      Squeezing, pressure, or pain in your chest    You may  also have any of the following:     Discomfort or pain in your back, neck, jaw, stomach, or arm    Shortness of breath    Nausea or vomiting    Lightheadedness or a sudden cold sweat    You have any of the following signs of a stroke:      Numbness or drooping on one side of your face     Weakness in an arm or leg    Confusion or difficulty speaking    Dizziness, a severe headache, or vision loss    Call your doctor or diabetes care team provider if:   You have a sore or wound that will not heal.    You have a change in the amount you urinate. Your blood sugar levels are higher than your target goals. You often have lower blood sugar levels than your target goals. Your skin is red, dry, warm, or swollen. You have trouble coping with diabetes, or you feel anxious or depressed. You have trouble following any part of your care plan, such as your meal plan. You have questions or concerns about your condition or care. What you need to know about high blood sugar levels:  High blood sugar levels may not cause any symptoms.  You may feel more thirsty or urinate more often than usual. Over time, high blood sugar levels can damage your nerves, blood vessels, tissues, and organs. The following can increase your blood sugar levels:  Large meals or large amounts of carbohydrates at one time    Less physical activity    Stress    Illness    A lower dose of diabetes medicine or insulin, or a late dose    What you need to know about low blood sugar levels:  Symptoms include feeling shaky, dizzy, irritable, or confused. You can prevent symptoms by keeping your blood sugar levels from going too low. Treat a low blood sugar level right away:      Drink 4 ounces of juice or have 1 tube of glucose gel. Check your blood sugar level again 10 to 15 minutes later. When the level goes back to normal, eat a meal or snack to prevent another decrease. Keep glucose gel, raisins, or hard candy with you at all times to treat a low blood sugar level. Your blood sugar level can get too low if you take diabetes medicine or insulin and do not eat enough food. If you use insulin, check your blood sugar level before you exercise. If your blood sugar level is below 100 mg/dL, eat 4 crackers or 2 ounces of raisins, or drink 4 ounces of juice. Check your level every 30 minutes if you exercise longer than 1 hour. You may need a snack during or after exercise. What you can do to manage your blood sugar levels:   Check your blood sugar levels as directed and as needed. Several items are available to use to check your levels. You may need to check by testing a drop of blood in a glucose monitor. You may instead be given a continuous glucose monitoring (CGM) device. The device is worn at all times. The CGM checks your blood sugar level every 5 minutes. It sends results to an electronic device such as a smart phone. A CGM can be used with or without an insulin pump. You and your diabetes care team providers will decide on the best method for you.  The goal for blood sugar levels before meals  is between 80 and 130 mg/dL and 2 hours after eating  is lower than 180 mg/dL. Make healthy food choices. Work with a dietitian to create a meal plan that works for you and your schedule. A dietitian can help you learn how to eat the right amount of carbohydrates (sugar and starchy foods) during your meals and snacks. Examples of carbohydrates are breads, cereals, rice, pasta, fruit, low-fat dairy, and sweets. Carbohydrates can raise your blood sugar level if you eat too many at one time. Eat high-fiber foods as directed. Fiber helps improve blood sugar levels. Fiber also lowers your risk for heart disease and other problems diabetes can cause. Examples of high-fiber foods include vegetables, whole-grain bread, and beans such as moran beans. Your dietitian can tell you how much fiber to have each day. Get regular physical activity. Physical activity can help you get to your target blood sugar level goal and manage your weight. Get at least 150 minutes of moderate to vigorous aerobic physical activity each week. Resistance training, such as lifting weights, should be done 3 times each week. Do not miss more than 2 days of physical activity in a row. Do not sit longer than 30 minutes at a time. Your healthcare provider can help you create an activity plan. The plan can include the best activities for you and can help you build your strength and endurance. Maintain a healthy weight. Ask your team what a healthy weight is for you. A healthy weight can help you control diabetes and prevent heart disease. Ask your team to help you create a weight-loss plan, if needed. Even a loss of 3% to 7% of your excess body weight can help make a difference in managing diabetes. Your team will help you set a weight-loss goal, such as 10 to 15 pounds, or 5% of your extra weight. Together you and your team can set manageable weight-loss goals. Take your diabetes medicine or insulin as directed.   You may need diabetes medicine, insulin, or both to help control your blood sugar levels. Your healthcare provider will teach you how and when to take your diabetes medicine or insulin. You will also be taught about side effects oral diabetes medicine can cause. Insulin may be injected or given through a pump or pen. You and your providers will decide on the best method for you: An insulin pump  is an implanted device that gives your insulin 24 hours a day. An insulin pump prevents the need for multiple insulin injections in a day. An insulin pen  is a device prefilled with the right amount of insulin. You and your family members will be taught how to draw up and give insulin  if this is the best method for you. Your providers will also teach you how to dispose of needles and syringes. You will learn how much insulin you need  and when to give it. You will be taught when not to give insulin. You will also be taught what to do if your blood sugar level drops too low. This may happen if you take insulin and do not eat the right amount of carbohydrates. More ways to manage type 2 diabetes:   Wear medical alert identification. Wear medical alert jewelry or carry a card that says you have diabetes. Ask your provider where to get these items. Do not smoke. Nicotine and other chemicals in cigarettes and cigars can cause lung and blood vessel damage. It also makes it more difficult to manage your diabetes. Ask your provider for information if you currently smoke and need help to quit. Do not use e-cigarettes or smokeless tobacco in place of cigarettes or to help you quit. They still contain nicotine. Check your feet each day for cuts, scratches, calluses, or other wounds. Look for redness and swelling, and feel for warmth. Wear shoes that fit well. Check your shoes for rocks or other objects that can hurt your feet. Do not walk barefoot or wear shoes without socks. Wear cotton socks to help keep your feet dry.          Ask about vaccines you may need.  You have a higher risk for serious illness if you get the flu, pneumonia, COVID-19, or hepatitis. Ask your provider if you should get vaccines to prevent these or other diseases, and when to get the vaccines. Talk to your provider if you become stressed about diabetes care. Sometimes being able to fit diabetes care into your life can cause increased stress. The stress can cause you not to take care of yourself properly. Your provider can help by offering tips about self-care. A mental health provider can listen and offer help with self-care issues. Other types of counseling can help you make nutrition or physical activity changes. Have your A1c checked as directed. Your provider may check your A1c every 3 months, or 2 times each year if your diabetes is controlled. An A1c test shows the average amount of sugar in your blood over the past 2 to 3 months. Your provider will tell you what your A1c level should be. Have screening tests as directed. Your provider may recommend screening for complications of diabetes and other conditions that may develop. Some screenings may begin right away and some may happen within the first 5 years of diagnosis:    Examples of diabetes complications  include kidney problems, high cholesterol, high blood pressure, blood vessel problems, eye problems, and sleep apnea. You may be screened for a low vitamin B level  if you take oral diabetes medicine for a long time. You may be screened for polycystic ovarian syndrome (PCOS)  if you are of childbearing age. Follow up with your doctor or diabetes care team providers as directed: You may need to have blood tests done before your follow-up visit. The test results will show if changes need to be made in your treatment or self-care. Talk to your provider if you cannot afford your medicine. Write down your questions so you remember to ask them during your visits.   © Copyright [employer] 2023 Information is for End User's use only and may not be sold, redistributed or otherwise used for commercial purposes. The above information is an  only. It is not intended as medical advice for individual conditions or treatments. Talk to your doctor, nurse or pharmacist before following any medical regimen to see if it is safe and effective for you. Basic Carbohydrate Counting   AMBULATORY CARE:   Carbohydrate counting  is a way to plan your meals by counting the amount of carbohydrate in foods. Carbohydrates are the sugars, starches, and fiber found in fruit, grains, vegetables, and milk products. Carbohydrates increase your blood sugar levels. Carbohydrate counting can help you eat the right amount of carbohydrate to keep your blood sugar levels under control. What you need to know about planning meals using carbohydrate counting:  A dietitian or healthcare provider will help you develop a healthy meal plan that works best for you. You will be taught how much carbohydrate to eat or drink for each meal and snack. Your meal plan will be based on your age, weight, usual food intake, and physical activity level. If you have diabetes, it will also include your blood sugar levels and diabetes medicine. Once you know how much carbohydrate you should eat, you can decide what type of food you want to eat. You will need to know what foods contain carbohydrate and how much they contain. Keep track of the amount of carbohydrate in meals and snacks in order to follow your meal plan. Do not avoid carbohydrates or skip meals. Your blood sugar may fall too low if you do not eat enough carbohydrate or you skip meals. Foods that contain carbohydrate:   Breads:  Each serving of food listed below contains about 15 g of carbohydrate .     1 slice of bread (1 ounce) or 1 flour or corn tortilla (6 inch)    ½ of a hamburger bun or ¼ of a large bagel (about 1 ounce)    1 pancake (about 4 inches across and ¼ inch thick)    Cereals and grains:  Serving sizes of ready-to-eat cereals vary. Look at the serving size and the total carbohydrate amount listed on the food label. Each serving of food listed below contains about 15 g of carbohydrate . ¾ cup of dry, unsweetened, ready-to-eat cereal or ¼ cup of low-fat granola     ½ cup of oatmeal or other cooked cereal     ? cup of cooked rice or pasta    Starchy vegetables and beans:  Each serving of food listed below contains about 15 g of carbohydrate . ½ cup of corn, green peas, sweet potatoes, or mashed potatoes    ¼ of a large baked potato    ½ cup of beans, lentils, and peas (garbanzo, moran, kidney, white, split, black-eyed)    Crackers and snacks:  Each serving of food listed below contains about 15 g of carbohydrate . 3 zenaida cracker squares or 8 animal crackers     6 saltine-type crackers    3 cups of popcorn or ¾ ounce of pretzels, potato chips, or tortilla chips    Fruit:  Each serving of food listed below contains about 15 g of carbohydrate . 1 small (4 ounce) piece of fresh fruit or ¾ to 1 cup of fresh fruit    ½ cup of canned or frozen fruit, packed in natural juice    ½ cup (4 ounces) of unsweetened fruit juice    2 tablespoons of dried fruit    Desserts or sugary foods:  Each serving of food listed below contains about 15 g of carbohydrate . 2-inch square unfrosted cake or brownie     2 small cookies    ½ cup of ice cream, frozen yogurt, or nondairy frozen yogurt    ¼ cup of sherbet or sorbet    1 tablespoon of regular syrup, jam, or jelly    2 tablespoons of light syrup    Milk and yogurt:  Foods from the milk group contain about 12 g of carbohydrate per serving. 1 cup of fat-free or low-fat milk    1 cup of soy milk    ? cup of fat-free, yogurt sweetened with artificial sweetener    Non-starchy vegetables:  Each serving contains about 5 g of carbohydrate . Three servings of non-starch vegetables count as 1 carbohydrate serving.      ½ cup of cooked vegetables or 1 cup of raw vegetables. This includes beets, broccoli, cabbage, cauliflower, cucumber, mushrooms, tomatoes, and zucchini    ½ cup of vegetable juice    How to use carbohydrate counting to plan meals:   Count carbohydrate amounts using serving sizes:      Pasta dinner example: You plan to have pasta, tossed salad, and an 8-ounce glass of milk. Your healthcare provider tells you that you may have 4 carbohydrate servings for dinner. One carbohydrate serving of pasta is ? cup. One cup of pasta will equal 3 carbohydrate servings. An 8-ounce glass of milk will count as 1 carbohydrate serving. These amounts of food would equal 4 carbohydrate servings. One cup of tossed salad does not count toward your carbohydrate servings. Count carbohydrate amounts using food labels:  Find the total amount of carbohydrate in a packaged food by reading the food label. Food labels tell you the serving size of the food and the total carbohydrate amount in each serving. Find the serving size on the food label and then decide how many servings you will eat. Multiply the number of servings you plan to eat by the carbohydrate amount per serving. Granola bar snack example: Your meal plan allows you to have 2 carbohydrate servings (30 grams) of carbohydrate for a snack. You plan to eat 1 package of granola bars, which contains 2 bars. According to the food label, the serving size of food in this package is 1 bar. Each serving (1 bar) contains 25 grams of carbohydrate. The total amount of carbohydrate in this package of granola bars would be 50 g. Based on your meal plan, you should eat only 1 bar. Follow up with your doctor as directed:  Write down your questions so you remember to ask them during your visits. © Copyright Loletta Gosselin 2023 Information is for End User's use only and may not be sold, redistributed or otherwise used for commercial purposes. The above information is an  only.  It is not intended as medical advice for individual conditions or treatments. Talk to your doctor, nurse or pharmacist before following any medical regimen to see if it is safe and effective for you.

## 2023-11-20 DIAGNOSIS — K20.90 ESOPHAGITIS: ICD-10-CM

## 2023-11-20 RX ORDER — OMEPRAZOLE 40 MG/1
40 CAPSULE, DELAYED RELEASE ORAL
Qty: 30 CAPSULE | Refills: 2 | Status: SHIPPED | OUTPATIENT
Start: 2023-11-20

## 2023-11-22 LAB
LEFT EYE DIABETIC RETINOPATHY: NORMAL
RIGHT EYE DIABETIC RETINOPATHY: NORMAL
SEVERITY (EYE EXAM): NORMAL

## 2023-12-06 DIAGNOSIS — G95.0 SYRINGOMYELIA (HCC): ICD-10-CM

## 2023-12-06 DIAGNOSIS — F11.20 CONTINUOUS OPIOID DEPENDENCE (HCC): ICD-10-CM

## 2023-12-06 RX ORDER — TRAMADOL HYDROCHLORIDE 50 MG/1
TABLET ORAL
Qty: 60 TABLET | Refills: 0 | Status: SHIPPED | OUTPATIENT
Start: 2023-12-06

## 2023-12-06 NOTE — TELEPHONE ENCOUNTER
Yes, good morning. My name is Marie Castillo. I called the list Monday morning to have my dorota prescription refilled. I needed my Tramadol. My  [id number]. My phone number is [phone]. [facility] pharmacy on the [address] [place]. in the [place]. My Tramadol is a is a 50 milligram  It's a for 30 day supply. Again, my name is Marie Castillo. My date of birth is [birth year]. My phone number is [phone]. I need the medication called Tramadol and I have only one day of day supply. Please, I need. OK. Thank you.  Bye, bye.

## 2024-01-05 DIAGNOSIS — G95.0 SYRINGOMYELIA (HCC): ICD-10-CM

## 2024-01-05 DIAGNOSIS — F11.20 CONTINUOUS OPIOID DEPENDENCE (HCC): ICD-10-CM

## 2024-01-05 RX ORDER — TRAMADOL HYDROCHLORIDE 50 MG/1
TABLET ORAL
Qty: 60 TABLET | Refills: 0 | Status: SHIPPED | OUTPATIENT
Start: 2024-01-05

## 2024-01-05 NOTE — TELEPHONE ENCOUNTER
Pt called in and left following vm:    Yes, hi my name is Stanislav Sommers. My date of birth is [birth year]. My phone number is [phone]. I am in need of my medication called Tramadol. Again. Again the Tramadol is a is a 30 day supply 50 milligram. I am with the [facility] on [address] In [facility]. I'm a [facility] patient again. My name is Stanislav Bowie. My date of birth is [birth year]. My phone number is [phone]. I am in need of my medication. Right. Tramadol. I am on my last day. I need to buy tomorrow. Thank you. Bye, bye.

## 2024-01-18 ENCOUNTER — VBI (OUTPATIENT)
Dept: ADMINISTRATIVE | Facility: OTHER | Age: 74
End: 2024-01-18

## 2024-01-19 NOTE — TELEPHONE ENCOUNTER
01/18/24 8:24 PM     [facility] [employer] SmartForm used to document caregap status.    Silvia Mcpherson MA

## 2024-02-01 DIAGNOSIS — G95.0 SYRINGOMYELIA (HCC): ICD-10-CM

## 2024-02-01 DIAGNOSIS — F11.20 CONTINUOUS OPIOID DEPENDENCE (HCC): ICD-10-CM

## 2024-02-01 RX ORDER — TRAMADOL HYDROCHLORIDE 50 MG/1
TABLET ORAL
Qty: 180 TABLET | Refills: 0 | Status: SHIPPED | OUTPATIENT
Start: 2024-02-01

## 2024-02-01 NOTE — TELEPHONE ENCOUNTER
Patient asking for a larger supply so that he doesn't have to call every month    Reason for call:   [x] Refill   [] Prior Auth  [] Other:     Office:   [x] PCP/Provider - Radha AVILES / [facility]  [] Specialty/Provider -     Medication: tramadol    Dose/Frequency: 50mg bid to tid (patient reports taking bid)    Quantity: 180    Pharmacy: [id number]  [place] - [address] [place] [address].     Does the patient have enough for 3 days?   [] Yes   [x] No - Send as HP to POD

## 2024-02-08 DIAGNOSIS — K20.90 ESOPHAGITIS: ICD-10-CM

## 2024-02-08 RX ORDER — OMEPRAZOLE 40 MG/1
40 CAPSULE, DELAYED RELEASE ORAL
Qty: 30 CAPSULE | Refills: 5 | Status: SHIPPED | OUTPATIENT
Start: 2024-02-08

## 2024-02-16 ENCOUNTER — APPOINTMENT (OUTPATIENT)
Dept: LAB | Facility: MEDICAL CENTER | Age: 74
End: 2024-02-16
Payer: MEDICARE

## 2024-02-16 DIAGNOSIS — R53.83 OTHER FATIGUE: ICD-10-CM

## 2024-02-16 DIAGNOSIS — E78.5 HYPERLIPIDEMIA, UNSPECIFIED HYPERLIPIDEMIA TYPE: ICD-10-CM

## 2024-02-16 DIAGNOSIS — Z79.891 LONG TERM (CURRENT) USE OF OPIATE ANALGESIC: ICD-10-CM

## 2024-02-16 DIAGNOSIS — E11.9 TYPE 2 DIABETES MELLITUS WITHOUT COMPLICATION, WITHOUT LONG-TERM CURRENT USE OF INSULIN (HCC): ICD-10-CM

## 2024-02-16 LAB
ALBUMIN SERPL BCP-MCNC: 4.1 G/DL (ref 3.5–5)
ALP SERPL-CCNC: 67 U/L (ref 34–104)
ALT SERPL W P-5'-P-CCNC: 14 U/L (ref 7–52)
ANION GAP SERPL CALCULATED.3IONS-SCNC: 5 MMOL/L
AST SERPL W P-5'-P-CCNC: 11 U/L (ref 13–39)
BILIRUB SERPL-MCNC: 0.41 MG/DL (ref 0.2–1)
BUN SERPL-MCNC: 22 MG/DL (ref 5–25)
CALCIUM SERPL-MCNC: 9.6 MG/DL (ref 8.4–10.2)
CHLORIDE SERPL-SCNC: 105 MMOL/L (ref 96–108)
CHOLEST SERPL-MCNC: 186 MG/DL
CO2 SERPL-SCNC: 31 MMOL/L (ref 21–32)
CREAT SERPL-MCNC: 0.97 MG/DL (ref 0.6–1.3)
CREAT UR-MCNC: 113.5 MG/DL
EST. AVERAGE GLUCOSE BLD GHB EST-MCNC: 163 MG/DL
GFR SERPL CREATININE-BSD FRML MDRD: 78 ML/MIN/1.73SQ M
GLUCOSE P FAST SERPL-MCNC: 168 MG/DL (ref 65–99)
HBA1C MFR BLD: 7.3 %
HDLC SERPL-MCNC: 38 MG/DL
LDLC SERPL CALC-MCNC: 122 MG/DL (ref 0–100)
MICROALBUMIN UR-MCNC: 39.7 MG/L
MICROALBUMIN/CREAT 24H UR: 35 MG/G CREATININE (ref 0–30)
NONHDLC SERPL-MCNC: 148 MG/DL
POTASSIUM SERPL-SCNC: 4.5 MMOL/L (ref 3.5–5.3)
PROT SERPL-MCNC: 7.5 G/DL (ref 6.4–8.4)
SODIUM SERPL-SCNC: 141 MMOL/L (ref 135–147)
TRIGL SERPL-MCNC: 128 MG/DL
TSH SERPL DL<=0.05 MIU/L-ACNC: 1.94 UIU/ML (ref 0.45–4.5)
VIT B12 SERPL-MCNC: 500 PG/ML (ref 180–914)

## 2024-02-16 PROCEDURE — 36415 COLL VENOUS BLD VENIPUNCTURE: CPT

## 2024-02-16 PROCEDURE — 82570 ASSAY OF URINE CREATININE: CPT

## 2024-02-16 PROCEDURE — 84443 ASSAY THYROID STIM HORMONE: CPT

## 2024-02-16 PROCEDURE — 82607 VITAMIN B-12: CPT

## 2024-02-16 PROCEDURE — 83036 HEMOGLOBIN GLYCOSYLATED A1C: CPT

## 2024-02-16 PROCEDURE — 80053 COMPREHEN METABOLIC PANEL: CPT

## 2024-02-16 PROCEDURE — 82043 UR ALBUMIN QUANTITATIVE: CPT

## 2024-02-16 PROCEDURE — 80061 LIPID PANEL: CPT

## 2024-02-29 ENCOUNTER — TELEPHONE (OUTPATIENT)
Age: 74
End: 2024-02-29

## 2024-02-29 NOTE — TELEPHONE ENCOUNTER
Stanislav requesting Valsartan 160 mg- advised 11/1/23 office sent refill for 90 tablets with additional refill. HE states bottle says no refills- call made to [facility] and spoke with [place]- she confirmed they do have refill on file for him and will start the refill process.     Stanislav advised pharmacy has refill and will start refill process, he was thankful for this information.

## 2024-03-13 ENCOUNTER — RA CDI HCC (OUTPATIENT)
Dept: OTHER | Facility: HOSPITAL | Age: 74
End: 2024-03-13

## 2024-03-13 NOTE — PROGRESS NOTES
----- Message from Mika Ackerman [address]  What is the best way for the office to contact you? OK to leave message on   voicemail  Preferred Call Back Phone Number?  [phone] HCC coding opportunities          Chart Reviewed number of suggestions sent to Provider: 1     Patients Insurance   E11.36  Medicare Insurance: Medicare

## 2024-03-19 ENCOUNTER — OFFICE VISIT (OUTPATIENT)
Dept: FAMILY MEDICINE CLINIC | Facility: CLINIC | Age: 74
End: 2024-03-19
Payer: MEDICARE

## 2024-03-19 VITALS
WEIGHT: 165 LBS | HEART RATE: 64 BPM | HEIGHT: 66 IN | BODY MASS INDEX: 26.52 KG/M2 | SYSTOLIC BLOOD PRESSURE: 134 MMHG | DIASTOLIC BLOOD PRESSURE: 72 MMHG

## 2024-03-19 DIAGNOSIS — F11.20 CONTINUOUS OPIOID DEPENDENCE (HCC): ICD-10-CM

## 2024-03-19 DIAGNOSIS — G95.0 SYRINGOMYELIA (HCC): ICD-10-CM

## 2024-03-19 DIAGNOSIS — E78.5 HYPERLIPIDEMIA, UNSPECIFIED HYPERLIPIDEMIA TYPE: ICD-10-CM

## 2024-03-19 DIAGNOSIS — E11.9 TYPE 2 DIABETES MELLITUS WITHOUT COMPLICATION, WITHOUT LONG-TERM CURRENT USE OF INSULIN (HCC): Primary | ICD-10-CM

## 2024-03-19 DIAGNOSIS — N52.9 ERECTILE DYSFUNCTION, UNSPECIFIED ERECTILE DYSFUNCTION TYPE: ICD-10-CM

## 2024-03-19 DIAGNOSIS — Z12.11 SCREEN FOR COLON CANCER: ICD-10-CM

## 2024-03-19 PROCEDURE — 99213 OFFICE O/P EST LOW 20 MIN: CPT | Performed by: FAMILY MEDICINE

## 2024-03-19 PROCEDURE — 99203 OFFICE O/P NEW LOW 30 MIN: CPT | Performed by: FAMILY MEDICINE

## 2024-03-19 RX ORDER — SILDENAFIL 100 MG/1
100 TABLET, FILM COATED ORAL DAILY PRN
Qty: 10 TABLET | Refills: 5 | Status: SHIPPED | OUTPATIENT
Start: 2024-03-19

## 2024-03-19 RX ORDER — LANCETS 30 GAUGE
1 EACH MISCELLANEOUS 2 TIMES DAILY
Qty: 100 EACH | Refills: 1 | Status: SHIPPED | OUTPATIENT
Start: 2024-03-19

## 2024-03-19 RX ORDER — TRAMADOL HYDROCHLORIDE 50 MG/1
TABLET ORAL
Qty: 180 TABLET | Refills: 0 | Status: SHIPPED | OUTPATIENT
Start: 2024-03-19

## 2024-03-19 RX ORDER — TRAMADOL HYDROCHLORIDE 50 MG/1
TABLET ORAL
Qty: 180 TABLET | Refills: 0 | Status: CANCELLED | OUTPATIENT
Start: 2024-03-19

## 2024-03-19 RX ORDER — ROSUVASTATIN CALCIUM 10 MG/1
10 TABLET, COATED ORAL 2 TIMES WEEKLY
Qty: 36 TABLET | Refills: 3 | Status: SHIPPED | OUTPATIENT
Start: 2024-03-21

## 2024-03-19 NOTE — PROGRESS NOTES
Name: Stanislav Glover      : 1950      MRN: [id number]  Encounter Provider: Shanti Kumar MD  Encounter Date: 3/19/2024   Encounter department: [facility] PRIMARY CARE    Assessment & Plan     1. Type 2 diabetes mellitus without complication, without long-term current use of insulin (HCC)  -     OneTouch Delica Lancets 30G MISC; Use 1 Device 2 (two) times a day  -     glucose blood (ONE TOUCH ULTRA TEST) test strip; Use 1 each daily  -     Comprehensive metabolic panel; Future; Expected date: 2024  -     Hemoglobin A1C; Future; Expected date: 2024  -     Albumin / creatinine urine ratio; Future; Expected date: 2024    2. Syringomyelia (HCC)  Assessment & Plan:  Last  refill , utd  uds and  contract, pain under  control on Tramadol    Orders:  -     traMADol (ULTRAM) 50 mg tablet; Take 1 tablet by mouth two to three times a day if needed for moderate pain    3. Continuous opioid dependence (HCC)  Assessment & Plan:  Last  refill , utd  uds and  contract, pain under  control on Tramadol    Orders:  -     traMADol (ULTRAM) 50 mg tablet; Take 1 tablet by mouth two to three times a day if needed for moderate pain    4. Erectile dysfunction, unspecified erectile dysfunction type  -     sildenafil (Viagra) 100 mg tablet; Take 1 tablet (100 mg total) by mouth daily as needed for erectile dysfunction    5. Hyperlipidemia, unspecified hyperlipidemia type  Assessment & Plan:  Rec  low  fat cheese and decrease lunch meat,goal for  ldl  100,  trial of Rosuvastatin 5 bid    Orders:  -     rosuvastatin (CRESTOR) 10 MG tablet; Take 1 tablet (10 mg total) by mouth 2 (two) times a week  -     Lipid panel; Future; Expected date: 2024    6. Screen for colon cancer  -     Ambulatory Referral to Gastroenterology; Future           Subjective      Patient presents with:  Follow-up: Follow up to chronic conditions and review bw results.  mjs    His LDL is coming down from - but still not  at 100 his sugar came down to 7.3% from 8.3% and he is trying to follow his diet he feels pretty good no chest pain no shortness of breath still has the pain from from his syringomyelia and is stable on his tramadol      Review of Systems   Constitutional:  Negative for activity change, appetite change and fatigue.   Respiratory:  Negative for shortness of breath.    Cardiovascular:  Negative for chest pain.   Musculoskeletal:  Positive for arthralgias and back pain.   Neurological:  Negative for dizziness, light-headedness and headaches.       Current Outpatient Medications on File Prior to Visit   Medication Sig    Blood Glucose Monitoring Suppl (ONE TOUCH ULTRA 2) w/Device KIT Use daily    gabapentin (NEURONTIN) 600 MG tablet Take 1 tablet (600 mg total) by mouth 3 (three) times a day as needed (leg/neck pain) (Patient taking differently: Take 600 mg by mouth 2 (two) times a day)    hydrochlorothiazide (HYDRODIURIL) 25 mg tablet Take 1 tablet (25 mg total) by mouth daily    metFORMIN (GLUCOPHAGE) 500 mg tablet Take 2 tablets (1,000 mg total) by mouth 2 (two) times a day with meals    omeprazole (PriLOSEC) 40 MG capsule take 1 capsule by mouth EVERY MORNING BEFORE BREAKFAST    tamsulosin (FLOMAX) 0.4 mg take 2 capsules by mouth daily with dinner PLEASE MAKE SURE THERE IS A 4 HOURS DIFFERENCE BETWEEN THE TAMSULOSIN AND SILDENAFIL    valsartan (DIOVAN) 160 mg tablet Take 1 tablet (160 mg total) by mouth daily    [DISCONTINUED] glucose blood (ONE TOUCH ULTRA TEST) test strip Use 1 each daily    [DISCONTINUED] OneTouch Delica Lancets 30G MISC Use 1 Device 2 (two) times a day    [DISCONTINUED] sildenafil (Viagra) 100 mg tablet Take 1 tablet (100 mg total) by mouth daily as needed for erectile dysfunction    [DISCONTINUED] traMADol (ULTRAM) 50 mg tablet Take 1 tablet by mouth two to three times a day if needed for moderate pain    naloxone (NARCAN) 4 mg/0.1 mL nasal spray 0.1 mL (4 mg total) into each nostril once for 1  "dose If the desired response is not obtained after 2-3 minutes, administer an additional dose using a new spray       Objective     /72   Pulse 64   Ht 5' 6\" (1.676 m)   Wt 74.8 kg (165 lb)   BMI 26.63 kg/m²     Physical Exam  Vitals reviewed.   Constitutional:       Appearance: Normal appearance.   Neck:      Vascular: No carotid bruit.   Cardiovascular:      Rate and Rhythm: Normal rate and regular rhythm.      Pulses: Normal pulses.      Heart sounds: Normal heart sounds.   Pulmonary:      Effort: Pulmonary effort is normal.      Breath sounds: Normal breath sounds.   Musculoskeletal:      Right lower leg: No edema.      Left lower leg: No edema.   Lymphadenopathy:      Cervical: No cervical adenopathy.   Neurological:      Mental Status: He is alert.   Psychiatric:         Mood and Affect: Mood normal.       Shanti Kumar MD    "

## 2024-03-22 LAB
LEFT EYE DIABETIC RETINOPATHY: NORMAL
RIGHT EYE DIABETIC RETINOPATHY: NORMAL

## 2024-04-04 ENCOUNTER — OFFICE VISIT (OUTPATIENT)
Dept: UROLOGY | Facility: CLINIC | Age: 74
End: 2024-04-04
Payer: MEDICARE

## 2024-04-04 ENCOUNTER — TELEPHONE (OUTPATIENT)
Age: 74
End: 2024-04-04

## 2024-04-04 VITALS
BODY MASS INDEX: 27.12 KG/M2 | HEART RATE: 74 BPM | OXYGEN SATURATION: 95 % | SYSTOLIC BLOOD PRESSURE: 160 MMHG | DIASTOLIC BLOOD PRESSURE: 90 MMHG | WEIGHT: 168 LBS

## 2024-04-04 DIAGNOSIS — N40.1 BENIGN LOCALIZED PROSTATIC HYPERPLASIA WITH LOWER URINARY TRACT SYMPTOMS (LUTS): ICD-10-CM

## 2024-04-04 DIAGNOSIS — N52.9 ERECTILE DYSFUNCTION, UNSPECIFIED ERECTILE DYSFUNCTION TYPE: ICD-10-CM

## 2024-04-04 DIAGNOSIS — Z12.5 SCREENING FOR PROSTATE CANCER: Primary | ICD-10-CM

## 2024-04-04 PROCEDURE — 99213 OFFICE O/P EST LOW 20 MIN: CPT | Performed by: PHYSICIAN ASSISTANT

## 2024-04-04 RX ORDER — SILDENAFIL 100 MG/1
100 TABLET, FILM COATED ORAL DAILY PRN
Qty: 10 TABLET | Refills: 5 | Status: SHIPPED | OUTPATIENT
Start: 2024-04-04

## 2024-04-04 RX ORDER — TAMSULOSIN HYDROCHLORIDE 0.4 MG/1
0.4 CAPSULE ORAL 2 TIMES DAILY
Qty: 180 CAPSULE | Refills: 3 | Status: SHIPPED | OUTPATIENT
Start: 2024-04-04

## 2024-04-04 NOTE — TELEPHONE ENCOUNTER
COLONOSCOPY  MIRALAX/Dulcolax Bowel Preparation Instructions    The OR/GI Lab will contact you the evening prior to your procedure with your exact arrival time.    Our practice requires a 1 week notice for any cancellations or rescheduling. We kindly ask that you immediately notify us of any changes including any new medications that are prescribed. Thank you for your cooperation.     WEEK BEFORE YOUR PROCEDURE:  Stop taking Iron tablets.  5 days prior, AVOID vegetables and fruits with skins or seeds, nuts, corn, popcorn and whole grain breads.   Purchase: One (1) 238-gram container of Miralax (polyethylene glycol 3350), four (4) 5 mg Dulcolax (bisacodyl) tablets, and one (1) 64-ounce bottle of Gatorade (sports drink) - no red, orange, or purple. These may be purchased at any pharmacy without a prescription. Generic products are permissible.   Arrange responsible transportation for day of the procedure.     DAY BEFORE THE PROCEDURE:   CLEAR liquids only for entire day prior. Nothing red, orange or purple.    You MAY have:                                                               Soda  Water  Broth Gatorade  Jello  Popsicles Coffee/tea without milk/creamer     YOU MAY NOT HAVE:  Solid foods   Milk and milk products    Juice with pulp    BOWEL PREPARATION:  Includes: One (1) 238-gram container of Miralax (polyethylene glycol 3350), four (4) 5 mg Dulcolax (bisacodyl) tablets, and one (1) 64-ounce bottle of Gatorade (sports drink).  Preparation may be refrigerated.  Entire bowel prep should be completed.     Afternoon before the procedure (2:00 pm - 5:00 pm):    Take two (2) 5 mg Dulcolax laxative tablets.     Evening before the procedure (6:00 pm):  Mix entire container of Miralax with one (1) 64-ounce bottle of Gatorade and shake until all medication is dissolved.   Begin drinking solution. Drink an eight (8) ounce cup every 10-15 minutes until you have consumed half (32 ounces) of the solution.  Refrigerate  remaining solution.    Night before the procedure (8:00 pm):  Take two (2) 5 mg Dulcolax laxative tablets.     Beginning 5 hours before your procedure:  Drink the remaining amount of prepared solution (32 ounces).  Drink an eight (8) ounce cup every 10-15 minutes until you have consumed the remaining solution.     Bowel prep should be completed 4 hours prior to procedure time.    NOTHING TO EAT OR DRINK AFTER MIDNIGHT- EXCEPT FOR YOUR PREP    DAY OF THE PROCEDURE:  You may brush your teeth.  Leave all jewelry at home.  Please arrive for your procedure as indicated by the OR / GI Lab / Endoscopy Unit. The hospital will contact you the day before with your exact arrival time.   Make sure you have arranged ahead of time for a responsible adult (18 or older) to accompany and drive you home after the procedure.  Please discuss any transportation concerns with our staff prior to your procedure.    The effects of the anesthesia can persist for 24 hours.  After receiving the sedation, you must exercise caution before engaging in any activity that could harm yourself and others (such as driving a car).  Do not make any important decisions or do not drink any alcoholic beverages during this time period.  After your procedure, you may have anything you'd like to eat or drink.  You will probably want to start with something light.  Please include plenty of fluids.  Avoid items that cause gas such as sodas and salads.    SPECIAL INSTRUCTIONS:    For patients currently taking blood thinners and/or antiplatelet therapy our office will contact the prescribing provider.  Our office will contact you with any required changes to your medication regimen.     Blood thinner (i.e. - Coumadin, Pradaxa, Lovenox, Xarelto, Eliquis)  ?  Continue (Do Not Stop)  ? Stop______________for_____________days prior to the procedure.    Antiplatelet (i.e. - Plavix, Aggrenox, Effient, Brilinta)  ?  Continue (Do Not  Stop)  ? Stop______________for_____________days prior to the procedure.       Diabetes:   If you are Diabetic, please see separate Diabetic Instruction Sheet.          Prescribed medications:  Do not stop your aspirin, or any of your other medications (unless instructed otherwise).    Take the rest of your prescribed medications with small sips of water at least 2 hours prior to your procedure.      For any questions or concerns related to your bowel preparation or pre-procedure instructions, please contact our office at [phone].  Thank you for choosing [facility]!

## 2024-04-04 NOTE — TELEPHONE ENCOUNTER
Scheduled date of colonoscopy (as of today):  05/01/2024    Physician performing colonoscopy:  Dr: Mynor    Location of colonoscopy:  [facility]    Bowel prep reviewed with patient:  Miralax/Dulcolax

## 2024-04-04 NOTE — PROGRESS NOTES
4/4/2024      No chief complaint on file.        Assessment and Plan    74 y.o. male managed by Dr Butler    1. Screening for prostate cancer  -     PSA, Total Screen; Future; Expected date: 04/04/2025    2. Erectile dysfunction, unspecified erectile dysfunction type  -     sildenafil (Viagra) 100 mg tablet; Take 1 tablet (100 mg total) by mouth daily as needed for erectile dysfunction    3. Benign localized prostatic hyperplasia with lower urinary tract symptoms (LUTS)  -     tamsulosin (FLOMAX) 0.4 mg; Take 1 capsule (0.4 mg total) by mouth 2 (two) times a day      Return yearly with psa/nahid    Lab Results   Component Value Date    PSA 3.22 10/14/2023    PSA 3.1 09/28/2022    PSA 2.1 06/04/2020         History of Present Illness  Stanislav Glover is a 74 y.o. male here for evaluation of annual follow-up BPH on double dose Flomax, erectile dysfunction on sildenafil 100 mg, and elevated PSA.  On chart review his PSA was 3.5 back in 2018, his typical baseline is 2.1-3.1.  PSA today 3.2.  He reoprts some urinary frequency when he takes his HCTZ but is manageable and necessary. No dysuria no hematuria, good flow. He definitely notices flow/nocturia change if he misses a dose of tamsulosin.    On chart review of an ultrasound from November 2022, 2 normal kidneys, simple right renal cyst, no stone disease, bladder normal, prostate 46 mL.        Review of Systems   Constitutional: Negative.    Respiratory: Negative.     Cardiovascular: Negative.    Genitourinary:  Positive for frequency. Negative for decreased urine volume, difficulty urinating, dysuria, flank pain, hematuria and urgency.   Musculoskeletal: Negative.            AUA SYMPTOM SCORE      Flowsheet Row Most Recent Value   AUA SYMPTOM SCORE    How often have you had a sensation of not emptying your bladder completely after you finished urinating? 3 (P)    How often have you had to urinate again less than two hours after you finished urinating? 5 (P)    How  often have you found you stopped and started again several times when you urinate? 2 (P)    How often have you found it difficult to postpone urination? 0 (P)    How often have you had a weak urinary stream? 4 (P)    How often have you had to push or strain to begin urination? 1 (P)    How many times did you most typically get up to urinate from the time you went to bed at night until the time you got up in the morning? 3 (P)    Quality of Life: If you were to spend the rest of your life with your urinary condition just the way it is now, how would you feel about that? 2 (P)    AUA SYMPTOM SCORE 18 (P)              Vitals  Vitals:    04/04/24 0759   BP: 160/90   BP Location: Left arm   Patient Position: Sitting   Cuff Size: Adult   Pulse: 74   SpO2: 95%   Weight: 76.2 kg (168 lb)       Physical Exam  Vitals and nursing note reviewed.   Constitutional:       General: He is not in acute distress.     Appearance: Normal appearance. He is well-developed. He is not diaphoretic.   HENT:      Head: Normocephalic and atraumatic.   Pulmonary:      Effort: Pulmonary effort is normal.      Comments: No cough or audible wheeze  Abdominal:      General: There is no distension.      Tenderness: There is no abdominal tenderness. There is no right CVA tenderness or left CVA tenderness.   Genitourinary:     Comments: uncircumcised penis, normal phallus, orthotopic patent meatus.  Testes smooth descended bilaterally into the scrotum nontender with no palpable mass.  Digital rectal exam smooth prostate 40g, without appreciable nodule, induration or asymmetry  Musculoskeletal:      Right lower leg: No edema.      Left lower leg: No edema.   Skin:     General: Skin is warm and dry.   Neurological:      Mental Status: He is alert and oriented to person, place, and time.      Gait: Gait normal.   Psychiatric:         Speech: Speech normal.         Behavior: Behavior normal.           Past History  Past Medical History:   Diagnosis Date    • Anxiety    • Chronic pain disorder     right shoulder   • Diabetes mellitus (HCC)     type 2, blood sugar 109 at 0500   • Diverticulosis    • Extremity pain    • GERD (gastroesophageal reflux disease)    • Hearing loss, right    • Hemorrhoids    • Neck pain    • Polyp, sigmoid colon      Social History     Socioeconomic History   • Marital status: /Civil Union     Spouse name: None   • Number of children: 3   • Years of education: None   • Highest education level: None   Occupational History   • Occupation: FULL TIME EMPLOYMENT   Tobacco Use   • Smoking status: Never   • Smokeless tobacco: Never   Vaping Use   • Vaping status: Never Used   Substance and Sexual Activity   • Alcohol use: Yes     Comment: occassional   • Drug use: No   • Sexual activity: Yes     Partners: Female   Other Topics Concern   • None   Social History Narrative    Lives with wife,daughter, and 2 grandchildren    Retired        Advance directive on file     Denied domestic violence     Denied problem with [employer] beliefs regarding medical care     Social Determinants of Health     Financial Resource Strain: Low Risk  (11/1/2023)    Overall Financial Resource Strain (CARDIA)    • Difficulty of Paying Living Expenses: Not hard at all   Food Insecurity: Not on file   Transportation Needs: No Transportation Needs (11/1/2023)    PRAPARE - Transportation    • Lack of Transportation (Medical): No    • Lack of Transportation (Non-Medical): No   Physical Activity: Not on file   Stress: Not on file   Social Connections: Not on file   Intimate Partner Violence: Not on file   Housing Stability: Not on file     Social History     Tobacco Use   Smoking Status Never   Smokeless Tobacco Never     Family History   Problem Relation Age of Onset   • Hypertension Brother    • Hepatitis Mother    • Heart disease Father         CARDIAC DISORDER        The following portions of the patient's history were reviewed and updated as appropriate: allergies,  current medications, past medical history, past social history, past surgical history and problem list.    Results  No results found for this or any previous visit (from the past 1 hour(s)).]  Lab Results   Component Value Date    PSA 3.22 10/14/2023    PSA 3.1 09/28/2022    PSA 2.1 06/04/2020    PSA 2.3 04/25/2019     Lab Results   Component Value Date    CALCIUM 9.6 02/16/2024     05/23/2016    K 4.5 02/16/2024    CO2 31 02/16/2024     02/16/2024    BUN 22 02/16/2024    CREATININE 0.97 02/16/2024     Lab Results   Component Value Date    WBC 6.81 06/09/2023    HGB 14.9 06/09/2023    HCT 44.5 06/09/2023    MCV 85 06/09/2023     06/09/2023

## 2024-04-17 ENCOUNTER — ANESTHESIA EVENT (OUTPATIENT)
Dept: ANESTHESIOLOGY | Facility: HOSPITAL | Age: 74
End: 2024-04-17

## 2024-04-17 ENCOUNTER — ANESTHESIA (OUTPATIENT)
Dept: ANESTHESIOLOGY | Facility: HOSPITAL | Age: 74
End: 2024-04-17

## 2024-04-18 ENCOUNTER — TELEPHONE (OUTPATIENT)
Dept: GASTROENTEROLOGY | Facility: MEDICAL CENTER | Age: 74
End: 2024-04-18

## 2024-04-18 NOTE — TELEPHONE ENCOUNTER
Confirming Upcoming Procedure: Colonoscopy on May 1  Physician performing: Dr. Lowe  Location of procedure:  [facility]  Prep: Miralax    Diabetic

## 2024-04-29 ENCOUNTER — OFFICE VISIT (OUTPATIENT)
Dept: FAMILY MEDICINE CLINIC | Facility: CLINIC | Age: 74
End: 2024-04-29
Payer: COMMERCIAL

## 2024-04-29 VITALS
SYSTOLIC BLOOD PRESSURE: 160 MMHG | HEART RATE: 81 BPM | OXYGEN SATURATION: 96 % | WEIGHT: 164 LBS | HEIGHT: 66 IN | DIASTOLIC BLOOD PRESSURE: 92 MMHG | BODY MASS INDEX: 26.36 KG/M2

## 2024-04-29 DIAGNOSIS — S20.219A CONTUSION OF CHEST WALL, UNSPECIFIED LATERALITY, INITIAL ENCOUNTER: ICD-10-CM
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
## 2024-04-29 DIAGNOSIS — F11.20 CONTINUOUS OPIOID DEPENDENCE (HCC): ICD-10-CM

## 2024-04-29 DIAGNOSIS — G95.0 SYRINGOMYELIA (HCC): ICD-10-CM

## 2024-04-29 PROCEDURE — 99214 OFFICE O/P EST MOD 30 MIN: CPT | Performed by: NURSE PRACTITIONER

## 2024-04-29 RX ORDER — TRAMADOL HYDROCHLORIDE 50 MG/1
TABLET ORAL
Qty: 180 TABLET | Refills: 0 | Status: SHIPPED | OUTPATIENT
Start: 2024-04-29

## 2024-04-29 RX ORDER — NAPROXEN 500 MG/1
500 TABLET ORAL 2 TIMES DAILY PRN
Qty: 60 TABLET | Refills: 0 | Status: SHIPPED | OUTPATIENT
Start: 2024-04-29
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
## 2024-04-30 LAB
LEFT EYE DIABETIC RETINOPATHY: NORMAL
RIGHT EYE DIABETIC RETINOPATHY: NORMAL

## 2024-04-30 RX ORDER — ONDANSETRON 2 MG/ML
4 INJECTION INTRAMUSCULAR; INTRAVENOUS EVERY 6 HOURS PRN
Status: CANCELLED | OUTPATIENT
Start: 2024-04-30

## 2024-04-30 RX ORDER — SODIUM CHLORIDE 9 MG/ML
125 INJECTION, SOLUTION INTRAVENOUS CONTINUOUS
Status: CANCELLED | OUTPATIENT
Start: 2024-04-30

## 2024-05-01 ENCOUNTER — ANESTHESIA EVENT (EMERGENCY)
Dept: GASTROENTEROLOGY | Facility: MEDICAL CENTER | Age: 74
End: 2024-05-01
Payer: MEDICARE

## 2024-05-01 ENCOUNTER — HOSPITAL ENCOUNTER (OUTPATIENT)
Dept: GASTROENTEROLOGY | Facility: MEDICAL CENTER | Age: 74
Setting detail: OUTPATIENT SURGERY
Discharge: HOME | End: 2024-05-01
Attending: INTERNAL MEDICINE
Payer: MEDICARE

## 2024-05-01 ENCOUNTER — APPOINTMENT (EMERGENCY)
Dept: RADIOLOGY | Facility: HOSPITAL | Age: 74
End: 2024-05-01
Payer: MEDICARE

## 2024-05-01 ENCOUNTER — HOSPITAL ENCOUNTER (OUTPATIENT)
Facility: HOSPITAL | Age: 74
Setting detail: OBSERVATION
Discharge: HOME | End: 2024-05-02
Attending: EMERGENCY MEDICINE
Payer: MEDICARE

## 2024-05-01 ENCOUNTER — ANESTHESIA (EMERGENCY)
Dept: GASTROENTEROLOGY | Facility: MEDICAL CENTER | Age: 74
End: 2024-05-01
Payer: MEDICARE

## 2024-05-01 ENCOUNTER — ANESTHESIA EVENT (OUTPATIENT)
Dept: GASTROENTEROLOGY | Facility: HOSPITAL | Age: 74
End: 2024-05-01
Payer: MEDICARE

## 2024-05-01 VITALS
BODY MASS INDEX: 25.71 KG/M2 | SYSTOLIC BLOOD PRESSURE: 191 MMHG | HEART RATE: 1 BPM | RESPIRATION RATE: 28 BRPM | WEIGHT: 160 LBS | OXYGEN SATURATION: 100 % | HEIGHT: 66 IN | DIASTOLIC BLOOD PRESSURE: 96 MMHG | TEMPERATURE: 79 F

## 2024-05-01 DIAGNOSIS — Z12.11 ENCOUNTER FOR SCREENING COLONOSCOPY: ICD-10-CM

## 2024-05-01 DIAGNOSIS — R07.89 OTHER CHEST PAIN: Primary | ICD-10-CM

## 2024-05-01 DIAGNOSIS — R07.9 CHEST PAIN: Primary | ICD-10-CM

## 2024-05-01 DIAGNOSIS — Z12.11 SCREENING FOR COLON CANCER: ICD-10-CM

## 2024-05-01 LAB
2HR DELTA HS TROPONIN: -1 NG/L
4HR DELTA HS TROPONIN: -1 NG/L
ATRIAL RATE: 127 BPM
ATRIAL RATE: 62 BPM
ATRIAL RATE: 75 BPM
ATRIAL RATE: 91 BPM
BACTERIA UR QL AUTO: ABNORMAL /HPF
BASOPHILS # BLD AUTO: 0.03 THOUSANDS/ÂΜL (ref 0–0.1)
BASOPHILS NFR BLD AUTO: 1 % (ref 0–1)
BILIRUB UR QL STRIP: NEGATIVE
CARDIAC TROPONIN I PNL SERPL HS: 3 NG/L
CARDIAC TROPONIN I PNL SERPL HS: 3 NG/L
CARDIAC TROPONIN I PNL SERPL HS: 4 NG/L
CLARITY UR: CLEAR
COLOR UR: YELLOW
EOSINOPHIL # BLD AUTO: 0.08 THOUSAND/ÂΜL (ref 0–0.61)
EOSINOPHIL NFR BLD AUTO: 1 % (ref 0–6)
ERYTHROCYTE [DISTWIDTH] IN BLOOD BY AUTOMATED COUNT: 13.2 % (ref 11.6–15.1)
GLUCOSE SERPL-MCNC: 158 MG/DL (ref 65–140)
GLUCOSE UR STRIP-MCNC: NEGATIVE MG/DL
HCT VFR BLD AUTO: 42.5 % (ref 36.5–49.3)
HGB BLD-MCNC: 14.4 G/DL (ref 12–17)
HGB UR QL STRIP.AUTO: ABNORMAL
IMM GRANULOCYTES # BLD AUTO: 0.02 THOUSAND/UL (ref 0–0.2)
IMM GRANULOCYTES NFR BLD AUTO: 0 % (ref 0–2)
KETONES UR STRIP-MCNC: NEGATIVE MG/DL
LEUKOCYTE ESTERASE UR QL STRIP: NEGATIVE
LYMPHOCYTES # BLD AUTO: 2.36 THOUSANDS/ÂΜL (ref 0.6–4.47)
LYMPHOCYTES NFR BLD AUTO: 36 % (ref 14–44)
MCH RBC QN AUTO: 28.2 PG (ref 26.8–34.3)
MCHC RBC AUTO-ENTMCNC: 33.9 G/DL (ref 31.4–37.4)
MCV RBC AUTO: 83 FL (ref 82–98)
MONOCYTES # BLD AUTO: 0.49 THOUSAND/ÂΜL (ref 0.17–1.22)
MONOCYTES NFR BLD AUTO: 7 % (ref 4–12)
NEUTROPHILS # BLD AUTO: 3.64 THOUSANDS/ÂΜL (ref 1.85–7.62)
NEUTS SEG NFR BLD AUTO: 55 % (ref 43–75)
NITRITE UR QL STRIP: NEGATIVE
NON-SQ EPI CELLS URNS QL MICRO: ABNORMAL /HPF
NRBC BLD AUTO-RTO: 0 /100 WBCS
P AXIS: 0 DEGREES
P AXIS: 53 DEGREES
P AXIS: 54 DEGREES
P AXIS: 67 DEGREES
PH UR STRIP.AUTO: 8 [PH] (ref 4.5–8)
PLATELET # BLD AUTO: 243 THOUSANDS/UL (ref 149–390)
PMV BLD AUTO: 10.4 FL (ref 8.9–12.7)
PR INTERVAL: 168 MS
PR INTERVAL: 184 MS
PR INTERVAL: 190 MS
PR INTERVAL: 200 MS
PROT UR STRIP-MCNC: NEGATIVE MG/DL
QRS AXIS: -10 DEGREES
QRS AXIS: -8 DEGREES
QRSD INTERVAL: 66 MS
QRSD INTERVAL: 70 MS
QRSD INTERVAL: 70 MS
QRSD INTERVAL: 72 MS
QT INTERVAL: 358 MS
QT INTERVAL: 376 MS
QT INTERVAL: 404 MS
QT INTERVAL: 434 MS
QTC INTERVAL: 419 MS
QTC INTERVAL: 439 MS
QTC INTERVAL: 440 MS
QTC INTERVAL: 440 MS
RBC # BLD AUTO: 5.11 MILLION/UL (ref 3.88–5.62)
RBC #/AREA URNS AUTO: ABNORMAL /HPF
SP GR UR STRIP.AUTO: 1.01 (ref 1–1.03)
T WAVE AXIS: 24 DEGREES
T WAVE AXIS: 25 DEGREES
T WAVE AXIS: 27 DEGREES
T WAVE AXIS: 29 DEGREES
UROBILINOGEN UR QL STRIP.AUTO: 0.2 E.U./DL
VENTRICULAR RATE: 62 BPM
VENTRICULAR RATE: 71 BPM
VENTRICULAR RATE: 75 BPM
VENTRICULAR RATE: 91 BPM
WBC # BLD AUTO: 6.62 THOUSAND/UL (ref 4.31–10.16)
WBC #/AREA URNS AUTO: ABNORMAL /HPF

## 2024-05-01 PROCEDURE — 84484 ASSAY OF TROPONIN QUANT: CPT | Performed by: PHYSICIAN ASSISTANT

## 2024-05-01 PROCEDURE — 93010 ELECTROCARDIOGRAM REPORT: CPT

## 2024-05-01 PROCEDURE — 81001 URINALYSIS AUTO W/SCOPE: CPT

## 2024-05-01 PROCEDURE — 36415 COLL VENOUS BLD VENIPUNCTURE: CPT | Performed by: PHYSICIAN ASSISTANT

## 2024-05-01 PROCEDURE — 71046 X-RAY EXAM CHEST 2 VIEWS: CPT

## 2024-05-01 PROCEDURE — 99215 OFFICE O/P EST HI 40 MIN: CPT | Performed by: INTERNAL MEDICINE

## 2024-05-01 PROCEDURE — 85025 COMPLETE CBC W/AUTO DIFF WBC: CPT | Performed by: PHYSICIAN ASSISTANT

## 2024-05-01 PROCEDURE — 93005 ELECTROCARDIOGRAM TRACING: CPT

## 2024-05-01 PROCEDURE — 99285 EMERGENCY DEPT VISIT HI MDM: CPT

## 2024-05-01 PROCEDURE — 99205 OFFICE O/P NEW HI 60 MIN: CPT | Performed by: INTERNAL MEDICINE

## 2024-05-01 PROCEDURE — 99223 1ST HOSP IP/OBS HIGH 75: CPT | Performed by: STUDENT IN AN ORGANIZED HEALTH CARE EDUCATION/TRAINING PROGRAM

## 2024-05-01 PROCEDURE — 80053 COMPREHEN METABOLIC PANEL: CPT | Performed by: PHYSICIAN ASSISTANT

## 2024-05-01 PROCEDURE — 82948 REAGENT STRIP/BLOOD GLUCOSE: CPT

## 2024-05-01 PROCEDURE — 99285 EMERGENCY DEPT VISIT HI MDM: CPT | Performed by: PHYSICIAN ASSISTANT

## 2024-05-01 RX ORDER — ACETAMINOPHEN 325 MG/1
650 TABLET ORAL EVERY 6 HOURS PRN
Status: DISCONTINUED | OUTPATIENT
Start: 2024-05-01 | End: 2024-05-02 | Stop reason: HOSPADM

## 2024-05-01 RX ORDER — POLYETHYLENE GLYCOL 3350 17 G/17G
238 POWDER, FOR SOLUTION ORAL ONCE
Status: COMPLETED | OUTPATIENT
Start: 2024-05-01 | End: 2024-05-01

## 2024-05-01 RX ORDER — SODIUM CHLORIDE 9 MG/ML
125 INJECTION, SOLUTION INTRAVENOUS CONTINUOUS
Status: DISCONTINUED | OUTPATIENT
Start: 2024-05-01 | End: 2024-05-05 | Stop reason: HOSPADM

## 2024-05-01 RX ORDER — PANTOPRAZOLE SODIUM 20 MG/1
20 TABLET, DELAYED RELEASE ORAL
Status: DISCONTINUED | OUTPATIENT
Start: 2024-05-02 | End: 2024-05-02 | Stop reason: HOSPADM

## 2024-05-01 RX ORDER — TRAMADOL HYDROCHLORIDE 50 MG/1
50 TABLET ORAL EVERY 6 HOURS PRN
Status: DISCONTINUED | OUTPATIENT
Start: 2024-05-01 | End: 2024-05-02 | Stop reason: HOSPADM

## 2024-05-01 RX ORDER — ONDANSETRON 2 MG/ML
4 INJECTION INTRAMUSCULAR; INTRAVENOUS EVERY 6 HOURS PRN
Status: DISCONTINUED | OUTPATIENT
Start: 2024-05-01 | End: 2024-05-02 | Stop reason: HOSPADM

## 2024-05-01 RX ORDER — GABAPENTIN 300 MG/1
600 CAPSULE ORAL 2 TIMES DAILY
Status: DISCONTINUED | OUTPATIENT
Start: 2024-05-01 | End: 2024-05-02 | Stop reason: HOSPADM

## 2024-05-01 RX ORDER — TAMSULOSIN HYDROCHLORIDE 0.4 MG/1
0.4 CAPSULE ORAL 2 TIMES DAILY
Status: DISCONTINUED | OUTPATIENT
Start: 2024-05-01 | End: 2024-05-02 | Stop reason: HOSPADM

## 2024-05-01 RX ORDER — ACETAMINOPHEN 325 MG/1
975 TABLET ORAL ONCE AS NEEDED
Status: COMPLETED | OUTPATIENT
Start: 2024-05-01 | End: 2024-05-01

## 2024-05-01 RX ORDER — ONDANSETRON 2 MG/ML
4 INJECTION INTRAMUSCULAR; INTRAVENOUS EVERY 6 HOURS PRN
Status: DISCONTINUED | OUTPATIENT
Start: 2024-05-01 | End: 2024-05-05 | Stop reason: HOSPADM

## 2024-05-01 RX ORDER — LIDOCAINE HYDROCHLORIDE 10 MG/ML
INJECTION, SOLUTION EPIDURAL; INFILTRATION; INTRACAUDAL; PERINEURAL AS NEEDED
Status: DISCONTINUED | OUTPATIENT
Start: 2024-05-01 | End: 2024-05-01

## 2024-05-01 RX ORDER — HEPARIN SODIUM 5000 [USP'U]/ML
5000 INJECTION, SOLUTION INTRAVENOUS; SUBCUTANEOUS EVERY 8 HOURS SCHEDULED
Status: DISCONTINUED | OUTPATIENT
Start: 2024-05-01 | End: 2024-05-02 | Stop reason: HOSPADM

## 2024-05-01 RX ORDER — PRAVASTATIN SODIUM 80 MG/1
80 TABLET ORAL
Status: DISCONTINUED | OUTPATIENT
Start: 2024-05-01 | End: 2024-05-02 | Stop reason: HOSPADM

## 2024-05-01 RX ADMIN — TRAMADOL HYDROCHLORIDE 50 MG: 50 TABLET, COATED ORAL at 17:53

## 2024-05-01 RX ADMIN — PRAVASTATIN SODIUM 80 MG: 80 TABLET ORAL at 17:52

## 2024-05-01 RX ADMIN — TAMSULOSIN HYDROCHLORIDE 0.4 MG: 0.4 CAPSULE ORAL at 21:09

## 2024-05-01 RX ADMIN — SODIUM CHLORIDE 125 ML/HR: 0.9 INJECTION, SOLUTION INTRAVENOUS at 07:12

## 2024-05-01 RX ADMIN — HEPARIN SODIUM 5000 UNITS: 5000 INJECTION INTRAVENOUS; SUBCUTANEOUS at 21:09

## 2024-05-01 RX ADMIN — HEPARIN SODIUM 5000 UNITS: 5000 INJECTION INTRAVENOUS; SUBCUTANEOUS at 17:53

## 2024-05-01 RX ADMIN — LIDOCAINE HYDROCHLORIDE 100 MG: 10 INJECTION, SOLUTION EPIDURAL; INFILTRATION; INTRACAUDAL at 07:30

## 2024-05-01 RX ADMIN — POLYETHYLENE GLYCOL 3350 238 G: 17 POWDER, FOR SOLUTION ORAL at 18:17

## 2024-05-01 RX ADMIN — ACETAMINOPHEN 975 MG: 325 TABLET, FILM COATED ORAL at 10:21

## 2024-05-01 RX ADMIN — GABAPENTIN 600 MG: 300 CAPSULE ORAL at 17:53

## 2024-05-01 NOTE — QUICK NOTE
Brief GI Note:    Refer to anesthesia note.     Colonoscopy cancelled due to epigastric pain, chest pain, dyspnea, tachycardia just before receiving anesthesia (did receive IV lidocaine). ECG and transfer to ER.

## 2024-05-01 NOTE — ANESTHESIA PREPROCEDURE EVALUATION
Procedure:  COLONOSCOPY    Relevant Problems   ANESTHESIA (within normal limits)      CARDIO   (+) Essential hypertension   (+) Hyperlipidemia      ENDO   (+) Type 2 diabetes mellitus without complication, without long-term current use of insulin (HCC)      GI/HEPATIC   (+) Gastroesophageal reflux disease with esophagitis      /RENAL   (+) Enlarged prostate with lower urinary tract symptoms (LUTS)      MUSCULOSKELETAL   (+) DDD (degenerative disc disease), thoracic      NEURO/PSYCH   (+) Chronic pain disorder   (+) Continuous opioid dependence (HCC)   (+) Syringomyelia (HCC)      PULMONARY   (+) ARPAN (obstructive sleep apnea)      Nervous and Auditory   (+) Hearing loss, right        Physical Exam    Airway    Mallampati score: II  TM Distance: >3 FB  Neck ROM: full     Dental   No notable dental hx     Cardiovascular  Rhythm: irregular, Rate: normal    Pulmonary  Pulmonary exam normal Breath sounds clear to auscultation    Other Findings        Anesthesia Plan  ASA Score- 2     Anesthesia Type- IV sedation with anesthesia with ASA Monitors.         Additional Monitors:     Airway Plan:     Comment: 1. Negative exercise stress test for symptoms of angina pectoris and negative for ECG changes of ischemia following exercise up to 89 % of patient's maximal age predicted heart rate.  2. Negative stress echocardiogram for changes of ischemia following exercise.   3. Borderline increased left ventricular wall thickness, normal left ventricular systolic function, grade 1 diastolic dysfunction and minor valve abnormalities as described below.  Resting ejection fraction was determined as around 55%.  4. Average exercise capacity, limited due to shortness of breath and fatigue (Total exercise time:  6 minutes 41 seconds; Workload: 8.0 METS).  5. Elevated resting blood pressure with normal blood pressure response to exercise.  6. Normal resting ECG.  No significant changes and no significant arrhythmia noted during stress  test.  .       Plan Factors-Exercise tolerance (METS): >4 METS.    Chart reviewed. EKG reviewed.  Existing labs reviewed. Patient summary reviewed.    Patient is not a current smoker.              Induction- intravenous.    Postoperative Plan-     Informed Consent- Anesthetic plan and risks discussed with patient.

## 2024-05-01 NOTE — ED NOTES
Pt noted with pulse ox = 83%. RN into pt's room, where pt was noted to be sleeping. Pt woke to voice and reports having sleep apnea. Pt placed on 2L NC. Elissa aware of same and in with pt at this time.      Anna Germain RN  05/01/24 0915

## 2024-05-01 NOTE — NURSING NOTE
During procedure pt presented with atrial fibrillation, new onset, 12 lead obtained in recovery room and confirmed A Fib. /65  Resp 22 Sat 98% 6L via simple mask. Pt asymptomatic. Dr. Wolfe notified patient's PCP, and instructed to have pt evaluated in ED. Upon arrival of ambulance team pt c/o nausea, zofran 4mg ordered and administered via IV. Face sheet and medical records printed and given to ambulance team. Hand off report completed to [place] ED charge nurse and ambulance team including pt allergies, meds taken at home, meds given at facility, medical history and present condition. Dr. Wolfe reported to ED attending physician.

## 2024-05-01 NOTE — ADDENDUM NOTE
Addendum  created 05/01/24 1015 by Petra Wolfe, DO    Attestation recorded in Intraprocedure, Intraprocedure Attestations filed
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
## 2024-05-01 NOTE — PERIOPERATIVE NURSING NOTE
Pt. Developed shortness and breath and chest pain in procedure room after lido and prior to colonoscopy.  Procedure aborted and patient returned to pre procedure area.  EKG obtained.  Pt states chest pain is gone, but he is tachypneic and shaking.  EMS arrived to transport patient to hospital.  Wife made aware of transfer to hospital.
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
## 2024-05-01 NOTE — ANESTHESIA POSTPROCEDURE EVALUATION
Post-Op Assessment Note    Cardiovascular status: EKG is NSR at 90.          Anethesia notable event occurred.    Staff: Anesthesiologist, CRNA   Comments: After monitors placed and IV lidocaine given, pt developed severe SOB, substernal vs. epigastric chest discomfort which he has never experienced before.  Brought immediately to post procedure where 12 lead EKG being performed and EMS called.              BP (!) 208/111 (05/01/24 0738)    Temp      Pulse 92 (05/01/24 0738)   Resp (!) 32 (05/01/24 0738)    SpO2 100 % (05/01/24 0738)
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
## 2024-05-01 NOTE — PLAN OF CARE
Problem: PAIN - ADULT  Goal: Verbalizes/displays adequate comfort level or baseline comfort level  Description: Interventions:  - Encourage patient to monitor pain and request assistance  - Assess pain using appropriate pain scale  - Administer analgesics based on type and severity of pain and evaluate response  - Implement non-pharmacological measures as appropriate and evaluate response  - Consider cultural and social influences on pain and pain management  - Notify physician/advanced practitioner if interventions unsuccessful or patient reports new pain  Outcome: Progressing     Problem: INFECTION - ADULT  Goal: Absence or prevention of progression during hospitalization  Description: INTERVENTIONS:  - Assess and monitor for signs and symptoms of infection  - Monitor lab/diagnostic results  - Monitor all insertion sites, i.e. indwelling lines, tubes, and drains  - Monitor endotracheal if appropriate and nasal secretions for changes in amount and color  - [place] appropriate cooling/warming therapies per order  - Administer medications as ordered  - Instruct and encourage patient and family to use good hand hygiene technique  - Identify and instruct in appropriate isolation precautions for identified infection/condition  Outcome: Progressing  Goal: Absence of fever/infection during neutropenic period  Description: INTERVENTIONS:  - Monitor WBC    Outcome: Progressing     Problem: SAFETY ADULT  Goal: Patient will remain free of falls  Description: INTERVENTIONS:  - Educate patient/family on patient safety including physical limitations  - Instruct patient to call for assistance with activity   - Consult OT/PT to assist with strengthening/mobility   - Keep Call bell within reach  - Keep bed low and locked with side rails adjusted as appropriate  - Keep care items and personal belongings within reach  - Initiate and maintain comfort rounds  - Make Fall Risk Sign visible to staff  - Offer Toileting every 2 Hours,  in advance of need  - Initiate/Maintain bed alarm  - Apply yellow socks and bracelet for high fall risk patients  - Consider moving patient to room near nurses station  Outcome: Progressing  Goal: Maintain or return to baseline ADL function  Description: INTERVENTIONS:  -  Assess patient's ability to carry out ADLs; assess patient's baseline for ADL function and identify physical deficits which impact ability to perform ADLs (bathing, care of mouth/teeth, toileting, grooming, dressing, etc.)  - Assess/evaluate cause of self-care deficits   - Assess range of motion  - Assess patient's mobility; develop plan if impaired  - Assess patient's need for assistive devices and provide as appropriate  - Encourage maximum independence but intervene and supervise when necessary  - Involve family in performance of ADLs  - Assess for home care needs following discharge   - Consider OT consult to assist with ADL evaluation and planning for discharge  - Provide patient education as appropriate  Outcome: Progressing  Goal: Maintains/Returns to pre admission functional level  Description: INTERVENTIONS:  - Perform AM-PAC 6 Click Basic Mobility/ Daily Activity assessment daily.  - Set and communicate daily mobility goal to care team and patient/family/caregiver.   - Collaborate with rehabilitation services on mobility goals if consulted  - Perform Range of Motion 3 times a day.  - Reposition patient every 2 hours.  - Out of bed for toileting  - Record patient progress and toleration of activity level   Outcome: Progressing     Problem: DISCHARGE PLANNING  Goal: Discharge to home or other facility with appropriate resources  Description: INTERVENTIONS:  - Identify barriers to discharge w/patient and caregiver  - Arrange for needed discharge resources and transportation as appropriate  - Identify discharge learning needs (meds, wound care, etc.)  - Arrange for interpretive services to assist at discharge as needed  - Refer to Case  Management Department for coordinating discharge planning if the patient needs post-hospital services based on physician/advanced practitioner order or complex needs related to functional status, cognitive ability, or social support system  Outcome: Progressing     Problem: Knowledge Deficit  Goal: Patient/family/caregiver demonstrates understanding of disease process, treatment plan, medications, and discharge instructions  Description: Complete learning assessment and assess knowledge base.  Interventions:  - Provide teaching at level of understanding  - Provide teaching via preferred learning methods  Outcome: Progressing
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
## 2024-05-01 NOTE — H&P
History and Physical - [facility]  Stanislav Glover 74 y.o. male MRN: [id number]          HPI: Stanislav Glover is a 74 y.o. year old male who presents for surveillance colonoscopy. Last colonoscopy 1/2020 revealed 5 small TA and diverticulosis.    No family history of colon cancer.      REVIEW OF SYSTEMS: Per the HPI, and otherwise unremarkable.    Historical Information   Past Medical History:   Diagnosis Date    Anxiety     Chronic pain disorder     right shoulder    Diabetes mellitus (HCC)     type 2, blood sugar 109 at 0500    Diverticulosis     Extremity pain     GERD (gastroesophageal reflux disease)     Hearing loss, right     Hemorrhoids     Neck pain     Polyp, sigmoid colon      Past Surgical History:   Procedure Laterality Date    COLONOSCOPY      EAR SURGERY      INNER EAR SURGERY      NASAL SINUS SURGERY      [place] COLONOSCOPY FLX DX W/COLLJ SPEC WHEN PFRMD N/A 8/30/2016    Procedure: COLONOSCOPY;  Surgeon: Gill Dowling MD;  Location: [facility];  Service: General    [place] SURGICAL ARTHROSCOPY SHOULDER W/ROTATOR CUFF RPR Right 2/5/2019    Procedure: ARTHROSCOPY SHOULDER;  Surgeon: Howard Downey DO;  Location:  MAIN OR;  Service: Orthopedics     Social History   Social History     Substance and Sexual Activity   Alcohol Use Yes    Comment: occassional     Social History     Substance and Sexual Activity   Drug Use No     Social History     Tobacco Use   Smoking Status Never   Smokeless Tobacco Never     Family History   Problem Relation Age of Onset    Hypertension Brother     Hepatitis Mother     Heart disease Father         CARDIAC DISORDER        Meds/Allergies       Current Outpatient Medications:     gabapentin (NEURONTIN) 600 MG tablet    hydrochlorothiazide (HYDRODIURIL) 25 mg tablet    metFORMIN (GLUCOPHAGE) 500 mg tablet    omeprazole (PriLOSEC) 40 MG capsule    rosuvastatin (CRESTOR) 10 MG tablet    tamsulosin (FLOMAX) 0.4 mg    traMADol (ULTRAM) 50 mg tablet    valsartan  "(DIOVAN) 160 mg tablet    Blood Glucose Monitoring Suppl (ONE TOUCH ULTRA 2) w/Device KIT    glucose blood (ONE TOUCH ULTRA TEST) test strip    naloxone (NARCAN) 4 mg/0.1 mL nasal spray    naproxen (Naprosyn) 500 mg tablet    OneTouch Delica Lancets 30G MISC    sildenafil (Viagra) 100 mg tablet    Current Facility-Administered Medications:     sodium chloride 0.9 % infusion, 125 mL/hr, Intravenous, Continuous, 125 mL/hr at 05/01/24 0712    Allergies   Allergen Reactions    Lipitor [Atorvastatin] Other (See Comments) and Myalgia     Other reaction(s): increased pain  arthralgia    Lyrica [Pregabalin] Other (See Comments)     Pt can't remmember reaction       Objective     BP (!) 183/79   Pulse (!) 50   Temp (!) 97.3 °F (36.3 °C) (Temporal)   Resp 18   Ht 5' 6\" (1.676 m)   Wt 72.6 kg (160 lb)   SpO2 97%   BMI 25.82 kg/m²       PHYSICAL EXAM    GEN: NAD  CARDIO: RRR  PULM: CTA bilaterally  ABD: soft, non-tender, non-distended  EXT: no lower extremity edema  NEURO: AAOx3      ASSESSMENT/PLAN:  74 y.o. year old male here for colonoscopy; he is stable and optimized for his procedure.        "
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
## 2024-05-01 NOTE — ANESTHESIA PROCEDURE NOTES
Anesthesia Notable Event    Date/Time: 5/1/2024 7:46 AM    Patient location during procedure: OR procedure room  Performed by: Petra Wolfe DO  Authorized by: Petra Wolfe DO    Pt developed SOB and substernal vs. Epigastric chest discomfort prior to procedure start.

## 2024-05-01 NOTE — ASSESSMENT & PLAN NOTE
Had colonoscopy delayed due to reported cp, now cleared by cardiology  Plan for colonoscopy tmr  Clear liquid diet, NPO midnight  Bowel prep per GI  GI consulted

## 2024-05-01 NOTE — NURSING NOTE
Patient transferred to [facility] via [facility], records given to transport team, report communicated w/ patients daily meds, allergies, and lidocaine given while in the procedure room. Patient in stable condition w/ c/o s.o.b. and chest pain.

## 2024-05-01 NOTE — ASSESSMENT & PLAN NOTE
Lab Results   Component Value Date    HGBA1C 7.3 (H) 02/16/2024       Recent Labs     05/01/24  1154   POCGLU 158*     Hold metformin  Sliding scale, currently on clears

## 2024-05-02 ENCOUNTER — APPOINTMENT (OUTPATIENT)
Dept: GASTROENTEROLOGY | Facility: HOSPITAL | Age: 74
End: 2024-05-02
Payer: MEDICARE

## 2024-05-02 ENCOUNTER — ANESTHESIA (OUTPATIENT)
Dept: GASTROENTEROLOGY | Facility: HOSPITAL | Age: 74
End: 2024-05-02
Payer: MEDICARE

## 2024-05-02 VITALS
DIASTOLIC BLOOD PRESSURE: 79 MMHG | RESPIRATION RATE: 16 BRPM | HEIGHT: 63 IN | TEMPERATURE: 98 F | BODY MASS INDEX: 27.77 KG/M2 | WEIGHT: 156.75 LBS | OXYGEN SATURATION: 96 % | SYSTOLIC BLOOD PRESSURE: 150 MMHG | HEART RATE: 60 BPM

## 2024-05-02 LAB
ANION GAP SERPL CALCULATED.3IONS-SCNC: 7 MMOL/L (ref 4–13)
BUN SERPL-MCNC: 18 MG/DL (ref 5–25)
CALCIUM SERPL-MCNC: 9.2 MG/DL (ref 8.4–10.2)
CHLORIDE SERPL-SCNC: 108 MMOL/L (ref 96–108)
CO2 SERPL-SCNC: 24 MMOL/L (ref 21–32)
CREAT SERPL-MCNC: 0.96 MG/DL (ref 0.6–1.3)
ERYTHROCYTE [DISTWIDTH] IN BLOOD BY AUTOMATED COUNT: 13.2 % (ref 11.6–15.1)
GFR SERPL CREATININE-BSD FRML MDRD: 78 ML/MIN/1.73SQ M
GLUCOSE P FAST SERPL-MCNC: 155 MG/DL (ref 65–99)
GLUCOSE SERPL-MCNC: 155 MG/DL (ref 65–140)
HCT VFR BLD AUTO: 39.5 % (ref 36.5–49.3)
HGB BLD-MCNC: 13.3 G/DL (ref 12–17)
MCH RBC QN AUTO: 28.3 PG (ref 26.8–34.3)
MCHC RBC AUTO-ENTMCNC: 33.7 G/DL (ref 31.4–37.4)
MCV RBC AUTO: 84 FL (ref 82–98)
PLATELET # BLD AUTO: 216 THOUSANDS/UL (ref 149–390)
PMV BLD AUTO: 10.8 FL (ref 8.9–12.7)
POTASSIUM SERPL-SCNC: 3.8 MMOL/L (ref 3.5–5.3)
RBC # BLD AUTO: 4.7 MILLION/UL (ref 3.88–5.62)
SODIUM SERPL-SCNC: 139 MMOL/L (ref 135–147)
WBC # BLD AUTO: 6.29 THOUSAND/UL (ref 4.31–10.16)

## 2024-05-02 PROCEDURE — 43239 EGD BIOPSY SINGLE/MULTIPLE: CPT | Performed by: INTERNAL MEDICINE

## 2024-05-02 PROCEDURE — 80048 BASIC METABOLIC PNL TOTAL CA: CPT | Performed by: STUDENT IN AN ORGANIZED HEALTH CARE EDUCATION/TRAINING PROGRAM

## 2024-05-02 PROCEDURE — 99239 HOSP IP/OBS DSCHRG MGMT >30: CPT

## 2024-05-02 PROCEDURE — 99214 OFFICE O/P EST MOD 30 MIN: CPT | Performed by: INTERNAL MEDICINE

## 2024-05-02 PROCEDURE — 99222 1ST HOSP IP/OBS MODERATE 55: CPT | Performed by: INTERNAL MEDICINE

## 2024-05-02 PROCEDURE — NC001 PR NO CHARGE

## 2024-05-02 PROCEDURE — 85027 COMPLETE CBC AUTOMATED: CPT | Performed by: STUDENT IN AN ORGANIZED HEALTH CARE EDUCATION/TRAINING PROGRAM

## 2024-05-02 PROCEDURE — 45385 COLONOSCOPY W/LESION REMOVAL: CPT | Performed by: INTERNAL MEDICINE

## 2024-05-02 PROCEDURE — 88305 TISSUE EXAM BY PATHOLOGIST: CPT | Performed by: PATHOLOGY

## 2024-05-02 RX ORDER — SODIUM CHLORIDE, SODIUM LACTATE, POTASSIUM CHLORIDE, CALCIUM CHLORIDE 600; 310; 30; 20 MG/100ML; MG/100ML; MG/100ML; MG/100ML
125 INJECTION, SOLUTION INTRAVENOUS CONTINUOUS
Status: DISCONTINUED | OUTPATIENT
Start: 2024-05-02 | End: 2024-05-02 | Stop reason: HOSPADM

## 2024-05-02 RX ORDER — SIMETHICONE 40MG/0.6ML
SUSPENSION, DROPS(FINAL DOSAGE FORM)(ML) ORAL AS NEEDED
Status: COMPLETED | OUTPATIENT
Start: 2024-05-02 | End: 2024-05-02

## 2024-05-02 RX ORDER — PROMETHAZINE HYDROCHLORIDE 25 MG/ML
12.5 INJECTION, SOLUTION INTRAMUSCULAR; INTRAVENOUS ONCE AS NEEDED
Status: DISCONTINUED | OUTPATIENT
Start: 2024-05-02 | End: 2024-05-02 | Stop reason: HOSPADM

## 2024-05-02 RX ORDER — MIDAZOLAM HYDROCHLORIDE 2 MG/2ML
INJECTION, SOLUTION INTRAMUSCULAR; INTRAVENOUS AS NEEDED
Status: DISCONTINUED | OUTPATIENT
Start: 2024-05-02 | End: 2024-05-02

## 2024-05-02 RX ORDER — HYDRALAZINE HYDROCHLORIDE 20 MG/ML
5 INJECTION INTRAMUSCULAR; INTRAVENOUS
Status: DISCONTINUED | OUTPATIENT
Start: 2024-05-02 | End: 2024-05-02 | Stop reason: HOSPADM

## 2024-05-02 RX ORDER — FENTANYL CITRATE/PF 50 MCG/ML
25 SYRINGE (ML) INJECTION
Status: DISCONTINUED | OUTPATIENT
Start: 2024-05-02 | End: 2024-05-02 | Stop reason: HOSPADM

## 2024-05-02 RX ORDER — METOCLOPRAMIDE HYDROCHLORIDE 5 MG/ML
10 INJECTION INTRAMUSCULAR; INTRAVENOUS ONCE AS NEEDED
Status: DISCONTINUED | OUTPATIENT
Start: 2024-05-02 | End: 2024-05-02 | Stop reason: HOSPADM

## 2024-05-02 RX ORDER — LABETALOL HYDROCHLORIDE 5 MG/ML
10 INJECTION, SOLUTION INTRAVENOUS
Status: DISCONTINUED | OUTPATIENT
Start: 2024-05-02 | End: 2024-05-02 | Stop reason: HOSPADM

## 2024-05-02 RX ORDER — ALBUTEROL SULFATE 2.5 MG/3ML
2.5 SOLUTION RESPIRATORY (INHALATION) ONCE AS NEEDED
Status: DISCONTINUED | OUTPATIENT
Start: 2024-05-02 | End: 2024-05-02 | Stop reason: HOSPADM

## 2024-05-02 RX ORDER — HYDROMORPHONE HCL/PF 1 MG/ML
0.5 SYRINGE (ML) INJECTION
Status: DISCONTINUED | OUTPATIENT
Start: 2024-05-02 | End: 2024-05-02 | Stop reason: HOSPADM

## 2024-05-02 RX ORDER — HYDROMORPHONE HCL/PF 1 MG/ML
0.2 SYRINGE (ML) INJECTION
Status: DISCONTINUED | OUTPATIENT
Start: 2024-05-02 | End: 2024-05-02 | Stop reason: HOSPADM

## 2024-05-02 RX ORDER — PROPOFOL 10 MG/ML
INJECTION, EMULSION INTRAVENOUS CONTINUOUS PRN
Status: DISCONTINUED | OUTPATIENT
Start: 2024-05-02 | End: 2024-05-02

## 2024-05-02 RX ORDER — ONDANSETRON 2 MG/ML
4 INJECTION INTRAMUSCULAR; INTRAVENOUS ONCE AS NEEDED
Status: DISCONTINUED | OUTPATIENT
Start: 2024-05-02 | End: 2024-05-02 | Stop reason: HOSPADM

## 2024-05-02 RX ORDER — PROPOFOL 10 MG/ML
INJECTION, EMULSION INTRAVENOUS AS NEEDED
Status: DISCONTINUED | OUTPATIENT
Start: 2024-05-02 | End: 2024-05-02

## 2024-05-02 RX ADMIN — HEPARIN SODIUM 5000 UNITS: 5000 INJECTION INTRAVENOUS; SUBCUTANEOUS at 04:37

## 2024-05-02 RX ADMIN — Medication 40 MG: at 14:22

## 2024-05-02 RX ADMIN — GABAPENTIN 600 MG: 300 CAPSULE ORAL at 08:15

## 2024-05-02 RX ADMIN — TRAMADOL HYDROCHLORIDE 50 MG: 50 TABLET, COATED ORAL at 08:17

## 2024-05-02 RX ADMIN — PROPOFOL 100 MCG/KG/MIN: 10 INJECTION, EMULSION INTRAVENOUS at 14:21

## 2024-05-02 RX ADMIN — MIDAZOLAM 2 MG: 1 INJECTION INTRAMUSCULAR; INTRAVENOUS at 14:14

## 2024-05-02 RX ADMIN — SODIUM CHLORIDE: 0.9 INJECTION, SOLUTION INTRAVENOUS at 14:11

## 2024-05-02 RX ADMIN — PROPOFOL 100 MG: 10 INJECTION, EMULSION INTRAVENOUS at 14:20

## 2024-05-02 RX ADMIN — TAMSULOSIN HYDROCHLORIDE 0.4 MG: 0.4 CAPSULE ORAL at 08:16

## 2024-05-02 RX ADMIN — PANTOPRAZOLE SODIUM 20 MG: 20 TABLET, DELAYED RELEASE ORAL at 04:38
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
## 2024-05-02 NOTE — ASSESSMENT & PLAN NOTE
Patient presented due to scheduled colonoscopy with GI this was delayed secondary to chest pain. Patient was evaluated by cardiology and was cleared for colonoscopy  Colonoscopy scheduled for 2:30 Pm today  Patient is NPO  GI consulted appreciate recommendations

## 2024-05-02 NOTE — ASSESSMENT & PLAN NOTE
Lab Results   Component Value Date    HGBA1C 7.3 (H) 02/16/2024       Recent Labs     05/01/24  1154   POCGLU 158*       Hold metformin  Sliding scale currently NPO

## 2024-05-02 NOTE — PROGRESS NOTES
Cardiology Progress Note   MD Sergio Kelly MD, FACC  Franco Santiago DO, [facility] HospitalMD Erik Lew DO, [facility] HospitalAVILA Sevilla DO, FACC  ----------------------------------------------------------------  [address] [address]    Stanislav Glover 74 y.o. male MRN: [id number]  Unit/Bed#: [address] 218-02 Encounter: [id number]      ASSESSMENT:   Preoperative CV risk assessment for low risk colonoscopy procedure  Acute onset shortness of breath  Musculoskeletal chest discomfort  CAD with coronary calcifications by CT chest, April 2024  Exercise stress echocardiogram appears negative for myocardial ischemia, ET 6:41, 89% MPHR, 8 METs, November 2022  Hypertension  LVEF 55%, grade 1 diastolic dysfunction, mild LVH, AV sclerosis, trace MR/TR/MT with PASP 20 mmHg, November 2022  Dyslipidemia  Type 2 diabetes mellitus  Anxiety  History of claustrophobia    PLAN:  Patient had episode of acute shortness of breath with musculoskeletal chest discomfort  He underwent EGD and colonoscopy  Colonoscopy demonstrated mild erythema of the antral mucosa without any acute findings concerning for bleed  Upper endoscopy showed 2 subcentimeter polyps of ascending colon as well as small hemorrhoids  No findings noted of the patient's site of bleeding  He has no chest pain concerning for angina and no signs or symptoms of heart failure; clinically examines to be euvolemic  Continue statin; consider increasing statin at discharge  Outpatient follow-up with primary cardiologist within 2 weeks of discharge for additional CV testing as clinically indicated  Will see further inpatient as needed; please reconsult with questions    Signed: Franco Santiago DO, FACC, FASE, FACP      History of Present Illness:  Patient seen and examined.  Denies chest pain, pressure, tightness or squeezing.  Denies lightheadedness, dizziness or palpitations.  Denies lower extremity swelling,  orthopnea or paroxysmal nocturnal dyspnea.      Review of Systems:  Review of Systems   Constitutional: Negative for decreased appetite, fever, weight gain and weight loss.   HENT:  Negative for congestion and sore throat.    Eyes:  Negative for visual disturbance.   Cardiovascular:  Negative for chest pain, dyspnea on exertion, leg swelling, near-syncope and palpitations.   Respiratory:  Negative for cough and shortness of breath.    Hematologic/Lymphatic: Negative for bleeding problem.   Skin:  Negative for rash.   Musculoskeletal:  Negative for myalgias and neck pain.   Gastrointestinal:  Negative for abdominal pain and nausea.   Neurological:  Negative for light-headedness and weakness.   Psychiatric/Behavioral:  Negative for depression.        Allergies   Allergen Reactions    Lipitor [Atorvastatin] Other (See Comments) and Myalgia     Other reaction(s): increased pain  arthralgia    Lyrica [Pregabalin] Other (See Comments)     Pt can't remmember reaction       Current Facility-Administered Medications on File Prior to Encounter   Medication    ondansetron (ZOFRAN) injection 4 mg    sodium chloride 0.9 % infusion    sodium chloride 0.9 % infusion     Current Outpatient Medications on File Prior to Encounter   Medication Sig    Blood Glucose Monitoring Suppl (ONE TOUCH ULTRA 2) w/Device KIT Use daily    gabapentin (NEURONTIN) 600 MG tablet Take 1 tablet (600 mg total) by mouth 3 (three) times a day as needed (leg/neck pain) (Patient taking differently: Take 600 mg by mouth 2 (two) times a day)    glucose blood (ONE TOUCH ULTRA TEST) test strip Use 1 each daily    hydrochlorothiazide (HYDRODIURIL) 25 mg tablet Take 1 tablet (25 mg total) by mouth daily    metFORMIN (GLUCOPHAGE) 500 mg tablet Take 2 tablets (1,000 mg total) by mouth 2 (two) times a day with meals    naloxone (NARCAN) 4 mg/0.1 mL nasal spray 0.1 mL (4 mg total) into each nostril once for 1 dose If the desired response is not obtained after 2-3  minutes, administer an additional dose using a new spray    naproxen (Naprosyn) 500 mg tablet Take 1 tablet (500 mg total) by mouth 2 (two) times a day as needed for mild pain or moderate pain    omeprazole (PriLOSEC) 40 MG capsule take 1 capsule by mouth EVERY MORNING BEFORE BREAKFAST    OneTouch Delica Lancets 30G MISC Use 1 Device 2 (two) times a day    rosuvastatin (CRESTOR) 10 MG tablet Take 1 tablet (10 mg total) by mouth 2 (two) times a week    sildenafil (Viagra) 100 mg tablet Take 1 tablet (100 mg total) by mouth daily as needed for erectile dysfunction    tamsulosin (FLOMAX) 0.4 mg Take 1 capsule (0.4 mg total) by mouth 2 (two) times a day    traMADol (ULTRAM) 50 mg tablet Take 1 tablet by mouth two to three times a day if needed for moderate pain    valsartan (DIOVAN) 160 mg tablet Take 1 tablet (160 mg total) by mouth daily        Current Facility-Administered Medications   Medication Dose Route Frequency Provider Last Rate    acetaminophen  650 mg Oral Q6H PRN Roger Arango MD      albuterol  2.5 mg Nebulization Once PRN Christianne Bowman MD      fentaNYL  25 mcg Intravenous Q5 Min PRN Christianne Bowman MD      gabapentin  600 mg Oral BID Roger Arango MD      heparin (porcine)  5,000 Units Subcutaneous Q8H [facility] Roger Arango MD      hydrALAZINE  5 mg Intravenous Q20 Min PRN Christianne Bowman MD      HYDROmorphone  0.2 mg Intravenous Q5 Min PRN Christianne Bowman MD      HYDROmorphone  0.5 mg Intravenous Q5 Min PRN Christianne Bowman MD      ipratropium  0.5 mg Nebulization Once PRN Christianne Bowman MD      labetalol  10 mg Intravenous Q10 Min PRN Christianne Bowman MD      lactated ringers  125 mL/hr Intravenous Continuous Christianne Bowman MD      lactated ringers  125 mL/hr Intravenous Continuous Christianne Bowman MD      metoclopramide  10 mg Intravenous Once PRN Christianne Bowman MD      ondansetron  4 mg Intravenous Q6H PRN Roger Arango MD      ondansetron  4 mg Intravenous Once PRN Christianne Bowman MD       pantoprazole  20 mg Oral Early Morning Roger Arango MD      pravastatin  80 mg Oral Daily With Dinner Roger Arango MD      promethazine  12.5 mg Intravenous Once PRN Christianne Bowman MD      tamsulosin  0.4 mg Oral BID Roger Arango MD      traMADol  50 mg Oral Q6H PRN Roger Arango MD       Facility-Administered Medications Ordered in Other Encounters   Medication Dose Route Frequency Provider Last Rate    ondansetron  4 mg Intravenous Q6H PRN Francisco Stone DO      sodium chloride  125 mL/hr Intravenous Continuous Trey Stiles, DO Stopped (05/01/24 0734)    sodium chloride  125 mL/hr Intravenous Continuous Francisco Stone DO         lactated ringers, 125 mL/hr  lactated ringers, 125 mL/hr        Vitals:    05/02/24 1500 05/02/24 1505 05/02/24 1515 05/02/24 1617   BP: (!) 86/45 105/55 125/60 150/79   BP Location:       Pulse: 74 63 61 60   Resp: 16 16 16    Temp: 98.4 °F (36.9 °C)   98 °F (36.7 °C)   TempSrc: Temporal      SpO2: 95% 95% 95% 96%   Weight:       Height:         Body mass index is 27.77 kg/m².      Intake/Output Summary (Last 24 hours) at 5/2/2024 1620  Last data filed at 5/2/2024 1530  Gross per 24 hour   Intake 920 ml   Output --   Net 920 ml       Weight change:     PHYSICAL EXAMINATION:  Gen: Awake, Alert, NAD  Head/eyes: AT/NC, pupils equal and round, Anicteric  ENT: mmm  Neck: Supple, No elevated JVP, trachea midline  Resp: CTA bilaterally no w/r/r  CV: RRR +S1, S2, No m/r/g  Abd: Soft, NT/ND + BS  Ext: no LE edema bilaterally  Neuro: Follows commands, moves all extermities  Psych: Appropriate affect, happy mood, pleasant attitude, non-combative  Skin: warm; no rash, erythema or venous stasis changes on exposed skin    Lab Results:  Results from last 7 days   Lab Units 05/02/24  0441   WBC Thousand/uL 6.29   HEMOGLOBIN g/dL 13.3   HEMATOCRIT % 39.5   PLATELETS Thousands/uL 216     Results from last 7 days   Lab Units 05/02/24  0441 05/01/24  0828   POTASSIUM mmol/L 3.8 4.2  "  CHLORIDE mmol/L 108 106   CO2 mmol/L 24 24   BUN mg/dL 18 18   CREATININE mg/dL 0.96 0.88   CALCIUM mg/dL 9.2 9.5   ALK PHOS U/L  --  79   ALT U/L  --  21   AST U/L  --  21     No results found for: \"TROPONINT\"      Results from last 7 days   Lab Units 05/01/24  1240 05/01/24  1025 05/01/24  0828   HS TNI 0HR ng/L  --   --  4   HS TNI 2HR ng/L  --  3  --    HS TNI 4HR ng/L 3  --   --                This note was completed in part utilizing M-Modal Fluency Direct Software.  Grammatical errors, random word insertions, spelling mistakes, and incomplete sentences may be an occasional consequence of this system secondary to software limitations, ambient noise, and hardware issues.  If you have any questions or concerns about the content, text, or information contained within the body of this dictation, please contact the provider for clarification.      "

## 2024-05-02 NOTE — ANESTHESIA POSTPROCEDURE EVALUATION
Post-Op Assessment Note    CV Status:  Stable    Pain management: adequate       Mental Status:  Alert   PONV Controlled:  None   Airway Patency:  Patent     Post Op Vitals Reviewed: Yes    No anethesia notable event occurred.    Staff: Anesthesiologist               /55 (05/02/24 1505)    Temp      Pulse 63 (05/02/24 1505)   Resp 16 (05/02/24 1505)    SpO2 95 % (05/02/24 1505)
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
## 2024-05-02 NOTE — ASSESSMENT & PLAN NOTE
Lab Results   Component Value Date    HGBA1C 7.3 (H) 02/16/2024       Recent Labs     05/01/24  1154   POCGLU 158*     Continue metformin at discharge

## 2024-05-02 NOTE — DISCHARGE INSTR - AVS FIRST PAGE
Dear Stanislav Glover,     It was our pleasure to care for you here at [facility].  It is our hope that we were always able to exceed the expected standards for your care during your stay.  You were hospitalized due to planned colonoscopy and EGD.  You were cared for on the south 2  floor by Treva White PA-C under the service of Stevan Joseph Pe* with the [facility] who covers for your primary care physician (PCP), Shanti Kumar MD, while you were hospitalized.  If you have any questions or concerns related to this hospitalization, you may contact us at .  For follow up as well as any medication refills, we recommend that you follow up with your primary care physician.  A registered nurse will reach out to you by phone within a few days after your discharge to answer any additional questions that you may have after going home.  However, at this time we provide for you here, the most important instructions / recommendations at discharge:     Notable Medication Adjustments -   none  Testing Required after Discharge -   none  ** Please contact your PCP to request testing orders for any of the testing recommended here **  Important follow up information -   Follow up with family doctor   Follow up with gastroenterology   Follow up with cardiology  Other Instructions -   Increase fiber in your diet.   Please review this entire after visit summary as additional general instructions including medication list, appointments, activity, diet, any pertinent wound care, and other additional recommendations from your care team that may be provided for you.      Sincerely,     Treva White PA-C

## 2024-05-02 NOTE — ASSESSMENT & PLAN NOTE
Patient presented due to scheduled colonoscopy with GI this was delayed secondary to chest pain. Patient was evaluated by cardiology and was cleared for colonoscopy/EGD  EGD showed Clement's esophagus, hiatal hernia noted, biopsies were taken  Continue on omeprazole 40 mg daily   Colonoscopy 2 polyps were removed, external and internal small hemorrhoids noted  Repeat colonoscopy recommended in 5 years increased fiber diet recommended  GI cleared patient for discharge today

## 2024-05-02 NOTE — RESTORATIVE TECHNICIAN NOTE
Restorative Technician Note      Patient Name: Stanislav Glover     Restorative Tech Visit Date: 05/02/24  Note Type: Mobility  Patient Position Upon Consult: Supine  Activity Performed: Ambulated; Range of motion  Patient Position at End of Consult: Supine; All needs within reach

## 2024-05-02 NOTE — CONSULTS
Consultation - [facility]  Stanislav Glover 74 y.o. male MRN: [id number]  Unit/Bed#: [place] [address] -02 Encounter: [id number]            Inpatient consult to gastroenterology     Date/Time  5/2/2024 12:27 PM     Performed by  Tea Vogel DO   Authorized by  Mercedes Womack PA-C             Reason for Consult / Principal Problem:     Surveillance colonoscopy    ASSESSMENT AND PLAN:      74-year-old male with history significant for colon polyps, diverticulosis, GERD, sliding hiatal hernia, who presented to the hospital following development of chest pain, shortness of breath, and tachycardia prior to receiving anesthesia for outpatient colonoscopy on 5/1 and was transferred to the hospital for further evaluation.  He underwent EKG and cardiac eval with clearance to proceed with colonoscopy.    He remains clinically stable and denies any further chest pain or shortness of breath.  We will proceed with surveillance colonoscopy today.    Rest of care per primary team.  Thank you for this consultation.   ______________________________________________________________________    HPI: Stanislav Glover is a 74-year-old male with history significant for colon polyps, diverticulosis, GERD, sliding hiatal hernia, whom we are asked to see in consultation for surveillance colonoscopy.    Patient was originally scheduled for outpatient colonoscopy at [facility] on 5/1 for surveillance colonoscopy in the setting of 5 small tubular adenomas and diverticulosis noted on last colonoscopy in January 2020.  However, he unfortunately developed epigastric pain, chest pain, dyspnea, and tachycardia just before receiving anesthesia and was transferred to the hospital for further evaluation.  He was evaluated by cardiology and underwent EKG with clearance for endoscopic evaluation.      REVIEW OF SYSTEMS:  10 point ROS reviewed and negative except otherwise noted in the HPI above.     Historical Information    Past Medical History:   Diagnosis Date    Anxiety     Chronic pain disorder     right shoulder    Diabetes mellitus (HCC)     type 2, blood sugar 109 at 0500    Diverticulosis     Extremity pain     GERD (gastroesophageal reflux disease)     Hearing loss, right     Hemorrhoids     Neck pain     Polyp, sigmoid colon      Past Surgical History:   Procedure Laterality Date    COLONOSCOPY      EAR SURGERY      INNER EAR SURGERY      NASAL SINUS SURGERY      [place] COLONOSCOPY FLX DX W/COLLJ SPEC WHEN PFRMD N/A 8/30/2016    Procedure: COLONOSCOPY;  Surgeon: Gill Dowling MD;  Location: [facility];  Service: General    [place] SURGICAL ARTHROSCOPY SHOULDER W/ROTATOR CUFF RPR Right 2/5/2019    Procedure: ARTHROSCOPY SHOULDER;  Surgeon: Howard Downey DO;  Location:  MAIN OR;  Service: Orthopedics     Social History   Social History     Substance and Sexual Activity   Alcohol Use Yes    Comment: occassional     Social History     Substance and Sexual Activity   Drug Use No     Social History     Tobacco Use   Smoking Status Never   Smokeless Tobacco Never     Family History   Problem Relation Age of Onset    Hypertension Brother     Hepatitis Mother     Heart disease Father         CARDIAC DISORDER        Meds/Allergies     Medications Prior to Admission   Medication    Blood Glucose Monitoring Suppl (ONE TOUCH ULTRA 2) w/Device KIT    gabapentin (NEURONTIN) 600 MG tablet    glucose blood (ONE TOUCH ULTRA TEST) test strip    hydrochlorothiazide (HYDRODIURIL) 25 mg tablet    metFORMIN (GLUCOPHAGE) 500 mg tablet    naloxone (NARCAN) 4 mg/0.1 mL nasal spray    naproxen (Naprosyn) 500 mg tablet    omeprazole (PriLOSEC) 40 MG capsule    OneTouch Delica Lancets 30G MISC    rosuvastatin (CRESTOR) 10 MG tablet    sildenafil (Viagra) 100 mg tablet    tamsulosin (FLOMAX) 0.4 mg    traMADol (ULTRAM) 50 mg tablet    valsartan (DIOVAN) 160 mg tablet     Current Facility-Administered Medications   Medication Dose Route Frequency     "acetaminophen (TYLENOL) tablet 650 mg  650 mg Oral Q6H PRN    gabapentin (NEURONTIN) capsule 600 mg  600 mg Oral BID    heparin (porcine) subcutaneous injection 5,000 Units  5,000 Units Subcutaneous Q8H RAMY    ondansetron (ZOFRAN) injection 4 mg  4 mg Intravenous Q6H PRN    pantoprazole (PROTONIX) EC tablet 20 mg  20 mg Oral Early Morning    pravastatin (PRAVACHOL) tablet 80 mg  80 mg Oral Daily With Dinner    tamsulosin (FLOMAX) capsule 0.4 mg  0.4 mg Oral BID    traMADol (ULTRAM) tablet 50 mg  50 mg Oral Q6H PRN     Facility-Administered Medications Ordered in Other Encounters   Medication Dose Route Frequency    ondansetron (ZOFRAN) injection 4 mg  4 mg Intravenous Q6H PRN    sodium chloride 0.9 % infusion  125 mL/hr Intravenous Continuous    sodium chloride 0.9 % infusion  125 mL/hr Intravenous Continuous       Allergies   Allergen Reactions    Lipitor [Atorvastatin] Other (See Comments) and Myalgia     Other reaction(s): increased pain  arthralgia    Lyrica [Pregabalin] Other (See Comments)     Pt can't remmember reaction         Objective     Blood pressure 153/94, pulse 76, temperature 98 °F (36.7 °C), temperature source Oral, resp. rate 18, height 5' 3\" (1.6 m), weight 71.1 kg (156 lb 12 oz), SpO2 97%. Body mass index is 27.77 kg/m².    PHYSICAL EXAM:    General: Well-appearing, NAD  Eyes: no conjunctival icterus or pallor  Abdominal: Soft, non-tender, non-distended  Extremities: Warm, no deformities, no edema  Neuro: alert and oriented  Psych: Normal affect    Lab Results:   Admission on 05/01/2024   Component Date Value    WBC 05/01/2024 6.62     RBC 05/01/2024 5.11     Hemoglobin 05/01/2024 14.4     Hematocrit 05/01/2024 42.5     MCV 05/01/2024 83     MCH 05/01/2024 28.2     MCHC 05/01/2024 33.9     RDW 05/01/2024 13.2     MPV 05/01/2024 10.4     Platelets 05/01/2024 243     nRBC 05/01/2024 0     Segmented % 05/01/2024 55     Immature Grans % 05/01/2024 0     Lymphocytes % 05/01/2024 36     Monocytes % " 05/01/2024 7     Eosinophils Relative 05/01/2024 1     Basophils Relative 05/01/2024 1     Absolute Neutrophils 05/01/2024 3.64     Absolute Immature Grans 05/01/2024 0.02     Absolute Lymphocytes 05/01/2024 2.36     Absolute Monocytes 05/01/2024 0.49     Eosinophils Absolute 05/01/2024 0.08     Basophils Absolute 05/01/2024 0.03     Sodium 05/01/2024 139     Potassium 05/01/2024 4.2     Chloride 05/01/2024 106     CO2 05/01/2024 24     ANION GAP 05/01/2024 9     BUN 05/01/2024 18     Creatinine 05/01/2024 0.88     Glucose 05/01/2024 160 (H)     Calcium 05/01/2024 9.5     AST 05/01/2024 21     ALT 05/01/2024 21     Alkaline Phosphatase 05/01/2024 79     Total Protein 05/01/2024 7.8     Albumin 05/01/2024 4.2     Total Bilirubin 05/01/2024 0.82     eGFR 05/01/2024 85     hs TnI 0hr 05/01/2024 4     hs TnI 2hr 05/01/2024 3     Delta 2hr hsTnI 05/01/2024 -1     hs TnI 4hr 05/01/2024 3     Delta 4hr hsTnI 05/01/2024 -1     Ventricular Rate 05/01/2024 75     Atrial Rate 05/01/2024 75     IA Interval 05/01/2024 168     QRSD Interval 05/01/2024 66     QT Interval 05/01/2024 376     QTC Interval 05/01/2024 419     P Axis 05/01/2024 0     QRS Axis 05/01/2024 -10     T Wave Luna 05/01/2024 25     Ventricular Rate 05/01/2024 71     Atrial Rate 05/01/2024 127     IA Interval 05/01/2024 190     QRSD Interval 05/01/2024 70     QT Interval 05/01/2024 404     QTC Interval 05/01/2024 439     P Axis 05/01/2024 53     QRS Axis 05/01/2024 -8     T Wave Luna 05/01/2024 27     POC Glucose 05/01/2024 158 (H)     Ventricular Rate 05/01/2024 62     Atrial Rate 05/01/2024 62     IA Interval 05/01/2024 200     QRSD Interval 05/01/2024 70     QT Interval 05/01/2024 434     QTC Interval 05/01/2024 440     P Axis 05/01/2024 54     QRS Axis 05/01/2024 -10     T Wave Luna 05/01/2024 24     Color, UA 05/01/2024 Yellow     Clarity, UA 05/01/2024 Clear     pH, UA 05/01/2024 8.0     Leukocytes, UA 05/01/2024 Negative     Nitrite, UA 05/01/2024  Negative     Protein, UA 05/01/2024 Negative     Glucose, UA 05/01/2024 Negative     Ketones, UA 05/01/2024 Negative     Urobilinogen, UA 05/01/2024 0.2     Bilirubin, UA 05/01/2024 Negative     Occult Blood, UA 05/01/2024 Small (A)     Specific [place], UA 05/01/2024 1.015     RBC, UA 05/01/2024 10-20 (A)     WBC, UA 05/01/2024 1-2     Epithelial Cells 05/01/2024 None Seen     Bacteria, UA 05/01/2024 None Seen     Sodium 05/02/2024 139     Potassium 05/02/2024 3.8     Chloride 05/02/2024 108     CO2 05/02/2024 24     ANION GAP 05/02/2024 7     BUN 05/02/2024 18     Creatinine 05/02/2024 0.96     Glucose 05/02/2024 155 (H)     Glucose, Fasting 05/02/2024 155 (H)     Calcium 05/02/2024 9.2     eGFR 05/02/2024 78     WBC 05/02/2024 6.29     RBC 05/02/2024 4.70     Hemoglobin 05/02/2024 13.3     Hematocrit 05/02/2024 39.5     MCV 05/02/2024 84     MCH 05/02/2024 28.3     MCHC 05/02/2024 33.7     RDW 05/02/2024 13.2     Platelets 05/02/2024 216     MPV 05/02/2024 10.8        Imaging Studies: I have personally reviewed pertinent imaging studies.    Tea Vogel D.O.  Fellow, PGY-5  Division of Gastroenterology & Hepatology  Available on [facility]  5/2/2024 12:21 PM

## 2024-05-02 NOTE — ANESTHESIA PREPROCEDURE EVALUATION
Procedure:  COLONOSCOPY  EGD    Relevant Problems   ANESTHESIA (within normal limits)      CARDIO   (+) Essential hypertension   (+) Hyperlipidemia      ENDO   (+) Type 2 diabetes mellitus without complication, without long-term current use of insulin (HCC)      GI/HEPATIC   (+) Gastroesophageal reflux disease with esophagitis      /RENAL   (+) Enlarged prostate with lower urinary tract symptoms (LUTS)      MUSCULOSKELETAL   (+) DDD (degenerative disc disease), thoracic      NEURO/PSYCH   (+) Chronic pain disorder   (+) Continuous opioid dependence (HCC)   (+) Syringomyelia (HCC)      PULMONARY   (+) ARPAN (obstructive sleep apnea)      Other   (+) Encounter for screening colonoscopy        Physical Exam    Airway    Mallampati score: II  TM Distance: >3 FB  Neck ROM: full     Dental   No notable dental hx     Cardiovascular  Rhythm: regular, Rate: normal, Cardiovascular exam normal    Pulmonary  Pulmonary exam normal Breath sounds clear to auscultation    Other Findings        Anesthesia Plan  ASA Score- 3     Anesthesia Type- IV sedation with anesthesia with ASA Monitors.         Additional Monitors:     Airway Plan:            Plan Factors-Exercise tolerance (METS): >4 METS.    Chart reviewed.   Existing labs reviewed. Patient summary reviewed.    Patient is not a current smoker.              Induction-     Postoperative Plan-     Informed Consent- Anesthetic plan and risks discussed with patient.  I personally reviewed this patient with the CRNA. Discussed and agreed on the Anesthesia Plan with the CRNA..

## 2024-05-02 NOTE — NURSING NOTE
Discharge paperwork reviewed with patient. Answered all questions and concerns. IV and masimo removed.

## 2024-05-03 ENCOUNTER — TRANSITIONAL CARE MANAGEMENT (OUTPATIENT)
Dept: FAMILY MEDICINE CLINIC | Facility: CLINIC | Age: 74
End: 2024-05-03

## 2024-05-06 PROCEDURE — 88305 TISSUE EXAM BY PATHOLOGIST: CPT | Performed by: PATHOLOGY

## 2024-05-07 ENCOUNTER — TELEPHONE (OUTPATIENT)
Dept: GASTROENTEROLOGY | Facility: CLINIC | Age: 74
End: 2024-05-07

## 2024-05-07 NOTE — TELEPHONE ENCOUNTER
----- Message from Carlos Joshi MD sent at 5/7/2024  2:44 PM EDT -----  Dear staff: Please place in recall for colonoscopy in 5 years.  Please arrange follow-up for patient in the GI office in 2 to 3 months at location of patient's preference.

## 2024-05-08 LAB
ALBUMIN SERPL BCP-MCNC: 4.2 G/DL (ref 3.5–5)
ALP SERPL-CCNC: 79 U/L (ref 34–104)
ALT SERPL W P-5'-P-CCNC: 21 U/L (ref 7–52)
ANION GAP SERPL CALCULATED.3IONS-SCNC: 9 MMOL/L (ref 4–13)
AST SERPL W P-5'-P-CCNC: 21 U/L (ref 13–39)
BILIRUB SERPL-MCNC: 0.82 MG/DL (ref 0.2–1)
BUN SERPL-MCNC: 18 MG/DL (ref 5–25)
CALCIUM SERPL-MCNC: 9.5 MG/DL (ref 8.4–10.2)
CHLORIDE SERPL-SCNC: 106 MMOL/L (ref 96–108)
CO2 SERPL-SCNC: 24 MMOL/L (ref 21–32)
CREAT SERPL-MCNC: 0.88 MG/DL (ref 0.6–1.3)
GFR SERPL CREATININE-BSD FRML MDRD: 85 ML/MIN/1.73SQ M
GLUCOSE SERPL-MCNC: 160 MG/DL (ref 65–140)
POTASSIUM SERPL-SCNC: 4.2 MMOL/L (ref 3.5–5.3)
PROT SERPL-MCNC: 7.8 G/DL (ref 6.4–8.4)
SODIUM SERPL-SCNC: 139 MMOL/L (ref 135–147)

## 2024-05-17 ENCOUNTER — OFFICE VISIT (OUTPATIENT)
Dept: CARDIOLOGY CLINIC | Facility: CLINIC | Age: 74
End: 2024-05-17
Payer: MEDICARE

## 2024-05-17 VITALS
SYSTOLIC BLOOD PRESSURE: 178 MMHG | HEART RATE: 63 BPM | DIASTOLIC BLOOD PRESSURE: 78 MMHG | WEIGHT: 155.6 LBS | BODY MASS INDEX: 27.56 KG/M2

## 2024-05-17 DIAGNOSIS — I10 ESSENTIAL HYPERTENSION: Primary | ICD-10-CM

## 2024-05-17 DIAGNOSIS — E78.49 OTHER HYPERLIPIDEMIA: ICD-10-CM

## 2024-05-17 PROCEDURE — G2211 COMPLEX E/M VISIT ADD ON: HCPCS | Performed by: PHYSICIAN ASSISTANT

## 2024-05-17 PROCEDURE — 99214 OFFICE O/P EST MOD 30 MIN: CPT | Performed by: PHYSICIAN ASSISTANT
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
## 2024-05-20 ENCOUNTER — TELEPHONE (OUTPATIENT)
Age: 74
End: 2024-05-20

## 2024-05-20 NOTE — TELEPHONE ENCOUNTER
Patient wife called and stated she received a call stating pcp had prescribed a brace for patient knee and back. Patients wife stated she does know anything about this. Patients wife advised I did not see anything ordered by pcp.

## 2024-06-05 DIAGNOSIS — E11.9 TYPE 2 DIABETES MELLITUS WITHOUT COMPLICATION, WITHOUT LONG-TERM CURRENT USE OF INSULIN (HCC): ICD-10-CM

## 2024-06-05 NOTE — TELEPHONE ENCOUNTER
Cc'd chart    Reason for call:   [x] Refill   [] Prior Auth  [] Other:     Office:   [x] PCP/Provider - Shanti Kumar MD  [] Specialty/Provider -     Medication: metFORMIN (GLUCOPHAGE) 500 mg tablet     Dose/Frequency: 1,000 mg, Oral, 2 times daily with meals     Quantity: 360    Pharmacy: [facility] [id number] [place] [facility] - MARLENE DELACRUZ - [address] [facility] [phone]

## 2024-06-19 ENCOUNTER — CLINICAL SUPPORT (OUTPATIENT)
Dept: CARDIOLOGY CLINIC | Facility: CLINIC | Age: 74
End: 2024-06-19
Payer: MEDICARE

## 2024-06-19 VITALS
DIASTOLIC BLOOD PRESSURE: 60 MMHG | SYSTOLIC BLOOD PRESSURE: 126 MMHG | HEART RATE: 65 BPM | HEIGHT: 65 IN | WEIGHT: 160 LBS | BODY MASS INDEX: 26.66 KG/M2

## 2024-06-19 DIAGNOSIS — I10 ESSENTIAL HYPERTENSION: Primary | ICD-10-CM

## 2024-06-19 PROCEDURE — 99211 OFF/OP EST MAY X REQ PHY/QHP: CPT

## 2024-06-19 NOTE — PROGRESS NOTES
Stanislav came in for a BP check today. Medications reviewed.  At last ov his BP was elevated, for he had not taken his morning medications before coming.  BP  today good at 126/60 and HR 65.  Patient feeling well.

## 2024-07-09 ENCOUNTER — APPOINTMENT (OUTPATIENT)
Dept: LAB | Facility: MEDICAL CENTER | Age: 74
End: 2024-07-09
Payer: MEDICARE

## 2024-07-09 DIAGNOSIS — E11.9 TYPE 2 DIABETES MELLITUS WITHOUT COMPLICATION, WITHOUT LONG-TERM CURRENT USE OF INSULIN (HCC): ICD-10-CM

## 2024-07-09 DIAGNOSIS — E78.5 HYPERLIPIDEMIA, UNSPECIFIED HYPERLIPIDEMIA TYPE: ICD-10-CM

## 2024-07-09 DIAGNOSIS — Z12.5 SCREENING FOR PROSTATE CANCER: ICD-10-CM

## 2024-07-09 LAB
ALBUMIN SERPL BCG-MCNC: 3.9 G/DL (ref 3.5–5)
ALP SERPL-CCNC: 78 U/L (ref 34–104)
ALT SERPL W P-5'-P-CCNC: 12 U/L (ref 7–52)
ANION GAP SERPL CALCULATED.3IONS-SCNC: 9 MMOL/L (ref 4–13)
AST SERPL W P-5'-P-CCNC: 14 U/L (ref 13–39)
BILIRUB SERPL-MCNC: 0.62 MG/DL (ref 0.2–1)
BUN SERPL-MCNC: 21 MG/DL (ref 5–25)
CALCIUM SERPL-MCNC: 9.5 MG/DL (ref 8.4–10.2)
CHLORIDE SERPL-SCNC: 102 MMOL/L (ref 96–108)
CHOLEST SERPL-MCNC: 171 MG/DL
CO2 SERPL-SCNC: 26 MMOL/L (ref 21–32)
CREAT SERPL-MCNC: 1.02 MG/DL (ref 0.6–1.3)
CREAT UR-MCNC: 48.6 MG/DL
EST. AVERAGE GLUCOSE BLD GHB EST-MCNC: 192 MG/DL
GFR SERPL CREATININE-BSD FRML MDRD: 73 ML/MIN/1.73SQ M
GLUCOSE P FAST SERPL-MCNC: 175 MG/DL (ref 65–99)
HBA1C MFR BLD: 8.3 %
HDLC SERPL-MCNC: 35 MG/DL
LDLC SERPL CALC-MCNC: 104 MG/DL (ref 0–100)
MICROALBUMIN UR-MCNC: 12.8 MG/L
MICROALBUMIN/CREAT 24H UR: 26 MG/G CREATININE (ref 0–30)
NONHDLC SERPL-MCNC: 136 MG/DL
POTASSIUM SERPL-SCNC: 4.3 MMOL/L (ref 3.5–5.3)
PROT SERPL-MCNC: 7.4 G/DL (ref 6.4–8.4)
SODIUM SERPL-SCNC: 137 MMOL/L (ref 135–147)
TRIGL SERPL-MCNC: 159 MG/DL

## 2024-07-09 PROCEDURE — 82043 UR ALBUMIN QUANTITATIVE: CPT

## 2024-07-09 PROCEDURE — 82570 ASSAY OF URINE CREATININE: CPT

## 2024-07-09 PROCEDURE — 83036 HEMOGLOBIN GLYCOSYLATED A1C: CPT

## 2024-07-09 PROCEDURE — 36415 COLL VENOUS BLD VENIPUNCTURE: CPT

## 2024-07-09 PROCEDURE — 80053 COMPREHEN METABOLIC PANEL: CPT

## 2024-07-09 PROCEDURE — 80061 LIPID PANEL: CPT

## 2024-07-10 DIAGNOSIS — G95.0 SYRINGOMYELIA (HCC): ICD-10-CM

## 2024-07-10 RX ORDER — GABAPENTIN 600 MG/1
600 TABLET ORAL 2 TIMES DAILY
Qty: 180 TABLET | Refills: 1 | Status: SHIPPED | OUTPATIENT
Start: 2024-07-10

## 2024-07-12 ENCOUNTER — TELEPHONE (OUTPATIENT)
Dept: CARDIOLOGY CLINIC | Facility: CLINIC | Age: 74
End: 2024-07-12
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
## 2024-07-22 ENCOUNTER — RA CDI HCC (OUTPATIENT)
Dept: OTHER | Facility: HOSPITAL | Age: 74
End: 2024-07-22

## 2024-07-22 NOTE — PROGRESS NOTES
HCC coding opportunities          Chart Reviewed number of suggestions sent to Provider: 2     Patients Insurance   E11.36 and E11.65  Medicare Insurance: Medicare

## 2024-07-26 ENCOUNTER — OFFICE VISIT (OUTPATIENT)
Dept: FAMILY MEDICINE CLINIC | Facility: CLINIC | Age: 74
End: 2024-07-26
Payer: MEDICARE

## 2024-07-26 ENCOUNTER — HOSPITAL ENCOUNTER (OUTPATIENT)
Dept: CT IMAGING | Facility: HOSPITAL | Age: 74
End: 2024-07-26
Payer: MEDICARE

## 2024-07-26 VITALS
HEART RATE: 68 BPM | OXYGEN SATURATION: 97 % | WEIGHT: 161 LBS | HEIGHT: 65 IN | BODY MASS INDEX: 26.82 KG/M2 | DIASTOLIC BLOOD PRESSURE: 84 MMHG | SYSTOLIC BLOOD PRESSURE: 134 MMHG

## 2024-07-26 DIAGNOSIS — J32.3 SPHENOID SINUSITIS, UNSPECIFIED CHRONICITY: Primary | ICD-10-CM

## 2024-07-26 DIAGNOSIS — E11.9 TYPE 2 DIABETES MELLITUS WITHOUT COMPLICATION, WITHOUT LONG-TERM CURRENT USE OF INSULIN (HCC): ICD-10-CM

## 2024-07-26 DIAGNOSIS — R51.9 ACUTE NONINTRACTABLE HEADACHE, UNSPECIFIED HEADACHE TYPE: ICD-10-CM

## 2024-07-26 DIAGNOSIS — R47.89 SPELL OF CHANGE IN SPEECH: ICD-10-CM

## 2024-07-26 DIAGNOSIS — I10 ESSENTIAL HYPERTENSION: ICD-10-CM

## 2024-07-26 DIAGNOSIS — G95.0 SYRINGOMYELIA (HCC): Primary | ICD-10-CM

## 2024-07-26 DIAGNOSIS — G89.4 CHRONIC PAIN DISORDER: ICD-10-CM

## 2024-07-26 DIAGNOSIS — E78.5 HYPERLIPIDEMIA, UNSPECIFIED HYPERLIPIDEMIA TYPE: ICD-10-CM

## 2024-07-26 PROBLEM — S20.219A CONTUSION OF CHEST: Status: RESOLVED | Noted: 2024-04-29 | Resolved: 2024-07-26

## 2024-07-26 PROCEDURE — 70450 CT HEAD/BRAIN W/O DYE: CPT

## 2024-07-26 PROCEDURE — G2211 COMPLEX E/M VISIT ADD ON: HCPCS | Performed by: FAMILY MEDICINE

## 2024-07-26 PROCEDURE — 99214 OFFICE O/P EST MOD 30 MIN: CPT | Performed by: FAMILY MEDICINE

## 2024-07-26 RX ORDER — AMOXICILLIN AND CLAVULANATE POTASSIUM 875; 125 MG/1; MG/1
1 TABLET, FILM COATED ORAL EVERY 12 HOURS SCHEDULED
Qty: 20 TABLET | Refills: 0 | Status: SHIPPED | OUTPATIENT
Start: 2024-07-26 | End: 2024-08-05

## 2024-07-26 RX ORDER — ROSUVASTATIN CALCIUM 10 MG/1
10 TABLET, COATED ORAL 3 TIMES WEEKLY
Qty: 40 TABLET | Refills: 3 | Status: SHIPPED | OUTPATIENT
Start: 2024-07-26

## 2024-07-26 RX ORDER — ROSUVASTATIN CALCIUM 10 MG/1
10 TABLET, COATED ORAL 2 TIMES WEEKLY
Qty: 36 TABLET | Refills: 3 | Status: SHIPPED | OUTPATIENT
Start: 2024-07-29 | End: 2024-07-26 | Stop reason: SDUPTHER

## 2024-07-26 NOTE — ASSESSMENT & PLAN NOTE
Ldl goal , currently  104  on 2 times/week of  Crestor, increase  to 3  times/week, recheck  in 11/24

## 2024-07-26 NOTE — PROGRESS NOTES
Ambulatory Visit  Name: Stanislav Glover      : 1950      MRN: [id number]  Encounter Provider: Shanti Kumar MD  Encounter Date: 2024   Encounter department: [facility] PRIMARY CARE    Assessment & Plan   1. Syringomyelia (HCC)  Assessment & Plan:  On Tramadol as  needed,last  uds , will obtain today and update  contract  Orders:  -     [facility] All Prescribed Meds and Special Instructions  -     Amphetamines, Methamphetamines  -     Butalbital  -     Phenobarbital  -     Secobarbital  -     Alprazolam  -     Clonazepam  -     Diazepam, Temazepam, Oxazepam  -     Lorazepam  -     Gabapentin  -     Pregabalin  -     Cocaine  -     Heroin  -     Buprenorphine  -     Levorphanol  -     Meperidine  -     Naltrexone  -     Fentanyl  -     Methadone  -     Oxycodone  -     Tapentadol  -     THC  -     Tramadol  -     Codeine, Hydrocodone, Hydropmorphone, Morphine  -     Bath Salts  -     Ethyl Glucuronide/Ethyl Sulfate  -     Kratom  -     Spice  -     Methylphenidate  -     Phentermine  -     Validity Oxidant  -     Validity Creatinine  -     Validity pH  -     Validity Specific  -     Xylazine Definitive Test  2. Type 2 diabetes mellitus without complication, without long-term current use of insulin (HCC)  Assessment & Plan:    Lab Results   Component Value Date    HGBA1C 8.3 (H) 2024   Sugar up a tad, reinforce  diet, rec clinical pharm raj, recheck  lab , on max  dose  Metformin  , next step may  be  injectable  med vs  Rybelsus, pt  leavitt s not  wish  to do shots, see if  Rybelsus  is  covered  Orders:  -     glucose blood (ONE TOUCH ULTRA TEST) test strip; Use 1 each daily  -     Ambulatory referral to Clinical Pharmacy - only for departments with embedded clinical pharmacists; Future; Expected date: 2024  -     Hemoglobin A1C; Future; Expected date: 2024  -     Comprehensive metabolic panel; Future; Expected date: 2024  -     semaglutide (Rybelsus) 3 MG tablet;  Take 1 tablet (3 mg total) by mouth daily before breakfast  3. Hyperlipidemia, unspecified hyperlipidemia type  Assessment & Plan:  Ldl goal , currently  104  on 2 times/week of  Crestor, increase  to 3  times/week, recheck  in 11/24  Orders:  -     TSH, 3rd generation with Free T4 reflex; Future; Expected date: 11/11/2024  -     Lipid Panel with Direct LDL reflex; Future; Expected date: 11/11/2024  -     rosuvastatin (CRESTOR) 10 MG tablet; Take 1 tablet (10 mg total) by mouth 3 (three) times a week  4. Essential hypertension  Assessment & Plan:  Bp stable on Valsartan and  hctz  5. Acute nonintractable headache, unspecified headache type  -     CT head wo contrast; Future; Expected date: 07/26/2024  6. Spell of change in speech  -     CT head wo contrast; Future; Expected date: 07/26/2024  7. Chronic pain disorder  Assessment & Plan:  Okay on Tramadol, no early refills  per  pdmp, await uds, update  contract       History of Present Illness     Patient presents with:  Follow-up: 4 months follow up   Got partials  last  week and has had  speech issues, dentist really  didn't  say whether  speech change/head pressure would be  from dentures worried  he  might have  had  ministroke , slight headache, no numbness/weakness  anywhere else ,also  pharmacist  told  him not  to take  Gabapentin and Tramadol together, vision actually  better, due  uds  and  contract        Review of Systems   Constitutional:  Negative for activity change, appetite change and fatigue.   HENT:          Ear pressure   Respiratory:  Negative for shortness of breath.    Cardiovascular:  Negative for chest pain.   Musculoskeletal:  Positive for arthralgias and back pain.   Neurological:  Positive for speech difficulty and headaches. Negative for dizziness, weakness, light-headedness and numbness.   Hematological:  Negative for adenopathy.       Objective     /84 (BP Location: Left arm, Patient Position: Sitting, Cuff Size: Large)    "Pulse 68   Ht 5' 5\" (1.651 m)   Wt 73 kg (161 lb)   SpO2 97%   BMI 26.79 kg/m²     Physical Exam  Vitals reviewed.   Constitutional:       Appearance: Normal appearance.   Neck:      Vascular: No carotid bruit.   Cardiovascular:      Rate and Rhythm: Normal rate and regular rhythm.      Pulses: Normal pulses.      Heart sounds: Normal heart sounds.   Pulmonary:      Effort: Pulmonary effort is normal.      Breath sounds: Normal breath sounds.   Musculoskeletal:      Right lower leg: No edema.      Left lower leg: No edema.   Lymphadenopathy:      Cervical: No cervical adenopathy.   Neurological:      Mental Status: He is alert.      Cranial Nerves: No cranial nerve deficit.      Sensory: No sensory deficit.      Motor: No weakness.      Coordination: Coordination normal.      Comments: Speech  slightly slurred compared  with baseline   Psychiatric:         Mood and Affect: Mood normal.       Administrative Statements     "

## 2024-07-26 NOTE — ASSESSMENT & PLAN NOTE
Lab Results   Component Value Date    HGBA1C 8.3 (H) 07/09/2024   Sugar up a tad, reinforce  diet, rec clinical pharm raj, recheck  lab 11/24, on max  dose  Metformin  , next step may  be  injectable  med vs  Rybelsus, pt  leavitt s not  wish  to do shots, see if  Rybelsus  is  covered

## 2024-07-29 LAB
4OH-XYLAZINE UR QL CFM: NEGATIVE NG/ML
6MAM UR QL CFM: NEGATIVE NG/ML
7AMINOCLONAZEPAM UR QL CFM: NEGATIVE NG/ML
A-OH ALPRAZ UR QL CFM: NEGATIVE NG/ML
ACCEPTABLE CREAT UR QL: NORMAL MG/DL
ACCEPTIBLE SP GR UR QL: NORMAL
AMPHET UR QL CFM: NEGATIVE NG/ML
BUPRENORPHINE UR QL CFM: NEGATIVE NG/ML
BUTALBITAL UR QL CFM: NEGATIVE NG/ML
BZE UR QL CFM: NEGATIVE NG/ML
CODEINE UR QL CFM: NEGATIVE NG/ML
EDDP UR QL CFM: NEGATIVE NG/ML
ETHYL GLUCURONIDE UR QL CFM: NEGATIVE NG/ML
ETHYL SULFATE UR QL SCN: NEGATIVE NG/ML
EUTYLONE UR QL: NEGATIVE NG/ML
FENTANYL UR QL CFM: NEGATIVE NG/ML
GLIADIN IGG SER IA-ACNC: NEGATIVE NG/ML
HYDROCODONE UR QL CFM: NEGATIVE NG/ML
HYDROMORPHONE UR QL CFM: NEGATIVE NG/ML
LORAZEPAM UR QL CFM: NEGATIVE NG/ML
ME-PHENIDATE UR QL CFM: NEGATIVE NG/ML
MEPERIDINE UR QL CFM: NEGATIVE NG/ML
METHADONE UR QL CFM: NEGATIVE NG/ML
METHAMPHET UR QL CFM: NEGATIVE NG/ML
MORPHINE UR QL CFM: NEGATIVE NG/ML
NALTREXONE UR QL CFM: NEGATIVE NG/ML
NITRITE UR QL: NORMAL UG/ML
NORBUPRENORPHINE UR QL CFM: NEGATIVE NG/ML
NORDIAZEPAM UR QL CFM: NEGATIVE NG/ML
NORFENTANYL UR QL CFM: NEGATIVE NG/ML
NORHYDROCODONE UR QL CFM: NEGATIVE NG/ML
NORMEPERIDINE UR QL CFM: NEGATIVE NG/ML
NOROXYCODONE UR QL CFM: NEGATIVE NG/ML
OXAZEPAM UR QL CFM: NEGATIVE NG/ML
OXYCODONE UR QL CFM: NEGATIVE NG/ML
OXYMORPHONE UR QL CFM: NEGATIVE NG/ML
PARA-FLUOROFENTANYL QUANTIFICATION: NORMAL NG/ML
PHENOBARB UR QL CFM: NEGATIVE NG/ML
RESULT ALL_PRESCRIBED MEDS AND SPECIAL INSTRUCTIONS: NORMAL
SECOBARBITAL UR QL CFM: NEGATIVE NG/ML
SL AMB 4-ANPP QUANTIFICATION: NORMAL NG/ML
SL AMB 5F-ADB-M7 METABOLITE QUANTIFICATION: NEGATIVE NG/ML
SL AMB 7-OH-MITRAGYNINE (KRATOM ALKALOID) QUANTIFICATION: NEGATIVE NG/ML
SL AMB AB-FUBINACA-M3 METABOLITE QUANTIFICATION: NEGATIVE NG/ML
SL AMB ACETYL FENTANYL QUANTIFICATION: NORMAL NG/ML
SL AMB ACETYL NORFENTANYL QUANTIFICATION: NORMAL NG/ML
SL AMB ACRYL FENTANYL QUANTIFICATION: NORMAL NG/ML
SL AMB CARFENTANIL QUANTIFICATION: NORMAL NG/ML
SL AMB CTHC (MARIJUANA METABOLITE) QUANTIFICATION: NEGATIVE NG/ML
SL AMB DEXTRORPHAN (DEXTROMETHORPHAN METABOLITE) QUANT: NEGATIVE NG/ML
SL AMB GABAPENTIN QUANTIFICATION: NORMAL NG/ML
SL AMB JWH018 METABOLITE QUANTIFICATION: NEGATIVE NG/ML
SL AMB JWH073 METABOLITE QUANTIFICATION: NEGATIVE NG/ML
SL AMB MDMB-FUBINACA-M1 METABOLITE QUANTIFICATION: NEGATIVE NG/ML
SL AMB METHYLONE QUANTIFICATION: NEGATIVE NG/ML
SL AMB N-DESMETHYL-TRAMADOL QUANTIFICATION: NORMAL NG/ML
SL AMB PHENTERMINE QUANTIFICATION: NEGATIVE NG/ML
SL AMB PREGABALIN QUANTIFICATION: NEGATIVE NG/ML
SL AMB RCS4 METABOLITE QUANTIFICATION: NEGATIVE NG/ML
SL AMB RITALINIC ACID QUANTIFICATION: NEGATIVE NG/ML
SMOOTH MUSCLE AB TITR SER IF: NEGATIVE NG/ML
SPECIMEN DRAWN SERPL: NEGATIVE NG/ML
SPECIMEN PH ACCEPTABLE UR: NORMAL
TAPENTADOL UR QL CFM: NEGATIVE NG/ML
TEMAZEPAM UR QL CFM: NEGATIVE NG/ML
TRAMADOL UR QL CFM: NORMAL NG/ML
URATE/CREAT 24H UR: NORMAL NG/ML
XYLAZINE UR QL CFM: NEGATIVE NG/ML

## 2024-07-29 RX ORDER — BLOOD SUGAR DIAGNOSTIC
STRIP MISCELLANEOUS
Qty: 100 STRIP | Refills: 1 | Status: SHIPPED | OUTPATIENT
Start: 2024-07-29

## 2024-07-29 NOTE — TELEPHONE ENCOUNTER
Requested Prescriptions     Pending Prescriptions Disp Refills    OneTouch Verio test strip [Pharmacy Med Name: ONE TOUCH VERIO TEST STRIP] 100 strip 1     Sig: USE 1 EACH DAILY

## 2024-07-31 ENCOUNTER — CLINICAL SUPPORT (OUTPATIENT)
Dept: FAMILY MEDICINE CLINIC | Facility: CLINIC | Age: 74
End: 2024-07-31

## 2024-07-31 DIAGNOSIS — E11.9 TYPE 2 DIABETES MELLITUS WITHOUT COMPLICATION, WITHOUT LONG-TERM CURRENT USE OF INSULIN (HCC): ICD-10-CM

## 2024-07-31 NOTE — PROGRESS NOTES
[facility] Clinical Integration Pharmacy Services   Rissa Corey, Anamika     Stanislav Glover is a 74 y.o. male who was referred to the clinical pharmacist by Shanti Kumar MD for diabetes. Patient presents in-person for initial clinical pharmacist consult.      Assessment/Plan:   Diabetes: Health Status: Healthy; Goals - A1c: <7-7.5%; SMBGs: Preprandial: 80-130mg/dL and HS: 80-180mg/dL per ADA Guidelines for older adults.  - Most recent A1c: above goal.   - SMBG: unknown.  TREATMENT REGIMEN: Confirmed that Rybelsus is available for patient to  from his preferred pharmacy.     INTERVENTIONS:   1. Provided Rybelsus education to patient. Discussed mechanism of action, potential side effects, expected outcomes, and proper administration. Stressed Rybelsus must be taken first thing in the morning before any food or drink,  from other medications by at least 30 minutes. Answered patient's questions.     Medication Reconciliation: I have reviewed the patient's allergies, medications and history as noted in the electronic medical record and updated as necessary..   - Rx Insurance: Medicare Advantage    Education:  - Counseled patient on diagnostic results, glycemic goals, instruction for self-management, importance of treatment adherence, and risk factor reduction as appropriate. All questions fully answered    - He was given instructions on how to contact me in the event of adverse drug event, questions, or concerns.    Follow-up:   Follow-up with pharmacist in 3-4 weeks  Next PCP visit: 11/27/2024    - Home Monitoring Records: Check fasting blood sugars  2-3 times weekly and record.  - Labs: A1c due on or after 10/27/2024    Subjective:     History of Present Illness:   Stanislav Glover is a 74 y.o. male with a history of type 2 diabetes without long term use of insulin. Diabetes course has been worsening. Denies recent illness or hospitalizations. He denies any polyuria, polydipsia, polyphagia, fatigue and  blurry vision. Denies recent hypoglycemic episodes. Denies any issues with his current DM regimen.    Patient recently prescribed Rybelsus by PCP due to elevated PCP. Has not yet picked up.      DM Self-Management:  - Self-monitoring: are performed sporadically.  He did not provide blood glucose log or meter today.  - SMBG readings (no log/meter, per memory): N/A  - Hypoglycemic episodes: No - History of Level 3/severe hypoglycemic event: none   - Hypoglycemia symptoms: N/A - Treatment of hypoglycemia: discussed treatment   Glucometer: Yes, Brand:  OneTouch Ultra 2  CGM: No  Current DM Regimen:  Metformin 1,000 BID  Rybelsus 3 mg daily    Diet: Wife prepares meals. Patient eats large portions.   Barriers to improving diet: Generally follows a good diet but tends to snack on fruits and sweets, especially before bedtime.  Activity: not active. Current exercise:  ADLs    Barriers to improving exercise: Syringomyelia               DM History:  Microvascular complications: nephropathy and ED  Cardiovascular complications: none  - Statin: Yes   - ACEi/ARB: Yes    Hospitalizations related to DM:  none reported  Previous DM medications:   None    CV Risk Factors:  - HF: No  - HTN: Yes - Obesity: No; Overweight   - CKD: normal GFR with albuminuria - Dyslipidemia: Yes - Smoking: Never     - Thyroid disorder: No  - History of pancreatitis: No    Drug Therapy Problems:  - Medication Adherence: Responsible for medication management: patient and spouse. Patient is compliant all of the time, denies any side effects from current medications.    Preventative Care:  Ophthamology: Due soon on: 8/23/2024 ; No Retinopathy on Exam OU  Podiatry: Due soon on: 8/23/2024 ; Low Risk (0): Normal Plantar Sensation   Kidney Health: Up to date    Objective:     Current Outpatient Medications   Medication Instructions    amoxicillin-clavulanate (AUGMENTIN) 875-125 mg per tablet 1 tablet, Oral, Every 12 hours scheduled    Blood Glucose Monitoring  "Suppl (ONE TOUCH ULTRA 2) w/Device KIT Does not apply, Daily    gabapentin (NEURONTIN) 600 mg, Oral, 2 times daily    hydroCHLOROthiazide 25 mg, Oral, Daily    metFORMIN (GLUCOPHAGE) 1,000 mg, Oral, 2 times daily with meals    naloxone (NARCAN) 4 mg, Nasal, Once, If the desired response is not obtained after 2-3 minutes, administer an additional dose using a new spray    omeprazole (PRILOSEC) 40 mg, Oral, Daily before breakfast    OneTouch Delica Lancets 30G MISC 1 Device, Does not apply, 2 times daily    OneTouch Verio test strip USE 1 EACH DAILY    rosuvastatin (CRESTOR) 10 mg, Oral, 3 times weekly    semaglutide (RYBELSUS) 3 mg, Oral, Daily before breakfast    sildenafil (VIAGRA) 100 mg, Oral, Daily PRN    tamsulosin (FLOMAX) 0.4 mg, Oral, 2 times daily    traMADol (ULTRAM) 50 mg tablet Take 1 tablet by mouth two to three times a day if needed for moderate pain    valsartan (DIOVAN) 160 mg, Oral, Daily     Allergies   Allergen Reactions    Lipitor [Atorvastatin] Other (See Comments) and Myalgia     Other reaction(s): increased pain  arthralgia    Lyrica [Pregabalin] Other (See Comments)     Pt can't remmember reaction     Vital Signs:  BP Readings from Last 3 Encounters:   07/26/24 134/84   06/19/24 126/60   05/17/24 (!) 178/78     Pulse Readings from Last 3 Encounters:   07/26/24 68   06/19/24 65   05/17/24 63      Estimated body mass index is 26.79 kg/m² as calculated from the following:    Height as of 7/26/24: 5' 5\" (1.651 m).    Weight as of 7/26/24: 73 kg (161 lb).     Pertinent Lab Data:  Lab Results   Component Value Date    SODIUM 137 07/09/2024    K 4.3 07/09/2024     07/09/2024    CO2 26 07/09/2024    BUN 21 07/09/2024    CREATININE 1.02 07/09/2024    GLUF 175 (H) 07/09/2024    GLUF 155 (H) 05/02/2024    CALCIUM 9.5 07/09/2024     Lab Results   Component Value Date    HGBA1C 8.3 (H) 07/09/2024    HGBA1C 7.3 (H) 02/16/2024    HGBA1C 8.3 (H) 10/14/2023    [id number] 500 02/16/2024       Pharmacist " Tracking Tool:   Reason For Outreach: Embedded Pharmacist  Demographics:  Intervention Method: In Person  Type of Intervention: New  Topics Addressed: Diabetes  Pharmacologic Interventions: Med Rec  Non-Pharmacologic Interventions: Care coordination and Medication/Device education  Time:  Direct Patient Care:  35  mins  Care Coordination:  15  mins  Recommendation Recipient: Patient/Caregiver  Outcome: Accepted    Rissa Corey, PharmD  Clinical Integration Pharmacist  [facility] Physician [facility]  [phone]

## 2024-08-02 ENCOUNTER — TELEPHONE (OUTPATIENT)
Age: 74
End: 2024-08-02

## 2024-08-02 DIAGNOSIS — N40.1 BENIGN LOCALIZED PROSTATIC HYPERPLASIA WITH LOWER URINARY TRACT SYMPTOMS (LUTS): ICD-10-CM

## 2024-08-02 DIAGNOSIS — E11.9 TYPE 2 DIABETES MELLITUS WITHOUT COMPLICATION, WITHOUT LONG-TERM CURRENT USE OF INSULIN (HCC): Primary | ICD-10-CM

## 2024-08-02 DIAGNOSIS — F11.20 CONTINUOUS OPIOID DEPENDENCE (HCC): ICD-10-CM

## 2024-08-02 DIAGNOSIS — G95.0 SYRINGOMYELIA (HCC): ICD-10-CM

## 2024-08-02 RX ORDER — TRAMADOL HYDROCHLORIDE 50 MG/1
TABLET ORAL
Qty: 180 TABLET | Refills: 0 | Status: SHIPPED | OUTPATIENT
Start: 2024-08-02

## 2024-08-02 NOTE — TELEPHONE ENCOUNTER
Patients wife called to let Dr. Kumar know that when they went to  the prescription for semaglutide (Rybelsus) 3 MG tablet, it was over $400.  They did not  the prescription and the patient is going to try to watch his diet to control his sugars at this time. If there are any questions or concerns, please reach out to the patient.

## 2024-08-02 NOTE — TELEPHONE ENCOUNTER
Requested Prescriptions     Pending Prescriptions Disp Refills    traMADol (ULTRAM) 50 mg tablet 180 tablet 0     Sig: Take 1 tablet by mouth two to three times a day if needed for moderate pain      LOV 7/26/24 F/U 11/27/24 Labs completed

## 2024-08-05 NOTE — TELEPHONE ENCOUNTER
Patient's wife would like 30-day supply of glimepiride sent to [facility] pharmacy in [facility] on [address]

## 2024-08-05 NOTE — PROGRESS NOTES
Spoke with patient's wife. She states that Rybelsus is cost-prohibitive. She has contacted PCP and plan is to proceed with glimepiride 4 mg tablets. Will D/C Mary

## 2024-08-05 NOTE — TELEPHONE ENCOUNTER
Returning call, advised notes from PCP. Spouse says he would like to order a 30 day supply to start. They will check on the price and how he does on the glimepiride. If he does well they will change to a 90 day supply after that. Confirmed pharmacy, [facility] [address]  She thanks us for our help!

## 2024-08-06 LAB
LEFT EYE DIABETIC RETINOPATHY: NORMAL
RIGHT EYE DIABETIC RETINOPATHY: NORMAL

## 2024-08-06 RX ORDER — GLIMEPIRIDE 4 MG/1
4 TABLET ORAL
Qty: 30 TABLET | Refills: 1 | Status: SHIPPED | OUTPATIENT
Start: 2024-08-06 | End: 2024-10-05

## 2024-08-28 DIAGNOSIS — E11.9 TYPE 2 DIABETES MELLITUS WITHOUT COMPLICATION, WITHOUT LONG-TERM CURRENT USE OF INSULIN (HCC): ICD-10-CM

## 2024-08-29 RX ORDER — GLIMEPIRIDE 4 MG/1
4 TABLET ORAL
Qty: 90 TABLET | Refills: 1 | Status: SHIPPED | OUTPATIENT
Start: 2024-08-29

## 2024-08-30 ENCOUNTER — OFFICE VISIT (OUTPATIENT)
Dept: SLEEP CENTER | Facility: CLINIC | Age: 74
End: 2024-08-30
Payer: MEDICARE

## 2024-08-30 VITALS
SYSTOLIC BLOOD PRESSURE: 114 MMHG | HEIGHT: 65 IN | WEIGHT: 166 LBS | DIASTOLIC BLOOD PRESSURE: 60 MMHG | HEART RATE: 47 BPM | BODY MASS INDEX: 27.66 KG/M2

## 2024-08-30 DIAGNOSIS — K21.00 GASTROESOPHAGEAL REFLUX DISEASE WITH ESOPHAGITIS WITHOUT HEMORRHAGE: ICD-10-CM

## 2024-08-30 DIAGNOSIS — E66.3 OVERWEIGHT (BMI 25.0-29.9): ICD-10-CM

## 2024-08-30 DIAGNOSIS — G47.19 EXCESSIVE DAYTIME SLEEPINESS: ICD-10-CM

## 2024-08-30 DIAGNOSIS — I10 ESSENTIAL HYPERTENSION: ICD-10-CM

## 2024-08-30 DIAGNOSIS — J31.0 CHRONIC RHINITIS: ICD-10-CM

## 2024-08-30 DIAGNOSIS — G47.33 OSA (OBSTRUCTIVE SLEEP APNEA): Primary | ICD-10-CM

## 2024-08-30 DIAGNOSIS — R68.2 DRY MOUTH: ICD-10-CM

## 2024-08-30 DIAGNOSIS — J34.2 DEVIATED NASAL SEPTUM: ICD-10-CM

## 2024-08-30 PROCEDURE — 99214 OFFICE O/P EST MOD 30 MIN: CPT | Performed by: INTERNAL MEDICINE

## 2024-08-30 PROCEDURE — G2211 COMPLEX E/M VISIT ADD ON: HCPCS | Performed by: INTERNAL MEDICINE

## 2024-08-30 NOTE — PATIENT INSTRUCTIONS
Strategies to improve dry mouth were discussed. Specifically, ensuring adequate nasal patency, adjusting heat and humidity settings on PAP machine, using Biotene mouthwash &/or Xylimelt.    Nursing Support:  When: Monday through Friday 7:30A-4:30PM except holidays  Where: Our direct line is [phone]  *3  *1.      If you are having a true emergency please call 911.  In the event that the line is busy or it is after hours please leave a voice message and we will return your call.  Please speak clearly, leaving your full name, birth date, best number to reach you and the reason for your call.   Medication refills: We will need the name of the medication, the dosage, the ordering provider, whether you get a 30 or 90 day refill, and the pharmacy name and address.  Medications will be ordered by the provider only.  Nurses cannot call in prescriptions.  Please allow 7 days for medication refills.  Physician requested updates: If your provider requested that you call with an update after starting medication, please be ready to provide us the medication and dosage, what time you take your medication, the time you attempt to fall asleep, time you fall asleep, when you wake up, and what time you get out of bed.  Sleep Study Results: We will contact you with sleep study results and/or next steps after the physician has reviewed your testing.

## 2024-08-30 NOTE — PROGRESS NOTES
Follow-Up Note - Sleep Center   Stanislav Glover  74 y.o. male  :1950  MRN:[id number]  DOS:2024    CC: I saw this patient for follow-up in clinic today for Sleep disordered breathing, Coexisting Sleep and Medical Problems.  Patient received ot a  Dream Station Version 2 machine from [employer] over a year ago.. Interval changes: None reported.      Results of prior studies in : The diagnostic study confirmed   obstructive sleep apnea:  AHI 16.6/hour considerably  higher during stage REM.   Intermittent snoring of moderate intensity was noted.  Minimum oxygen saturation 81 %  and 10.4 minutes of total sleep time was spent with saturations less than 90%.] During the subsequent therapeutic study, sleep disordered breathing was adequately remediated with PAP at 10cm H2O.      PFSH, Problem List, Medications & Allergies were reviewed in EMR.   He  has a past medical history of Anxiety, Chronic pain disorder, Diabetes mellitus (HCC), Diverticulosis, Extremity pain, GERD (gastroesophageal reflux disease), Hearing loss, right, Hemorrhoids, Neck pain, and Polyp, sigmoid colon.    He has a current medication list which includes the following prescription(s): one touch ultra 2, gabapentin, glimepiride, hydrochlorothiazide, metformin, naloxone, omeprazole, onetouch delica lancets 30g, onetouch verio, rosuvastatin, sildenafil, tamsulosin, tramadol, and valsartan.    PHYSIOLOGICAL DATA REVIEW : Using PAP > 4 hours/night 100%. Estimated URBANO 2.6/hour with pressure of 10cm H2O ; patient has not been using non FDA approved devices to sanitize the machine.  INTERPRETATION: Compliance is excellent; Pressure setting is:optimal; ;   SUBJECTIVE: With respect to use of PAP, Stanislav  is experiencing significant adverse effects:dry mouth/throat.He derives benefit.  Is satisfied with sleep and daytime function.   Sleep Routine: Stanislav reports getting 8-9 hrs sleep; he has no difficulty initiating or maintaining sleep . He arises  "spontaneously and feels refreshed.Stanislav denies excessive daytime sleepiness,.  He rated himself at Total score: 7 /24 on the [place] Sleepiness Scale.   Other issues: None reported.     Habits: Reports that he has never smoked. He has never used smokeless tobacco.,  Reports current alcohol use.,  Reports no history of drug use., Caffeine use:limited until  ; Exercise routine: regular.      ROS: Significant for weight is stable within a few pounds.  Allergies and acid reflux are controlled.  Is on several medications for chronic pain (that also causes dry mouth).    EXAM: /60 (BP Location: Left arm, Patient Position: Sitting, Cuff Size: Large)   Pulse (!) 47   Ht 5' 5\" (1.651 m)   Wt 75.3 kg (166 lb)   BMI 27.62 kg/m²     Wt Readings from Last 3 Encounters:   08/30/24 75.3 kg (166 lb)   07/26/24 73 kg (161 lb)   06/19/24 72.6 kg (160 lb)      Patient is well groomed; well appearing.   CNS: Alert, orientated, speech clear & coherent  Psych: cooperative and in no distress. Mental state: Appears normal.  H&N: EOMI; NC/AT: No facial pressure marks, no rashes.    Skin/Extrem: col & hydration normal; no edema  Resp: Respiratory effort is normal  Physical findings otherwise essentially unchanged from previous.    IMPRESSION: Problem List Items & Comorbidities Addressed this Visit    1. ARPAN (obstructive sleep apnea)  PAP DME Resupply/Reorder      2. Excessive daytime sleepiness        3. Dry mouth        4. Deviated nasal septum        5. Chronic rhinitis        6. Gastroesophageal reflux disease with esophagitis without hemorrhage        7. Essential hypertension        8. Overweight (BMI 25.0-29.9)            PLAN:  I reviewed results of prior studies and physiologic data with the patient.   I discussed treatment options with risks and benefits.  Treatment with  PAP is medically necessary and Stanislav is agreable to continue use.   Care of equipment, methods to improve comfort using PAP and importance of " "compliance with therapy were discussed.  Pressure setting: continue 10 cmH2O.    Rx provided to replace supplies and Care coordinated with DME provider.   Strategies to improve dry mouth were discussed. Specifically, ensuring adequate nasal patency, adjusting heat and humidity settings on PAP machine, using Biotene mouthwash &/or Xylimelt.  Discussed strategies for weight maintenance.    Follow-up is advised in 1 year or sooner if needed to monitor progress, compliance and to adjust therapy.     Thank you for allowing me to participate in the care of this patient.    Sincerely,     Authenticated electronically on 08/30/24   Board Certified Specialist     Portions of the record may have been created with voice recognition software. Occasional wrong word or \"sound a like\" substitutions may have occurred due to the inherent limitations of voice recognition software. There may also be notations and random deletions of words or characters from malfunctioning software. Read the chart carefully and recognize, using context, where substitutions/deletions have occurred.    "

## 2024-08-31 ENCOUNTER — TELEPHONE (OUTPATIENT)
Dept: SLEEP CENTER | Facility: CLINIC | Age: 74
End: 2024-08-31

## 2024-08-31 LAB
DME PARACHUTE DELIVERY DATE REQUESTED: NORMAL
DME PARACHUTE ITEM DESCRIPTION: NORMAL
DME PARACHUTE ORDER STATUS: NORMAL
DME PARACHUTE SUPPLIER NAME: NORMAL
DME PARACHUTE SUPPLIER PHONE: NORMAL

## 2024-09-03 LAB

## 2024-09-06 DIAGNOSIS — I10 ESSENTIAL HYPERTENSION: ICD-10-CM

## 2024-09-06 RX ORDER — HYDROCHLOROTHIAZIDE 25 MG/1
25 TABLET ORAL DAILY
Qty: 90 TABLET | Refills: 1 | Status: SHIPPED | OUTPATIENT
Start: 2024-09-06

## 2024-09-06 NOTE — TELEPHONE ENCOUNTER
Reason for call:   [x] Refill   [] Prior Auth  [x] Other: received fax- in media cc charts    Office:   [x] PCP/Provider - raymundo matthews  [] Specialty/Provider -     Medication:       hydrochlorothiazide (HYDRODIURIL) 25 mg tablet 25 mg, Daily  # 90       Pharmacy: [id number] [place] [facility] - [place], [place] - [address]      Does the patient have enough for 3 days?   [] Yes   [x] No - Send as HP to POD

## 2024-09-13 DIAGNOSIS — G95.0 SYRINGOMYELIA (HCC): ICD-10-CM

## 2024-09-13 DIAGNOSIS — F11.20 CONTINUOUS OPIOID DEPENDENCE (HCC): ICD-10-CM

## 2024-09-13 NOTE — TELEPHONE ENCOUNTER
Reason for call:   [x] Refill   [] Prior Auth  [] Other:     Office:   [x] PCP/Provider - PRIMARY CARE [facility] POD  Authorized By: Shanti Kumar MD  [] Specialty/Provider -     Medication: traMADol (ULTRAM) 50 mg tablet     Dose/Frequency: Take 1 tablet by mouth two to three times a day if needed for moderate pain,     Quantity:  180 tablet     Pharmacy: [address]     Does the patient have enough for 3 days?   [x] Yes   [] No - Send as HP to POD

## 2024-09-16 RX ORDER — TRAMADOL HYDROCHLORIDE 50 MG/1
50 TABLET ORAL EVERY 8 HOURS PRN
Qty: 180 TABLET | Refills: 0 | Status: SHIPPED | OUTPATIENT
Start: 2024-09-16

## 2024-09-19 ENCOUNTER — TELEPHONE (OUTPATIENT)
Dept: FAMILY MEDICINE CLINIC | Facility: CLINIC | Age: 74
End: 2024-09-19

## 2024-09-19 DIAGNOSIS — I10 ESSENTIAL HYPERTENSION: ICD-10-CM

## 2024-09-19 RX ORDER — VALSARTAN 160 MG/1
160 TABLET ORAL DAILY
Qty: 90 TABLET | Refills: 1 | Status: SHIPPED | OUTPATIENT
Start: 2024-09-19

## 2024-09-19 NOTE — TELEPHONE ENCOUNTER
Reason for call:   [x] Refill   [] Prior Auth  [] Other:     Office:   [x] PCP/Provider - Dr. José MD  [] Specialty/Provider -     Medication: valsartan (DIOVAN) 160 mg     Dose/Frequency: Take 1 tablet (160 mg total) by mouth daily     Quantity: 90    Pharmacy: [id number]  [place] - [address] [place] [address].     Does the patient have enough for 3 days?   [x] Yes   [] No - Send as HP to POD

## 2024-09-20 DIAGNOSIS — K20.90 ESOPHAGITIS: ICD-10-CM

## 2024-09-20 LAB
LEFT EYE DIABETIC RETINOPATHY: NORMAL
RIGHT EYE DIABETIC RETINOPATHY: NORMAL

## 2024-09-20 RX ORDER — OMEPRAZOLE 40 MG/1
40 CAPSULE, DELAYED RELEASE ORAL
Qty: 30 CAPSULE | Refills: 2 | Status: SHIPPED | OUTPATIENT
Start: 2024-09-20

## 2024-09-24 ENCOUNTER — OFFICE VISIT (OUTPATIENT)
Dept: GASTROENTEROLOGY | Facility: MEDICAL CENTER | Age: 74
End: 2024-09-24
Payer: MEDICARE

## 2024-09-24 VITALS
OXYGEN SATURATION: 98 % | HEIGHT: 65 IN | TEMPERATURE: 98.2 F | HEART RATE: 68 BPM | DIASTOLIC BLOOD PRESSURE: 78 MMHG | BODY MASS INDEX: 27.96 KG/M2 | WEIGHT: 167.8 LBS | SYSTOLIC BLOOD PRESSURE: 145 MMHG

## 2024-09-24 DIAGNOSIS — Z86.010 HX OF COLONIC POLYPS: ICD-10-CM

## 2024-09-24 DIAGNOSIS — K21.9 GASTROESOPHAGEAL REFLUX DISEASE, UNSPECIFIED WHETHER ESOPHAGITIS PRESENT: Primary | ICD-10-CM

## 2024-09-24 DIAGNOSIS — Z86.0100 HX OF COLONIC POLYPS: ICD-10-CM

## 2024-09-24 DIAGNOSIS — K22.9 IRREGULAR Z LINE OF ESOPHAGUS: ICD-10-CM

## 2024-09-24 PROCEDURE — 99213 OFFICE O/P EST LOW 20 MIN: CPT | Performed by: INTERNAL MEDICINE

## 2024-09-24 NOTE — PROGRESS NOTES
[facility] - Outpatient Follow-up Note  Stanislav Glover 74 y.o. male MRN: [id number]  Encounter: [id number]          ASSESSMENT AND PLAN:      1. Gastroesophageal reflux disease, unspecified whether esophagitis present  Patient has been compliant with omeprazole every day.  His acid reflux is overall well-controlled.  He was accompanied by his wife.  His wife reported he is not compliant with diet.  Patient was counseled on the importance of lifestyle modification including head elevation, avoiding food that can trigger symptoms and avoid lying down within 4 hours after eating.  Continue omeprazole daily.    2. Irregular Z line of esophagus  He had multiple EGDs in the past and every time biopsy was negative for intestinal metaplasia.  We will repeat upper endoscopy in 3 years, if it is negative for Clement's, we will discontinue the surveillance.    3. Hx of colonic polyps  Patient had history of colon polyps and every colonoscopy in the past.  Repeat next colonoscopy in 2029.  All the test results have been reviewed with the patient.  All questions answered.  Follow-up on a as needed basis.    ______________________________________________________________________    SUBJECTIVE: 74-year-old man with history of colon polyps, irregular Z-line, GERD presented for follow-up after being discharged from the hospital.  Patient was scheduled to have colonoscopy in May, he had acute onset of chest pain during the procedure and procedure was canceled.  Patient underwent an EGD with findings of irregular Z-line, biopsy was negative for Clement's esophagus.  He had a colonoscopy with 2 tubular adenoma removed.  Patient underwent EGD in 2020 with endoscopic findings of Clement's esophagus, biopsy was negative for intestinal metaplasia.  Repeat endoscopy in 2024 showed signs of Clement's esophagus, again biopsy was negative for intestinal metaplasia.    REVIEW OF SYSTEMS IS OTHERWISE  NEGATIVE.      Historical Information   Past Medical History:   Diagnosis Date    Anxiety     Chronic pain disorder     right shoulder    Diabetes mellitus (HCC)     type 2, blood sugar 109 at 0500    Diverticulosis     Extremity pain     GERD (gastroesophageal reflux disease)     Hearing loss, right     Hemorrhoids     Neck pain     Polyp, sigmoid colon      Past Surgical History:   Procedure Laterality Date    COLONOSCOPY      EAR SURGERY      INNER EAR SURGERY      NASAL SINUS SURGERY      DC COLONOSCOPY FLX DX W/COLLJ SPEC WHEN PFRMD N/A 8/30/2016    Procedure: COLONOSCOPY;  Surgeon: Gill Dowling MD;  Location: [facility];  Service: General    DC SURGICAL ARTHROSCOPY SHOULDER W/ROTATOR CUFF RPR Right 2/5/2019    Procedure: ARTHROSCOPY SHOULDER;  Surgeon: Howard Downey DO;  Location:  MAIN OR;  Service: Orthopedics     Social History   Social History     Substance and Sexual Activity   Alcohol Use Yes    Comment: occassional     Social History     Substance and Sexual Activity   Drug Use No     Social History     Tobacco Use   Smoking Status Never   Smokeless Tobacco Never     Family History   Problem Relation Age of Onset    Hypertension Brother     Hepatitis Mother     Heart disease Father         CARDIAC DISORDER        Meds/Allergies       Current Outpatient Medications:     Blood Glucose Monitoring Suppl (ONE TOUCH ULTRA 2) w/Device KIT    gabapentin (NEURONTIN) 600 MG tablet    glimepiride (AMARYL) 4 mg tablet    hydroCHLOROthiazide 25 mg tablet    metFORMIN (GLUCOPHAGE) 500 mg tablet    omeprazole (PriLOSEC) 40 MG capsule    OneTouch Delica Lancets 30G MISC    OneTouch Verio test strip    rosuvastatin (CRESTOR) 10 MG tablet    sildenafil (Viagra) 100 mg tablet    tamsulosin (FLOMAX) 0.4 mg    traMADol (ULTRAM) 50 mg tablet    valsartan (DIOVAN) 160 mg tablet    naloxone (NARCAN) 4 mg/0.1 mL nasal spray    Allergies   Allergen Reactions    Lipitor [Atorvastatin] Other (See Comments) and Myalgia      "Other reaction(s): increased pain  arthralgia    Lyrica [Pregabalin] Other (See Comments)     Pt can't remmember reaction           Objective     Blood pressure 145/78, pulse 68, temperature 98.2 °F (36.8 °C), temperature source Tympanic, height 5' 5\" (1.651 m), weight 76.1 kg (167 lb 12.8 oz), SpO2 98%. Body mass index is 27.92 kg/m².      PHYSICAL EXAM:      General Appearance:   Alert, cooperative, no distress   HEENT:   Normocephalic, atraumatic, anicteric.     Neck:  Supple, symmetrical, trachea midline   Lungs:   Clear to auscultation bilaterally; no rales, rhonchi or wheezing; respirations unlabored    Heart::   Regular rate and rhythm; no murmur, rub, or gallop.   Abdomen:   Soft, non-tender, non-distended; normal bowel sounds; no masses, no organomegaly    Genitalia:   Deferred    Rectal:   Deferred    Extremities:  No cyanosis, clubbing or edema    Pulses:  2+ and symmetric    Skin:  No jaundice, rashes, or lesions    Lymph nodes:  No palpable cervical lymphadenopathy        Lab Results:   No visits with results within 1 Day(s) from this visit.   Latest known visit with results is:   Orders Only on 09/20/2024   Component Date Value    Right Eye Diabetic Retin* 09/20/2024 None     Left Eye Diabetic Retino* 09/20/2024 None          Radiology Results:   No results found.   "

## 2024-10-17 DIAGNOSIS — E11.9 TYPE 2 DIABETES MELLITUS WITHOUT COMPLICATION, WITHOUT LONG-TERM CURRENT USE OF INSULIN (HCC): ICD-10-CM

## 2024-10-17 NOTE — TELEPHONE ENCOUNTER
Reason for call:   [x] Refill   [] Prior Auth  [] Other:     Office:   [x] PCP/Provider -   [] Specialty/Provider -     Medication: OneTouch Delica Lancets 30G MISC     Dose/Frequency: : Use 1 Device 2 (two) times a day,     Quantity: 100 each    [facility]    Does the patient have enough for 3 days?   [x] Yes   [] No - Send as HP to POD

## 2024-10-18 ENCOUNTER — TELEPHONE (OUTPATIENT)
Age: 74
End: 2024-10-18

## 2024-10-18 DIAGNOSIS — E11.9 TYPE 2 DIABETES MELLITUS WITHOUT COMPLICATION, WITHOUT LONG-TERM CURRENT USE OF INSULIN (HCC): ICD-10-CM

## 2024-10-18 RX ORDER — BLOOD SUGAR DIAGNOSTIC
1 STRIP MISCELLANEOUS DAILY
Qty: 100 STRIP | Refills: 1 | Status: SHIPPED | OUTPATIENT
Start: 2024-10-18

## 2024-10-18 RX ORDER — LANCETS 30 GAUGE
1 EACH MISCELLANEOUS 2 TIMES DAILY
Qty: 100 EACH | Refills: 0 | Status: SHIPPED | OUTPATIENT
Start: 2024-10-18 | End: 2024-10-24 | Stop reason: SDUPTHER

## 2024-10-24 DIAGNOSIS — E11.9 TYPE 2 DIABETES MELLITUS WITHOUT COMPLICATION, WITHOUT LONG-TERM CURRENT USE OF INSULIN (HCC): ICD-10-CM

## 2024-10-24 RX ORDER — LANCETS 30 GAUGE
1 EACH MISCELLANEOUS 2 TIMES DAILY
Qty: 100 EACH | Refills: 0 | Status: SHIPPED | OUTPATIENT
Start: 2024-10-24 | End: 2024-10-25 | Stop reason: SDUPTHER

## 2024-10-25 ENCOUNTER — TELEPHONE (OUTPATIENT)
Age: 74
End: 2024-10-25

## 2024-10-25 DIAGNOSIS — E11.9 TYPE 2 DIABETES MELLITUS WITHOUT COMPLICATION, WITHOUT LONG-TERM CURRENT USE OF INSULIN (HCC): ICD-10-CM

## 2024-10-25 RX ORDER — LANCETS 30 GAUGE
1 EACH MISCELLANEOUS DAILY
Qty: 100 EACH | Refills: 1 | Status: SHIPPED | OUTPATIENT
Start: 2024-10-25

## 2024-10-25 NOTE — TELEPHONE ENCOUNTER
Per [facility] pharmacy ( [place]) the insurance only pay for 1 check a day since pt does not use insulin or is insulin dependent.  Please resend rx for pt to check once a day.

## 2024-10-25 NOTE — TELEPHONE ENCOUNTER
Called earlier today and had call on hold, then line got disconnected.  Tried again, they are out for lunch until 2pm

## 2024-10-25 NOTE — TELEPHONE ENCOUNTER
"Patient called the RX Refill Line. Message is being forwarded to the office.  not needed for call.    Patient is requesting a message to the office regarding his glucose blood (OneTouch Ultra Test) test strips. Patient states that he was informed by the pharmacy that the office continues to send the wrong script. I confirmed the requested supplies (glucose blood (OneTouch Ultra Test) test strip) patient confirmed that is the correct script. Patient was informed the office sent on 10/18/2024 at 10:43 am.     \"Sent to pharmacy as: OneTouch Ultra Test In Vitro Strip (glucose blood)\"    Please review, patient would like office to contact Pharmacy to address this concern.        Please contact patient at [phone], patient would like a call back once completed.    "

## 2024-11-01 DIAGNOSIS — F11.20 CONTINUOUS OPIOID DEPENDENCE (HCC): ICD-10-CM

## 2024-11-01 DIAGNOSIS — G95.0 SYRINGOMYELIA (HCC): ICD-10-CM

## 2024-11-01 RX ORDER — TRAMADOL HYDROCHLORIDE 50 MG/1
50 TABLET ORAL EVERY 8 HOURS PRN
Qty: 180 TABLET | Refills: 0 | Status: SHIPPED | OUTPATIENT
Start: 2024-11-01 | End: 2024-11-06 | Stop reason: SDUPTHER

## 2024-11-01 NOTE — TELEPHONE ENCOUNTER
Reason for call:   [x] Refill   [] Prior Auth  [] Other:     Office:   [x] PCP/Provider - PRIMARY CARE [facility] POD  Authorized By: Chris Fuentes DO  [] Specialty/Provider -     Medication: traMADol (ULTRAM) 50 mg tablet     Dose/Frequency: Take 1 tablet (50 mg total) by mouth every 8 (eight) hours as needed for moderate pain Take 1 tablet by mouth two to three times a day if needed for moderate pain,     Quantity: 180 tablet     [place] [address] [place] [address]     Does the patient have enough for 3 days?   [x] Yes   [] No - Send as HP to POD

## 2024-11-05 NOTE — TELEPHONE ENCOUNTER
Patient called the RX Refill Line. Message is being forwarded to the office.     Patient is requesting - Pt called and stated he was advised by pharmacy to call his providers office in regards of his tramadol. Pt was informed refill was sent on 11/1/2024 and everything looks fine on our end. Pt was unable to tell me what was the issue and he requested office to please call pharmacy he is completely out of medication. Please review. Thank you.      [id number] [facility] [place] - [address] [facility]     [phone]

## 2024-11-06 DIAGNOSIS — G95.0 SYRINGOMYELIA (HCC): ICD-10-CM

## 2024-11-06 DIAGNOSIS — F11.20 CONTINUOUS OPIOID DEPENDENCE (HCC): ICD-10-CM

## 2024-11-06 RX ORDER — TRAMADOL HYDROCHLORIDE 50 MG/1
50 TABLET ORAL EVERY 8 HOURS PRN
Qty: 180 TABLET | Refills: 0 | Status: SHIPPED | OUTPATIENT
Start: 2024-11-06

## 2024-11-06 NOTE — TELEPHONE ENCOUNTER
Patients Tramadol needs directions changed due to 2 different directions being listed on script. Please advise asap.

## 2024-11-21 ENCOUNTER — RA CDI HCC (OUTPATIENT)
Dept: OTHER | Facility: HOSPITAL | Age: 74
End: 2024-11-21

## 2024-11-21 NOTE — PROGRESS NOTES
HCC coding opportunities          Chart Reviewed number of suggestions sent to Provider: 2     Patients Insurance   E11.65 and E11.36  Medicare Insurance: Medicare

## 2024-12-01 DIAGNOSIS — E11.9 TYPE 2 DIABETES MELLITUS WITHOUT COMPLICATION, WITHOUT LONG-TERM CURRENT USE OF INSULIN (HCC): ICD-10-CM

## 2024-12-03 LAB
LEFT EYE DIABETIC RETINOPATHY: NORMAL
RIGHT EYE DIABETIC RETINOPATHY: NORMAL

## 2024-12-10 ENCOUNTER — APPOINTMENT (OUTPATIENT)
Dept: LAB | Facility: MEDICAL CENTER | Age: 74
End: 2024-12-10
Payer: MEDICARE

## 2024-12-10 DIAGNOSIS — E11.9 TYPE 2 DIABETES MELLITUS WITHOUT COMPLICATION, WITHOUT LONG-TERM CURRENT USE OF INSULIN (HCC): ICD-10-CM

## 2024-12-10 DIAGNOSIS — E78.5 HYPERLIPIDEMIA, UNSPECIFIED HYPERLIPIDEMIA TYPE: ICD-10-CM

## 2024-12-10 LAB — TSH SERPL DL<=0.05 MIU/L-ACNC: 1.76 UIU/ML (ref 0.45–4.5)

## 2024-12-10 PROCEDURE — 83036 HEMOGLOBIN GLYCOSYLATED A1C: CPT

## 2024-12-10 PROCEDURE — 84443 ASSAY THYROID STIM HORMONE: CPT

## 2024-12-10 PROCEDURE — 80061 LIPID PANEL: CPT

## 2024-12-10 PROCEDURE — 80053 COMPREHEN METABOLIC PANEL: CPT

## 2024-12-10 PROCEDURE — 36415 COLL VENOUS BLD VENIPUNCTURE: CPT

## 2024-12-11 LAB
ALBUMIN SERPL BCG-MCNC: 4.3 G/DL (ref 3.5–5)
ALP SERPL-CCNC: 69 U/L (ref 34–104)
ALT SERPL W P-5'-P-CCNC: 22 U/L (ref 7–52)
ANION GAP SERPL CALCULATED.3IONS-SCNC: 9 MMOL/L (ref 4–13)
AST SERPL W P-5'-P-CCNC: 16 U/L (ref 13–39)
BILIRUB SERPL-MCNC: 0.78 MG/DL (ref 0.2–1)
BUN SERPL-MCNC: 23 MG/DL (ref 5–25)
CALCIUM SERPL-MCNC: 9.4 MG/DL (ref 8.4–10.2)
CHLORIDE SERPL-SCNC: 102 MMOL/L (ref 96–108)
CHOLEST SERPL-MCNC: 148 MG/DL (ref ?–200)
CO2 SERPL-SCNC: 29 MMOL/L (ref 21–32)
CREAT SERPL-MCNC: 0.92 MG/DL (ref 0.6–1.3)
EST. AVERAGE GLUCOSE BLD GHB EST-MCNC: 163 MG/DL
GFR SERPL CREATININE-BSD FRML MDRD: 82 ML/MIN/1.73SQ M
GLUCOSE P FAST SERPL-MCNC: 109 MG/DL (ref 65–99)
HBA1C MFR BLD: 7.3 %
HDLC SERPL-MCNC: 36 MG/DL
LDLC SERPL CALC-MCNC: 85 MG/DL (ref 0–100)
POTASSIUM SERPL-SCNC: 4.5 MMOL/L (ref 3.5–5.3)
PROT SERPL-MCNC: 7.3 G/DL (ref 6.4–8.4)
SODIUM SERPL-SCNC: 140 MMOL/L (ref 135–147)
TRIGL SERPL-MCNC: 134 MG/DL (ref ?–150)

## 2024-12-13 ENCOUNTER — RESULTS FOLLOW-UP (OUTPATIENT)
Dept: FAMILY MEDICINE CLINIC | Facility: CLINIC | Age: 74
End: 2024-12-13

## 2024-12-15 DIAGNOSIS — K20.90 ESOPHAGITIS: ICD-10-CM

## 2024-12-17 ENCOUNTER — TELEPHONE (OUTPATIENT)
Age: 74
End: 2024-12-17

## 2024-12-17 RX ORDER — OMEPRAZOLE 40 MG/1
40 CAPSULE, DELAYED RELEASE ORAL
Qty: 30 CAPSULE | Refills: 5 | Status: SHIPPED | OUTPATIENT
Start: 2024-12-17

## 2024-12-17 NOTE — TELEPHONE ENCOUNTER
Patients wife called in and wanted to know the patiens A1C - Advised that it is 7.3. No other questions . Called ended well.

## 2024-12-18 DIAGNOSIS — G95.0 SYRINGOMYELIA (HCC): ICD-10-CM

## 2024-12-18 DIAGNOSIS — F11.20 CONTINUOUS OPIOID DEPENDENCE (HCC): ICD-10-CM

## 2024-12-18 NOTE — TELEPHONE ENCOUNTER
Reason for call:   [x] Refill   [] Prior Auth  [] Other:     Office:   [x] PCP/Provider -   [] Specialty/Provider -     Medication: TRAMADOL    Dose/Frequency: 50 MG    Quantity: 180    Pharmacy:     [facility] [id number] [place] [facility] - [place] - [address] [facility]  [address] [facility] [place] [address]  Phone: [phone]  Fax: [phone]   Does the patient have enough for 3 days?   [x] Yes   [] No - Send as HP to POD

## 2024-12-19 RX ORDER — TRAMADOL HYDROCHLORIDE 50 MG/1
50 TABLET ORAL EVERY 8 HOURS PRN
Qty: 180 TABLET | Refills: 0 | Status: SHIPPED | OUTPATIENT
Start: 2024-12-19

## 2024-12-31 ENCOUNTER — OFFICE VISIT (OUTPATIENT)
Dept: FAMILY MEDICINE CLINIC | Facility: CLINIC | Age: 74
End: 2024-12-31
Payer: MEDICARE

## 2024-12-31 VITALS
BODY MASS INDEX: 27.66 KG/M2 | DIASTOLIC BLOOD PRESSURE: 74 MMHG | HEIGHT: 65 IN | SYSTOLIC BLOOD PRESSURE: 130 MMHG | OXYGEN SATURATION: 98 % | HEART RATE: 71 BPM | WEIGHT: 166 LBS

## 2024-12-31 DIAGNOSIS — G89.4 CHRONIC PAIN DISORDER: ICD-10-CM

## 2024-12-31 DIAGNOSIS — G95.0 SYRINGOMYELIA (HCC): ICD-10-CM

## 2024-12-31 DIAGNOSIS — E11.9 TYPE 2 DIABETES MELLITUS WITHOUT COMPLICATION, WITHOUT LONG-TERM CURRENT USE OF INSULIN (HCC): ICD-10-CM

## 2024-12-31 DIAGNOSIS — F40.243 ANXIETY WITH FLYING: ICD-10-CM

## 2024-12-31 DIAGNOSIS — Z00.00 MEDICARE ANNUAL WELLNESS VISIT, SUBSEQUENT: Primary | ICD-10-CM

## 2024-12-31 DIAGNOSIS — F11.20 CONTINUOUS OPIOID DEPENDENCE (HCC): ICD-10-CM

## 2024-12-31 DIAGNOSIS — E78.5 HYPERLIPIDEMIA, UNSPECIFIED HYPERLIPIDEMIA TYPE: ICD-10-CM

## 2024-12-31 PROBLEM — Z12.11 ENCOUNTER FOR SCREENING COLONOSCOPY: Status: RESOLVED | Noted: 2024-05-01 | Resolved: 2024-12-31

## 2024-12-31 PROCEDURE — G0439 PPPS, SUBSEQ VISIT: HCPCS | Performed by: FAMILY MEDICINE

## 2024-12-31 PROCEDURE — 99214 OFFICE O/P EST MOD 30 MIN: CPT | Performed by: FAMILY MEDICINE

## 2024-12-31 RX ORDER — ROSUVASTATIN CALCIUM 10 MG/1
10 TABLET, COATED ORAL 3 TIMES WEEKLY
Qty: 40 TABLET | Refills: 3 | Status: SHIPPED | OUTPATIENT
Start: 2025-01-01

## 2024-12-31 RX ORDER — ALPRAZOLAM 0.5 MG
TABLET ORAL
Qty: 4 TABLET | Refills: 0 | Status: SHIPPED | OUTPATIENT
Start: 2024-12-31

## 2024-12-31 NOTE — ASSESSMENT & PLAN NOTE
Ldl currently  85 on rosuvastatin    Orders:    rosuvastatin (CRESTOR) 10 MG tablet; Take 1 tablet (10 mg total) by mouth 3 (three) times a week

## 2024-12-31 NOTE — PROGRESS NOTES
Name: Stanislav Glover      : 1950      MRN: [id number]  Encounter Provider: Shanti Kumar MD  Encounter Date: 2024   Encounter department: [facility] PRIMARY [facility]    Assessment & Plan  Medicare annual wellness visit, subsequent         Hyperlipidemia, unspecified hyperlipidemia type  Ldl currently  85 on rosuvastatin    Orders:    rosuvastatin (CRESTOR) 10 MG tablet; Take 1 tablet (10 mg total) by mouth 3 (three) times a week    Syringomyelia (HCC)  Stopped  gabapentin  , pain doing  okay, on 2  Tramadol/day, utd  uds and c ontract         Chronic pain disorder  Stopped  gabapentin  , pain doing  okay, on 2  Tramadol/day, utd  uds and c ontract         Continuous opioid dependence (HCC)  Stopped  gabapentin  , pain doing  okay, on 2  Tramadol/day, utd  uds and c ontract         Type 2 diabetes mellitus without complication, without long-term current use of insulin (HCC)    Lab Results   Component Value Date    HGBA1C 7.3 (H) 12/10/2024   Bs  excellent  on current  dose  of  metformin         Anxiety with flying    Orders:    ALPRAZolam (XANAX) 0.5 mg tablet; 1  po  1  hour  before  flight, may repeat immediately  prior  if  needed       Preventive health issues were discussed with patient, and age appropriate screening tests were ordered as noted in patient's After Visit Summary. Personalized health advice and appropriate referrals for health education or preventive services given if needed, as noted in patient's After Visit Summary.    History of Present Illness     Patient presents with:  Diabetes  Medicare Wellness Visit  Follow-up: Pt also going on a trip on the plane to [place] and needs something to calm him down   Bs  and  cholesterol on last  lab very good,       Patient Care Team:  Shanti Kumar MD as PCP - General (Family Medicine)  MD Gill Pradhan MD as Endoscopist  Maren Vail PA-C (Gastroenterology)  CLAUDY Sadler as Nurse  Practitioner (Gastroenterology)    Review of Systems   Constitutional:  Negative for activity change, appetite change and fatigue.   Respiratory:  Negative for shortness of breath.    Cardiovascular:  Negative for chest pain.   Musculoskeletal:  Positive for neck pain.   Neurological:  Negative for dizziness, light-headedness and headaches.   Psychiatric/Behavioral:  The patient is not nervous/anxious.      Medical History Reviewed by provider this encounter:       Annual Wellness Visit Questionnaire   Stanislav is here for his Subsequent Wellness visit.     Health Risk Assessment:   Patient rates overall health as very good. Patient feels that their physical health rating is same. Patient is very satisfied with their life. Eyesight was rated as same. Hearing was rated as same. Patient feels that their emotional and mental health rating is same. Patients states they are sometimes angry. Patient states they are often unusually tired/fatigued. Pain experienced in the last 7 days has been none. Patient states that he has experienced no weight loss or gain in last 6 months.     Depression Screening:   PHQ-2 Score: 0      Fall Risk Screening:   In the past year, patient has experienced: no history of falling in past year      Home Safety:  Patient does not have trouble with stairs inside or outside of their home. Patient has working smoke alarms and has working carbon monoxide detector. Home safety hazards include: none.     Nutrition:   Current diet is Regular.     Medications:   Patient is currently taking over-the-counter supplements. OTC medications include: see medication list. Patient is able to manage medications.     Activities of Daily Living (ADLs)/Instrumental Activities of Daily Living (IADLs):   Walk and transfer into and out of bed and chair?: Yes  Dress and groom yourself?: Yes    Bathe or shower yourself?: Yes    Feed yourself? Yes  Do your laundry/housekeeping?: Yes  Manage your money, pay your bills and  track your expenses?: Yes  Make your own meals?: Yes    Do your own shopping?: Yes    Previous Hospitalizations:   Any hospitalizations or ED visits within the last 12 months?: Yes    How many hospitalizations have you had in the last year?: 1-2    Advance Care Planning:   Living will: No      Comments: Wife  Leena is ROSA ISELA    Cognitive Screening:   Provider or family/friend/caregiver concerned regarding cognition?: No    PREVENTIVE SCREENINGS      Cardiovascular Screening:    General: History Lipid Disorder and Screening Current      Diabetes Screening:     General: History Diabetes and Screening Current      Colorectal Cancer Screening:     General: Screening Current      Prostate Cancer Screening:    General: Screening Current      Osteoporosis Screening:    General: Screening Not Indicated      Abdominal Aortic Aneurysm (AAA) Screening:    Risk factors include: age between 65-76 yo        Lung Cancer Screening:     General: Screening Not Indicated      Hepatitis C Screening:    General: Screening Current    Screening, Brief Intervention, and Referral to Treatment (SBIRT)    Screening    Typical number of drinks in a week: 0    Single Item Drug Screening:  How often have you used an illegal drug (including marijuana) or a prescription medication for non-medical reasons in the past year? never    Single Item Drug Screen Score: 0  Interpretation: Negative screen for possible drug use disorder    Review of Current Opioid Use    Opioid Risk Tool (ORT) Interpretation: Complete Opioid Risk Tool (ORT)    Social Drivers of Health     Financial Resource Strain: Low Risk  (11/1/2023)    Overall Financial Resource Strain (CARDIA)     Difficulty of Paying Living Expenses: Not hard at all   Food Insecurity: No Food Insecurity (12/31/2024)    Hunger Vital Sign     Worried About Running Out of Food in the Last Year: Never true     Ran Out of Food in the Last Year: Never true   Transportation Needs: No Transportation Needs  "(12/31/2024)    PRAPARE - Transportation     Lack of Transportation (Medical): No     Lack of Transportation (Non-Medical): No   Housing Stability: Low Risk  (12/31/2024)    Housing Stability Vital Sign     Unable to Pay for Housing in the Last Year: No     Number of Times Moved in the Last Year: 1     Homeless in the Last Year: No   Utilities: Not At Risk (12/31/2024)    [facility] Utilities     Threatened with loss of utilities: No     No results found.    Objective   /70 (BP Location: Left arm, Patient Position: Sitting, Cuff Size: Adult)   Pulse 71   Ht 5' 5\" (1.651 m)   Wt 75.3 kg (166 lb)   SpO2 98%   BMI 27.62 kg/m²     Physical Exam  Vitals reviewed.   Constitutional:       Appearance: Normal appearance.   Cardiovascular:      Rate and Rhythm: Normal rate and regular rhythm.      Pulses: Normal pulses. no weak pulses.           Dorsalis pedis pulses are 2+ on the right side and 2+ on the left side.        Posterior tibial pulses are 2+ on the right side and 2+ on the left side.      Heart sounds: Normal heart sounds.   Pulmonary:      Effort: Pulmonary effort is normal.      Breath sounds: Normal breath sounds.   Musculoskeletal:      Right lower leg: No edema.      Left lower leg: No edema.   Feet:      Right foot:      Skin integrity: No ulcer, skin breakdown, erythema, warmth, callus or dry skin.      Left foot:      Skin integrity: No ulcer, skin breakdown, erythema, warmth, callus or dry skin.   Lymphadenopathy:      Cervical: No cervical adenopathy.   Neurological:      Mental Status: He is alert.   Psychiatric:         Mood and Affect: Mood normal.       Diabetic Foot Exam    Patient's shoes and socks removed.    Right Foot/Ankle   Right Foot Inspection  Skin Exam: skin normal and skin intact. No dry skin, no warmth, no callus, no erythema, no maceration, no abnormal color, no pre-ulcer, no ulcer and no callus.     Toe Exam: ROM and strength within normal limits.     Sensory   Monofilament " testing: intact    Vascular  Capillary refills: < 3 seconds  The right DP pulse is 2+. The right PT pulse is 2+.     Left Foot/Ankle  Left Foot Inspection  Skin Exam: skin normal and skin intact. No dry skin, no warmth, no erythema, no maceration, normal color, no pre-ulcer, no ulcer and no callus.     Toe Exam: ROM and strength within normal limits.     Sensory   Monofilament testing: intact    Vascular  Capillary refills: < 3 seconds  The left DP pulse is 2+. The left PT pulse is 2+.     Assign Risk Category  No deformity present  No loss of protective sensation  No weak pulses  Risk: 0

## 2024-12-31 NOTE — ASSESSMENT & PLAN NOTE
Lab Results   Component Value Date    HGBA1C 7.3 (H) 12/10/2024   Bs  excellent  on current  dose  of  metformin

## 2025-01-06 LAB
LEFT EYE DIABETIC RETINOPATHY: NORMAL
RIGHT EYE DIABETIC RETINOPATHY: NORMAL

## 2025-01-21 ENCOUNTER — CONSULT (OUTPATIENT)
Dept: FAMILY MEDICINE CLINIC | Facility: CLINIC | Age: 75
End: 2025-01-21
Payer: MEDICARE

## 2025-01-21 ENCOUNTER — APPOINTMENT (OUTPATIENT)
Dept: LAB | Facility: CLINIC | Age: 75
End: 2025-01-21
Payer: MEDICARE

## 2025-01-21 VITALS
SYSTOLIC BLOOD PRESSURE: 126 MMHG | HEART RATE: 64 BPM | HEIGHT: 65 IN | OXYGEN SATURATION: 98 % | WEIGHT: 164 LBS | DIASTOLIC BLOOD PRESSURE: 70 MMHG | BODY MASS INDEX: 27.32 KG/M2

## 2025-01-21 DIAGNOSIS — I10 ESSENTIAL HYPERTENSION: ICD-10-CM

## 2025-01-21 DIAGNOSIS — G95.0 SYRINGOMYELIA (HCC): ICD-10-CM

## 2025-01-21 DIAGNOSIS — Z01.818 PRE-OP EXAM: ICD-10-CM

## 2025-01-21 DIAGNOSIS — E11.9 TYPE 2 DIABETES MELLITUS WITHOUT COMPLICATION, WITHOUT LONG-TERM CURRENT USE OF INSULIN (HCC): ICD-10-CM

## 2025-01-21 DIAGNOSIS — F11.20 CONTINUOUS OPIOID DEPENDENCE (HCC): ICD-10-CM

## 2025-01-21 DIAGNOSIS — Z01.818 PRE-OP EXAM: Primary | ICD-10-CM

## 2025-01-21 DIAGNOSIS — H26.9 CATARACT OF RIGHT EYE, UNSPECIFIED CATARACT TYPE: ICD-10-CM

## 2025-01-21 LAB
ANION GAP SERPL CALCULATED.3IONS-SCNC: 10 MMOL/L (ref 4–13)
BUN SERPL-MCNC: 19 MG/DL (ref 5–25)
CALCIUM SERPL-MCNC: 10 MG/DL (ref 8.4–10.2)
CHLORIDE SERPL-SCNC: 99 MMOL/L (ref 96–108)
CO2 SERPL-SCNC: 28 MMOL/L (ref 21–32)
CREAT SERPL-MCNC: 1.11 MG/DL (ref 0.6–1.3)
GFR SERPL CREATININE-BSD FRML MDRD: 65 ML/MIN/1.73SQ M
GLUCOSE P FAST SERPL-MCNC: 166 MG/DL (ref 65–99)
POTASSIUM SERPL-SCNC: 4.1 MMOL/L (ref 3.5–5.3)
SODIUM SERPL-SCNC: 137 MMOL/L (ref 135–147)

## 2025-01-21 PROCEDURE — 80048 BASIC METABOLIC PNL TOTAL CA: CPT

## 2025-01-21 PROCEDURE — 99214 OFFICE O/P EST MOD 30 MIN: CPT | Performed by: NURSE PRACTITIONER

## 2025-01-21 PROCEDURE — 36415 COLL VENOUS BLD VENIPUNCTURE: CPT

## 2025-01-21 PROCEDURE — G2211 COMPLEX E/M VISIT ADD ON: HCPCS | Performed by: NURSE PRACTITIONER

## 2025-01-21 NOTE — ASSESSMENT & PLAN NOTE
Patient is not medically cleared for the procedure at this time as he has not completed preop BMP yet.  After he completes the preop BMP the results will be reviewed and if they are acceptable for surgery I will addend my note to clear him.  Addendum: BMP was completed and reviewed.  Patient is medically cleared for procedure.        Negative

## 2025-01-21 NOTE — ASSESSMENT & PLAN NOTE
Well-controlled on current regimen.  Patient does have a repeat hemoglobin A1c ordered to be completed prior to his next office visit.  Lab Results   Component Value Date    HGBA1C 7.3 (H) 12/10/2024     Orders:    Basic metabolic panel; Future

## 2025-01-21 NOTE — PROGRESS NOTES
Pre-operative Clearance  Name: Stanislav Glover      : 1950      MRN: [id number]  Encounter Provider: CLAUDY Bryan  Encounter Date: 2025   Encounter department: [facility] PRIMARY CARE    Assessment & Plan  Pre-op exam  Addendum: Patient is medically cleared for upcoming procedure.  Orders:    Basic metabolic panel; Future    Cataract of right eye, unspecified cataract type  Patient is not medically cleared for the procedure at this time as he has not completed preop BMP yet.  After he completes the preop BMP the results will be reviewed and if they are acceptable for surgery I will addend my note to clear him.  Addendum: BMP was completed and reviewed.  Patient is medically cleared for procedure.       Syringomyelia (HCC)  Patient reports that his pain is manageable on current pain medication regimen.       Continuous opioid dependence (HCC)  Patient continues to follow with his PCP Dr. Kumar regarding this.  PDMP was reviewed and no red flags were noted.       Type 2 diabetes mellitus without complication, without long-term current use of insulin (HCC)  Well-controlled on current regimen.  Patient does have a repeat hemoglobin A1c ordered to be completed prior to his next office visit.  Lab Results   Component Value Date    HGBA1C 7.3 (H) 12/10/2024     Orders:    Basic metabolic panel; Future    Essential hypertension  Well-controlled on current regimen.       Pre-operative Clearance:     Revised Cardiac Risk Index:  RCI RISK CLASS II (1 risk factor, risk of major cardiac complications approximately 1.3%)    Clearance:  Patient is not medically optimized for proposed surgery.  They are not cleared for surgery at this time.       Patient must complete preop BMP prior to medical clearance.    Medication Instructions:   - Avoid herbs or non-directed vitamins one week prior to surgery    - Avoid aspirin containing medications or non-steroidal anti-inflammatory drugs one week preceding  surgery    - May take tylenol for pain up until the night before surgery    - ACE Inhibitors or ARBs: Continue this medication up to the evening before surgery/procedure, but do not take the morning of the day of surgery.  - Alpha-1 Adrenergic Blocker (ie, tamsulosin, doxazosin, alfusozin): Continue to take this medication on your normal schedule.  - Benzodiazepines (ie, alprazolam, lorazepam, diazepam):  If the medication is needed, continue to take it on your normal schedule.  - Diuretics: Continue taking this medication up to the evening before surgery/procedure, but do not take in the morning of the day of surgery/procedure.  - Hyperlipidemia meds: Continue to take this medication on your normal schedule.  - Opioids: Continue to take this medication on your normal schedule.      Medicine Instructions for Adults with Diabetes (NO Bowel Prep)    Follow these instructions when a BOWEL PREP is NOT required for your procedure or surgery!    NOTE:  GLP Agonists taken weekly: do not take in the 7 days before your procedure. **Bariatric surgery: do not take 4 weeks prior to your procedure.    SGLT-2 Inhibitors: do not take in the 4 days before your procedure    On the Day Before Surgery/Procedure  If you are having a procedure (e.g., Colonoscopy) or surgery which DOES NOT require a bowel prep, follow the directions below based on the type of medicine you take for your diabetes.  Type of Medicine You Take Examples What to Do   Pre-Mixed Insulin Intermediate  Dtwfyya43/25, Frubthz12/30, Novolog 70/30, Regular Insulin Take 1/2 your regular dose the evening before our procedure.   Rapid/Fast Acting  Insulin and/or Long-Acting Insulin Humalog U200, NovoLog, Apidra,  Lantus, Levemir, Tresiba, Toujeo,  Fias, Basaglar Take your FULL regular dose the day before procedure.   Oral Diabetic Medicines (sulfonylurea) Glipizide/Glimepiride/  Glucotrol Take your regular dose with dinner the evening before your procedure.   Other Oral  Diabetic Medicines Metformin, Glucophage, Glucophage  XR, Riomet, Glumetza, Actose,  Avandia, Gl set, Prandin Take your regular dose with dinner the evening before your procedure   GLP Agonists Adlyxin, Byetta, Bydureon,  Ozempic, Soliqua, Tanzeum,  Trulicity, Victoza, Saxenda,  Rybelsus, Wegovy, Mounjaro, Zepbound If taken daily, take as normal  If taken weekly, do not take this medicine for 7 days before your procedure including the day of the procedure (resume taking after the procedure). **Bariatric surgery: do not take 4 weeks prior to procedure   SGLT-2 Inhibitors Jardiance, Invokana, Farxiga, Steglatro, Brenzavvy, Qtern, Segluromet Glyxambi, Synjardy, Synjardy XR, Invokamet, InvokametXR, Trijary XR, Xigduo X Do not take for 4 days before your procedure including the day of the procedure (resume taking after the procedure)   This educational material has been approved by the Patient Education Advisory Committee.    On the Day of Surgery/Procedure  Follow the directions below based on the type of medicine you take for your diabetes.  Type of Medicine You Take  Examples What to Do   Long-Acting Insulin Lantus, Levemir, Tresiba,  Toujeo, Basaglar, Semglee If you normally take your Long Acting Insulin in the morning, take the full dose as scheduled.   GLP-I Agonists Adlyxin, Byetta, Bydureon,  Ozempic, Soliqua, Tanzeum,  Trulicity, Victoza, Saxenda,  Rybelsus, Mounjaro Do NOT take this medicine on the day of your procedure (resume taking after the procedure)   Except for the morning Long-Acting Insulin, DO NOT take ANY diabetic medicine on the day of your procedure unless you were instructed by the doctor who manages your diabetes medicines.  Continue to check your blood sugars.  If you have an insulin pump, ask your endocrinologist for instructions at least 3 days before your procedure. NOTE: If you are not able to ask your endocrinologist in advance, on the day of the procedure set your insulin pump to your  basal rate only. Bring your insulin pump supplies to the hospital.         History of Present Illness     Patient is here for preop clearance for right eye cataract removal with Dr. Miller of [facility] for [facility] on 2/10/2025.  No preop EKG is required prior to this procedure.  Preop BMP is required but has not been completed yet.    Hypertension: Well-controlled on current regimen.  Patient denies lightheadedness, dizziness, frequent headaches, or vision changes.    Type II diabetes: Patient's most recent hemoglobin A1c was 7.3.  Patient denies polydipsia, polyphagia, or polyuria.    Syringomyelia/continuous opioid dependence: Patient reports that his symptoms are stable on current pain regimen.  Patient continues to follow with her PCP Dr. Kumar for management of his tramadol prescription.      Review of Systems   Constitutional:  Negative for chills and fever.   HENT:  Negative for ear pain and sore throat.    Eyes:  Negative for pain and visual disturbance.   Respiratory:  Negative for cough, chest tightness, shortness of breath and wheezing.    Cardiovascular:  Negative for chest pain, palpitations and leg swelling.   Gastrointestinal:  Negative for abdominal pain, constipation, diarrhea, nausea and vomiting.   Endocrine: Negative for cold intolerance and heat intolerance.   Genitourinary:  Negative for decreased urine volume, dysuria and hematuria.   Musculoskeletal:  Negative for arthralgias, back pain and myalgias.   Skin:  Negative for color change and rash.   Allergic/Immunologic: Negative for environmental allergies.   Neurological:  Negative for dizziness, seizures, syncope, weakness, light-headedness, numbness and headaches.   Hematological:  Negative for adenopathy.   Psychiatric/Behavioral:  Negative for confusion. The patient is not nervous/anxious.    All other systems reviewed and are negative.    Past Medical History   Past Medical History:   Diagnosis Date    Anxiety     Chronic pain  disorder     right shoulder    Diabetes mellitus (HCC)     type 2, blood sugar 109 at 0500    Diverticulosis     Extremity pain     GERD (gastroesophageal reflux disease)     Hearing loss, right     Hemorrhoids     Neck pain     Polyp, sigmoid colon      Past Surgical History:   Procedure Laterality Date    COLONOSCOPY      EAR SURGERY      INNER EAR SURGERY      NASAL SINUS SURGERY      MN COLONOSCOPY FLX DX W/COLLJ SPEC WHEN PFRMD N/A 8/30/2016    Procedure: COLONOSCOPY;  Surgeon: Gill Dowling MD;  Location: [facility];  Service: General    MN SURGICAL ARTHROSCOPY SHOULDER W/ROTATOR CUFF RPR Right 2/5/2019    Procedure: ARTHROSCOPY SHOULDER;  Surgeon: Howard Downey DO;  Location:  MAIN OR;  Service: Orthopedics     Family History   Problem Relation Age of Onset    Hypertension Brother     Hepatitis Mother     Heart disease Father         CARDIAC DISORDER      Social History     Tobacco Use    Smoking status: Never    Smokeless tobacco: Never   Vaping Use    Vaping status: Never Used   Substance and Sexual Activity    Alcohol use: Yes     Comment: occassional    Drug use: No    Sexual activity: Yes     Partners: Female     Current Outpatient Medications on File Prior to Visit   Medication Sig    ALPRAZolam (XANAX) 0.5 mg tablet 1  po  1  hour  before  flight, may repeat immediately  prior  if  needed    Blood Glucose Monitoring Suppl (ONE TOUCH ULTRA 2) w/Device KIT Use daily    glimepiride (AMARYL) 4 mg tablet TAKE 1 TABLET BY MOUTH DAILY WITH BREAKFAST    glucose blood (OneTouch Ultra Test) test strip Use 1 each in the morning Use as instructed    hydroCHLOROthiazide 25 mg tablet Take 1 tablet (25 mg total) by mouth daily    metFORMIN (GLUCOPHAGE) 500 mg tablet TAKE 2 TABLETS BY MOUTH TWICE A DAY WITH FOOD    omeprazole (PriLOSEC) 40 MG capsule TAKE 1 CAPSULE BY MOUTH EVERY DAY IN THE MORNING BEFORE BREAKFAST    OneTouch Delica Lancets 30G MISC Use 1 Device in the morning Type 2 dm E11.9    rosuvastatin  "(CRESTOR) 10 MG tablet Take 1 tablet (10 mg total) by mouth 3 (three) times a week    sildenafil (Viagra) 100 mg tablet Take 1 tablet (100 mg total) by mouth daily as needed for erectile dysfunction    tamsulosin (FLOMAX) 0.4 mg Take 1 capsule (0.4 mg total) by mouth 2 (two) times a day    traMADol (ULTRAM) 50 mg tablet Take 1 tablet (50 mg total) by mouth every 8 (eight) hours as needed for moderate pain    valsartan (DIOVAN) 160 mg tablet Take 1 tablet (160 mg total) by mouth daily    naloxone (NARCAN) 4 mg/0.1 mL nasal spray 0.1 mL (4 mg total) into each nostril once for 1 dose If the desired response is not obtained after 2-3 minutes, administer an additional dose using a new spray     Allergies   Allergen Reactions    Lipitor [Atorvastatin] Other (See Comments) and Myalgia     Other reaction(s): increased pain  arthralgia    Lyrica [Pregabalin] Other (See Comments)     Pt can't remmember reaction     Objective   /70 (BP Location: Right arm, Patient Position: Sitting, Cuff Size: Standard)   Pulse 64   Ht 5' 5\" (1.651 m)   Wt 74.4 kg (164 lb)   SpO2 98%   BMI 27.29 kg/m²     Physical Exam  Vitals and nursing note reviewed.   Constitutional:       General: He is not in acute distress.     Appearance: Normal appearance. He is well-developed. He is not ill-appearing.   HENT:      Head: Normocephalic.   Eyes:      Conjunctiva/sclera: Conjunctivae normal.   Cardiovascular:      Rate and Rhythm: Normal rate and regular rhythm.      Pulses: Normal pulses.           Carotid pulses are 2+ on the right side and 2+ on the left side.       Radial pulses are 2+ on the right side and 2+ on the left side.        Posterior tibial pulses are 2+ on the right side and 2+ on the left side.      Heart sounds: Normal heart sounds. No murmur heard.     No friction rub. No gallop.   Pulmonary:      Effort: Pulmonary effort is normal. No respiratory distress.      Breath sounds: Normal breath sounds. No decreased breath " sounds, wheezing, rhonchi or rales.   Abdominal:      General: Abdomen is flat. Bowel sounds are normal. There is no distension.      Palpations: Abdomen is soft.      Tenderness: There is no abdominal tenderness. There is no guarding.   Musculoskeletal:         General: No swelling. Normal range of motion.      Cervical back: Normal range of motion and neck supple.      Right lower leg: No edema.      Left lower leg: No edema.   Skin:     General: Skin is warm and dry.      Capillary Refill: Capillary refill takes less than 2 seconds.   Neurological:      General: No focal deficit present.      Mental Status: He is alert and oriented to person, place, and time.   Psychiatric:         Mood and Affect: Mood normal.         Behavior: Behavior normal.         Thought Content: Thought content normal.         Judgment: Judgment normal.           CLAUDY Bryan

## 2025-01-21 NOTE — ASSESSMENT & PLAN NOTE
Patient continues to follow with his PCP Dr. Kumar regarding this.  PDMP was reviewed and no red flags were noted.

## 2025-01-22 ENCOUNTER — RESULTS FOLLOW-UP (OUTPATIENT)
Dept: FAMILY MEDICINE CLINIC | Facility: CLINIC | Age: 75
End: 2025-01-22

## 2025-01-30 ENCOUNTER — OFFICE VISIT (OUTPATIENT)
Dept: FAMILY MEDICINE CLINIC | Facility: CLINIC | Age: 75
End: 2025-01-30
Payer: MEDICARE

## 2025-01-30 VITALS
DIASTOLIC BLOOD PRESSURE: 80 MMHG | BODY MASS INDEX: 27.06 KG/M2 | TEMPERATURE: 98.3 F | HEART RATE: 103 BPM | OXYGEN SATURATION: 96 % | SYSTOLIC BLOOD PRESSURE: 130 MMHG | WEIGHT: 162.4 LBS | HEIGHT: 65 IN

## 2025-01-30 DIAGNOSIS — J02.9 VIRAL PHARYNGITIS: Primary | ICD-10-CM

## 2025-01-30 DIAGNOSIS — I10 ESSENTIAL HYPERTENSION: ICD-10-CM

## 2025-01-30 PROCEDURE — G2211 COMPLEX E/M VISIT ADD ON: HCPCS | Performed by: NURSE PRACTITIONER

## 2025-01-30 PROCEDURE — 99214 OFFICE O/P EST MOD 30 MIN: CPT | Performed by: NURSE PRACTITIONER

## 2025-01-30 RX ORDER — BENZONATATE 200 MG/1
200 CAPSULE ORAL 3 TIMES DAILY PRN
Qty: 20 CAPSULE | Refills: 0 | Status: SHIPPED | OUTPATIENT
Start: 2025-01-30 | End: 2025-02-07

## 2025-01-30 NOTE — PROGRESS NOTES
Name: Stanislav Glover      : 1950      MRN: [id number]  Encounter Provider: CLAUDY Bryan  Encounter Date: 2025   Encounter department: [facility] PRIMARY [facility]ARE  :  Assessment & Plan  Viral pharyngitis  Patient symptoms are consistent with viral pharyngitis.  Tessalon Perles were ordered to be used as needed for cough.  Patient was advised to continue using Mucinex as directed and I did also advise him to use salt water gargles and Cepacol lozenges as needed to help with throat discomfort.  Orders:  •  benzonatate (TESSALON) 200 MG capsule; Take 1 capsule (200 mg total) by mouth 3 (three) times a day as needed for cough    Essential hypertension  Well-controlled on current regimen.              History of Present Illness   URI symptoms: Patient reports over the past 4 days he has been experiencing symptoms of sore throat and productive cough.  He denies any fever, chills, pain with swallowing, or shortness of breath.  The patient has been taking Mucinex and this has been improving his symptoms.    Hypertension: Well-controlled on current regimen.  Patient denies lightheadedness, dizziness, frequent headaches, or vision changes.          Review of Systems   Constitutional:  Negative for chills and fever.   HENT:  Positive for postnasal drip and sore throat. Negative for congestion, ear pain, rhinorrhea, sinus pressure and sinus pain.    Eyes:  Negative for pain and visual disturbance.   Respiratory:  Positive for cough (productive). Negative for chest tightness, shortness of breath and wheezing.    Cardiovascular:  Negative for chest pain, palpitations and leg swelling.   Gastrointestinal:  Negative for abdominal pain, constipation, diarrhea, nausea and vomiting.   Endocrine: Negative for cold intolerance and heat intolerance.   Genitourinary:  Negative for decreased urine volume, dysuria and hematuria.   Musculoskeletal:  Negative for arthralgias, back pain and myalgias.   Skin:   "Negative for color change and rash.   Allergic/Immunologic: Negative for environmental allergies.   Neurological:  Negative for dizziness, seizures, syncope, weakness, light-headedness, numbness and headaches.   Hematological:  Negative for adenopathy.   Psychiatric/Behavioral:  Negative for confusion. The patient is not nervous/anxious.    All other systems reviewed and are negative.      Objective   /80 (BP Location: Left arm, Patient Position: Sitting, Cuff Size: Standard)   Pulse 103   Temp 98.3 °F (36.8 °C) (Oral)   Ht 5' 5\" (1.651 m)   Wt 73.7 kg (162 lb 6.4 oz)   SpO2 96%   BMI 27.02 kg/m²      Physical Exam  Vitals and nursing note reviewed.   Constitutional:       General: He is not in acute distress.     Appearance: Normal appearance. He is well-developed. He is not ill-appearing.   HENT:      Head: Normocephalic.      Right Ear: Hearing and tympanic membrane normal.      Left Ear: Hearing and tympanic membrane normal.      Mouth/Throat:      Pharynx: Postnasal drip present. No pharyngeal swelling, oropharyngeal exudate, posterior oropharyngeal erythema or uvula swelling.      Tonsils: No tonsillar exudate or tonsillar abscesses.   Eyes:      Conjunctiva/sclera: Conjunctivae normal.   Cardiovascular:      Rate and Rhythm: Normal rate and regular rhythm.      Pulses: Normal pulses.           Carotid pulses are 2+ on the right side and 2+ on the left side.       Radial pulses are 2+ on the right side and 2+ on the left side.        Posterior tibial pulses are 2+ on the right side and 2+ on the left side.      Heart sounds: Normal heart sounds. No murmur heard.  Pulmonary:      Effort: Pulmonary effort is normal. No respiratory distress.      Breath sounds: Normal breath sounds. No decreased breath sounds, wheezing, rhonchi or rales.   Abdominal:      General: Abdomen is flat. Bowel sounds are normal. There is no distension.      Palpations: Abdomen is soft.      Tenderness: There is no abdominal " tenderness. There is no guarding.   Musculoskeletal:         General: No swelling. Normal range of motion.      Cervical back: Normal range of motion and neck supple.      Right lower leg: No edema.      Left lower leg: No edema.   Skin:     General: Skin is warm and dry.      Capillary Refill: Capillary refill takes less than 2 seconds.   Neurological:      General: No focal deficit present.      Mental Status: He is alert and oriented to person, place, and time.   Psychiatric:         Mood and Affect: Mood normal.         Behavior: Behavior normal.         Thought Content: Thought content normal.         Judgment: Judgment normal.

## 2025-01-30 NOTE — ASSESSMENT & PLAN NOTE
Patient symptoms are consistent with viral pharyngitis.  Tessalon Perles were ordered to be used as needed for cough.  Patient was advised to continue using Mucinex as directed and I did also advise him to use salt water gargles and Cepacol lozenges as needed to help with throat discomfort.  Orders:  •  benzonatate (TESSALON) 200 MG capsule; Take 1 capsule (200 mg total) by mouth 3 (three) times a day as needed for cough

## 2025-02-03 ENCOUNTER — TELEPHONE (OUTPATIENT)
Age: 75
End: 2025-02-03

## 2025-02-03 DIAGNOSIS — J02.9 PHARYNGITIS, UNSPECIFIED ETIOLOGY: Primary | ICD-10-CM

## 2025-02-03 RX ORDER — AMOXICILLIN 500 MG/1
500 CAPSULE ORAL EVERY 12 HOURS SCHEDULED
Qty: 14 CAPSULE | Refills: 0 | Status: SHIPPED | OUTPATIENT
Start: 2025-02-03 | End: 2025-02-07

## 2025-02-03 NOTE — TELEPHONE ENCOUNTER
Wife called in and states that patient is congested and bringing up mucus and has surgery 1/10 and if he is not feeling better it will have to be cancelled and is requesting something to be sent into [facility] in [place]. Please advise

## 2025-02-05 DIAGNOSIS — F11.20 CONTINUOUS OPIOID DEPENDENCE (HCC): ICD-10-CM

## 2025-02-05 DIAGNOSIS — G95.0 SYRINGOMYELIA (HCC): ICD-10-CM

## 2025-02-05 DIAGNOSIS — M51.34 DDD (DEGENERATIVE DISC DISEASE), THORACIC: ICD-10-CM

## 2025-02-05 DIAGNOSIS — G89.4 CHRONIC PAIN DISORDER: ICD-10-CM

## 2025-02-05 NOTE — TELEPHONE ENCOUNTER
Reason for call:   [x] Refill   [] Prior Auth  [] Other:     Office:   [x] PCP/Provider - José  [] Specialty/Provider -     Medication:   Tramadol 50 mg, 1 q8 prn, 180    Pharmacy:   [facility] in [facility]      Does the patient have enough for 3 days?   [x] Yes   [] No - Send as HP to POD

## 2025-02-06 RX ORDER — TRAMADOL HYDROCHLORIDE 50 MG/1
50 TABLET ORAL EVERY 8 HOURS PRN
Qty: 90 TABLET | Refills: 0 | Status: SHIPPED | OUTPATIENT
Start: 2025-02-06

## 2025-02-07 ENCOUNTER — OFFICE VISIT (OUTPATIENT)
Dept: FAMILY MEDICINE CLINIC | Facility: CLINIC | Age: 75
End: 2025-02-07
Payer: MEDICARE

## 2025-02-07 ENCOUNTER — RESULTS FOLLOW-UP (OUTPATIENT)
Dept: FAMILY MEDICINE CLINIC | Facility: CLINIC | Age: 75
End: 2025-02-07

## 2025-02-07 ENCOUNTER — APPOINTMENT (OUTPATIENT)
Dept: RADIOLOGY | Facility: MEDICAL CENTER | Age: 75
End: 2025-02-07
Payer: MEDICARE

## 2025-02-07 VITALS
WEIGHT: 162 LBS | BODY MASS INDEX: 26.99 KG/M2 | HEART RATE: 59 BPM | TEMPERATURE: 98.4 F | OXYGEN SATURATION: 96 % | DIASTOLIC BLOOD PRESSURE: 64 MMHG | HEIGHT: 65 IN | SYSTOLIC BLOOD PRESSURE: 102 MMHG

## 2025-02-07 DIAGNOSIS — Z20.822 EXPOSURE TO COVID-19 VIRUS: ICD-10-CM

## 2025-02-07 DIAGNOSIS — J18.9 PNEUMONIA OF LEFT LOWER LOBE DUE TO INFECTIOUS ORGANISM: ICD-10-CM

## 2025-02-07 DIAGNOSIS — J18.9 PNEUMONIA OF LEFT LOWER LOBE DUE TO INFECTIOUS ORGANISM: Primary | ICD-10-CM

## 2025-02-07 LAB
SARS-COV-2 AG UPPER RESP QL IA: NEGATIVE
SL AMB POCT RAPID FLU A: NEGATIVE
SL AMB POCT RAPID FLU B: NEGATIVE
VALID CONTROL: NORMAL

## 2025-02-07 PROCEDURE — 71046 X-RAY EXAM CHEST 2 VIEWS: CPT

## 2025-02-07 PROCEDURE — G2211 COMPLEX E/M VISIT ADD ON: HCPCS | Performed by: FAMILY MEDICINE

## 2025-02-07 PROCEDURE — 99214 OFFICE O/P EST MOD 30 MIN: CPT | Performed by: FAMILY MEDICINE

## 2025-02-07 PROCEDURE — 87811 SARS-COV-2 COVID19 W/OPTIC: CPT | Performed by: FAMILY MEDICINE

## 2025-02-07 PROCEDURE — 87804 INFLUENZA ASSAY W/OPTIC: CPT | Performed by: FAMILY MEDICINE

## 2025-02-07 RX ORDER — LEVOFLOXACIN 500 MG/1
500 TABLET, FILM COATED ORAL EVERY 24 HOURS
Qty: 10 TABLET | Refills: 0 | Status: SHIPPED | OUTPATIENT
Start: 2025-02-07 | End: 2025-02-17

## 2025-02-07 RX ORDER — DEXTROMETHORPHAN HYDROBROMIDE AND PROMETHAZINE HYDROCHLORIDE 15; 6.25 MG/5ML; MG/5ML
5 SYRUP ORAL 4 TIMES DAILY PRN
Qty: 120 ML | Refills: 1 | Status: SHIPPED | OUTPATIENT
Start: 2025-02-07

## 2025-02-07 NOTE — PROGRESS NOTES
"Name: Stanislav Glover      : 1950      MRN: [id number]  Encounter Provider: Shanti Kumar MD  Encounter Date: 2025   Encounter department: [facility] PRIMARY CARE  :  Assessment & Plan  Exposure to COVID-19 virus    Orders:    POCT Rapid Covid Ag    POCT rapid flu A and B    Pneumonia of left lower lobe due to infectious organism    Orders:    levofloxacin (LEVAQUIN) 500 mg tablet; Take 1 tablet (500 mg total) by mouth every 24 hours for 10 days    promethazine-dextromethorphan (PHENERGAN-DM) 6.25-15 mg/5 mL oral syrup; Take 5 mL by mouth 4 (four) times a day as needed for cough    XR chest pa and lateral; Future    Rest, fluids, deep breathing  exercises       History of Present Illness   Patient presents with:  Cold Like Symptoms: cough, mucous production (no improvement in symptoms about 1 week, grand daughter tested positive for COVID, feels  pain in  upper  back when takes  deep breath or  has  bad  cough,coughing up  yellow  mucus  that si starting to clear, negative  for  flu/covid, wife  also  sick, had  to cancel proposed  cataract  surgery         Review of Systems   Constitutional:  Negative for activity change, appetite change and fatigue.   HENT:  Positive for sore throat. Negative for congestion, ear pain, rhinorrhea, sinus pressure and sinus pain.    Respiratory:  Positive for cough and shortness of breath.    Cardiovascular:  Negative for chest pain.   Neurological:  Positive for headaches. Negative for dizziness and light-headedness.   Hematological:  Negative for adenopathy.       Objective   /64 (BP Location: Left arm, Patient Position: Sitting, Cuff Size: Standard)   Pulse 59   Temp 98.4 °F (36.9 °C) (Tympanic)   Ht 5' 5\" (1.651 m)   Wt 73.5 kg (162 lb)   SpO2 96%   BMI 26.96 kg/m²      Physical Exam  Vitals reviewed.   Constitutional:       Appearance: Normal appearance.   Cardiovascular:      Rate and Rhythm: Normal rate and regular rhythm.      Pulses: Normal " pulses.      Heart sounds: Normal heart sounds.   Pulmonary:      Effort: Pulmonary effort is normal.      Breath sounds: Rhonchi present.      Comments: Lll  lobe,with egophony   and  dullness  to   percussion  Lymphadenopathy:      Cervical: No cervical adenopathy.   Neurological:      Mental Status: He is alert.   Psychiatric:         Mood and Affect: Mood normal.

## 2025-02-13 ENCOUNTER — TELEPHONE (OUTPATIENT)
Age: 75
End: 2025-02-13

## 2025-02-13 ENCOUNTER — APPOINTMENT (OUTPATIENT)
Dept: LAB | Facility: CLINIC | Age: 75
End: 2025-02-13
Payer: MEDICARE

## 2025-02-13 ENCOUNTER — OFFICE VISIT (OUTPATIENT)
Dept: FAMILY MEDICINE CLINIC | Facility: CLINIC | Age: 75
End: 2025-02-13
Payer: MEDICARE

## 2025-02-13 VITALS
HEART RATE: 80 BPM | WEIGHT: 160 LBS | HEIGHT: 65 IN | TEMPERATURE: 97.7 F | DIASTOLIC BLOOD PRESSURE: 70 MMHG | OXYGEN SATURATION: 97 % | SYSTOLIC BLOOD PRESSURE: 132 MMHG | BODY MASS INDEX: 26.66 KG/M2

## 2025-02-13 DIAGNOSIS — R06.00 DYSPNEA, UNSPECIFIED TYPE: ICD-10-CM

## 2025-02-13 DIAGNOSIS — R53.1 WEAKNESS: ICD-10-CM

## 2025-02-13 DIAGNOSIS — R06.00 DYSPNEA, UNSPECIFIED TYPE: Primary | ICD-10-CM

## 2025-02-13 LAB
BASOPHILS # BLD AUTO: 0.04 THOUSANDS/ΜL (ref 0–0.1)
BASOPHILS NFR BLD AUTO: 1 % (ref 0–1)
BNP SERPL-MCNC: 76 PG/ML (ref 0–100)
CARDIAC TROPONIN I PNL SERPL HS: 4 NG/L (ref 8–18)
CK SERPL-CCNC: 53 U/L (ref 39–308)
CRP SERPL QL: 25.1 MG/L
EOSINOPHIL # BLD AUTO: 0.04 THOUSAND/ΜL (ref 0–0.61)
EOSINOPHIL NFR BLD AUTO: 1 % (ref 0–6)
ERYTHROCYTE [DISTWIDTH] IN BLOOD BY AUTOMATED COUNT: 12.7 % (ref 11.6–15.1)
HCT VFR BLD AUTO: 38.3 % (ref 36.5–49.3)
HGB BLD-MCNC: 12.7 G/DL (ref 12–17)
IMM GRANULOCYTES # BLD AUTO: 0.02 THOUSAND/UL (ref 0–0.2)
IMM GRANULOCYTES NFR BLD AUTO: 0 % (ref 0–2)
LYMPHOCYTES # BLD AUTO: 2.41 THOUSANDS/ΜL (ref 0.6–4.47)
LYMPHOCYTES NFR BLD AUTO: 29 % (ref 14–44)
MCH RBC QN AUTO: 28.9 PG (ref 26.8–34.3)
MCHC RBC AUTO-ENTMCNC: 33.2 G/DL (ref 31.4–37.4)
MCV RBC AUTO: 87 FL (ref 82–98)
MONOCYTES # BLD AUTO: 0.54 THOUSAND/ΜL (ref 0.17–1.22)
MONOCYTES NFR BLD AUTO: 7 % (ref 4–12)
NEUTROPHILS # BLD AUTO: 5.14 THOUSANDS/ΜL (ref 1.85–7.62)
NEUTS SEG NFR BLD AUTO: 62 % (ref 43–75)
NRBC BLD AUTO-RTO: 0 /100 WBCS
PLATELET # BLD AUTO: 321 THOUSANDS/UL (ref 149–390)
PMV BLD AUTO: 10.5 FL (ref 8.9–12.7)
RBC # BLD AUTO: 4.39 MILLION/UL (ref 3.88–5.62)
WBC # BLD AUTO: 8.19 THOUSAND/UL (ref 4.31–10.16)

## 2025-02-13 PROCEDURE — 85379 FIBRIN DEGRADATION QUANT: CPT

## 2025-02-13 PROCEDURE — 36415 COLL VENOUS BLD VENIPUNCTURE: CPT

## 2025-02-13 PROCEDURE — 83880 ASSAY OF NATRIURETIC PEPTIDE: CPT

## 2025-02-13 PROCEDURE — 99214 OFFICE O/P EST MOD 30 MIN: CPT | Performed by: FAMILY MEDICINE

## 2025-02-13 PROCEDURE — 82550 ASSAY OF CK (CPK): CPT

## 2025-02-13 PROCEDURE — G2211 COMPLEX E/M VISIT ADD ON: HCPCS | Performed by: FAMILY MEDICINE

## 2025-02-13 PROCEDURE — 86140 C-REACTIVE PROTEIN: CPT | Performed by: FAMILY MEDICINE

## 2025-02-13 PROCEDURE — 85025 COMPLETE CBC W/AUTO DIFF WBC: CPT | Performed by: FAMILY MEDICINE

## 2025-02-13 PROCEDURE — 84484 ASSAY OF TROPONIN QUANT: CPT

## 2025-02-13 PROCEDURE — 93000 ELECTROCARDIOGRAM COMPLETE: CPT | Performed by: FAMILY MEDICINE

## 2025-02-13 RX ORDER — PREDNISONE 20 MG/1
20 TABLET ORAL 2 TIMES DAILY WITH MEALS
Qty: 10 TABLET | Refills: 0 | Status: SHIPPED | OUTPATIENT
Start: 2025-02-13 | End: 2025-02-18

## 2025-02-13 NOTE — TELEPHONE ENCOUNTER
Patient was seen in the office 2/7 for pneumonia. States he started to feel better after he started the abx but started to feel bad again a few days ago. States he is shaky and weak. His cough went away but he feels mild SOB at times. No fever. OV scheduled for today.

## 2025-02-13 NOTE — PROGRESS NOTES
Name: Stanislav Glover      : 1950      MRN: [id number]  Encounter Provider: Shanti Kumar MD  Encounter Date: 2025   Encounter department: [facility] PRIMARY CARE  :  Assessment & Plan  Dyspnea, unspecified type  Stop Levaquin, cont  cough med, unsure  if  weakness  is reaction to Levaquin, check  bnp, d-dimer,cbc, troponin    Orders:    CBC and differential    D-dimer, quantitative; Future    B-Type Natriuretic Peptide(BNP); Future    High Sensitivity Troponin I Random; Future    predniSONE 20 mg tablet; Take 1 tablet (20 mg total) by mouth 2 (two) times a day with meals for 5 days    POCT ECG    Weakness  Check cpk and crp    Orders:    CK; Future    C-reactive protein           History of Present Illness   Patient presents with:  Shortness of Breath: Pt was seen last week with abx that is not helping much but admits cough has gotten better. Still taking levofloxacin and phenergan   Pt states he is having s/e such as SOB , trouble swollowing, throat tightness, N/A fever, weakness , loss of mireya and salty taste in mouth with food   Extremity Weakness  Headache  Just  going out  to take  care  of  snow  and ice  really  wiped  him out      Review of Systems   Constitutional:  Positive for fatigue. Negative for activity change and appetite change.   HENT:  Negative for congestion.    Respiratory:  Positive for shortness of breath.    Cardiovascular:  Negative for chest pain and leg swelling.   Neurological:  Positive for weakness and headaches. Negative for dizziness and light-headedness.       Objective   There were no vitals taken for this visit.     Physical Exam  Vitals reviewed.   Constitutional:       Appearance: Normal appearance.   Cardiovascular:      Rate and Rhythm: Normal rate and regular rhythm.      Pulses: Normal pulses.      Heart sounds: Normal heart sounds.   Pulmonary:      Effort: Pulmonary effort is normal.      Breath sounds: Normal breath sounds.   Musculoskeletal:      Right  lower leg: No edema.      Left lower leg: No edema.   Lymphadenopathy:      Cervical: No cervical adenopathy.   Neurological:      Mental Status: He is alert.   Psychiatric:         Mood and Affect: Mood normal.

## 2025-02-14 ENCOUNTER — RESULTS FOLLOW-UP (OUTPATIENT)
Dept: FAMILY MEDICINE CLINIC | Facility: CLINIC | Age: 75
End: 2025-02-14

## 2025-02-14 LAB — D DIMER PPP FEU-MCNC: <0.27 UG/ML FEU

## 2025-02-19 DIAGNOSIS — R79.82 ELEVATED C-REACTIVE PROTEIN (CRP): Primary | ICD-10-CM

## 2025-02-23 DIAGNOSIS — E11.9 TYPE 2 DIABETES MELLITUS WITHOUT COMPLICATION, WITHOUT LONG-TERM CURRENT USE OF INSULIN (HCC): ICD-10-CM

## 2025-02-24 ENCOUNTER — CONSULT (OUTPATIENT)
Dept: FAMILY MEDICINE CLINIC | Facility: CLINIC | Age: 75
End: 2025-02-24
Payer: MEDICARE

## 2025-02-24 VITALS
RESPIRATION RATE: 16 BRPM | OXYGEN SATURATION: 96 % | SYSTOLIC BLOOD PRESSURE: 106 MMHG | TEMPERATURE: 97.5 F | BODY MASS INDEX: 27.16 KG/M2 | DIASTOLIC BLOOD PRESSURE: 60 MMHG | HEART RATE: 97 BPM | HEIGHT: 65 IN | WEIGHT: 163 LBS

## 2025-02-24 DIAGNOSIS — H25.091 OTHER AGE-RELATED INCIPIENT CATARACT OF RIGHT EYE: Primary | ICD-10-CM

## 2025-02-24 PROBLEM — R06.00 DYSPNEA: Status: RESOLVED | Noted: 2025-02-13 | Resolved: 2025-02-24

## 2025-02-24 PROBLEM — J02.9 VIRAL PHARYNGITIS: Status: RESOLVED | Noted: 2022-12-05 | Resolved: 2025-02-24

## 2025-02-24 PROBLEM — R07.9 CHEST PAIN: Status: RESOLVED | Noted: 2022-06-02 | Resolved: 2025-02-24

## 2025-02-24 PROCEDURE — 99214 OFFICE O/P EST MOD 30 MIN: CPT | Performed by: FAMILY MEDICINE

## 2025-02-24 PROCEDURE — G2211 COMPLEX E/M VISIT ADD ON: HCPCS | Performed by: FAMILY MEDICINE

## 2025-02-24 RX ORDER — GLIMEPIRIDE 4 MG/1
4 TABLET ORAL
Qty: 90 TABLET | Refills: 1 | Status: SHIPPED | OUTPATIENT
Start: 2025-02-24

## 2025-02-24 NOTE — PROGRESS NOTES
Pre-operative Clearance  Name: Stanislav Glover      : 1950      MRN: [id number]  Encounter Provider: Shanti Kumar MD  Encounter Date: 2025   Encounter department: [facility] PRIMARY CARE    Assessment & Plan    Pre-operative Clearance:     Revised Cardiac Risk Index:  RCI RISK CLASS I (0 risk factors, risk of major cardiac complications approximately 0.5%)    Clearance:  Patient is medically optimized (CLEARED) for proposed surgery without any additional cardiac testing.      Medication Instructions:   - Avoid herbs or non-directed vitamins one week prior to surgery    - Avoid aspirin containing medications or non-steroidal anti-inflammatory drugs one week preceding surgery    - May take tylenol for pain up until the night before surgery    - ACE Inhibitors or ARBs: Continue this medication up to the evening before surgery/procedure, but do not take the morning of the day of surgery.  - Alpha-1 Adrenergic Blocker (ie, tamsulosin, doxazosin, alfusozin): Continue to take this medication on your normal schedule.  - Benzodiazepines (ie, alprazolam, lorazepam, diazepam):  If the medication is needed, continue to take it on your normal schedule.  - Diuretics: Continue taking this medication up to the evening before surgery/procedure, but do not take in the morning of the day of surgery/procedure.  - Hyperlipidemia meds: Continue to take this medication on your normal schedule.  - Opioids: Continue to take this medication on your normal schedule.  - Other med instructions: May take  metformin and  amaryl night  before      Medicine Instructions for Adults with Diabetes (NO Bowel Prep)    Follow these instructions when a BOWEL PREP is NOT required for your procedure or surgery!    NOTE:  GLP Agonists taken weekly: do not take in the 7 days before your procedure. **Bariatric surgery: do not take 4 weeks prior to your procedure.    SGLT-2 Inhibitors: do not take in the 4 days before your procedure    On  the Day Before Surgery/Procedure  If you are having a procedure (e.g., Colonoscopy) or surgery which DOES NOT require a bowel prep, follow the directions below based on the type of medicine you take for your diabetes.  Type of Medicine You Take Examples What to Do   Pre-Mixed Insulin Intermediate  Vewlmqi59/25, Sacanyg22/30, Novolog 70/30, Regular Insulin Take 1/2 your regular dose the evening before our procedure.   Rapid/Fast Acting  Insulin and/or Long-Acting Insulin Humalog U200, NovoLog, Apidra,  Lantus, Levemir, Tresiba, Toujeo,  Fias, Basaglar Take your FULL regular dose the day before procedure.   Oral Diabetic Medicines (sulfonylurea) Glipizide/Glimepiride/  Glucotrol Take your regular dose with dinner the evening before your procedure.   Other Oral Diabetic Medicines Metformin, Glucophage, Glucophage  XR, Riomet, Glumetza, Actose,  Avandia, Gl set, Prandin Take your regular dose with dinner the evening before your procedure   GLP Agonists Adlyxin, Byetta, Bydureon,  Ozempic, Soliqua, Tanzeum,  Trulicity, Victoza, Saxenda,  Rybelsus, Wegovy, Mounjaro, Zepbound If taken daily, take as normal  If taken weekly, do not take this medicine for 7 days before your procedure including the day of the procedure (resume taking after the procedure). **Bariatric surgery: do not take 4 weeks prior to procedure   SGLT-2 Inhibitors Jardiance, Invokana, Farxiga, Steglatro, Brenzavvy, Qtern, Segluromet Glyxambi, Synjardy, Synjardy XR, Invokamet, InvokametXR, Trijary XR, Xigduo X Do not take for 4 days before your procedure including the day of the procedure (resume taking after the procedure)   This educational material has been approved by the Patient Education Advisory Committee.    On the Day of Surgery/Procedure  Follow the directions below based on the type of medicine you take for your diabetes.  Type of Medicine You Take  Examples What to Do   Long-Acting Insulin Lantus, Levemir, Tresiba,  Toujeo, Basaglar, Semglee If  you normally take your Long Acting Insulin in the morning, take the full dose as scheduled.   GLP-I Agonists Adlyxin, Byetta, Bydureon,  Ozempic, Soliqua, Tanzeum,  Trulicity, Victoza, Saxenda,  Rybelsus, Mounjaro Do NOT take this medicine on the day of your procedure (resume taking after the procedure)   Except for the morning Long-Acting Insulin, DO NOT take ANY diabetic medicine on the day of your procedure unless you were instructed by the doctor who manages your diabetes medicines.  Continue to check your blood sugars.  If you have an insulin pump, ask your endocrinologist for instructions at least 3 days before your procedure. NOTE: If you are not able to ask your endocrinologist in advance, on the day of the procedure set your insulin pump to your basal rate only. Bring your insulin pump supplies to the hospital.         History of Present Illness     Pre op r  cataract rescheduled  due to pneumonia, feels  well      Review of Systems   Constitutional:  Negative for activity change, appetite change and fatigue.   HENT:  Negative for congestion, ear pain, sinus pressure, sinus pain and sore throat.    Eyes:  Positive for visual disturbance.   Respiratory:  Negative for cough and shortness of breath.    Cardiovascular:  Negative for chest pain.   Neurological:  Negative for dizziness, light-headedness and headaches.     Past Medical History   Past Medical History:   Diagnosis Date    Anxiety     Chronic pain disorder     right shoulder    Diabetes mellitus (HCC)     type 2, blood sugar 109 at 0500    Diverticulosis     Extremity pain     GERD (gastroesophageal reflux disease)     Hearing loss, right     Hemorrhoids     Neck pain     Polyp, sigmoid colon      Past Surgical History:   Procedure Laterality Date    COLONOSCOPY      EAR SURGERY      INNER EAR SURGERY      NASAL SINUS SURGERY      MA COLONOSCOPY FLX DX W/COLLJ SPEC WHEN PFRMD N/A 8/30/2016    Procedure: COLONOSCOPY;  Surgeon: Gill Dowling MD;   Location: [facility];  Service: General    NC SURGICAL ARTHROSCOPY SHOULDER W/ROTATOR CUFF RPR Right 2/5/2019    Procedure: ARTHROSCOPY SHOULDER;  Surgeon: Howard Downey DO;  Location:  MAIN OR;  Service: Orthopedics     Family History   Problem Relation Age of Onset    Hypertension Brother     Hepatitis Mother     Heart disease Father         CARDIAC DISORDER      Social History     Tobacco Use    Smoking status: Never    Smokeless tobacco: Never   Vaping Use    Vaping status: Never Used   Substance and Sexual Activity    Alcohol use: Yes     Comment: occassional    Drug use: No    Sexual activity: Yes     Partners: Female     Current Outpatient Medications on File Prior to Visit   Medication Sig    ALPRAZolam (XANAX) 0.5 mg tablet 1  po  1  hour  before  flight, may repeat immediately  prior  if  needed    Blood Glucose Monitoring Suppl (ONE TOUCH ULTRA 2) w/Device KIT Use daily    glimepiride (AMARYL) 4 mg tablet TAKE 1 TABLET BY MOUTH DAILY WITH BREAKFAST    glucose blood (OneTouch Ultra Test) test strip Use 1 each in the morning Use as instructed    hydroCHLOROthiazide 25 mg tablet Take 1 tablet (25 mg total) by mouth daily    metFORMIN (GLUCOPHAGE) 500 mg tablet TAKE 2 TABLETS BY MOUTH TWICE A DAY WITH FOOD    omeprazole (PriLOSEC) 40 MG capsule TAKE 1 CAPSULE BY MOUTH EVERY DAY IN THE MORNING BEFORE BREAKFAST    OneTouch Delica Lancets 30G MISC Use 1 Device in the morning Type 2 dm E11.9    promethazine-dextromethorphan (PHENERGAN-DM) 6.25-15 mg/5 mL oral syrup Take 5 mL by mouth 4 (four) times a day as needed for cough    rosuvastatin (CRESTOR) 10 MG tablet Take 1 tablet (10 mg total) by mouth 3 (three) times a week    sildenafil (Viagra) 100 mg tablet Take 1 tablet (100 mg total) by mouth daily as needed for erectile dysfunction    tamsulosin (FLOMAX) 0.4 mg Take 1 capsule (0.4 mg total) by mouth 2 (two) times a day    traMADol (ULTRAM) 50 mg tablet Take 1 tablet (50 mg total) by mouth every 8 (eight)  "hours as needed for moderate pain    valsartan (DIOVAN) 160 mg tablet Take 1 tablet (160 mg total) by mouth daily    naloxone (NARCAN) 4 mg/0.1 mL nasal spray 0.1 mL (4 mg total) into each nostril once for 1 dose If the desired response is not obtained after 2-3 minutes, administer an additional dose using a new spray     Allergies   Allergen Reactions    Lipitor [Atorvastatin] Other (See Comments) and Myalgia     Other reaction(s): increased pain  arthralgia    Lyrica [Pregabalin] Other (See Comments)     Pt can't remmember reaction     Objective   Ht 5' 5\" (1.651 m)   BMI 26.63 kg/m²     Physical Exam  Vitals reviewed.   Constitutional:       Appearance: Normal appearance.   HENT:      Right Ear: Tympanic membrane normal.      Left Ear: Tympanic membrane normal.      Nose: No congestion or rhinorrhea.      Mouth/Throat:      Pharynx: No oropharyngeal exudate or posterior oropharyngeal erythema.   Cardiovascular:      Rate and Rhythm: Normal rate and regular rhythm.      Pulses: Normal pulses.      Heart sounds: Normal heart sounds.   Pulmonary:      Effort: Pulmonary effort is normal.      Breath sounds: Normal breath sounds.   Musculoskeletal:      Right lower leg: No edema.      Left lower leg: No edema.   Lymphadenopathy:      Cervical: No cervical adenopathy.   Neurological:      Mental Status: He is alert.   Psychiatric:         Mood and Affect: Mood normal.           Arielle Mckeon MA  "

## 2025-03-07 DIAGNOSIS — I10 ESSENTIAL HYPERTENSION: ICD-10-CM

## 2025-03-07 RX ORDER — HYDROCHLOROTHIAZIDE 25 MG/1
25 TABLET ORAL DAILY
Qty: 90 TABLET | Refills: 1 | Status: SHIPPED | OUTPATIENT
Start: 2025-03-07

## 2025-03-07 NOTE — TELEPHONE ENCOUNTER
Reason for call:   [x] Refill   [] Prior Auth  [] Other:     Office:   [x] PCP/Provider -   [] Specialty/Provider -     Medication: hydroCHLOROthiazide 25 mg     Dose/Frequency: Take 1 tablet (25 mg total) by mouth daily     Quantity: 90    Pharmacy:   [id number] [place], [place] - [address] [facility]       Local Pharmacy   Does the patient have enough for 3 days?   [x] Yes   [] No - Send as HP to POD    Mail Away Pharmacy   Does the patient have enough for 10 days?   [] Yes   [] No - Send as HP to POD

## 2025-03-10 DIAGNOSIS — I10 ESSENTIAL HYPERTENSION: ICD-10-CM

## 2025-03-11 RX ORDER — VALSARTAN 160 MG/1
160 TABLET ORAL DAILY
Qty: 90 TABLET | Refills: 1 | Status: SHIPPED | OUTPATIENT
Start: 2025-03-11

## 2025-03-28 DIAGNOSIS — F11.20 CONTINUOUS OPIOID DEPENDENCE (HCC): ICD-10-CM

## 2025-03-28 DIAGNOSIS — G95.0 SYRINGOMYELIA (HCC): ICD-10-CM

## 2025-03-28 RX ORDER — TRAMADOL HYDROCHLORIDE 50 MG/1
50 TABLET ORAL EVERY 8 HOURS PRN
Qty: 90 TABLET | Refills: 0 | Status: SHIPPED | OUTPATIENT
Start: 2025-03-28

## 2025-03-28 NOTE — TELEPHONE ENCOUNTER
Reason for call:   [x] Refill   [] Prior Auth  [] Other:     Office:   [x] PCP/Provider - Shanti Kumar   [] Specialty/Provider -     Medication: traMADol (ULTRAM) 50 mg tablet     Dose/Frequency: Take 1 tablet (50 mg total) by mouth every 8 (eight) hours as needed for moderate pain     Quantity: 90    Pharmacy:   [id number] [place], [place]       Local Pharmacy   Does the patient have enough for 3 days?   [x] Yes   [] No - Send as HP to POD

## 2025-04-04 ENCOUNTER — APPOINTMENT (OUTPATIENT)
Dept: LAB | Facility: MEDICAL CENTER | Age: 75
End: 2025-04-04
Payer: MEDICARE

## 2025-04-04 LAB — PSA SERPL-MCNC: 3.67 NG/ML (ref 0–4)

## 2025-04-08 ENCOUNTER — OFFICE VISIT (OUTPATIENT)
Dept: UROLOGY | Facility: CLINIC | Age: 75
End: 2025-04-08
Payer: MEDICARE

## 2025-04-08 VITALS
WEIGHT: 163 LBS | OXYGEN SATURATION: 96 % | BODY MASS INDEX: 27.12 KG/M2 | HEART RATE: 88 BPM | DIASTOLIC BLOOD PRESSURE: 80 MMHG | SYSTOLIC BLOOD PRESSURE: 128 MMHG

## 2025-04-08 DIAGNOSIS — Z12.5 SCREENING FOR PROSTATE CANCER: ICD-10-CM

## 2025-04-08 DIAGNOSIS — N52.8 OTHER MALE ERECTILE DYSFUNCTION: ICD-10-CM

## 2025-04-08 DIAGNOSIS — N52.9 ERECTILE DYSFUNCTION, UNSPECIFIED ERECTILE DYSFUNCTION TYPE: ICD-10-CM

## 2025-04-08 DIAGNOSIS — N40.1 BENIGN LOCALIZED PROSTATIC HYPERPLASIA WITH LOWER URINARY TRACT SYMPTOMS (LUTS): Primary | ICD-10-CM

## 2025-04-08 LAB
SL AMB  POCT GLUCOSE, UA: 2000
SL AMB LEUKOCYTE ESTERASE,UA: ABNORMAL
SL AMB POCT BILIRUBIN,UA: ABNORMAL
SL AMB POCT BLOOD,UA: ABNORMAL
SL AMB POCT CLARITY,UA: CLEAR
SL AMB POCT COLOR,UA: YELLOW
SL AMB POCT KETONES,UA: ABNORMAL
SL AMB POCT NITRITE,UA: ABNORMAL
SL AMB POCT PH,UA: 5
SL AMB POCT SPECIFIC GRAVITY,UA: 1.02
SL AMB POCT URINE PROTEIN: ABNORMAL
SL AMB POCT UROBILINOGEN: ABNORMAL

## 2025-04-08 PROCEDURE — 81002 URINALYSIS NONAUTO W/O SCOPE: CPT | Performed by: PHYSICIAN ASSISTANT

## 2025-04-08 PROCEDURE — 99213 OFFICE O/P EST LOW 20 MIN: CPT | Performed by: PHYSICIAN ASSISTANT

## 2025-04-08 RX ORDER — TAMSULOSIN HYDROCHLORIDE 0.4 MG/1
0.4 CAPSULE ORAL 2 TIMES DAILY
Qty: 180 CAPSULE | Refills: 3 | Status: SHIPPED | OUTPATIENT
Start: 2025-04-08

## 2025-04-08 RX ORDER — SILDENAFIL 100 MG/1
TABLET, FILM COATED ORAL
Qty: 10 TABLET | Refills: 5 | Status: SHIPPED | OUTPATIENT
Start: 2025-04-08

## 2025-04-08 NOTE — ASSESSMENT & PLAN NOTE
satisfied with voiding pattern on flomax 0.4mg bid several years- continue same dose  Orders:    POCT urine dip    tamsulosin (FLOMAX) 0.4 mg; Take 1 capsule (0.4 mg total) by mouth 2 (two) times a day

## 2025-04-08 NOTE — PROGRESS NOTES
Name: Stanislav Glover      : 1950      MRN: [id number]  Encounter Provider: Sindi Cervantes PA-C  Encounter Date: 2025   Encounter department: [facility] UROLOGY [place] [facility]  :  Assessment & Plan  Benign localized prostatic hyperplasia with lower urinary tract symptoms (LUTS)  satisfied with voiding pattern on flomax 0.4mg bid several years- continue same dose  Orders:    POCT urine dip    tamsulosin (FLOMAX) 0.4 mg; Take 1 capsule (0.4 mg total) by mouth 2 (two) times a day    Screening for prostate cancer  Lab Results   Component Value Date    PSA 3.674 2025    PSA 3.22 10/14/2023    PSA 3.1 2022       Orders:    PSA, Total Screen; Future    Other male erectile dysfunction  does well with sildenafil 100mg on demand dosing 1-2 hrs prior to intimacy- refilled today       Erectile dysfunction, unspecified erectile dysfunction type    Orders:    sildenafil (Viagra) 100 mg tablet; Take one tablet on empty stomach one hour prior to sex        History of Present Illness   Stanislav Glover is a 75 y.o. male who presents annual follow-up BPH on double dose Flomax, erectile dysfunction on sildenafil 100 mg on demand, and routine prostate cancer screening he had 1 PSA elevation to 3.5 that has recovered back to his baseline 2-3 since.  This is appropriate for his age and prostate size 46 cc by ultrasound.  No urinary tract infections no hematuria.  No history of stone disease.    Review of Systems   Constitutional: Negative.    Respiratory: Negative.     Cardiovascular: Negative.    Genitourinary:  Negative for decreased urine volume, difficulty urinating, dysuria, flank pain, frequency, hematuria and urgency.   Musculoskeletal: Negative.           Objective   /80 (BP Location: Left arm, Patient Position: Sitting, Cuff Size: Standard)   Pulse 88   Wt 73.9 kg (163 lb)   SpO2 96%   BMI 27.12 kg/m²     Physical Exam  Vitals and nursing note reviewed.   Constitutional:       General: He is  not in acute distress.     Appearance: He is well-developed. He is not diaphoretic.   HENT:      Head: Normocephalic and atraumatic.   Pulmonary:      Effort: Pulmonary effort is normal.   Musculoskeletal:      Right lower leg: No edema.      Left lower leg: No edema.   Skin:     General: Skin is warm.   Neurological:      Mental Status: He is alert and oriented to person, place, and time.           Results   Lab Results   Component Value Date    PSA 3.674 04/04/2025    PSA 3.22 10/14/2023    PSA 3.1 09/28/2022     Lab Results   Component Value Date    CALCIUM 10.0 01/21/2025     05/23/2016    K 4.1 01/21/2025    CO2 28 01/21/2025    CL 99 01/21/2025    BUN 19 01/21/2025    CREATININE 1.11 01/21/2025     Lab Results   Component Value Date    WBC 8.19 02/13/2025    HGB 12.7 02/13/2025    HCT 38.3 02/13/2025    MCV 87 02/13/2025     02/13/2025       Office Urine Dip  No results found for this or any previous visit (from the past hour).

## 2025-04-08 NOTE — ASSESSMENT & PLAN NOTE
Orders:    sildenafil (Viagra) 100 mg tablet; Take one tablet on empty stomach one hour prior to sex

## 2025-04-08 NOTE — ASSESSMENT & PLAN NOTE
Lab Results   Component Value Date    PSA 3.674 04/04/2025    PSA 3.22 10/14/2023    PSA 3.1 09/28/2022       Orders:    PSA, Total Screen; Future

## 2025-04-25 ENCOUNTER — APPOINTMENT (OUTPATIENT)
Dept: LAB | Facility: MEDICAL CENTER | Age: 75
End: 2025-04-25
Payer: MEDICARE

## 2025-04-25 DIAGNOSIS — E11.9 TYPE 2 DIABETES MELLITUS WITHOUT COMPLICATION, WITHOUT LONG-TERM CURRENT USE OF INSULIN (HCC): ICD-10-CM

## 2025-04-25 DIAGNOSIS — E78.5 HYPERLIPIDEMIA, UNSPECIFIED HYPERLIPIDEMIA TYPE: ICD-10-CM

## 2025-04-25 DIAGNOSIS — R79.82 ELEVATED C-REACTIVE PROTEIN (CRP): ICD-10-CM

## 2025-04-25 LAB
ALBUMIN SERPL BCG-MCNC: 4.3 G/DL (ref 3.5–5)
ALP SERPL-CCNC: 69 U/L (ref 34–104)
ALT SERPL W P-5'-P-CCNC: 21 U/L (ref 7–52)
ANION GAP SERPL CALCULATED.3IONS-SCNC: 11 MMOL/L (ref 4–13)
AST SERPL W P-5'-P-CCNC: 17 U/L (ref 13–39)
BILIRUB SERPL-MCNC: 0.66 MG/DL (ref 0.2–1)
BUN SERPL-MCNC: 22 MG/DL (ref 5–25)
CALCIUM SERPL-MCNC: 9.5 MG/DL (ref 8.4–10.2)
CHLORIDE SERPL-SCNC: 104 MMOL/L (ref 96–108)
CHOLEST SERPL-MCNC: 150 MG/DL (ref ?–200)
CO2 SERPL-SCNC: 25 MMOL/L (ref 21–32)
CREAT SERPL-MCNC: 0.96 MG/DL (ref 0.6–1.3)
CRP SERPL QL: 2.1 MG/L
GFR SERPL CREATININE-BSD FRML MDRD: 78 ML/MIN/1.73SQ M
GLUCOSE P FAST SERPL-MCNC: 134 MG/DL (ref 65–99)
HDLC SERPL-MCNC: 36 MG/DL
LDLC SERPL CALC-MCNC: 93 MG/DL (ref 0–100)
NONHDLC SERPL-MCNC: 114 MG/DL
POTASSIUM SERPL-SCNC: 4.2 MMOL/L (ref 3.5–5.3)
PROT SERPL-MCNC: 7.2 G/DL (ref 6.4–8.4)
SODIUM SERPL-SCNC: 140 MMOL/L (ref 135–147)
TRIGL SERPL-MCNC: 105 MG/DL (ref ?–150)

## 2025-04-25 PROCEDURE — 86140 C-REACTIVE PROTEIN: CPT

## 2025-04-25 PROCEDURE — 80061 LIPID PANEL: CPT

## 2025-04-25 PROCEDURE — 80053 COMPREHEN METABOLIC PANEL: CPT

## 2025-04-25 PROCEDURE — 36415 COLL VENOUS BLD VENIPUNCTURE: CPT

## 2025-04-28 ENCOUNTER — RESULTS FOLLOW-UP (OUTPATIENT)
Dept: FAMILY MEDICINE CLINIC | Facility: CLINIC | Age: 75
End: 2025-04-28

## 2025-04-29 LAB
LEFT EYE DIABETIC RETINOPATHY: NORMAL
RIGHT EYE DIABETIC RETINOPATHY: NORMAL

## 2025-05-01 ENCOUNTER — RA CDI HCC (OUTPATIENT)
Dept: OTHER | Facility: HOSPITAL | Age: 75
End: 2025-05-01

## 2025-05-01 NOTE — PROGRESS NOTES
HCC coding opportunities          Chart Reviewed number of suggestions sent to Provider: 2     Patients Insurance   E11.36 and H34.8310  Medicare Insurance: Medicare

## 2025-05-07 ENCOUNTER — APPOINTMENT (OUTPATIENT)
Dept: LAB | Facility: MEDICAL CENTER | Age: 75
End: 2025-05-07
Attending: FAMILY MEDICINE
Payer: MEDICARE

## 2025-05-07 ENCOUNTER — OFFICE VISIT (OUTPATIENT)
Dept: FAMILY MEDICINE CLINIC | Facility: CLINIC | Age: 75
End: 2025-05-07
Payer: MEDICARE

## 2025-05-07 VITALS
SYSTOLIC BLOOD PRESSURE: 124 MMHG | WEIGHT: 165.8 LBS | OXYGEN SATURATION: 94 % | HEIGHT: 65 IN | BODY MASS INDEX: 27.63 KG/M2 | DIASTOLIC BLOOD PRESSURE: 72 MMHG | HEART RATE: 74 BPM

## 2025-05-07 DIAGNOSIS — I10 ESSENTIAL HYPERTENSION: ICD-10-CM

## 2025-05-07 DIAGNOSIS — H34.8310 BRANCH RETINAL VEIN OCCLUSION OF RIGHT EYE WITH MACULAR EDEMA: ICD-10-CM

## 2025-05-07 DIAGNOSIS — E78.5 HYPERLIPIDEMIA, UNSPECIFIED HYPERLIPIDEMIA TYPE: ICD-10-CM

## 2025-05-07 DIAGNOSIS — F11.20 CONTINUOUS OPIOID DEPENDENCE (HCC): ICD-10-CM

## 2025-05-07 DIAGNOSIS — E11.9 TYPE 2 DIABETES MELLITUS WITHOUT COMPLICATION, WITHOUT LONG-TERM CURRENT USE OF INSULIN (HCC): Primary | ICD-10-CM

## 2025-05-07 DIAGNOSIS — G95.0 SYRINGOMYELIA (HCC): ICD-10-CM

## 2025-05-07 DIAGNOSIS — Z79.891 LONG TERM (CURRENT) USE OF OPIATE ANALGESIC: ICD-10-CM

## 2025-05-07 DIAGNOSIS — M89.9 DISORDER OF BONE, UNSPECIFIED: ICD-10-CM

## 2025-05-07 DIAGNOSIS — R29.898 WEAKNESS OF BOTH LEGS: ICD-10-CM

## 2025-05-07 PROBLEM — R53.1 WEAKNESS: Status: RESOLVED | Noted: 2025-02-13 | Resolved: 2025-05-07

## 2025-05-07 PROBLEM — H26.9 CATARACT OF RIGHT EYE: Status: RESOLVED | Noted: 2025-01-21 | Resolved: 2025-05-07

## 2025-05-07 LAB
25(OH)D3 SERPL-MCNC: 15.1 NG/ML (ref 30–100)
BASOPHILS # BLD AUTO: 0.03 THOUSANDS/ÂΜL (ref 0–0.1)
BASOPHILS NFR BLD AUTO: 1 % (ref 0–1)
EOSINOPHIL # BLD AUTO: 0.08 THOUSAND/ÂΜL (ref 0–0.61)
EOSINOPHIL NFR BLD AUTO: 1 % (ref 0–6)
ERYTHROCYTE [DISTWIDTH] IN BLOOD BY AUTOMATED COUNT: 13.5 % (ref 11.6–15.1)
EST. AVERAGE GLUCOSE BLD GHB EST-MCNC: 163 MG/DL
HBA1C MFR BLD: 7.3 %
HCT VFR BLD AUTO: 40.3 % (ref 36.5–49.3)
HGB BLD-MCNC: 13.1 G/DL (ref 12–17)
IMM GRANULOCYTES # BLD AUTO: 0.02 THOUSAND/UL (ref 0–0.2)
IMM GRANULOCYTES NFR BLD AUTO: 0 % (ref 0–2)
LYMPHOCYTES # BLD AUTO: 2.57 THOUSANDS/ÂΜL (ref 0.6–4.47)
LYMPHOCYTES NFR BLD AUTO: 42 % (ref 14–44)
MCH RBC QN AUTO: 27.8 PG (ref 26.8–34.3)
MCHC RBC AUTO-ENTMCNC: 32.5 G/DL (ref 31.4–37.4)
MCV RBC AUTO: 86 FL (ref 82–98)
MONOCYTES # BLD AUTO: 0.45 THOUSAND/ÂΜL (ref 0.17–1.22)
MONOCYTES NFR BLD AUTO: 7 % (ref 4–12)
NEUTROPHILS # BLD AUTO: 2.94 THOUSANDS/ÂΜL (ref 1.85–7.62)
NEUTS SEG NFR BLD AUTO: 49 % (ref 43–75)
NRBC BLD AUTO-RTO: 0 /100 WBCS
PLATELET # BLD AUTO: 210 THOUSANDS/UL (ref 149–390)
PMV BLD AUTO: 11.2 FL (ref 8.9–12.7)
RBC # BLD AUTO: 4.71 MILLION/UL (ref 3.88–5.62)
SL AMB POCT HEMOGLOBIN AIC: 7.2 (ref ?–6.5)
TSH SERPL DL<=0.05 MIU/L-ACNC: 2.17 UIU/ML (ref 0.45–4.5)
VIT B12 SERPL-MCNC: 1305 PG/ML (ref 180–914)
WBC # BLD AUTO: 6.09 THOUSAND/UL (ref 4.31–10.16)

## 2025-05-07 PROCEDURE — 84443 ASSAY THYROID STIM HORMONE: CPT | Performed by: FAMILY MEDICINE

## 2025-05-07 PROCEDURE — 83036 HEMOGLOBIN GLYCOSYLATED A1C: CPT | Performed by: FAMILY MEDICINE

## 2025-05-07 PROCEDURE — 82306 VITAMIN D 25 HYDROXY: CPT | Performed by: FAMILY MEDICINE

## 2025-05-07 PROCEDURE — G2211 COMPLEX E/M VISIT ADD ON: HCPCS | Performed by: FAMILY MEDICINE

## 2025-05-07 PROCEDURE — 36415 COLL VENOUS BLD VENIPUNCTURE: CPT | Performed by: FAMILY MEDICINE

## 2025-05-07 PROCEDURE — 85025 COMPLETE CBC W/AUTO DIFF WBC: CPT | Performed by: FAMILY MEDICINE

## 2025-05-07 PROCEDURE — 99214 OFFICE O/P EST MOD 30 MIN: CPT | Performed by: FAMILY MEDICINE

## 2025-05-07 PROCEDURE — 83036 HEMOGLOBIN GLYCOSYLATED A1C: CPT

## 2025-05-07 PROCEDURE — 82607 VITAMIN B-12: CPT | Performed by: FAMILY MEDICINE

## 2025-05-07 RX ORDER — TRAMADOL HYDROCHLORIDE 50 MG/1
50 TABLET ORAL EVERY 8 HOURS PRN
Qty: 90 TABLET | Refills: 0 | Status: SHIPPED | OUTPATIENT
Start: 2025-05-07

## 2025-05-07 NOTE — ASSESSMENT & PLAN NOTE
On tramadol, utd  uds  and contract, pain under  reasonable control,no early  refills    Orders:    traMADol (ULTRAM) 50 mg tablet; Take 1 tablet (50 mg total) by mouth every 8 (eight) hours as needed for moderate pain

## 2025-05-07 NOTE — ASSESSMENT & PLAN NOTE
Lab Results   Component Value Date    HGBA1C 7.2 (A) 05/07/2025   Bs  stable on metformin and  amaryl    Orders:    POCT hemoglobin A1c

## 2025-05-07 NOTE — PROGRESS NOTES
Name: Stanislav Glover      : 1950      MRN: [id number]  Encounter Provider: Shanti Kumar MD  Encounter Date: 2025   Encounter department: [facility] PRIMARY CARE  :  Assessment & Plan  Branch retinal vein occlusion of right eye with macular edema  Sees ophth, stable         Weakness of both legs  Needs  vitamin  b12 , vitamin d  and cbc and thyroid    Orders:    Vitamin D 25 hydroxy    Vitamin B12    CBC and differential    TSH, 3rd generation    Type 2 diabetes mellitus without complication, without long-term current use of insulin (HCC)    Lab Results   Component Value Date    HGBA1C 7.2 (A) 2025   Bs  stable on metformin and  amaryl    Orders:    POCT hemoglobin A1c    Disorder of bone, unspecified    Orders:    Vitamin D 25 hydroxy    Long term (current) use of opiate analgesic  On tramadol, utd  uds  and contract, pain under  reasonable control,no early  refills  Orders:    Vitamin B12    Essential hypertension  BP stable on Valsartan and  hctz    Orders:    TSH, 3rd generation    Syringomyelia (HCC)  On tramadol, utd  uds  and contract, pain under  reasonable control,no early  refills    Orders:    traMADol (ULTRAM) 50 mg tablet; Take 1 tablet (50 mg total) by mouth every 8 (eight) hours as needed for moderate pain    Continuous opioid dependence (HCC)  On tramadol, utd  uds  and contract, pain under  reasonable control,no early  refills    Orders:    traMADol (ULTRAM) 50 mg tablet; Take 1 tablet (50 mg total) by mouth every 8 (eight) hours as needed for moderate pain    Hyperlipidemia, unspecified hyperlipidemia type  Ldl goal . Currently  93, on crestor    Orders:    TSH, 3rd generation           History of Present Illness   Patient presents with:  Follow-up: 4 months follow up. Patient also mention he has been feeling weak this past week wants to know if he needs vitamins , wants  to know  if  vitamin levels  can be  checked    . Bs  this  am 115      Review of Systems  "  Constitutional:  Negative for activity change, appetite change and fatigue.   Respiratory:  Negative for shortness of breath.    Cardiovascular:  Negative for chest pain.   Musculoskeletal:  Positive for back pain and neck pain.   Neurological:  Positive for weakness. Negative for dizziness, light-headedness, numbness and headaches.   Hematological:  Negative for adenopathy.   Psychiatric/Behavioral:  The patient is not nervous/anxious.        Objective   /72 (BP Location: Left arm, Patient Position: Sitting, Cuff Size: Standard)   Pulse 74   Ht 5' 5\" (1.651 m)   Wt 75.2 kg (165 lb 12.8 oz)   SpO2 94%   BMI 27.59 kg/m²      Physical Exam    "

## 2025-05-07 NOTE — ASSESSMENT & PLAN NOTE
Needs  vitamin  b12 , vitamin d  and cbc and thyroid    Orders:    Vitamin D 25 hydroxy    Vitamin B12    CBC and differential    TSH, 3rd generation

## 2025-05-08 ENCOUNTER — RESULTS FOLLOW-UP (OUTPATIENT)
Dept: FAMILY MEDICINE CLINIC | Facility: CLINIC | Age: 75
End: 2025-05-08

## 2025-05-08 DIAGNOSIS — E55.9 VITAMIN D DEFICIENCY: Primary | ICD-10-CM

## 2025-05-08 PROBLEM — Z12.5 SCREENING FOR PROSTATE CANCER: Status: RESOLVED | Noted: 2019-05-01 | Resolved: 2025-05-08

## 2025-06-04 DIAGNOSIS — K20.90 ESOPHAGITIS: ICD-10-CM

## 2025-06-04 RX ORDER — OMEPRAZOLE 40 MG/1
40 CAPSULE, DELAYED RELEASE ORAL
Qty: 90 CAPSULE | Refills: 1 | Status: SHIPPED | OUTPATIENT
Start: 2025-06-04

## 2025-06-25 DIAGNOSIS — G95.0 SYRINGOMYELIA (HCC): ICD-10-CM

## 2025-06-25 DIAGNOSIS — F11.20 CONTINUOUS OPIOID DEPENDENCE (HCC): ICD-10-CM

## 2025-06-25 RX ORDER — TRAMADOL HYDROCHLORIDE 50 MG/1
50 TABLET ORAL EVERY 8 HOURS PRN
Qty: 90 TABLET | Refills: 0 | Status: SHIPPED | OUTPATIENT
Start: 2025-06-25

## 2025-06-25 NOTE — TELEPHONE ENCOUNTER
Reason for call:   [x] Refill   [] Prior Auth  [] Other:     Office:   [x] PCP/Provider -   [] Specialty/Provider -     Medication: traMADol (ULTRAM) 50 mg tablet     Dose/Frequency: Take 1 tablet (50 mg total) by mouth every 8 (eight) hours as needed     Quantity: 90    Pharmacy: [facility] [place], [place]     Local Pharmacy   Does the patient have enough for 3 days?   [x] Yes   [] No - Send as HP to POD

## 2025-08-13 ENCOUNTER — OFFICE VISIT (OUTPATIENT)
Dept: FAMILY MEDICINE CLINIC | Facility: CLINIC | Age: 75
End: 2025-08-13
Payer: MEDICARE

## (undated) DEVICE — PASSER SUT LASSO MICRO 6IN 90DEG STR

## (undated) DEVICE — CANNULA BUTTON 8 X 30MM PASSPORT

## (undated) DEVICE — UNDERGLOVE PROTEXIS  BLUE SZ 7

## (undated) DEVICE — 3M™ STERI-STRIP™ REINFORCED ADHESIVE SKIN CLOSURES, R1547, 1/2 IN X 4 IN (12 MM X 100 MM), 6 STRIPS/ENVELOPE: Brand: 3M™ STERI-STRIP™

## (undated) DEVICE — SUT MONOCRYL 4-0 PS-2 27 IN Y426H

## (undated) DEVICE — 3M™ STERI-DRAPE™ U-DRAPE 1015: Brand: STERI-DRAPE™

## (undated) DEVICE — PACK PBDS SHOULDER ARTHROSCOPY RF

## (undated) DEVICE — LAMINECTOMY ARM CRADLE FOAM POSITIONER: Brand: CARDINAL HEALTH

## (undated) DEVICE — T-MAX DISPOSABLE FACE MASK 8 PER BOX

## (undated) DEVICE — INTENDED FOR TISSUE SEPARATION, AND OTHER PROCEDURES THAT REQUIRE A SHARP SURGICAL BLADE TO PUNCTURE OR CUT.: Brand: BARD-PARKER ® SAFETYLOCK CARBON RIB-BACK BLADES

## (undated) DEVICE — KIT DISP F/ 2.9MM ROTATOR CUFF PUSHLOCK

## (undated) DEVICE — CANNULA 5.75 X 70MM BARREL SHAPED BOWL

## (undated) DEVICE — DRAPE SHEET THREE QUARTER

## (undated) DEVICE — NEEDLE FILTER 5 MICR 19G X 1.5IN

## (undated) DEVICE — SUT TIGERLINK #2 26IN CLSD LOOP AR-7235T

## (undated) DEVICE — 3M™ MICROFOAM™ SURGICAL TAPE 4 ROLLS/CARTON 6 CARTONS/CASE 1528-3: Brand: 3M™ MICROFOAM™

## (undated) DEVICE — STERILE POLYISOPRENE POWDER-FREE SURGICAL GLOVES: Brand: PROTEXIS

## (undated) DEVICE — GAUZE SPONGES,16 PLY: Brand: CURITY

## (undated) DEVICE — 4-PORT MANIFOLD: Brand: NEPTUNE 2

## (undated) DEVICE — PUDDLEVAC FLOOR SUCTION DEVICE: Brand: PUDDLEVAC

## (undated) DEVICE — VAPR COOLPULSE 90 ELECTRODE 90 DEGREES SUCTION WITH INTEGRATED HANDPIECE: Brand: VAPR COOLPULSE

## (undated) DEVICE — BLADE SHAVER CUTTER BN 4MM 13CM COOLCUT

## (undated) DEVICE — NEEDLE BLUNT 18 G X 1 1/2IN

## (undated) DEVICE — TIGERTAPE 2MM X 7IN AR-7237-7T

## (undated) DEVICE — STERILE POLYISOPRENE POWDER-FREE SURGICAL GLOVES WITH EMOLLIENT COATING: Brand: PROTEXIS

## (undated) DEVICE — TUBING ARTHROSCOPY DUALWAVE OUTFLOW TUBE SET

## (undated) DEVICE — STOCKINETTE,IMPERVIOUS,12X48,STERILE: Brand: MEDLINE

## (undated) DEVICE — SYRINGE 3ML LL

## (undated) DEVICE — SHOULDER STABILIZATION KIT,                                    DISPOSABLE 12 PER BOX

## (undated) DEVICE — 3M™ IOBAN™ 2 ANTIMICROBIAL INCISE DRAPE 6650EZ: Brand: IOBAN™ 2

## (undated) DEVICE — OCCLUSIVE GAUZE STRIP,3% BISMUTH TRIBROMOPHENATE IN PETROLATUM BLEND: Brand: XEROFORM

## (undated) DEVICE — ABDOMINAL PAD: Brand: DERMACEA

## (undated) DEVICE — DRAPE STERI 1010 18IN X 12IN

## (undated) DEVICE — BLADE SHAVER TORPEDO 4MM 13CM  COOLCUT

## (undated) DEVICE — CHLORAPREP HI-LITE 26ML ORANGE

## (undated) DEVICE — GARMENT,MEDLINE,DVT,INT,CALF,FOAM,MED: Brand: MEDLINE

## (undated) DEVICE — CANNULA 7 X70MM THRD SEAL SIDE PORT

## (undated) DEVICE — NEEDLE SUT SCORPION MULTIFIRE

## (undated) DEVICE — TUBING ARTHROSCOPIC WAVE  MAIN PUMP